# Patient Record
Sex: FEMALE | Race: WHITE | NOT HISPANIC OR LATINO | URBAN - METROPOLITAN AREA
[De-identification: names, ages, dates, MRNs, and addresses within clinical notes are randomized per-mention and may not be internally consistent; named-entity substitution may affect disease eponyms.]

---

## 2017-07-22 ENCOUNTER — INPATIENT (INPATIENT)
Facility: HOSPITAL | Age: 82
LOS: 2 days | Discharge: OTHER INSTITUTION | DRG: 159 | End: 2017-07-25
Attending: HOSPITALIST | Admitting: INTERNAL MEDICINE
Payer: MEDICARE

## 2017-07-22 VITALS
SYSTOLIC BLOOD PRESSURE: 144 MMHG | HEART RATE: 70 BPM | RESPIRATION RATE: 20 BRPM | TEMPERATURE: 98 F | DIASTOLIC BLOOD PRESSURE: 77 MMHG

## 2017-07-22 PROCEDURE — 71010: CPT | Mod: 26

## 2017-07-22 PROCEDURE — 99285 EMERGENCY DEPT VISIT HI MDM: CPT | Mod: 25,GC

## 2017-07-22 PROCEDURE — 73620 X-RAY EXAM OF FOOT: CPT | Mod: 26,LT

## 2017-07-22 PROCEDURE — 72170 X-RAY EXAM OF PELVIS: CPT | Mod: 26

## 2017-07-22 PROCEDURE — 70450 CT HEAD/BRAIN W/O DYE: CPT | Mod: 26

## 2017-07-22 PROCEDURE — 73590 X-RAY EXAM OF LOWER LEG: CPT | Mod: 26,RT

## 2017-07-22 PROCEDURE — 73610 X-RAY EXAM OF ANKLE: CPT | Mod: 26,LT

## 2017-07-22 PROCEDURE — 73562 X-RAY EXAM OF KNEE 3: CPT | Mod: 26,RT

## 2017-07-22 PROCEDURE — 70486 CT MAXILLOFACIAL W/O DYE: CPT | Mod: 26

## 2017-07-22 PROCEDURE — 93010 ELECTROCARDIOGRAM REPORT: CPT

## 2017-07-22 RX ORDER — ACETAMINOPHEN 500 MG
650 TABLET ORAL ONCE
Qty: 0 | Refills: 0 | Status: COMPLETED | OUTPATIENT
Start: 2017-07-22 | End: 2017-07-22

## 2017-07-22 RX ADMIN — Medication 650 MILLIGRAM(S): at 20:11

## 2017-07-22 NOTE — ED PROVIDER NOTE - ATTENDING CONTRIBUTION TO CARE
Patient with fall and difficulty walking, decreased activities of daily living secondary to legal blindness. Patient fell onto face with mild to moderate pain and dental laxity per family. no shortness of breath no chest pain, no numbness or weakness. Patient has pain to left face/foot/ankle and right knee. No loss of consciousness.   will get iv, labs, ct head/neck/maxillofacial bones, dental consult, Will follow up on labs, analgesia, reassess and disposition as clinically indicated.

## 2017-07-22 NOTE — ED PROVIDER NOTE - MUSCULOSKELETAL, MLM
Strength appropriate for age. Full active range of motion of all 4 extremities. Pain in R knee when flexing w/ point tenderness inferior aspect of patella w/ small ecchymose

## 2017-07-22 NOTE — ED PROVIDER NOTE - ENMT, MLM
Airway patent, Nasal mucosa clear. Mouth with normal mucosa. Small abrasion insided lower lip. Loose but stable R upper frontal incisor. Chipped L upper frontal incisior. Small abrasion Just inferior to lip, midline, not actively bleeding and non communicating with inside of lip

## 2017-07-22 NOTE — ED ADULT NURSE NOTE - OBJECTIVE STATEMENT
89 y/o female hx HTN on asa & plavix, bibems s/p fall. as per pt she was walking and fell on her face hit mouth, dry blood on chin, 89 y/o female hx HTN on asa & plavix, stroke in 2015, chronic bilateral hip pain, visually impaired,  bibems s/p fall. as per pt she was walking and fell on her face hit mouth, dry blood on chin, no headache, PERRL, A&Ox3. No CP or SOB, no nausea/vomiting, no fever/chills. rash eruption on right arm on prednisone. Safety maintained & continue monitor.

## 2017-07-22 NOTE — ED PROVIDER NOTE - PROGRESS NOTE DETAILS
Dr. Isacc Parker: [phone] - [facility] saw patient - splinted from R canine to L canine - moved displaced tooth back into place - needs to follow up with Dental Clinic [phone] in [place] in 2 weeks - will place this information in discharge instructions if patient patient unable to walk - will admit patient unable to walk - reporting pain in R knee - unable to walk to bathroom with tech and nurse - not a safe discharge w/ concurrent legal blindness- will admit

## 2017-07-22 NOTE — ED PROVIDER NOTE - EYES, MLM
Clear bilaterally, pupils equal, round and reactive to light. EOMI. No scleral icterus. Dec visual acuity both eyes Clear bilaterally, pupils equal, round and reactive to light. EOMI. No scleral icterus. Decreased visual acuity both eyes (patient sees movement/light at 1-2 feet)

## 2017-07-22 NOTE — ED PROVIDER NOTE - CARE PLAN
Instructions for follow-up, activity and diet:	1) Please return to the ED should you have any new or worsening symptoms, worsening pain, develop   2) Please follow up with Dental Clinic [phone] in [place] in 2 weeks - please call to make an appointment Instructions for follow-up, activity and diet:	1) Please return to the ED should you have any new or worsening symptoms, worsening pain, develop dizziness, chest pain, or shortness of breath  2) Please follow up with Dental Clinic [phone] in [place] in 2 weeks - please call to make an appointment - please continue with a soft diet until cleared by dental Instructions for follow-up, activity and diet:	1) Please return to the ED should you have any new or worsening symptoms, worsening pain, develop dizziness, chest pain, or shortness of breath  2) Please follow up with Dental Clinic [phone] in [place] in 2 weeks - please call to make an appointment - please continue with a soft diet until cleared by dental Principal Discharge DX:	Fall  Instructions for follow-up, activity and diet:	1) Please return to the ED should you have any new or worsening symptoms, worsening pain, develop dizziness, chest pain, or shortness of breath  2) Please follow up with Dental Clinic [phone] in [place] in 2 weeks - please call to make an appointment - please continue with a soft diet until cleared by dental Principal Discharge DX:	Fall  Instructions for follow-up, activity and diet:	1) Please return to the ED should you have any new or worsening symptoms, worsening pain, develop dizziness, chest pain, or shortness of breath  2) Please follow up with Dental Clinic [phone] in [place] in 2 weeks - please call to make an appointment - please continue with a soft diet until cleared by dental Principal Discharge DX:	Fall  Instructions for follow-up, activity and diet:	1) Please return to the ED should you have any new or worsening symptoms, worsening pain, develop dizziness, chest pain, or shortness of breath  2) Please follow up with Dental Clinic [phone] [facility] in 2 weeks - please call to make an appointment - please continue with a soft diet until cleared by dental  Secondary Diagnosis:	Closed fracture of tooth, initial encounter

## 2017-07-22 NOTE — CONSULT NOTE ADULT - SUBJECTIVE AND OBJECTIVE BOX
Patient is a 89y old  Female who presents with a chief complaint of "fell and chipped my front tooth"    HPI:  89 y/o female hx of CVA on ASA plavix, w/ bilateral vision loss, HTN, HLD, not on AC presents from assisted living [place] with a fall. Per patient, pants were wet, so she walking to her room and she tripped and hit face on the floor. No LOC. Normally walks with a walker but was not using it today. Reports L sided face pain, R knee pain, L foot and ankle pain, and chipped front tooth. Was unable to get off floor by herself - aid had to help - on floor for 5 minutes. Denies any Chest pain, SOB, N/V/D, confusion. A+O x 3. Been following with Derm for R arm rash - will find out biopsy results on Monday.     PAST MEDICAL & SURGICAL HISTORY:  Stroke  HLD (hyperlipidemia)  HTN (hypertension)  Hypothyroid  Diabetes (insulin dependent)    ( -  ) heart valve replacement  (  - ) joint replacement  (  - ) pregnancy    Home Medications:   * Patient Currently Takes Medications as of 14-Jul-2015 09:51 documented in Order   · 	lactulose 10 g/15 mL oral syrup: 45 milliliter(s) orally 2 times a day, As needed, constipation  · 	amLODIPine 5 mg oral tablet: 1 tab(s) orally once a day  · 	albuterol-ipratropium 2.5 mg-0.5 mg/3 mL inhalation solution: 3 milliliter(s) inhaled every 6 hours  · 	levofloxacin 250 mg oral tablet: 1 tab(s) orally every 24 hours  · 	insulin lispro 100 units/mL subcutaneous solution:  subcutaneous once a day (at bedtime), 2 Unit(s) if Glucose 251 - 300  	4 Unit(s) if Glucose 301 - 350  	6 Unit(s) if Glucose 351 - 400  	8 Unit(s) if Glucose Greater Than 400  · 	insulin lispro 100 units/mL subcutaneous solution:  subcutaneous 3 times a day (before meals), 1 Unit(s) if Glucose 151 - 200  	2 Unit(s) if Glucose 201 - 250  	3 Unit(s) if Glucose 251 - 300  	4 Unit(s) if Glucose 301 - 350  	5 Unit(s) if Glucose 351 - 400  	6 Unit(s) if Glucose Greater Than 400  · 	acetaminophen 325 mg oral tablet: 2 tab(s) orally every 6 hours, As needed, For Temp over 37.9 C (100.2 F)  · 	Multiple Vitamins oral tablet: 1 tab(s) orally once a day  · 	ascorbic acid 500 mg oral tablet: 1 tab(s) orally 2 times a day  · 	folic acid 1 mg oral tablet: 1 tab(s) orally once a day  · 	atorvastatin 40 mg oral tablet: 1 tab(s) orally once a day (at bedtime)  · 	clopidogrel 75 mg oral tablet: 1 tab(s) orally once a day  carvedilol 25 mg oral tablet: 1 tab(s) orally every 12 hours  · 	famotidine 20 mg oral tablet: 1 tab(s) orally once a day  · 	aspirin 81 mg oral delayed release tablet: 1 tab(s) orally once a day  · 	clonidine 0.2 mg oral tablet:  orally once a day (at bedtime)  · 	Coumadin 2.5 mg oral tablet: 1 tab(s) orally once a day (at bedtime)  · 	Seroquel 25 mg oral tablet: 1 tab(s) orally once a day  · 	Cozaar 50 mg oral tablet: 2 tab(s) orally once a day  · 	Lutein 6 mg oral capsule: 1 cap(s) orally once a day  ·	Synthroid 25 mcg (0.025 mg) oral tablet: 2 tab(s) orally once a day    Allergies: No Known Allergies, no intolerances    Vital Signs Last 24 Hrs  T(C): 36.6 (22 Jul 2017 19:13), Max: 36.6 (22 Jul 2017 19:13)  T(F): 97.9 (22 Jul 2017 19:13), Max: 97.9 (22 Jul 2017 19:13)  HR: 70 (22 Jul 2017 19:13) (70 - 70)  BP: 144/77 (22 Jul 2017 19:13) (144/77 - 144/77)  BP(mean): --  RR: 20 (22 Jul 2017 19:13) (20 - 20)  SpO2: --    EOE:  TMJ (  - ) clicks                    (  +  ) pops bilaterally - assymptomatic                    (  -  ) crepitus             Mandible FROM             Facial bones and MOM grossly intact             ( -  ) trismus             ( -  ) LAD             ( + ) mild swelling - on left of face where patient reports falling - upon palpation patient denied pain, no fluctuance, no crepitus              (  + ) mild asymmetry - due to mild swelling on left side of face             ( +  ) palpation - slight pain with palpation of lower lip - 5mm inferior to vermillion border of lower lip noted ecchymosis of 1.3 cm spanning mediolaterally with superficial abrasion measuring 2x2mm, hemostasis achieved with moderate finger pressure.             (  - ) SOB             (  - ) dysphagia             ( -  ) LOC    IOE:  permanent dentition: grossly intact                 hard/soft palate:  ( -  ) palatal torus           tongue/FOM WNL           labial/buccal mucosa : lower lip ecchymosis - 2cm extending from the midline to distal of tooth #27 and spanning 1cm superior-inferiorly.           ( -  ) percussion           ( +  ) palpation - with apical pressure applied to tooth #8 - hemorrhaging noted from gingival sulcus on buccal of tooth #8            ( - ) swelling            ( -  ) abscess           ( -  ) sinus tract    Dentition present: 2mm anterior open bite, enamel-dentin fracture without pulpal exposure (Plasencia Class II) of tooth #9 mesial incisal edge  Mobility: mobility class 2 on tooth #8, mobility of Class 1 on tooth #9    *DENTAL RADIOGRAPHS: two periapical radiographs taken of tooth #8 & 9     RADIOLOGY & ADDITIONAL STUDIES: widened periodontal ligament noted at apex of tooth #8 indicating coronal displacement. #9 noted to be WNL    *ASSESSMENT: Clinical examination revealed asymptomatic popping of TMJ bilaterally, mild swelling on left of face where patient reports falling, mild extraoral asymmetry due to mild swelling on left side of face. Upon palpation patient denied pain, no fluctuance, no crepitus. Upon palpation of lower lip, patient verbalized slight pain (2/10 on pain scale). 5mm inferior to vermillion border of lower lip noted ecchymosis of 1.3 cm spanning mediolaterally with superficial abrasion measuring 2x2mm, hemostasis achieved with moderate finger pressure x 2mins. Intraorally 2mm anterior open bite noted with posterior teeth in proper occlusion, noted lower lip ecchymosis of 2cm extending from the midline to distal of tooth #27 and spanning 1cm superior-inferiorly. Patient verbalized slight pain (2/10 on pain scale) with apical pressure applied to tooth #8 - hemorrhaging noted from gingival sulcus on buccal of tooth #8. Radiographic and clinical evaluation revealed #8 extruded and #9 concussion.    PROCEDURE:   Verbal consent given.  Anesthesia: 1 carpule 3% Carbocaine delivered via buccal infiltration of tooth #8 & #9.  Treatment: digital repositioning of tooth #8 apically and wire and composite splint placed from tooth # 6-11. Teeth etched, bonded, and flowable composite placed and light cured to secure wire. Repeat periapical radiographs taken. Radiographic evaluation revealed reduction of widened PDL and replacement of tooth #8 in socket.    RECOMMENDATIONS:  1) Pain control as needed.  2) Soft diet and avoid anterior teeth while chewing (avoid biting into food with front teeth)  3) Dental F/U in 2 weeks with outpatient dentist to evaluate splint and stability of tooth #8 and #9   4) Dental F/U with outpatient dentist for comprehensive dental care.   5) If any difficulty swallowing/breathing, fever occur, return to ER.     Magali Terry DDS, Roxie Babcock DDS, pager #[phone]  Consulted with Chief Resident Vitor Toro DDS

## 2017-07-22 NOTE — ED PROVIDER NOTE - OBJECTIVE STATEMENT
89 y/o female with CAD plavix, HTN, HLD, unsure if on AC presents from assisted living with a fall. Per patient, pants were wet, so she walking to her room and she tripped and hit face on the floor. No LOC. Normally walks with a walker but was not using it today. Reports L sided face pain, R knee pain, L foot and ankle pain, and chipped front tooth. Was unable to get off floor by herself - aid had to help. Denies any Chest pain, SOB, N/V/D, confusion. A+O x 3.  PMD: Dr. Teo Olguin 89 y/o female hx of CVA on ASA plavix, w/ bilateral vision loss, HTN, HLD, not on AC presents from assisted living [facility] with a fall. Per patient, pants were wet, so she walking to her room and she tripped and hit face on the floor. No LOC. Normally walks with a walker but was not using it today. Reports L sided face pain, R knee pain, L foot and ankle pain, and chipped front tooth. Was unable to get off floor by herself - aid had to help - on floor for 5 minutes. Denies any Chest pain, SOB, N/V/D, confusion. A+O x 3.  PMD: [facility] (Marko Babcock) [phone] 89 y/o female hx of CVA on ASA plavix, w/ bilateral vision loss, HTN, HLD, not on AC presents from assisted living [facility] with a fall. Per patient, pants were wet, so she walking to her room and she tripped and hit face on the floor. No LOC. Normally walks with a walker but was not using it today. Reports L sided face pain, R knee pain, L foot and ankle pain, and chipped front tooth. Was unable to get off floor by herself - aid had to help - on floor for 5 minutes. Denies any Chest pain, SOB, N/V/D, confusion. A+O x 3. Been following with Derm for R arm rash - will find out biopsy results on Monday.   PMD: [facility] (Marko Babcock) [phone]

## 2017-07-22 NOTE — ED PROVIDER NOTE - PLAN OF CARE
1) Please return to the ED should you have any new or worsening symptoms, worsening pain, develop   2) Please follow up with Dental Clinic [phone] in [place] in 2 weeks - please call to make an appointment 1) Please return to the ED should you have any new or worsening symptoms, worsening pain, develop dizziness, chest pain, or shortness of breath  2) Please follow up with Dental Clinic [phone] in [place] in 2 weeks - please call to make an appointment - please continue with a soft diet until cleared by dental

## 2017-07-22 NOTE — ED PROVIDER NOTE - SKIN, MLM
Erythematous rash w/ scattered bullae on R arm, some blisters sloughed off - Erythematous rash w/ scattered bullae on R arm, some blisters sloughed off

## 2017-07-23 DIAGNOSIS — R21 RASH AND OTHER NONSPECIFIC SKIN ERUPTION: ICD-10-CM
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
## 2017-07-23 DIAGNOSIS — I10 ESSENTIAL (PRIMARY) HYPERTENSION: ICD-10-CM

## 2017-07-23 DIAGNOSIS — I63.9 CEREBRAL INFARCTION, UNSPECIFIED: ICD-10-CM

## 2017-07-23 DIAGNOSIS — R73.9 HYPERGLYCEMIA, UNSPECIFIED: ICD-10-CM

## 2017-07-23 DIAGNOSIS — E03.9 HYPOTHYROIDISM, UNSPECIFIED: ICD-10-CM

## 2017-07-23 DIAGNOSIS — Z96.642 PRESENCE OF LEFT ARTIFICIAL HIP JOINT: Chronic | ICD-10-CM

## 2017-07-23 DIAGNOSIS — F41.9 ANXIETY DISORDER, UNSPECIFIED: ICD-10-CM

## 2017-07-23 LAB
ALBUMIN SERPL ELPH-MCNC: 3.9 G/DL — SIGNIFICANT CHANGE UP (ref 3.3–5)
ALP SERPL-CCNC: 80 U/L — SIGNIFICANT CHANGE UP (ref 40–120)
ALT FLD-CCNC: 42 U/L RC — SIGNIFICANT CHANGE UP (ref 10–45)
ANION GAP SERPL CALC-SCNC: 14 MMOL/L — SIGNIFICANT CHANGE UP (ref 5–17)
APPEARANCE UR: CLEAR — SIGNIFICANT CHANGE UP
APTT BLD: 20 SEC — LOW (ref 27.5–37.4)
AST SERPL-CCNC: 18 U/L — SIGNIFICANT CHANGE UP (ref 10–40)
BASOPHILS # BLD AUTO: 0.1 K/UL — SIGNIFICANT CHANGE UP (ref 0–0.2)
BASOPHILS NFR BLD AUTO: 0.8 % — SIGNIFICANT CHANGE UP (ref 0–2)
BILIRUB SERPL-MCNC: 0.7 MG/DL — SIGNIFICANT CHANGE UP (ref 0.2–1.2)
BILIRUB UR-MCNC: NEGATIVE — SIGNIFICANT CHANGE UP
BUN SERPL-MCNC: 40 MG/DL — HIGH (ref 7–23)
CALCIUM SERPL-MCNC: 9.2 MG/DL — SIGNIFICANT CHANGE UP (ref 8.4–10.5)
CHLORIDE SERPL-SCNC: 97 MMOL/L — SIGNIFICANT CHANGE UP (ref 96–108)
CO2 SERPL-SCNC: 25 MMOL/L — SIGNIFICANT CHANGE UP (ref 22–31)
COLOR SPEC: SIGNIFICANT CHANGE UP
CREAT ?TM UR-MCNC: 38 MG/DL — SIGNIFICANT CHANGE UP
CREAT SERPL-MCNC: 1.02 MG/DL — SIGNIFICANT CHANGE UP (ref 0.5–1.3)
DIFF PNL FLD: NEGATIVE — SIGNIFICANT CHANGE UP
EOSINOPHIL # BLD AUTO: 0 K/UL — SIGNIFICANT CHANGE UP (ref 0–0.5)
EOSINOPHIL NFR BLD AUTO: 0 % — SIGNIFICANT CHANGE UP (ref 0–6)
GLUCOSE SERPL-MCNC: 407 MG/DL — HIGH (ref 70–99)
GLUCOSE UR QL: >1000
HCT VFR BLD CALC: 44.8 % — SIGNIFICANT CHANGE UP (ref 34.5–45)
HGB BLD-MCNC: 14.8 G/DL — SIGNIFICANT CHANGE UP (ref 11.5–15.5)
INR BLD: 0.98 RATIO — SIGNIFICANT CHANGE UP (ref 0.88–1.16)
KETONES UR-MCNC: NEGATIVE — SIGNIFICANT CHANGE UP
LEUKOCYTE ESTERASE UR-ACNC: NEGATIVE — SIGNIFICANT CHANGE UP
LYMPHOCYTES # BLD AUTO: 1.2 K/UL — SIGNIFICANT CHANGE UP (ref 1–3.3)
LYMPHOCYTES # BLD AUTO: 10.5 % — LOW (ref 13–44)
MCHC RBC-ENTMCNC: 31.3 PG — SIGNIFICANT CHANGE UP (ref 27–34)
MCHC RBC-ENTMCNC: 33.1 GM/DL — SIGNIFICANT CHANGE UP (ref 32–36)
MCV RBC AUTO: 94.6 FL — SIGNIFICANT CHANGE UP (ref 80–100)
MICROALBUMIN UR-MCNC: 1.7 MG/DL — SIGNIFICANT CHANGE UP
MICROALBUMIN/CREAT UR-RTO: 45 MG/G — HIGH (ref 0–30)
MONOCYTES # BLD AUTO: 0.5 K/UL — SIGNIFICANT CHANGE UP (ref 0–0.9)
MONOCYTES NFR BLD AUTO: 3.9 % — SIGNIFICANT CHANGE UP (ref 2–14)
NEUTROPHILS # BLD AUTO: 9.8 K/UL — HIGH (ref 1.8–7.4)
NEUTROPHILS NFR BLD AUTO: 84.7 % — HIGH (ref 43–77)
NITRITE UR-MCNC: NEGATIVE — SIGNIFICANT CHANGE UP
PH UR: 5.5 — SIGNIFICANT CHANGE UP (ref 5–8)
PLATELET # BLD AUTO: 176 K/UL — SIGNIFICANT CHANGE UP (ref 150–400)
POTASSIUM SERPL-MCNC: 5.1 MMOL/L — SIGNIFICANT CHANGE UP (ref 3.5–5.3)
POTASSIUM SERPL-SCNC: 5.1 MMOL/L — SIGNIFICANT CHANGE UP (ref 3.5–5.3)
PROT SERPL-MCNC: 6.8 G/DL — SIGNIFICANT CHANGE UP (ref 6–8.3)
PROT UR-MCNC: SIGNIFICANT CHANGE UP
PROTHROM AB SERPL-ACNC: 10.7 SEC — SIGNIFICANT CHANGE UP (ref 9.8–12.7)
RBC # BLD: 4.73 M/UL — SIGNIFICANT CHANGE UP (ref 3.8–5.2)
RBC # FLD: 12.7 % — SIGNIFICANT CHANGE UP (ref 10.3–14.5)
SODIUM SERPL-SCNC: 136 MMOL/L — SIGNIFICANT CHANGE UP (ref 135–145)
SP GR SPEC: 1.03 — HIGH (ref 1.01–1.02)
UROBILINOGEN FLD QL: NEGATIVE — SIGNIFICANT CHANGE UP
WBC # BLD: 11.5 K/UL — HIGH (ref 3.8–10.5)
WBC # FLD AUTO: 11.5 K/UL — HIGH (ref 3.8–10.5)

## 2017-07-23 PROCEDURE — 99223 1ST HOSP IP/OBS HIGH 75: CPT | Mod: GC

## 2017-07-23 PROCEDURE — 99223 1ST HOSP IP/OBS HIGH 75: CPT

## 2017-07-23 RX ORDER — CHOLECALCIFEROL (VITAMIN D3) 125 MCG
1000 CAPSULE ORAL DAILY
Qty: 0 | Refills: 0 | Status: DISCONTINUED | OUTPATIENT
Start: 2017-07-23 | End: 2017-07-25

## 2017-07-23 RX ORDER — CARVEDILOL PHOSPHATE 80 MG/1
25 CAPSULE, EXTENDED RELEASE ORAL EVERY 12 HOURS
Qty: 0 | Refills: 0 | Status: DISCONTINUED | OUTPATIENT
Start: 2017-07-23 | End: 2017-07-25

## 2017-07-23 RX ORDER — PANTOPRAZOLE SODIUM 20 MG/1
40 TABLET, DELAYED RELEASE ORAL
Qty: 0 | Refills: 0 | Status: DISCONTINUED | OUTPATIENT
Start: 2017-07-23 | End: 2017-07-25

## 2017-07-23 RX ORDER — DOCUSATE SODIUM 100 MG
100 CAPSULE ORAL THREE TIMES A DAY
Qty: 0 | Refills: 0 | Status: DISCONTINUED | OUTPATIENT
Start: 2017-07-23 | End: 2017-07-25

## 2017-07-23 RX ORDER — ALPRAZOLAM 0.25 MG
0 TABLET ORAL
Qty: 0 | Refills: 0 | COMMUNITY

## 2017-07-23 RX ORDER — ASCORBIC ACID 60 MG
500 TABLET,CHEWABLE ORAL DAILY
Qty: 0 | Refills: 0 | Status: DISCONTINUED | OUTPATIENT
Start: 2017-07-23 | End: 2017-07-25

## 2017-07-23 RX ORDER — INSULIN LISPRO 100/ML
VIAL (ML) SUBCUTANEOUS
Qty: 0 | Refills: 0 | Status: DISCONTINUED | OUTPATIENT
Start: 2017-07-23 | End: 2017-07-25

## 2017-07-23 RX ORDER — QUETIAPINE FUMARATE 200 MG/1
25 TABLET, FILM COATED ORAL AT BEDTIME
Qty: 0 | Refills: 0 | Status: DISCONTINUED | OUTPATIENT
Start: 2017-07-23 | End: 2017-07-25

## 2017-07-23 RX ORDER — INSULIN LISPRO 100/ML
VIAL (ML) SUBCUTANEOUS AT BEDTIME
Qty: 0 | Refills: 0 | Status: DISCONTINUED | OUTPATIENT
Start: 2017-07-23 | End: 2017-07-25

## 2017-07-23 RX ORDER — FOLIC ACID 0.8 MG
1 TABLET ORAL DAILY
Qty: 0 | Refills: 0 | Status: DISCONTINUED | OUTPATIENT
Start: 2017-07-23 | End: 2017-07-25

## 2017-07-23 RX ORDER — SENNA PLUS 8.6 MG/1
0 TABLET ORAL
Qty: 0 | Refills: 0 | COMMUNITY

## 2017-07-23 RX ORDER — ASPIRIN/CALCIUM CARB/MAGNESIUM 324 MG
81 TABLET ORAL DAILY
Qty: 0 | Refills: 0 | Status: DISCONTINUED | OUTPATIENT
Start: 2017-07-23 | End: 2017-07-25

## 2017-07-23 RX ORDER — VALACYCLOVIR 500 MG/1
500 TABLET, FILM COATED ORAL EVERY 12 HOURS
Qty: 0 | Refills: 0 | Status: DISCONTINUED | OUTPATIENT
Start: 2017-07-23 | End: 2017-07-24

## 2017-07-23 RX ORDER — QUETIAPINE FUMARATE 200 MG/1
0 TABLET, FILM COATED ORAL
Qty: 0 | Refills: 0 | COMMUNITY

## 2017-07-23 RX ORDER — LEVOTHYROXINE SODIUM 125 MCG
50 TABLET ORAL DAILY
Qty: 0 | Refills: 0 | Status: DISCONTINUED | OUTPATIENT
Start: 2017-07-23 | End: 2017-07-25

## 2017-07-23 RX ORDER — DEXTROSE 50 % IN WATER 50 %
25 SYRINGE (ML) INTRAVENOUS ONCE
Qty: 0 | Refills: 0 | Status: DISCONTINUED | OUTPATIENT
Start: 2017-07-23 | End: 2017-07-25

## 2017-07-23 RX ORDER — ACETAMINOPHEN 500 MG
650 TABLET ORAL EVERY 6 HOURS
Qty: 0 | Refills: 0 | Status: DISCONTINUED | OUTPATIENT
Start: 2017-07-23 | End: 2017-07-25

## 2017-07-23 RX ORDER — DOCUSATE SODIUM 100 MG
0 CAPSULE ORAL
Qty: 0 | Refills: 0 | COMMUNITY

## 2017-07-23 RX ORDER — AMLODIPINE BESYLATE 2.5 MG/1
5 TABLET ORAL DAILY
Qty: 0 | Refills: 0 | Status: DISCONTINUED | OUTPATIENT
Start: 2017-07-23 | End: 2017-07-24

## 2017-07-23 RX ORDER — HEPARIN SODIUM 5000 [USP'U]/ML
5000 INJECTION INTRAVENOUS; SUBCUTANEOUS EVERY 8 HOURS
Qty: 0 | Refills: 0 | Status: DISCONTINUED | OUTPATIENT
Start: 2017-07-23 | End: 2017-07-25

## 2017-07-23 RX ORDER — ALPRAZOLAM 0.25 MG
1 TABLET ORAL
Qty: 0 | Refills: 0 | COMMUNITY

## 2017-07-23 RX ORDER — SODIUM CHLORIDE 9 MG/ML
1000 INJECTION, SOLUTION INTRAVENOUS
Qty: 0 | Refills: 0 | Status: DISCONTINUED | OUTPATIENT
Start: 2017-07-23 | End: 2017-07-25

## 2017-07-23 RX ORDER — ALPRAZOLAM 0.25 MG
0.25 TABLET ORAL AT BEDTIME
Qty: 0 | Refills: 0 | Status: DISCONTINUED | OUTPATIENT
Start: 2017-07-23 | End: 2017-07-25

## 2017-07-23 RX ORDER — DEXTROSE 50 % IN WATER 50 %
1 SYRINGE (ML) INTRAVENOUS ONCE
Qty: 0 | Refills: 0 | Status: DISCONTINUED | OUTPATIENT
Start: 2017-07-23 | End: 2017-07-25

## 2017-07-23 RX ORDER — GLUCAGON INJECTION, SOLUTION 0.5 MG/.1ML
1 INJECTION, SOLUTION SUBCUTANEOUS ONCE
Qty: 0 | Refills: 0 | Status: DISCONTINUED | OUTPATIENT
Start: 2017-07-23 | End: 2017-07-25

## 2017-07-23 RX ORDER — DEXTROSE 50 % IN WATER 50 %
12.5 SYRINGE (ML) INTRAVENOUS ONCE
Qty: 0 | Refills: 0 | Status: DISCONTINUED | OUTPATIENT
Start: 2017-07-23 | End: 2017-07-25

## 2017-07-23 RX ORDER — ATORVASTATIN CALCIUM 80 MG/1
40 TABLET, FILM COATED ORAL AT BEDTIME
Qty: 0 | Refills: 0 | Status: DISCONTINUED | OUTPATIENT
Start: 2017-07-23 | End: 2017-07-25

## 2017-07-23 RX ORDER — SENNA PLUS 8.6 MG/1
2 TABLET ORAL AT BEDTIME
Qty: 0 | Refills: 0 | Status: DISCONTINUED | OUTPATIENT
Start: 2017-07-23 | End: 2017-07-25

## 2017-07-23 RX ORDER — CLOPIDOGREL BISULFATE 75 MG/1
75 TABLET, FILM COATED ORAL DAILY
Qty: 0 | Refills: 0 | Status: DISCONTINUED | OUTPATIENT
Start: 2017-07-23 | End: 2017-07-25

## 2017-07-23 RX ADMIN — Medication 1 MILLIGRAM(S): at 08:49

## 2017-07-23 RX ADMIN — ATORVASTATIN CALCIUM 40 MILLIGRAM(S): 80 TABLET, FILM COATED ORAL at 22:53

## 2017-07-23 RX ADMIN — Medication 10 MILLIGRAM(S): at 14:45

## 2017-07-23 RX ADMIN — Medication 50 MICROGRAM(S): at 05:20

## 2017-07-23 RX ADMIN — Medication 650 MILLIGRAM(S): at 02:47

## 2017-07-23 RX ADMIN — Medication 2: at 22:53

## 2017-07-23 RX ADMIN — Medication 2: at 19:00

## 2017-07-23 RX ADMIN — Medication 0.2 MILLIGRAM(S): at 22:52

## 2017-07-23 RX ADMIN — PANTOPRAZOLE SODIUM 40 MILLIGRAM(S): 20 TABLET, DELAYED RELEASE ORAL at 08:48

## 2017-07-23 RX ADMIN — Medication 0.2 MILLIGRAM(S): at 03:40

## 2017-07-23 RX ADMIN — Medication 1 TABLET(S): at 08:48

## 2017-07-23 RX ADMIN — AMLODIPINE BESYLATE 5 MILLIGRAM(S): 2.5 TABLET ORAL at 08:49

## 2017-07-23 RX ADMIN — SENNA PLUS 2 TABLET(S): 8.6 TABLET ORAL at 22:52

## 2017-07-23 RX ADMIN — CARVEDILOL PHOSPHATE 25 MILLIGRAM(S): 80 CAPSULE, EXTENDED RELEASE ORAL at 08:48

## 2017-07-23 RX ADMIN — HEPARIN SODIUM 5000 UNIT(S): 5000 INJECTION INTRAVENOUS; SUBCUTANEOUS at 22:53

## 2017-07-23 RX ADMIN — Medication 100 MILLIGRAM(S): at 17:27

## 2017-07-23 RX ADMIN — Medication: at 04:36

## 2017-07-23 RX ADMIN — CLOPIDOGREL BISULFATE 75 MILLIGRAM(S): 75 TABLET, FILM COATED ORAL at 14:19

## 2017-07-23 RX ADMIN — CARVEDILOL PHOSPHATE 25 MILLIGRAM(S): 80 CAPSULE, EXTENDED RELEASE ORAL at 17:28

## 2017-07-23 RX ADMIN — HEPARIN SODIUM 5000 UNIT(S): 5000 INJECTION INTRAVENOUS; SUBCUTANEOUS at 17:28

## 2017-07-23 RX ADMIN — Medication 100 MILLIGRAM(S): at 22:52

## 2017-07-23 RX ADMIN — VALACYCLOVIR 500 MILLIGRAM(S): 500 TABLET, FILM COATED ORAL at 22:53

## 2017-07-23 RX ADMIN — Medication 3: at 14:19

## 2017-07-23 RX ADMIN — HEPARIN SODIUM 5000 UNIT(S): 5000 INJECTION INTRAVENOUS; SUBCUTANEOUS at 08:50

## 2017-07-23 RX ADMIN — Medication 100 MILLIGRAM(S): at 08:49

## 2017-07-23 RX ADMIN — Medication 500 MILLIGRAM(S): at 08:48

## 2017-07-23 RX ADMIN — Medication 1000 UNIT(S): at 08:48

## 2017-07-23 RX ADMIN — QUETIAPINE FUMARATE 25 MILLIGRAM(S): 200 TABLET, FILM COATED ORAL at 22:52

## 2017-07-23 RX ADMIN — Medication 81 MILLIGRAM(S): at 14:20

## 2017-07-23 NOTE — H&P ADULT - PROBLEM SELECTOR PLAN 1
See above.  Presumed mechanical fall with multiple sensory deficits>>PT.  Visually impaired.  Fall precautions.

## 2017-07-23 NOTE — H&P ADULT - NSHPLABSRESULTS_GEN_ALL_CORE
WBC 11.5  Hb 14.8.  Platelets of 176K.  INR 0.9.  Random glucose of 407.  Cr 1.0.  Alb 3.9.  HCO3 25.  EKG tracing reviewed with NSR at 61.  Chest radiograph reviewed with no infiltrate or effusion.  Pelvis radiograph negative for fx, LEFT ORIF.  LEFT ankle, foot negative fx.  LEFT tib/fib neg for fx.  RIGHT knee and RIGHT tib/fib neg for fx.  Head CTT and maxillofacial non-dx.

## 2017-07-23 NOTE — CONSULT NOTE ADULT - SUBJECTIVE AND OBJECTIVE BOX
Patient is a 89y old  Female who presents with a chief complaint of S/P presumed mechanical fall at [facility]. (2017 02:42)      HPI: Pt is a 89 yo female HTN, HLD, s/p CVA x2 recent rash rx'd with prednisone presents s/p mechanical fall.  Pt denied lightheadedness, LOC, cp, sob or palpitations.  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 Found to have high grade intracranial vertebral stenosis.   2nd infarct with bilateral decrease in vision.  PAST MEDICAL & SURGICAL HISTORY:  Stroke  HLD (hyperlipidemia)  HTN (hypertension)  Hypothyroid  History of hip replacement, total, left      Allergies    No Known Allergies      Home Medications:   * Patient Currently Takes Medications as of 2017 02:46 documented in [facility]  · 	amLODIPine 5 mg oral tablet: 1 tab(s) orally once a day, Last Dose Taken:    · 	Multiple Vitamins oral tablet: 1 tab(s) orally once a day  · 	ascorbic acid 500 mg oral tablet: 1 tab(s) orally 2 times a day, Last Dose Taken:    · 	folic acid 1 mg oral tablet: 1 tab(s) orally once a day  · 	atorvastatin 40 mg oral tablet: 1 tab(s) orally once a day (at bedtime), Last Dose Taken:    · 	clopidogrel 75 mg oral tablet: 1 tab(s) orally once a day  · 	carvedilol 25 mg oral tablet: 1 tab(s) orally every 12 hours, Last Dose Taken:    · 	aspirin 81 mg oral delayed release tablet: 1 tab(s) orally once a day, Last Dose Taken:    · 	clonidine 0.2 mg oral tablet:  orally once a day (at bedtime)  · 	SEROquel 25 mg oral tablet:  orally once a day (at bedtime), Last Dose Taken:    · 	Synthroid 50 mcg (0.05 mg) oral tablet: 1 tab(s) orally once a day, Last Dose Taken:    · 	Protonix 40 mg oral delayed release tablet: 1 tab(s) orally once a day, Last Dose Taken:    · 	Colace 100 mg oral capsule:  orally 3 times a day, Last Dose Taken:    · 	Senna:  orally , Last Dose Taken:    · 	Xanax 0.25 mg oral tablet:  orally once a day (at bedtime), As Needed, Last Dose Taken:    · 	predniSONE 20 mg oral tablet: 1 tab(s) orally once a day, Last Dose Taken: 2017        MEDICATIONS  (STANDING):  aspirin enteric coated 81 milliGRAM(s) Oral daily  cloNIDine 0.2 milliGRAM(s) Oral at bedtime  atorvastatin 40 milliGRAM(s) Oral at bedtime  clopidogrel Tablet 75 milliGRAM(s) Oral daily  QUEtiapine 25 milliGRAM(s) Oral at bedtime  carvedilol 25 milliGRAM(s) Oral every 12 hours  amLODIPine   Tablet 5 milliGRAM(s) Oral daily  docusate sodium 100 milliGRAM(s) Oral three times a day  senna 2 Tablet(s) Oral at bedtime  pantoprazole    Tablet 40 milliGRAM(s) Oral before breakfast  levothyroxine 50 MICROGram(s) Oral daily  multivitamin 1 Tablet(s) Oral daily  ascorbic acid 500 milliGRAM(s) Oral daily  folic acid 1 milliGRAM(s) Oral daily  heparin  Injectable 5000 Unit(s) SubCutaneous every 8 hours  cholecalciferol 1000 Unit(s) Oral daily  insulin lispro (HumaLOG) corrective regimen sliding scale   SubCutaneous three times a day before meals  insulin lispro (HumaLOG) corrective regimen sliding scale   SubCutaneous at bedtime  dextrose 5%. 1000 milliLiter(s) (50 mL/Hr) IV Continuous <Continuous>  dextrose 50% Injectable 12.5 Gram(s) IV Push once  dextrose 50% Injectable 25 Gram(s) IV Push once  dextrose 50% Injectable 25 Gram(s) IV Push once    MEDICATIONS  (PRN):  acetaminophen   Tablet. 650 milliGRAM(s) Oral every 6 hours PRN Mild Pain (1 - 3)  ALPRAZolam 0.25 milliGRAM(s) Oral at bedtime PRN anxiety  dextrose Gel 1 Dose(s) Oral once PRN Blood Glucose LESS THAN 70 milliGRAM(s)/deciliter  glucagon  Injectable 1 milliGRAM(s) IntraMuscular once PRN Glucose LESS THAN 70 milligrams/deciliter      SH neg cig neg etoh    FAMILY HISTORY:  No pertinent family history in first degree relatives    ROS as above all other systems negative        Vital Signs Last 24 Hrs  T(C): 36.3 (2017 07:35), Max: 36.6 (2017 19:13)  T(F): 97.4 (2017 07:35), Max: 97.9 (2017 19:13)  HR: 62 (2017 08:43) (61 - 79)  BP: 146/86 (2017 08:43) (144/77 - 196/78)  BP(mean): --  RR: 16 (2017 07:35) (16 - 20)  SpO2: 95% (2017 07:35) (94% - 96%)  Daily     Daily   I&O's Detail      Physical Exam  Patient in NAD no c/o cp or sob  Neck supple without JVD  carotid without bruit  Lungs clear  Cor s1s2 2/6 donny rusb  Abd soft nontender  Ext without edema  multiple erythematous lesions RUE  Pulses +2 DP      LABS  PT/INR - ( 2017 02:43 )   PT: 10.7 sec;   INR: 0.98 ratio         PTT - ( 2017 02:43 )  PTT:20.0 sec  Urinalysis Basic - ( 2017 04:12 )    Color: x / Appearance: Clear / S.026 / pH: x  Gluc: x / Ketone: Negative  / Bili: Negative / Urobili: Negative   Blood: x / Protein: Trace / Nitrite: Negative   Leuk Esterase: Negative / RBC: x / WBC x   Sq Epi: x / Non Sq Epi: x / Bacteria: x          CBC Full  -  ( 2017 02:43 )  WBC Count : 11.5 K/uL  Hemoglobin : 14.8 g/dL  Hematocrit : 44.8 %  Platelet Count - Automated : 176 K/uL  Mean Cell Volume : 94.6 fl  Mean Cell Hemoglobin : 31.3 pg  Mean Cell Hemoglobin Concentration : 33.1 gm/dL  Auto Neutrophil # : 9.8 K/uL  Auto Lymphocyte # : 1.2 K/uL  Auto Monocyte # : 0.5 K/uL  Auto Eosinophil # : 0.0 K/uL  Auto Basophil # : 0.1 K/uL  Auto Neutrophil % : 84.7 %  Auto Lymphocyte % : 10.5 %  Auto Monocyte % : 3.9 %  Auto Eosinophil % : 0.0 %  Auto Basophil % : 0.8 %        136  |  97  |  40<H>  ----------------------------<  407<H>  5.1   |  25  |  1.02    Ca    9.2      2017 02:43    TPro  6.8  /  Alb  3.9  /  TBili  0.7  /  DBili  x   /  AST  18  /  ALT  42  /  AlkPhos  80  07-23    ECG:  < from: 12 Lead ECG (17 @ 22:50) >    Ventricular Rate 61 BPM    Atrial Rate 61 BPM    P-R Interval 194 ms    QRS Duration 76 ms     ms    QTc 430 ms    P Axis 53 degrees    R Axis 14 degrees    T Axis 40 degrees    Diagnosis Line NORMAL SINUS RHYTHM  POSSIBLE LEFT ATRIAL ENLARGEMENT  BORDERLINE ECG        RADIOLOGY & ADDITIONAL STUDIES:    < from: CT Head No Cont (17 @ 23:57) >  INTERPRETATION:  .    CLINICAL INFORMATION: Status post fall. Broken teeth. Trauma.    TECHNIQUE: Axial CT images were obtained through the brain, paranasal   sinuses, and orbits. Coronal and sagittal reconstruction images were also   obtained.    COMPARISON: Comparison is made to a prior noncontrast head CT examination   dated 2015. Comparison is also made to aprior brain MRI examination   dated 2014. No prior maxillofacial CT examinations are available for   comparison.    FINDINGS:     NONCONTRAST HEAD CT: There is no acute intracranial hemorrhage, mass   effect, midline shift, herniation, extra-axial fluid collection, or   hydrocephalus.    There is diffuse cerebral volume loss with prominence of the sulci,   fissures, and cisternal spaces which is normal for the patient's age.   There is mild periventricular white matter hypoattenuation statistically   compatible with microvascular changes given calcific atherosclerotic   disease of the intracranial arteries. There is redemonstration of   encephalomalacia and gliosis within the bilateral parieto-occipital   lobes, right greater than left, and right temporal lobe. There is   redemonstration of chronic infarcts within the bilateral cerebellar   hemispheres.    The calvarium appears intact.    NONCONTRAST MAXILLOFACIAL CT: No acute fractures or dislocations are   notable. The bilateral orbital floors and orbital rims are intact.    There is evidence of bilateral cataract removal. Bilateral senile scleral   plaques are noted. Otherwise, the bilateral globes, extraocular muscles,   optic nerves, and orbital fat appear unremarkable.    The nasal bones and nasal septum are intact.    The paranasal sinuses and mastoid air cells are clear.    Evaluation of the oral cavity is limited study due to streak and beam   hardening artifact generated by dental hardware and dental amalgam. There   is nonspecific periapical lucency surrounding the roots of the right   second maxillary molar.    The mandible and maxilla appear intact.    Sebaceous cysts are notable along the chin.    IMPRESSION:.    NONCONTRAST HEAD CT: No acute intracranial hemorrhage, mass effect, or   midline shift.    Similar-appearing microvascular disease and chronic infarcts, as detailed.    NONCONTRAST MAXILLOFACIAL CT: No acute facial fractures.      Assessment and Plan  89 yo female HTN, HLD, s/p CVA x2 recent rash rx'd with prednisone presents s/p mechanical fall.  Pt denied lightheadedness, LOC, cp, sob or palpitations.  CVA  bilateral occipital and R inferior cerebellar infarct. Found to have high grade intracranial vertebral stenosis.   2nd infarct with bilateral decrease in vision.  Cardiac status is stable  Neuro consult Dr. Tomlinson-will discuss with him risk benefit of DAPT in this patient s/p two mechanical falls the previous one resulting in a fractured him  Taper prednisone off.  Agree with current rx

## 2017-07-23 NOTE — H&P ADULT - NSHPPHYSICALEXAM_GEN_ALL_CORE
Physical exam with an elderly, chronically ill appearing, with diffuse sarcopenia Physical exam with an elderly, chronically ill appearing, with diffuse sarcopenia.  BP  144-196/78-82  HR  79  Afebrile.  95% on RA.  HEENT, PERRL, B/L visual impairment.  Oropharynx with repair of upper set tooth #8 and #9 with composite splint placed by dentist.  Neck supple, chest clear, cor s1 s2, abdomen soft, normal bowel sounds, non-tender, no rebound.  Ext with diffuse sarcopenia, interossei wasting.  Full range of motion of B/L hips, knees and ankles..  Skin with RIGHT upper extremity with (?) C6 dermatome distribution of scattered stages of healing of crusted and raw blisters.  No pus, no warmth.  Neurologic exam AXOx3.  speech fluent.  cognition intact.  Poor visual capacity.  UE/LE 5/5.

## 2017-07-23 NOTE — H&P ADULT - ATTENDING COMMENTS
NIGHT HOSPITALIST:  Patient/ nephew (Dr. MEENAKSHI Parker) aware of course and agree with plan/care as above.  Care reviewed with covering NP.  Care assumed by the DAY HOSPITALIST.

## 2017-07-23 NOTE — INPATIENT CERTIFICATION FOR MEDICARE PATIENTS - RISKS OF ADVERSE EVENTS
Concern for delay in diagnosis and treatment/Concern for neurologic deterioration/Concern for worsening infectious process

## 2017-07-23 NOTE — CONSULT NOTE ADULT - ATTENDING COMMENTS
This woman appears to have 'predominantly' localized cutaneous zoster involving the R arm.  However, there are a few vesicles on the abdomen and contralateral hand.  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  While we have not confirmed this is zoster, it appears quite characteristic and most of the lesions on the R arm have crusted.    In view of the extra-dermatomal vesicles, and the recent prednisone, I'm inclined to recommend antiviral Rx.  However, I don't think that IV acyclovir is necessary.    Suggest: valtrex 500 mg po twice daily for 7 days.  [facility] precs and contact precs   Hopefully dc home soon    ID to see the patient PRN.

## 2017-07-23 NOTE — H&P ADULT - PROBLEM SELECTOR PLAN 2
Skin exanthem as above appears limited to a dermatome>>will have the DAY PROVIDER review if Prednisone is still indicated>>to follow up pending skin biopsy as above.  Consider formal ID evaluation.

## 2017-07-23 NOTE — CHART NOTE - NSCHARTNOTEFT_GEN_A_CORE
Patient seen and examined.  For full H and P, please refer to note in [place] from same date.  Meds, labs and vitals all reviewed.  Patient is an elderly female who presents with mechanical fall from assisted living.   Awaiting ID eval for evaluation of possible zoster.   Patient is on Prednisone taper for possible pemphigoid.   Cardiology consult appreciated.   Neuro pending, to evaluate safety of DAPT given falls.

## 2017-07-23 NOTE — H&P ADULT - PROBLEM SELECTOR PLAN 6
See [place] I Stop.  Will cautiously continue with low dose night time Xanax (has been on Rx for 2 years) and on night Seroquel.

## 2017-07-23 NOTE — H&P ADULT - ASSESSMENT
NIGHT HOSPITALIST:  Presentation S/P presumed mechanical fall in the setting of patient with multiple sensory deficits, in the setting of known cerebrovascular disease but clinical and radiographic evidence of no acute neurologic event.  Patient S/P skin biopsy with patient on Prednisone 20 mg daily for Day 3 of 10>>unclear if this is pemphigoid but suspicious for zoster--appears limited to a single dermatome distribution>>will temporarily HOLD Prednisone until review by the DAY PROVIDER with the dermatologist.  May consider formal ID evaluation.  Patient likely has type 2 diabetes mellitus with historical elevation of random glucose since 2013>>will provide S/S and night time Lantus when weight in kg is available.  Will assume aspiration risk and provided soft diet per dentistry as tolerated.

## 2017-07-23 NOTE — H&P ADULT - PROBLEM SELECTOR PLAN 7
Likely has type 2 diabetes mellitus versus steroid induced>>S/S with night time Lantus when weight in kg is available.

## 2017-07-23 NOTE — CONSULT NOTE ADULT - SUBJECTIVE AND OBJECTIVE BOX
HPI:    89 year old female with PMH CVA in  with B/L occipital and RIGHT inferior- cerebellar hemisphere infarcts, Hypothyroidism who presented on 17 with presumed mechanical fall at [facility]. Patient had a rash develop on her right arm over the past week. Which was reportedly biopsied as outpatient. Differential was pemphigoid vs. shingles. No pain or pruritis associated with the rash. No chills or fevers. Patient was started on a 10 day course of Prednisone starting at 20 mg PO QD. With today being Day 5. No SOB, Cough, Sore throat or rhinorrhea. No N/V/D.       PAST MEDICAL & SURGICAL HISTORY:  Stroke  HLD (hyperlipidemia)  HTN (hypertension)  Hypothyroid  History of hip replacement, total, left      Allergies  No Known Allergies        ANTIMICROBIALS:      OTHER MEDS: MEDICATIONS  (STANDING):  acetaminophen   Tablet. 650 every 6 hours PRN  aspirin enteric coated 81 daily  cloNIDine 0.2 at bedtime  atorvastatin 40 at bedtime  clopidogrel Tablet 75 daily  QUEtiapine 25 at bedtime  carvedilol 25 every 12 hours  amLODIPine   Tablet 5 daily  docusate sodium 100 three times a day  senna 2 at bedtime  pantoprazole    Tablet 40 before breakfast  levothyroxine 50 daily  ALPRAZolam 0.25 at bedtime PRN  heparin  Injectable 5000 every 8 hours  insulin lispro (HumaLOG) corrective regimen sliding scale  three times a day before meals  insulin lispro (HumaLOG) corrective regimen sliding scale  at bedtime  dextrose Gel 1 once PRN  dextrose 50% Injectable 12.5 once  dextrose 50% Injectable 25 once  dextrose 50% Injectable 25 once  glucagon  Injectable 1 once PRN      SOCIAL HISTORY:  [ ] etoh [ ] tobacco [ ] former smoker [ ] IVDU    FAMILY HISTORY:  No pertinent family history in first degree relatives      REVIEW OF SYSTEMS  [  ] ROS unobtainable because:    [  ] All other systems negative except as noted below:	    Constitutional:  [ ] fever [ ] weight loss  Skin:  [ ] rash [ ] phlebitis	  Eyes: [ ] icterus [ ] inflammation	  ENMT: [ ] discharge [ ] thrush [ ] ulcers [ ] exudates  Respiratory: [ ] dyspnea [ ] hemoptysis [ ] cough [ ] sputum	  Cardiovascular:  [ ] chest pain [ ] palpitations [ ] edema	  Gastrointestinal:  [ ] nausea [ ] vomiting [ ] diarrhea [ ] constipation [ ] pain	  Genitourinary:  [ ] dysuria [ ] frequency [ ] hematuria [ ] discharge [ ] flank pain  Musculoskeletal:  [ ] myalgias [ ] arthralgias [ ] arthritis	  Neurological:  [ ] headache [ ] seizures	  Psychiatric:  [ ] anxiety [ ] depression	  Hematology/Lymphatics:  [ ] lymphadenopathy  Endocrine:  [ ] adrenal [ ] thyroid  Allergic/Immunologic:	 [ ] transplant [ ] seasonal    Vital Signs Last 24 Hrs  T(F): 97.4 (17 @ 07:35), Max: 97.9 (17 @ 19:13)    Vital Signs Last 24 Hrs  HR: 62 (17 @ 08:43) (61 - 79)  BP: 146/86 (17 @ 08:43) (144/77 - 196/78)  RR: 16 (17 @ 07:35)  SpO2: 95% (17 @ 07:35) (94% - 96%)  Wt(kg): --    PHYSICAL EXAM:  General: non-toxic  HEAD/EYES: anicteric, PERRL  ENT:  supple  Cardiovascular:   S1, S2  Respiratory:  clear bilaterally  GI:  soft, non-tender, normal bowel sounds  :  no CVA tenderness   Musculoskeletal:  no synovitis  Neurologic:  grossly non-focal  Skin:  no rash  Lymph: no lymphadenopathy  Psychiatric:  appropriate affect  Vascular:  no phlebitis                                14.8   11.5  )-----------( 176      ( 2017 02:43 )             44.8           136  |  97  |  40<H>  ----------------------------<  407<H>  5.1   |  25  |  1.02    Ca    9.2      2017 02:43    TPro  6.8  /  Alb  3.9  /  TBili  0.7  /  DBili  x   /  AST  18  /  ALT  42  /  AlkPhos  80        Urinalysis Basic - ( 2017 04:12 )    Color: x / Appearance: Clear / S.026 / pH: x  Gluc: x / Ketone: Negative  / Bili: Negative / Urobili: Negative   Blood: x / Protein: Trace / Nitrite: Negative   Leuk Esterase: Negative / RBC: x / WBC x   Sq Epi: x / Non Sq Epi: x / Bacteria: x      RADIOLOGY:    EXAM:  CT BRAIN                        EXAM:  CT MAXILLOFACIAL                        PROCEDURE DATE:  2017    NONCONTRAST HEAD CT: No acute intracranial hemorrhage, mass effect, or   midline shift.    Similar-appearing microvascular disease and chronic infarcts, as detailed.    EXAM:  CHEST SINGLE AP OR PA                        PROCEDURE DATE:  2017    No acute bony abnormality or fracture. Clear lungs. HPI:    89 year old female with PMH CVA in  with B/L occipital and RIGHT inferior- cerebellar hemisphere infarcts, Hypothyroidism who presented on 17 with presumed mechanical fall at [facility]. Patient had a rash develop on her right arm over the past week. Per the patient's report it was biopsied during the mid-week. Suspicion as outpatient was that this was bullous pemphigoid vs. zoster. She developed the rash starting on her RUE, 1st, 2nd and 3rd digits extending around to her back. She also developed several lesions over her knuckles on LUE involving digits 1, 2 and 3. She also reported scattered lesions on her lower back. She states that they were not pruritic or painful but she did note a pulling sensation in the finger of the RUE at onset. She was prescribed a 10 day course of 20 mg Prednisone with today being day 5. Biopsy results were supposed to be ready today but patient was hospitalized She denies any chills or fevers. She denies any abdominal pain, N/V/D. She denies any urinary symptoms. No rhinorrhea, cough or sore throat.     PAST MEDICAL & SURGICAL HISTORY:  Stroke  HLD (hyperlipidemia)  HTN (hypertension)  Hypothyroid  History of hip replacement, total, left    Allergies  No Known Allergies    ANTIMICROBIALS:      OTHER MEDS: MEDICATIONS  (STANDING):  acetaminophen   Tablet. 650 every 6 hours PRN  aspirin enteric coated 81 daily  cloNIDine 0.2 at bedtime  atorvastatin 40 at bedtime  clopidogrel Tablet 75 daily  QUEtiapine 25 at bedtime  carvedilol 25 every 12 hours  amLODIPine   Tablet 5 daily  docusate sodium 100 three times a day  senna 2 at bedtime  pantoprazole    Tablet 40 before breakfast  levothyroxine 50 daily  ALPRAZolam 0.25 at bedtime PRN  heparin  Injectable 5000 every 8 hours  insulin lispro (HumaLOG) corrective regimen sliding scale  three times a day before meals  insulin lispro (HumaLOG) corrective regimen sliding scale  at bedtime  dextrose Gel 1 once PRN  dextrose 50% Injectable 12.5 once  dextrose 50% Injectable 25 once  dextrose 50% Injectable 25 once  glucagon  Injectable 1 once PRN    SOCIAL HISTORY: No smoking or EtOH use. No recreational drug use. Resident of [facility]    FAMILY HISTORY:  No pertinent family history in first degree relatives      REVIEW OF SYSTEMS  [  ] ROS unobtainable because:    [ x ] All other systems negative except as noted below:	    Constitutional:  [ ] fever [ ] weight loss  Skin:  [ x] rash [ ] phlebitis	  Eyes: [ ] icterus [ ] inflammation	  ENMT: [ ] discharge [ ] thrush [ ] ulcers [ ] exudates  Respiratory: [ ] dyspnea [ ] hemoptysis [ ] cough [ ] sputum	  Cardiovascular:  [ ] chest pain [ ] palpitations [ ] edema	  Gastrointestinal:  [ ] nausea [ ] vomiting [ ] diarrhea [ ] constipation [ ] pain	  Genitourinary:  [ ] dysuria [ ] frequency [ ] hematuria [ ] discharge [ ] flank pain  Musculoskeletal:  [ ] myalgias [ ] arthralgias [ ] arthritis	  Neurological:  [ ] headache [ ] seizures	  Psychiatric:  [ ] anxiety [ ] depression	  Hematology/Lymphatics:  [ ] lymphadenopathy  Endocrine:  [ ] adrenal [ ] thyroid  Allergic/Immunologic:	 [ ] transplant [ ] seasonal    Vital Signs Last 24 Hrs  T(F): 97.4 (17 @ 07:35), Max: 97.9 (17 @ 19:13)    Vital Signs Last 24 Hrs  HR: 62 (17 @ 08:43) (61 - 79)  BP: 146/86 (17 @ 08:43) (144/77 - 196/78)  RR: 16 (17 @ 07:35)  SpO2: 95% (17 @ 07:35) (94% - 96%)  Wt(kg): --    PHYSICAL EXAMINATION:  General: AAO3X, NAD  HEENT: PERRL, EOMI, No subconjunctival hemorrhages  Neck: Supple  Cardiac: RRR, No M/R/G  Resp: CTAB, No Wh/Rh/Ra  Abdomen: Partially crusted skin lesion to the right of her umbilicus. Scattered crusted lesions at LLQ and LUQ. NBS, NT/ND, No HSM, No rigidty or guarding  MSK: No LE edema. No stigmata of IE. No evidence of phlebitis. No evidence of synovitis.  Back: No CVA Tenderness  Skin: Mixture of crusted, partially crusted and uncrusted skin lesions extending from the 1st, 2nd, 3rd digits on RUE along Dermatome C6/C7 to the upper back. Crusted skin lesions over her knuckles on LUE over 2nd and 3rd digits (Dermatome C6/C7). Crusted lesions over her left upper back along dermatome ~T2-T3. Isolated uncrusted lesion at the lower thoracic midback. Scattered abdominal crushed and uncrusted lesions on abdomen as mentioned above.   Neuro: AAO3X. CN 2-12 Grossly intact. Moves all four extremities spontaneously.  Psych: Calm, Pleasant, Cooperative                       14.8   11.5  )-----------( 176      ( 2017 02:43 )             44.8           136  |  97  |  40<H>  ----------------------------<  407<H>  5.1   |  25  |  1.02    Ca    9.2      2017 02:43    TPro  6.8  /  Alb  3.9  /  TBili  0.7  /  DBili  x   /  AST  18  /  ALT  42  /  AlkPhos  80        Urinalysis Basic - ( 2017 04:12 )    Color: x / Appearance: Clear / S.026 / pH: x  Gluc: x / Ketone: Negative  / Bili: Negative / Urobili: Negative   Blood: x / Protein: Trace / Nitrite: Negative   Leuk Esterase: Negative / RBC: x / WBC x   Sq Epi: x / Non Sq Epi: x / Bacteria: x      RADIOLOGY:    EXAM:  CT BRAIN                        EXAM:  CT MAXILLOFACIAL                        PROCEDURE DATE:  2017    NONCONTRAST HEAD CT: No acute intracranial hemorrhage, mass effect, or   midline shift.    Similar-appearing microvascular disease and chronic infarcts, as detailed.    EXAM:  CHEST SINGLE AP OR PA                        PROCEDURE DATE:  2017    No acute bony abnormality or fracture. Clear lungs.

## 2017-07-23 NOTE — H&P ADULT - HISTORY OF PRESENT ILLNESS
NIGHT HOSPITALIST:  Patient UNKNOWN to me previously, assigned to me at this point via the ER to admit this 89 y/o F--followed by Dr. TYLER Parker (patient's nephew) and history obtained from patient's other nephew, Dr. 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  No HA, no LOC>  Patient apparently with skin biopsy of the RIGHT arm with patient with a week history of RIGHT arm skin eruption with varying blisters--by Dr. Adam Bodian  [phone]-- with R/O pemphigoid versus shingles.  Patient denies pain or pruritus of the rash.  No fever, no chills, no rigors.  Apparently patient has been on Prednisone Day 4 of planned 10 of twenty mg daily but awaiting pathology of above.  No chest pain/pressure.  No dyspnea.  No abdominal pain, no red blood per rectum or melena.  NO back pain, no tearing back pain.  NO hip pain.  Remaining review of systems not contributory.

## 2017-07-24 DIAGNOSIS — B02.9 ZOSTER WITHOUT COMPLICATIONS: ICD-10-CM

## 2017-07-24 LAB
ANION GAP SERPL CALC-SCNC: 14 MMOL/L — SIGNIFICANT CHANGE UP (ref 5–17)
BASOPHILS # BLD AUTO: 0 K/UL — SIGNIFICANT CHANGE UP (ref 0–0.2)
BASOPHILS NFR BLD AUTO: 0.2 % — SIGNIFICANT CHANGE UP (ref 0–2)
BUN SERPL-MCNC: 33 MG/DL — HIGH (ref 7–23)
CALCIUM SERPL-MCNC: 9.2 MG/DL — SIGNIFICANT CHANGE UP (ref 8.4–10.5)
CHLORIDE SERPL-SCNC: 101 MMOL/L — SIGNIFICANT CHANGE UP (ref 96–108)
CO2 SERPL-SCNC: 25 MMOL/L — SIGNIFICANT CHANGE UP (ref 22–31)
CREAT SERPL-MCNC: 0.89 MG/DL — SIGNIFICANT CHANGE UP (ref 0.5–1.3)
EOSINOPHIL # BLD AUTO: 0 K/UL — SIGNIFICANT CHANGE UP (ref 0–0.5)
EOSINOPHIL NFR BLD AUTO: 0.5 % — SIGNIFICANT CHANGE UP (ref 0–6)
GLUCOSE SERPL-MCNC: 257 MG/DL — HIGH (ref 70–99)
HBA1C BLD-MCNC: 9.6 % — HIGH (ref 4–5.6)
HCT VFR BLD CALC: 43.3 % — SIGNIFICANT CHANGE UP (ref 34.5–45)
HGB BLD-MCNC: 14.3 G/DL — SIGNIFICANT CHANGE UP (ref 11.5–15.5)
LYMPHOCYTES # BLD AUTO: 2 K/UL — SIGNIFICANT CHANGE UP (ref 1–3.3)
LYMPHOCYTES # BLD AUTO: 22 % — SIGNIFICANT CHANGE UP (ref 13–44)
MCHC RBC-ENTMCNC: 31 PG — SIGNIFICANT CHANGE UP (ref 27–34)
MCHC RBC-ENTMCNC: 33 GM/DL — SIGNIFICANT CHANGE UP (ref 32–36)
MCV RBC AUTO: 94.1 FL — SIGNIFICANT CHANGE UP (ref 80–100)
MONOCYTES # BLD AUTO: 0.8 K/UL — SIGNIFICANT CHANGE UP (ref 0–0.9)
MONOCYTES NFR BLD AUTO: 9.1 % — SIGNIFICANT CHANGE UP (ref 2–14)
NEUTROPHILS # BLD AUTO: 6.2 K/UL — SIGNIFICANT CHANGE UP (ref 1.8–7.4)
NEUTROPHILS NFR BLD AUTO: 68.2 % — SIGNIFICANT CHANGE UP (ref 43–77)
PLATELET # BLD AUTO: 174 K/UL — SIGNIFICANT CHANGE UP (ref 150–400)
POTASSIUM SERPL-MCNC: 4.2 MMOL/L — SIGNIFICANT CHANGE UP (ref 3.5–5.3)
POTASSIUM SERPL-SCNC: 4.2 MMOL/L — SIGNIFICANT CHANGE UP (ref 3.5–5.3)
RBC # BLD: 4.6 M/UL — SIGNIFICANT CHANGE UP (ref 3.8–5.2)
RBC # FLD: 12.7 % — SIGNIFICANT CHANGE UP (ref 10.3–14.5)
SODIUM SERPL-SCNC: 140 MMOL/L — SIGNIFICANT CHANGE UP (ref 135–145)
TSH SERPL-MCNC: 1.88 UIU/ML — SIGNIFICANT CHANGE UP (ref 0.27–4.2)
WBC # BLD: 9.1 K/UL — SIGNIFICANT CHANGE UP (ref 3.8–10.5)
WBC # FLD AUTO: 9.1 K/UL — SIGNIFICANT CHANGE UP (ref 3.8–10.5)

## 2017-07-24 PROCEDURE — 99233 SBSQ HOSP IP/OBS HIGH 50: CPT

## 2017-07-24 RX ORDER — AMLODIPINE BESYLATE 2.5 MG/1
10 TABLET ORAL DAILY
Qty: 0 | Refills: 0 | Status: DISCONTINUED | OUTPATIENT
Start: 2017-07-24 | End: 2017-07-25

## 2017-07-24 RX ADMIN — Medication 2: at 08:28

## 2017-07-24 RX ADMIN — Medication 100 MILLIGRAM(S): at 05:26

## 2017-07-24 RX ADMIN — AMLODIPINE BESYLATE 5 MILLIGRAM(S): 2.5 TABLET ORAL at 05:26

## 2017-07-24 RX ADMIN — Medication 0.2 MILLIGRAM(S): at 22:47

## 2017-07-24 RX ADMIN — Medication 100 MILLIGRAM(S): at 13:02

## 2017-07-24 RX ADMIN — QUETIAPINE FUMARATE 25 MILLIGRAM(S): 200 TABLET, FILM COATED ORAL at 22:47

## 2017-07-24 RX ADMIN — Medication 100 MILLIGRAM(S): at 22:47

## 2017-07-24 RX ADMIN — Medication 81 MILLIGRAM(S): at 13:02

## 2017-07-24 RX ADMIN — HEPARIN SODIUM 5000 UNIT(S): 5000 INJECTION INTRAVENOUS; SUBCUTANEOUS at 13:01

## 2017-07-24 RX ADMIN — Medication 2: at 17:05

## 2017-07-24 RX ADMIN — Medication 1 TABLET(S): at 13:02

## 2017-07-24 RX ADMIN — CARVEDILOL PHOSPHATE 25 MILLIGRAM(S): 80 CAPSULE, EXTENDED RELEASE ORAL at 05:26

## 2017-07-24 RX ADMIN — Medication 1 MILLIGRAM(S): at 13:02

## 2017-07-24 RX ADMIN — VALACYCLOVIR 500 MILLIGRAM(S): 500 TABLET, FILM COATED ORAL at 13:02

## 2017-07-24 RX ADMIN — CARVEDILOL PHOSPHATE 25 MILLIGRAM(S): 80 CAPSULE, EXTENDED RELEASE ORAL at 17:05

## 2017-07-24 RX ADMIN — SENNA PLUS 2 TABLET(S): 8.6 TABLET ORAL at 22:47

## 2017-07-24 RX ADMIN — AMLODIPINE BESYLATE 10 MILLIGRAM(S): 2.5 TABLET ORAL at 17:04

## 2017-07-24 RX ADMIN — Medication 50 MICROGRAM(S): at 05:26

## 2017-07-24 RX ADMIN — Medication 1000 UNIT(S): at 13:02

## 2017-07-24 RX ADMIN — Medication 1: at 13:01

## 2017-07-24 RX ADMIN — HEPARIN SODIUM 5000 UNIT(S): 5000 INJECTION INTRAVENOUS; SUBCUTANEOUS at 22:47

## 2017-07-24 RX ADMIN — CLOPIDOGREL BISULFATE 75 MILLIGRAM(S): 75 TABLET, FILM COATED ORAL at 13:02

## 2017-07-24 RX ADMIN — HEPARIN SODIUM 5000 UNIT(S): 5000 INJECTION INTRAVENOUS; SUBCUTANEOUS at 05:26

## 2017-07-24 RX ADMIN — PANTOPRAZOLE SODIUM 40 MILLIGRAM(S): 20 TABLET, DELAYED RELEASE ORAL at 05:26

## 2017-07-24 RX ADMIN — ATORVASTATIN CALCIUM 40 MILLIGRAM(S): 80 TABLET, FILM COATED ORAL at 22:47

## 2017-07-24 RX ADMIN — Medication 500 MILLIGRAM(S): at 13:02

## 2017-07-24 NOTE — PROGRESS NOTE ADULT - PROBLEM SELECTOR PLAN 1
See above.  Presumed mechanical fall with multiple sensory deficits>>PT.  Visually impaired.  Fall precautions. -s/p mechanical fall  -has had 2 prior falls before  -PT eval

## 2017-07-24 NOTE — CONSULT NOTE ADULT - NEGATIVE GASTROINTESTINAL SYMPTOMS
no melena/no hematochezia/no abdominal pain/no vomiting/no steatorrhea/positive constipation; colonscopy in distant past without advanced lesions/no diarrhea/no nausea/no jaundice

## 2017-07-24 NOTE — PHYSICAL THERAPY INITIAL EVALUATION ADULT - GAIT DEVIATIONS NOTED, PT EVAL
decreased velocity of limb motion/decreased massimo/decreased step length/decreased weight-shifting ability

## 2017-07-24 NOTE — PROGRESS NOTE ADULT - PROBLEM SELECTOR PLAN 2
Skin exanthem as above appears limited to a dermatome>>will have the DAY PROVIDER review if Prednisone is still indicated>>to follow up pending skin biopsy as above.  Consider formal ID evaluation. -cutaneous zoster (likely), however treating as disseminated as abdomen and upper back also involved   -appreciated ID recommendations  -continue with valtrex for 7 days total  -d/c prednisone

## 2017-07-24 NOTE — PROGRESS NOTE ADULT - SUBJECTIVE AND OBJECTIVE BOX
Patient is a 89y old  Female who presents with a chief complaint of S/P presumed mechanical fall at [facility]. (2017 02:42)      SUBJECTIVE / OVERNIGHT EVENTS:    MEDICATIONS  (STANDING):  aspirin enteric coated 81 milliGRAM(s) Oral daily  cloNIDine 0.2 milliGRAM(s) Oral at bedtime  atorvastatin 40 milliGRAM(s) Oral at bedtime  clopidogrel Tablet 75 milliGRAM(s) Oral daily  QUEtiapine 25 milliGRAM(s) Oral at bedtime  carvedilol 25 milliGRAM(s) Oral every 12 hours  amLODIPine   Tablet 5 milliGRAM(s) Oral daily  docusate sodium 100 milliGRAM(s) Oral three times a day  senna 2 Tablet(s) Oral at bedtime  pantoprazole    Tablet 40 milliGRAM(s) Oral before breakfast  levothyroxine 50 MICROGram(s) Oral daily  multivitamin 1 Tablet(s) Oral daily  ascorbic acid 500 milliGRAM(s) Oral daily  folic acid 1 milliGRAM(s) Oral daily  heparin  Injectable 5000 Unit(s) SubCutaneous every 8 hours  cholecalciferol 1000 Unit(s) Oral daily  insulin lispro (HumaLOG) corrective regimen sliding scale   SubCutaneous three times a day before meals  insulin lispro (HumaLOG) corrective regimen sliding scale   SubCutaneous at bedtime  dextrose 5%. 1000 milliLiter(s) (50 mL/Hr) IV Continuous <Continuous>  dextrose 50% Injectable 12.5 Gram(s) IV Push once  dextrose 50% Injectable 25 Gram(s) IV Push once  dextrose 50% Injectable 25 Gram(s) IV Push once  predniSONE   Tablet 5 milliGRAM(s) Oral daily  valACYclovir 500 milliGRAM(s) Oral every 12 hours    MEDICATIONS  (PRN):  acetaminophen   Tablet. 650 milliGRAM(s) Oral every 6 hours PRN Mild Pain (1 - 3)  ALPRAZolam 0.25 milliGRAM(s) Oral at bedtime PRN anxiety  dextrose Gel 1 Dose(s) Oral once PRN Blood Glucose LESS THAN 70 milliGRAM(s)/deciliter  glucagon  Injectable 1 milliGRAM(s) IntraMuscular once PRN Glucose LESS THAN 70 milligrams/deciliter        CAPILLARY BLOOD GLUCOSE  225 (2017 07:50)  320 (2017 21:23)  233 (2017 17:36)  265 (2017 13:06)        I&O's Summary    2017 07:01  -  2017 07:00  --------------------------------------------------------  IN: 240 mL / OUT: 300 mL / NET: -60 mL        PHYSICAL EXAM:  GENERAL: NAD, well-developed  HEAD:  Atraumatic, Normocephalic  EYES: EOMI, PERRLA, conjunctiva and sclera clear  NECK: Supple, No JVD  CHEST/LUNG: Clear to auscultation bilaterally; No wheeze  HEART: Regular rate and rhythm; No murmurs, rubs, or gallops  ABDOMEN: Soft, Nontender, Nondistended; Bowel sounds present  EXTREMITIES:  2+ Peripheral Pulses, No clubbing, cyanosis, or edema  PSYCH: AAOx3  NEUROLOGY: non-focal  SKIN: No rashes or lesions    LABS:                        14.3   9.1   )-----------( 174      ( 2017 06:33 )             43.3     07-24    140  |  101  |  33<H>  ----------------------------<  257<H>  4.2   |  25  |  0.89    Ca    9.2      2017 06:33    TPro  6.8  /  Alb  3.9  /  TBili  0.7  /  DBili  x   /  AST  18  /  ALT  42  /  AlkPhos  80  07-23    PT/INR - ( 2017 02:43 )   PT: 10.7 sec;   INR: 0.98 ratio         PTT - ( 2017 02:43 )  PTT:20.0 sec      Urinalysis Basic - ( 2017 04:12 )    Color: x / Appearance: Clear / S.026 / pH: x  Gluc: x / Ketone: Negative  / Bili: Negative / Urobili: Negative   Blood: x / Protein: Trace / Nitrite: Negative   Leuk Esterase: Negative / RBC: x / WBC x   Sq Epi: x / Non Sq Epi: x / Bacteria: x        RADIOLOGY & ADDITIONAL TESTS:    Imaging Personally Reviewed:    Consultant(s) Notes Reviewed:      Care Discussed with Consultants/Other Providers: Patient is a 89y old  Female who presents with a chief complaint of S/P presumed mechanical fall at [facility]. (2017 02:42)      SUBJECTIVE / OVERNIGHT EVENTS: no acute events overnight. Patient states that her right arm rash is improving. Denies any other pain or discomfort. Looking forward to discharge from hospital.     MEDICATIONS  (STANDING):  aspirin enteric coated 81 milliGRAM(s) Oral daily  cloNIDine 0.2 milliGRAM(s) Oral at bedtime  atorvastatin 40 milliGRAM(s) Oral at bedtime  clopidogrel Tablet 75 milliGRAM(s) Oral daily  QUEtiapine 25 milliGRAM(s) Oral at bedtime  carvedilol 25 milliGRAM(s) Oral every 12 hours  amLODIPine   Tablet 5 milliGRAM(s) Oral daily  docusate sodium 100 milliGRAM(s) Oral three times a day  senna 2 Tablet(s) Oral at bedtime  pantoprazole    Tablet 40 milliGRAM(s) Oral before breakfast  levothyroxine 50 MICROGram(s) Oral daily  multivitamin 1 Tablet(s) Oral daily  ascorbic acid 500 milliGRAM(s) Oral daily  folic acid 1 milliGRAM(s) Oral daily  heparin  Injectable 5000 Unit(s) SubCutaneous every 8 hours  cholecalciferol 1000 Unit(s) Oral daily  insulin lispro (HumaLOG) corrective regimen sliding scale   SubCutaneous three times a day before meals  insulin lispro (HumaLOG) corrective regimen sliding scale   SubCutaneous at bedtime  dextrose 5%. 1000 milliLiter(s) (50 mL/Hr) IV Continuous <Continuous>  dextrose 50% Injectable 12.5 Gram(s) IV Push once  dextrose 50% Injectable 25 Gram(s) IV Push once  dextrose 50% Injectable 25 Gram(s) IV Push once  predniSONE   Tablet 5 milliGRAM(s) Oral daily  valACYclovir 500 milliGRAM(s) Oral every 12 hours    MEDICATIONS  (PRN):  acetaminophen   Tablet. 650 milliGRAM(s) Oral every 6 hours PRN Mild Pain (1 - 3)  ALPRAZolam 0.25 milliGRAM(s) Oral at bedtime PRN anxiety  dextrose Gel 1 Dose(s) Oral once PRN Blood Glucose LESS THAN 70 milliGRAM(s)/deciliter  glucagon  Injectable 1 milliGRAM(s) IntraMuscular once PRN Glucose LESS THAN 70 milligrams/deciliter        CAPILLARY BLOOD GLUCOSE  225 (2017 07:50)  320 (2017 21:23)  233 (2017 17:36)  265 (2017 13:06)        I&O's Summary    2017 07:01  -  2017 07:00  --------------------------------------------------------  IN: 240 mL / OUT: 300 mL / NET: -60 mL        PHYSICAL EXAM:  GENERAL: NAD, elderly appearing female   HEAD:  Atraumatic, Normocephalic  EYES: EOMI, PERRLA, conjunctiva and sclera clear  NECK: Supple, No JVD  CHEST/LUNG: Clear to auscultation bilaterally; No wheeze  HEART: Regular rate and rhythm;   ABDOMEN: Soft, Nontender, Nondistended; Bowel sounds present  EXTREMITIES:  no cyanosis, warm to touch b/l   PSYCH: AAOx3  NEUROLOGY: non-focal  SKIN: multiple skin lesions (crusted and uncrusted) over the right upper extremity extending to the cupper back. Some lesions on LUE as well. Minimal lesions noted on abdomen.     LABS:                        14.3   9.1   )-----------( 174      ( 2017 06:33 )             43.3     07-24    140  |  101  |  33<H>  ----------------------------<  257<H>  4.2   |  25  |  0.89    Ca    9.2      2017 06:33    TPro  6.8  /  Alb  3.9  /  TBili  0.7  /  DBili  x   /  AST  18  /  ALT  42  /  AlkPhos  80  07-23    PT/INR - ( 2017 02:43 )   PT: 10.7 sec;   INR: 0.98 ratio         PTT - ( 2017 02:43 )  PTT:20.0 sec      Urinalysis Basic - ( 2017 04:12 )    Color: x / Appearance: Clear / S.026 / pH: x  Gluc: x / Ketone: Negative  / Bili: Negative / Urobili: Negative   Blood: x / Protein: Trace / Nitrite: Negative   Leuk Esterase: Negative / RBC: x / WBC x   Sq Epi: x / Non Sq Epi: x / Bacteria: x

## 2017-07-24 NOTE — PHYSICAL THERAPY INITIAL EVALUATION ADULT - PERTINENT HX OF CURRENT PROBLEM, REHAB EVAL
Pt is a 89 y/o female admitted to 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

## 2017-07-24 NOTE — DIETITIAN INITIAL EVALUATION ADULT. - NS AS NUTRI INTERV MEALS SNACK
continue Soft, Consistent Carbohydrate, DASH diet/Carbohydrate - modified diet/Texture-modified diet

## 2017-07-24 NOTE — PROGRESS NOTE ADULT - PROBLEM SELECTOR PLAN 6
See [place] I Stop.  Will cautiously continue with low dose night time Xanax (has been on Rx for 2 years) and on night Seroquel. -continue night time xanax and seroquel

## 2017-07-24 NOTE — DIETITIAN INITIAL EVALUATION ADULT. - ORAL INTAKE PTA
Unable to assess po intake PTA. Pt admitted from [facility] .Per chart, pt takes MVI, vit C and folic acid; reports NKFA. Pt noted on coumadin PTA. Pt reports good po intake PTA. Pt admitted from [facility], reports she follows a Regular diet with no restrictions and no chewing or swallowing difficulty. Per chart, pt takes MVI, vit C and folic acid; reports NKFA. Pt noted on coumadin PTA.

## 2017-07-24 NOTE — PROGRESS NOTE ADULT - PROBLEM SELECTOR PLAN 4
Will continue with patient's clonidine, Norvasc, Coreg. -BP elevted  -On norvasc, coreg, and clonidine  -will uptitrate amlodipine

## 2017-07-24 NOTE — CONSULT NOTE ADULT - GASTROINTESTINAL DETAILS
bowel sounds normal/no distention/no masses palpable/no rigidity/no organomegaly/no rebound tenderness/soft/no guarding/nontender

## 2017-07-24 NOTE — DIETITIAN INITIAL EVALUATION ADULT. - ENERGY NEEDS
ht: 5 feet 0 inches, wt: 137 pounds, BMI: 26.7 Kg/m2, IBW: 100pounds (+/- 10%), 137% IBW. Edema: 1+ L/R ankle; Skin: no pressure injuries.

## 2017-07-24 NOTE — DIETITIAN INITIAL EVALUATION ADULT. - FACTORS AFF FOOD INTAKE
persistent constipation/Meal rounds and pt interview pending. Pt noted with chipped front tooth S/P fall, now S/P 7/22 dental work in ER, ordered for Soft diet to avoid using front teeth. Constipation noted in chart; GI following, bowel regimen recommended./difficulty chewing difficulty chewing/persistent constipation/Pt consumed 50-75% of lunch tray, reports no chewing difficulty despite dental work. Pt noted with chipped front tooth S/P fall, now S/P 7/22 dental work in ER, ordered for Soft diet to avoid using front teeth. Constipation noted in chart; GI following. Bowel regimen resulted in BM today per pt.

## 2017-07-24 NOTE — DIETITIAN INITIAL EVALUATION ADULT. - OTHER INFO
Nutrition Consult for Nutrition Support Team received and appreciated. Pt with h/o ASA plavix, with bilateral vision loss, HTN, HLD, not on AC presents from assisted living [place] with a fall; chipped front tooth noted with dental repair 7/22. Pt  on airborne/contact isolation for Shingles.

## 2017-07-24 NOTE — PROGRESS NOTE ADULT - ASSESSMENT
89 year old female with PMH CVA in 2013 with B/L occipital and RIGHT inferior- cerebellar hemisphere infarcts, Hypothyroidism, s/p mechanical fall at assisted living, found to have cutaneous zoster:

## 2017-07-24 NOTE — CHART NOTE - NSCHARTNOTEFT_GEN_A_CORE
Notified by Dr. Parker that pathology from skin biopsy was consistent with a hypersensitivity reaction, likely a drug reaction, with no evidence of zoster. Spoke with Dr. Santillan of ID and we both agree with stopping the valtrex and d/cing the isolation precautions. Dr. Tomlinson to see patient tomorrow morning. Patient evaluated by PT - can return to assisted living facility.     Attempted to reach Dr. Parker with these updates, unable to reach him. Will try again tomorrow.

## 2017-07-24 NOTE — PHYSICAL THERAPY INITIAL EVALUATION ADULT - PRECAUTIONS/LIMITATIONS, REHAB EVAL
Patient apparently with skin biopsy of the RIGHT arm with patient with a week history of RIGHT arm skin eruption with varying blisters. - CT head, - CT Maxillofacial. XR knee: There is no fracture or dislocation of the right knee. The joint spaces are preserved. There are vascular calcifications.

## 2017-07-24 NOTE — PROGRESS NOTE ADULT - SUBJECTIVE AND OBJECTIVE BOX
Subjective: Pt. feels better today. Rash on R arm is drying up.  No CP or SOB.    MEDICATIONS  (STANDING):  aspirin enteric coated 81 milliGRAM(s) Oral daily  cloNIDine 0.2 milliGRAM(s) Oral at bedtime  atorvastatin 40 milliGRAM(s) Oral at bedtime  clopidogrel Tablet 75 milliGRAM(s) Oral daily  QUEtiapine 25 milliGRAM(s) Oral at bedtime  carvedilol 25 milliGRAM(s) Oral every 12 hours  amLODIPine   Tablet 5 milliGRAM(s) Oral daily  docusate sodium 100 milliGRAM(s) Oral three times a day  senna 2 Tablet(s) Oral at bedtime  pantoprazole    Tablet 40 milliGRAM(s) Oral before breakfast  levothyroxine 50 MICROGram(s) Oral daily  multivitamin 1 Tablet(s) Oral daily  ascorbic acid 500 milliGRAM(s) Oral daily  folic acid 1 milliGRAM(s) Oral daily  heparin  Injectable 5000 Unit(s) SubCutaneous every 8 hours  cholecalciferol 1000 Unit(s) Oral daily  insulin lispro (HumaLOG) corrective regimen sliding scale   SubCutaneous three times a day before meals  insulin lispro (HumaLOG) corrective regimen sliding scale   SubCutaneous at bedtime  dextrose 5%. 1000 milliLiter(s) (50 mL/Hr) IV Continuous <Continuous>  dextrose 50% Injectable 12.5 Gram(s) IV Push once  dextrose 50% Injectable 25 Gram(s) IV Push once  dextrose 50% Injectable 25 Gram(s) IV Push once  predniSONE   Tablet 5 milliGRAM(s) Oral daily  valACYclovir 500 milliGRAM(s) Oral every 12 hours    MEDICATIONS  (PRN):  acetaminophen   Tablet. 650 milliGRAM(s) Oral every 6 hours PRN Mild Pain (1 - 3)  ALPRAZolam 0.25 milliGRAM(s) Oral at bedtime PRN anxiety  dextrose Gel 1 Dose(s) Oral once PRN Blood Glucose LESS THAN 70 milliGRAM(s)/deciliter  glucagon  Injectable 1 milliGRAM(s) IntraMuscular once PRN Glucose LESS THAN 70 milligrams/deciliter      Vital Signs Last 24 Hrs  T(C): 36.7 (2017 05:25), Max: 36.8 (2017 17:13)  T(F): 98 (2017 05:25), Max: 98.2 (2017 17:13)  HR: 71 (2017 05:25) (64 - 71)  BP: 142/87 (2017 05:25) (142/87 - 166/77)  BP(mean): --  RR: 17 (2017 05:25) (16 - 18)  SpO2: 95% (2017 05:25) (94% - 97%)    PHYSICAL EXAM:    Constitutional: WD, WN in NAD    ENMT: Ecchymosis below lower lip    Neck: no JVD noted    Respiratory: clear lungs    Cardiovascular: RRR, no murmurs noted    Gastrointestinal: BS present, NT, ND, no masses noted    Extremities: no edema noted    Neurological: pt is blind    Skin: vesicular rash on the R arm with dried lesions    TELEMETRY: None    ECG:      LABS:                        14.3   9.1   )-----------( 174      ( 2017 06:33 )             43.3     24    140  |  101  |  33<H>  ----------------------------<  257<H>  4.2   |  25  |  0.89    Ca    9.2      2017 06:33    TPro  6.8  /  Alb  3.9  /  TBili  0.7  /  DBili  x   /  AST  18  /  ALT  42  /  AlkPhos  80          PT/INR - ( 2017 02:43 )   PT: 10.7 sec;   INR: 0.98 ratio         PTT - ( 2017 02:43 )  PTT:20.0 sec  Urinalysis Basic - ( 2017 04:12 )    Color: x / Appearance: Clear / S.026 / pH: x  Gluc: x / Ketone: Negative  / Bili: Negative / Urobili: Negative   Blood: x / Protein: Trace / Nitrite: Negative   Leuk Esterase: Negative / RBC: x / WBC x   Sq Epi: x / Non Sq Epi: x / Bacteria: x      I&O's Summary    2017 07:01  -  2017 07:00  --------------------------------------------------------  IN: 240 mL / OUT: 300 mL / NET: -60 mL      BNP  RADIOLOGY & ADDITIONAL STUDIES:    IMPRESSION:  Mechanical fall at home  Prior cerebellar CVA  HTN  R arm rash- ? herpes zoster vs. bullous pemphigoid    RECOMMENDATIONS:  Continue current meds- taper off Prednisone  Neurology f/u regarding DAPT- if needed  PT as tolerated

## 2017-07-24 NOTE — PROGRESS NOTE ADULT - PROBLEM SELECTOR PLAN 7
Likely has type 2 diabetes mellitus versus steroid induced>>S/S with night time Lantus when weight in kg is available. -likely from steroids, should improve now that steroids are d/rea  -continue SS insulin

## 2017-07-24 NOTE — PHYSICAL THERAPY INITIAL EVALUATION ADULT - ADDITIONAL COMMENTS
Pt lives is a resident at an assisted living facility. Pt required assist with all ADLs, and amb with a RW and assist.

## 2017-07-24 NOTE — PROGRESS NOTE ADULT - PROBLEM SELECTOR PLAN 3
History of cerebrovascular disease but presently clinically stable.  On ASA , Plavix. -h/o R inferior cerebellar hemisphere infarcts  -limited vision  -on ASA and plavix  -neuro consulted regarding need for DAPT in the setting of multiple falls, will follow up

## 2017-07-24 NOTE — CONSULT NOTE ADULT - SUBJECTIVE AND OBJECTIVE BOX
Patient is a 89y old  Female who presents with a chief complaint of S/P presumed mechanical fall at [facility]. notes constipation for at least  3 three days.      HPI:  patient is a 89 y.o. white female with a history of  HTN, hypothyroidism, hyperlipidemia, ascvd  with a history of CVA in  with B/L occipital and RIGHT inferior- cerebellar hemisphere infarcts, with short segment high grade stenosis of the proximal RIGHT intracranial vertebral artery, with an admission in May, 2014 for a CVA, with severe bilateral visual impairment, with the patient with a presumed mechanical fall at the assisted living . No HA,  LOC;  Patient apparently with skin biopsy of the RIGHT arm with patient with a week history of RIGHT arm skin eruption with varying blisters--by Dr. Adam Bodian  [phone]-- with R/O pemphigoid versus shingles.  Patient denies pain or pruritus of the rash.  No fever, no chills, no rigors.  Apparently patient has been on Prednisone Day 4 of planned 10 of twenty mg daily but awaiting pathology of above.  No chest pain/pressure.  No dyspnea.  No abdominal pain, no red blood per rectum or melena. no nauseaa or vomiting. no bowel movement for ? 3 to 5 days.      PAST MEDICAL  HISTORY:  ascvd  HLD (hyperlipidemia)  HTN (hypertension)  Hypothyroid  past surgical hx:  History of hip replacement, total, left  s/p cataract repair      Allergies  No Known Allergies      MEDICATIONS  (STANDING):  aspirin enteric coated 81 milliGRAM(s) Oral daily  cloNIDine 0.2 milliGRAM(s) Oral at bedtime  atorvastatin 40 milliGRAM(s) Oral at bedtime  clopidogrel Tablet 75 milliGRAM(s) Oral daily  QUEtiapine 25 milliGRAM(s) Oral at bedtime  carvedilol 25 milliGRAM(s) Oral every 12 hours  amLODIPine   Tablet 5 milliGRAM(s) Oral daily  docusate sodium 100 milliGRAM(s) Oral three times a day  senna 2 Tablet(s) Oral at bedtime    family hx:  negative for crc  pantoprazole    Tablet 40 milliGRAM(s) Oral before breakfast  levothyroxine 50 MICROGram(s) Oral daily  multivitamin 1 Tablet(s) Oral daily  ascorbic acid 500 milliGRAM(s) Oral daily  folic acid 1 milliGRAM(s) Oral daily  heparin  Injectable 5000 Unit(s) SubCutaneous every 8 hours  cholecalciferol 1000 Unit(s) Oral daily  insulin lispro (HumaLOG) corrective regimen sliding scale   SubCutaneous three times a day before meals  insulin lispro (HumaLOG) corrective regimen sliding scale   SubCutaneous at bedtime  dextrose 5%. 1000 milliLiter(s) (50 mL/Hr) IV Continuous <Continuous>  dextrose 50% Injectable 12.5 Gram(s) IV Push once  dextrose 50% Injectable 25 Gram(s) IV Push once  dextrose 50% Injectable 25 Gram(s) IV Push once  predniSONE   Tablet 5 milliGRAM(s) Oral daily  valACYclovir 500 milliGRAM(s) Oral every 12 hours    MEDICATIONS  (PRN):  acetaminophen   Tablet. 650 milliGRAM(s) Oral every 6 hours PRN Mild Pain (1 - 3)  ALPRAZolam 0.25 milliGRAM(s) Oral at bedtime PRN anxiety  dextrose Gel 1 Dose(s) Oral once PRN Blood Glucose LESS THAN 70 milliGRAM(s)/deciliter  glucagon  Injectable 1 milliGRAM(s) IntraMuscular once PRN Glucose LESS THAN 70 milligrams/deciliter      social history    FAMILY HISTORY:  No pertinent family history in first degree relatives              Vital Signs Last 24 Hrs  T(C): 36.7 (2017 05:25), Max: 36.8 (2017 17:13)  T(F): 98 (2017 05:25), Max: 98.2 (2017 17:13)  HR: 71 (2017 05:25) (62 - 71)  BP: 142/87 (2017 05:25) (142/87 - 185/79)  BP(mean): --  RR: 17 (2017 05:25) (16 - 18)  SpO2: 95% (2017 05:25) (94% - 97%)        LABS:                        14.8   11.5  )-----------( 176      ( 2017 02:43 )             44.8     07-23    136  |  97  |  40<H>  ----------------------------<  407<H>  5.1   |  25  |  1.02    Ca    9.2      2017 02:43    TPro  6.8  /  Alb  3.9  /  TBili  0.7  /  DBili  x   /  AST  18  /  ALT  42  /  AlkPhos  80  -    PT/INR - ( 2017 02:43 )   PT: 10.7 sec;   INR: 0.98 ratio         PTT - ( 2017 02:43 )  PTT:20.0 sec  Urinalysis Basic - ( 2017 04:12 )    Color: x / Appearance: Clear / S.026 / pH: x  Gluc: x / Ketone: Negative  / Bili: Negative / Urobili: Negative   Blood: x / Protein: Trace / Nitrite: Negative   Leuk Esterase: Negative / RBC: x / WBC x   Sq Epi: x / Non Sq Epi: x / Bacteria: x      I&O's Summary    2017 07:01  -  2017 06:59  --------------------------------------------------------  IN: 240 mL / OUT: 300 mL / NET: -60 mL      RADIOLOGY & ADDITIONAL STUDIES:        lidya

## 2017-07-25 ENCOUNTER — TRANSCRIPTION ENCOUNTER (OUTPATIENT)
Age: 82
End: 2017-07-25

## 2017-07-25 VITALS
RESPIRATION RATE: 18 BRPM | OXYGEN SATURATION: 96 % | DIASTOLIC BLOOD PRESSURE: 75 MMHG | SYSTOLIC BLOOD PRESSURE: 134 MMHG | HEART RATE: 75 BPM | TEMPERATURE: 97 F

## 2017-07-25 PROCEDURE — 71045 X-RAY EXAM CHEST 1 VIEW: CPT

## 2017-07-25 PROCEDURE — 97162 PT EVAL MOD COMPLEX 30 MIN: CPT

## 2017-07-25 PROCEDURE — 83036 HEMOGLOBIN GLYCOSYLATED A1C: CPT

## 2017-07-25 PROCEDURE — 70450 CT HEAD/BRAIN W/O DYE: CPT

## 2017-07-25 PROCEDURE — 81003 URINALYSIS AUTO W/O SCOPE: CPT

## 2017-07-25 PROCEDURE — 82043 UR ALBUMIN QUANTITATIVE: CPT

## 2017-07-25 PROCEDURE — 99239 HOSP IP/OBS DSCHRG MGMT >30: CPT

## 2017-07-25 PROCEDURE — 73620 X-RAY EXAM OF FOOT: CPT

## 2017-07-25 PROCEDURE — 73610 X-RAY EXAM OF ANKLE: CPT

## 2017-07-25 PROCEDURE — 80053 COMPREHEN METABOLIC PANEL: CPT

## 2017-07-25 PROCEDURE — 99285 EMERGENCY DEPT VISIT HI MDM: CPT | Mod: 25

## 2017-07-25 PROCEDURE — 85730 THROMBOPLASTIN TIME PARTIAL: CPT

## 2017-07-25 PROCEDURE — 73562 X-RAY EXAM OF KNEE 3: CPT

## 2017-07-25 PROCEDURE — 85610 PROTHROMBIN TIME: CPT

## 2017-07-25 PROCEDURE — 70486 CT MAXILLOFACIAL W/O DYE: CPT

## 2017-07-25 PROCEDURE — 73590 X-RAY EXAM OF LOWER LEG: CPT

## 2017-07-25 PROCEDURE — 72170 X-RAY EXAM OF PELVIS: CPT

## 2017-07-25 PROCEDURE — 80048 BASIC METABOLIC PNL TOTAL CA: CPT

## 2017-07-25 PROCEDURE — 84443 ASSAY THYROID STIM HORMONE: CPT

## 2017-07-25 PROCEDURE — 93005 ELECTROCARDIOGRAM TRACING: CPT

## 2017-07-25 PROCEDURE — 85027 COMPLETE CBC AUTOMATED: CPT

## 2017-07-25 RX ORDER — ACETAMINOPHEN 500 MG
2 TABLET ORAL
Qty: 0 | Refills: 0 | COMMUNITY

## 2017-07-25 RX ORDER — ASPIRIN/CALCIUM CARB/MAGNESIUM 324 MG
1 TABLET ORAL
Qty: 0 | Refills: 0 | COMMUNITY

## 2017-07-25 RX ORDER — AMLODIPINE BESYLATE 2.5 MG/1
1 TABLET ORAL
Qty: 30 | Refills: 0 | OUTPATIENT
Start: 2017-07-25 | End: 2017-08-24

## 2017-07-25 RX ORDER — HALOBETASOL PROPIONATE 0.5 MG/G
1 OINTMENT TOPICAL
Qty: 0 | Refills: 0 | COMMUNITY

## 2017-07-25 RX ORDER — QUETIAPINE FUMARATE 200 MG/1
1 TABLET, FILM COATED ORAL
Qty: 0 | Refills: 0 | COMMUNITY

## 2017-07-25 RX ORDER — SENNA PLUS 8.6 MG/1
2 TABLET ORAL
Qty: 0 | Refills: 0 | COMMUNITY

## 2017-07-25 RX ORDER — LEVOTHYROXINE SODIUM 125 MCG
1 TABLET ORAL
Qty: 0 | Refills: 0 | COMMUNITY

## 2017-07-25 RX ORDER — PANTOPRAZOLE SODIUM 20 MG/1
1 TABLET, DELAYED RELEASE ORAL
Qty: 0 | Refills: 0 | COMMUNITY

## 2017-07-25 RX ORDER — ALPRAZOLAM 0.25 MG
1 TABLET ORAL
Qty: 0 | Refills: 0 | COMMUNITY

## 2017-07-25 RX ORDER — DOCUSATE SODIUM 100 MG
3 CAPSULE ORAL
Qty: 0 | Refills: 0 | COMMUNITY

## 2017-07-25 RX ADMIN — Medication 500 MILLIGRAM(S): at 12:16

## 2017-07-25 RX ADMIN — CARVEDILOL PHOSPHATE 25 MILLIGRAM(S): 80 CAPSULE, EXTENDED RELEASE ORAL at 05:18

## 2017-07-25 RX ADMIN — Medication 100 MILLIGRAM(S): at 12:16

## 2017-07-25 RX ADMIN — PANTOPRAZOLE SODIUM 40 MILLIGRAM(S): 20 TABLET, DELAYED RELEASE ORAL at 05:18

## 2017-07-25 RX ADMIN — Medication 1 TABLET(S): at 12:16

## 2017-07-25 RX ADMIN — AMLODIPINE BESYLATE 10 MILLIGRAM(S): 2.5 TABLET ORAL at 05:18

## 2017-07-25 RX ADMIN — HEPARIN SODIUM 5000 UNIT(S): 5000 INJECTION INTRAVENOUS; SUBCUTANEOUS at 12:16

## 2017-07-25 RX ADMIN — HEPARIN SODIUM 5000 UNIT(S): 5000 INJECTION INTRAVENOUS; SUBCUTANEOUS at 05:19

## 2017-07-25 RX ADMIN — Medication 50 MICROGRAM(S): at 05:19

## 2017-07-25 RX ADMIN — CLOPIDOGREL BISULFATE 75 MILLIGRAM(S): 75 TABLET, FILM COATED ORAL at 12:16

## 2017-07-25 RX ADMIN — Medication 81 MILLIGRAM(S): at 12:16

## 2017-07-25 RX ADMIN — Medication 2: at 08:18

## 2017-07-25 RX ADMIN — Medication 1 MILLIGRAM(S): at 12:16

## 2017-07-25 RX ADMIN — Medication 100 MILLIGRAM(S): at 05:18

## 2017-07-25 RX ADMIN — Medication 1000 UNIT(S): at 12:16

## 2017-07-25 NOTE — PROGRESS NOTE ADULT - GASTROINTESTINAL DETAILS
no rebound tenderness/bowel sounds normal/soft/no masses palpable/nontender/no organomegaly/no guarding/no rigidity/no distention

## 2017-07-25 NOTE — CONSULT NOTE ADULT - ASSESSMENT
The patient is a 89y Female with a history of HT, HLD multiple montalvo w multiple intracranial stenosis- refractory to single anti-plt tx stabilized on multiple - admitted w recent zoster then s/p trip/fall     -continue to recommend dual tx-asa/plavix  -consider MRI/A-- may be done as outpt  - consider high dose statin
2 year old female with PMH CVA in 2013 with B/L occipital and RIGHT inferior- cerebellar hemisphere infarcts, Hypothyroidism who presented on 7/23/17 with presumed mechanical fall at [facility]. Developed predominantly (suspected) localized cutaneous zoster involving RUE. However, there are scattered vesicles on the abdomen, contralateral hand and left upper back. 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  --Please Start Valacyclovir 500 mg PO BID x 7 days  --Recommend Airborne and Contact Precautions  --Recommend Discontinuing Prednisone  --F/U outpatient dermatology skin biopsy
constipation  suggest trial of miralax  hold colace and senna  check stool guaiac  continue gi prophylaxis

## 2017-07-25 NOTE — DISCHARGE NOTE ADULT - MEDICATION SUMMARY - MEDICATIONS TO STOP TAKING
I will STOP taking the medications listed below when I get home from the hospital:    predniSONE 20 mg oral tablet  -- 1 tab(s) by mouth once a day

## 2017-07-25 NOTE — PROGRESS NOTE ADULT - PROBLEM SELECTOR PLAN 2
-derm path results shows no evidence of zoster  -as per discussion with ID, valtrex was discontinued   -rash appears to be healing well  -follow up with PCP as an outpatient

## 2017-07-25 NOTE — PROGRESS NOTE ADULT - PROBLEM SELECTOR PLAN 3
-h/o R inferior cerebellar hemisphere infarcts  -limited vision  -on ASA and plavix  -patient seen by neuro, continue ASA and plavix  -MRI/MRA as an outpatient

## 2017-07-25 NOTE — DISCHARGE NOTE ADULT - HOSPITAL COURSE
MD 89 y/o F--followed by Dr. TYLER Parker (patient's nephew) and history obtained from patient's other nephew, Dr. 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  No HA, no LOC>  Patient apparently with skin biopsy of the RIGHT arm with patient with a week history of RIGHT arm skin eruption with varying blisters--by Dr. Adam Bodian  [phone]-- with R/O pemphigoid versus shingles.  Patient denies pain or pruritus of the rash.  No fever, no chills, no rigors.  Apparently patient has been on Prednisone Day 4 of planned 10 of twenty mg daily but awaiting pathology of above.  No chest pain/pressure.  No dyspnea.  No abdominal pain, no red blood per rectum or melena.  NO back pain, no tearing back pain.  NO hip pain.      Patient was treated with valtrex for zoster, along with prednisone. However derm path results from biopsy done at her outpatient dermatologist's office returned as no evidence of zoster, therefore the valtrex and prednisone were discontinued. Was likely a drug reaction. Her rash continued to improve. She was continued on DAPT (despite history of falls), per neurology recommendations. She was discharged to assisted living.

## 2017-07-25 NOTE — CONSULT NOTE ADULT - SUBJECTIVE AND OBJECTIVE BOX
Neurology Consult Note    Patient is a 89y old  Female who presents with a chief complaint of S/P presumed mechanical fall at [facility]. (23 Jul 2017 02:42)      The patient is a 89y Female with a history of HT, HLD multiple montalvo w multiple intracranial stenosis- refractory to single anti-plt tx stabilized on multiple - admitted w recent zoster then s/p trip/fall asked to re adress continued need for dual tx    MEDICATIONS  (STANDING):  aspirin enteric coated 81 milliGRAM(s) Oral daily  cloNIDine 0.2 milliGRAM(s) Oral at bedtime  atorvastatin 40 milliGRAM(s) Oral at bedtime  clopidogrel Tablet 75 milliGRAM(s) Oral daily  QUEtiapine 25 milliGRAM(s) Oral at bedtime  carvedilol 25 milliGRAM(s) Oral   every 12 hours  docusate sodium 100 milliGRAM(s) Oral three times a day  senna 2 Tablet(s) Oral at bedtime  pantoprazole    Tablet 40 milliGRAM(s) Oral before breakfast  levothyroxine 50 MICROGram(s) Oral daily  multivitamin 1 Tablet(s) Oral daily  ascorbic acid 500 milliGRAM(s) Oral daily  folic acid 1 milliGRAM(s) Oral daily  heparin  Injectable 5000 Unit(s) SubCutaneous every 8 hours  cholecalciferol 1000 Unit(s) Oral daily  insulin lispro (HumaLOG) corrective regimen sliding scale   SubCutaneous three times a day before meals  insulin lispro (HumaLOG) corrective regimen sliding scale   SubCutaneous at bedtime  dextrose 5%. 1000 milliLiter(s) (50 mL/Hr) IV Continuous <Continuous>  dextrose 50% Injectable 12.5 Gram(s) IV Push once  dextrose 50% Injectable 25 Gram(s) IV Push once  dextrose 50% Injectable 25 Gram(s) IV Push once  amLODIPine   Tablet 10 milliGRAM(s) Oral daily    MEDICATIONS  (PRN):  acetaminophen   Tablet. 650 milliGRAM(s) Oral every 6 hours PRN Mild Pain (1 - 3)  ALPRAZolam 0.25 milliGRAM(s) Oral at bedtime PRN anxiety  dextrose Gel 1 Dose(s) Oral once PRN Blood Glucose LESS THAN 70 milliGRAM(s)/deciliter  glucagon  Injectable 1 milliGRAM(s) IntraMuscular once PRN Glucose LESS THAN 70 milligrams/deciliter      PAST MEDICAL & SURGICAL HISTORY:  Stroke  HLD (hyperlipidemia)  HTN (hypertension)  Hypothyroid  History of hip replacement, total, left      FAMILY HISTORY:  No pertinent family history in first degree relatives      Allergies    No Known Allergies    Intolerances        SOCIAL HISTORY:  type ~  then ?H&P    REVIEW OF SYSTEMS  General:(-),Skin/Breast:(-),Ophthalmologic:(-),ENMT:(-),Respiratory and Thorax:(-),Cardiovascular:(-),Gastrointestinal:(-),Genitourinary:(-),Musculoskeletal:(-),Psychiatric:(-),Hematology/Lymphatics:(-),Endocrine:(-),Allergic/Immunologic:(-)    Vital Signs Last 24 Hrs  T(C): 36.6 (25 Jul 2017 05:05), Max: 36.7 (24 Jul 2017 13:00)  T(F): 97.8 (25 Jul 2017 05:05), Max: 98 (24 Jul 2017 13:00)  HR: 75 (25 Jul 2017 05:05) (67 - 86)  BP: 127/78 (25 Jul 2017 05:05) (127/78 - 152/74)  BP(mean): --  RR: 18 (25 Jul 2017 05:05) (16 - 18)  SpO2: 95% (25 Jul 2017 05:05) (94% - 96%)    Physical exam  GENERAL-WDWN  CARDIOVASCULAR- Cor- RRR s MGR, carotid- 2+, No bruits  EYES- non-icteric disks obscured  MENTAL STATUS- Alert, attends, fluent, non-dysarthric, follows commands, oriented, good memory and affect.  CRANIAL NERVES-II  Visual Fields inconsistent but finger counting appears accurate?. III-IV-VI EOMI & Pupils - Bilateral - Normal Bilaterally- L palp fissure <R. V Trigeminal - Bilateral - Normal bilateral facial sensation and jaw movement. VII Facial - Normal bilateral facial movement. VIII Acoustic - Bilateral - Hearing normal to voice bilaterally. IX Glossopharyngeal / X Vagus - Normal palate elevates symmetrically. XI Accessory - Normal Bilaterally. XII Hypoglossal - Tongue protrudes midline and moves symmetrically.  MOTOR-Bulk and Contour - Normal. Tone - Normal tone, no abnormal movements. Strength - 5/5 normal muscle strength - Strength full throughout.  SENSORY-Normal - LT, DSS,   REFLEXES- DTR's 1+ throughout.  COORDINATION-Normal FFM, DARCIE, FNF - bilaterally.  GAIT-NA    CBC Full  -  ( 24 Jul 2017 06:33 )  WBC Count : 9.1 K/uL  Hemoglobin : 14.3 g/dL  Hematocrit : 43.3 %  Platelet Count - Automated : 174 K/uL  Mean Cell Volume : 94.1 fl  Mean Cell Hemoglobin : 31.0 pg  Mean Cell Hemoglobin Concentration : 33.0 gm/dL  Auto Neutrophil # : 6.2 K/uL  Auto Lymphocyte # : 2.0 K/uL  Auto Monocyte # : 0.8 K/uL  Auto Eosinophil # : 0.0 K/uL  Auto Basophil # : 0.0 K/uL  Auto Neutrophil % : 68.2 %  Auto Lymphocyte % : 22.0 %  Auto Monocyte % : 9.1 %  Auto Eosinophil % : 0.5 %  Auto Basophil % : 0.2 %      07-24    140  |  101  |  33<H>  ----------------------------<  257<H>  4.2   |  25  |  0.89    Ca    9.2      24 Jul 2017 06:33                < from: CT Head No Cont (07.22.17 @ 23:57) >    EXAM:  CT BRAIN                          EXAM:  CT MAXILLOFACIAL                            PROCEDURE DATE:  07/22/2017            INTERPRETATION:  .    CLINICAL INFORMATION: Status post fall. Broken teeth. Trauma.    TECHNIQUE: Axial CT images were obtained through the brain, paranasal   sinuses, and orbits. Coronal and sagittal reconstruction images were also   obtained.    COMPARISON: Comparison is made to a prior noncontrast head CT examination   dated 7/5/2015. Comparison is also made to aprior brain MRI examination   dated 4/30/2014. No prior maxillofacial CT examinations are available for   comparison.    FINDINGS:     NONCONTRAST HEAD CT: There is no acute intracranial hemorrhage, mass   effect, midline shift, herniation, extra-axial fluid collection, or   hydrocephalus.    There is diffuse cerebral volume loss with prominence of the sulci,   fissures, and cisternal spaces which is normal for the patient's age.   There is mild periventricular white matter hypoattenuation statistically   compatible with microvascular changes given calcific atherosclerotic   disease of the intracranial arteries. There is redemonstration of   encephalomalacia and gliosis within the bilateral parieto-occipital   lobes, right greater than left, and right temporal lobe. There is   redemonstration of chronic infarcts within the bilateral cerebellar   hemispheres.    The calvarium appears intact.    NONCONTRAST MAXILLOFACIAL CT: No acute fractures or dislocations are   notable. The bilateral orbital floors and orbital rims are intact.    There is evidence of bilateral cataract removal. Bilateral senile scleral   plaques are noted. Otherwise, the bilateral globes, extraocular muscles,   optic nerves, and orbital fat appear unremarkable.    The nasal bones and nasal septum are intact.    The paranasal sinuses and mastoid air cells are clear.    Evaluation of the oral cavity is limited study due to streak and beam   hardening artifact generated by dental hardware and dental amalgam. There   is nonspecific periapical lucency surrounding the roots of the right   second maxillary molar.    The mandible and maxilla appear intact.    Sebaceous cysts are notable along the chin.    IMPRESSION:.    NONCONTRAST HEAD CT: No acute intracranial hemorrhage, mass effect, or   midline shift.    Similar-appearing microvascular disease and chronic infarcts, as detailed.    NONCONTRAST MAXILLOFACIAL CT: No acute facial fractures.                    JONNA DARDEN M.D., ATTENDING RADIOLOGIST  This documenthas been electronically signed. Jul 23 2017  1:02AM                < end of copied text >

## 2017-07-25 NOTE — PROGRESS NOTE ADULT - ASSESSMENT
consider  miralax prn constipation  hold colace and senna  ? role of norvasc in increasing constipation  gi prophylaxis

## 2017-07-25 NOTE — DISCHARGE NOTE ADULT - PLAN OF CARE
Resolution of symptom Monitor for fall  Fall precaution  call bell within reach  chair and bed alarm to prevent fall Low salt diet  Activity as tolerated.  Take all medication as prescribed.  Follow up with your medical doctor for routine blood pressure monitoring at your next visit.  Notify your doctor if you have any of the following symptoms:   Dizziness, Lightheadedness, Blurry vision, Headache, Chest pain, Shortness of breath you do not make enough thyroid hormone  signs & symptoms of low levels of thyroid hormone - tired, getting cold easily, coarse or thin hair, constipation, shortness of breath, swelling, irregular periods  your doctor will do thyroid hormone blood tests at least once a year to monitor if medication dose is adequate  take your thyroid medicine as directed by your doctor & on empty stomach Continue to apply cream

## 2017-07-25 NOTE — PROGRESS NOTE ADULT - SUBJECTIVE AND OBJECTIVE BOX
HPI:   89 yo female HTN, HLD, s/p CVA x2 recent rash rx'd with prednisone presents s/p mechanical fall.  Pt denied lightheadedness, LOC, cp, sob or palpitations.  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 Found to have high grade intracranial vertebral stenosis.  5/14 2nd infarct with bilateral decrease in vision.  PAST MEDICAL & SURGICAL HISTORY:  Stroke  HLD (hyperlipidemia)  HTN (hypertension)  Hypothyroid  History of hip replacement, total, left      Allergies    No Known Allergies          MEDICATIONS  (STANDING):  aspirin enteric coated 81 milliGRAM(s) Oral daily  cloNIDine 0.2 milliGRAM(s) Oral at bedtime  atorvastatin 40 milliGRAM(s) Oral at bedtime  clopidogrel Tablet 75 milliGRAM(s) Oral daily  QUEtiapine 25 milliGRAM(s) Oral at bedtime  carvedilol 25 milliGRAM(s) Oral every 12 hours  docusate sodium 100 milliGRAM(s) Oral three times a day  senna 2 Tablet(s) Oral at bedtime  pantoprazole    Tablet 40 milliGRAM(s) Oral before breakfast  levothyroxine 50 MICROGram(s) Oral daily  multivitamin 1 Tablet(s) Oral daily  ascorbic acid 500 milliGRAM(s) Oral daily  folic acid 1 milliGRAM(s) Oral daily  heparin  Injectable 5000 Unit(s) SubCutaneous every 8 hours  cholecalciferol 1000 Unit(s) Oral daily  insulin lispro (HumaLOG) corrective regimen sliding scale   SubCutaneous three times a day before meals  insulin lispro (HumaLOG) corrective regimen sliding scale   SubCutaneous at bedtime  dextrose 5%. 1000 milliLiter(s) (50 mL/Hr) IV Continuous <Continuous>  dextrose 50% Injectable 12.5 Gram(s) IV Push once  dextrose 50% Injectable 25 Gram(s) IV Push once  dextrose 50% Injectable 25 Gram(s) IV Push once  amLODIPine   Tablet 10 milliGRAM(s) Oral daily    MEDICATIONS  (PRN):  acetaminophen   Tablet. 650 milliGRAM(s) Oral every 6 hours PRN Mild Pain (1 - 3)  ALPRAZolam 0.25 milliGRAM(s) Oral at bedtime PRN anxiety  dextrose Gel 1 Dose(s) Oral once PRN Blood Glucose LESS THAN 70 milliGRAM(s)/deciliter  glucagon  Injectable 1 milliGRAM(s) IntraMuscular once PRN Glucose LESS THAN 70 milligrams/deciliter            Vital Signs Last 24 Hrs  T(C): 36.6 (25 Jul 2017 05:05), Max: 36.7 (24 Jul 2017 13:00)  T(F): 97.8 (25 Jul 2017 05:05), Max: 98 (24 Jul 2017 13:00)  HR: 75 (25 Jul 2017 05:05) (67 - 86)  BP: 127/78 (25 Jul 2017 05:05) (127/78 - 152/74)  BP(mean): --  RR: 18 (25 Jul 2017 05:05) (16 - 18)  SpO2: 95% (25 Jul 2017 05:05) (94% - 96%)  Daily     Daily   I&O's Detail    24 Jul 2017 07:01  -  25 Jul 2017 07:00  --------------------------------------------------------  IN:    Oral Fluid: 720 mL  Total IN: 720 mL    OUT:    Voided: 650 mL  Total OUT: 650 mL    Total NET: 70 mL          Physical Exam  Pt in NAD  Neck without JVD  Lungs clear  Cor s1s2 2/6 donny rusb  Abd soft  Ext without edema  RUE multiple healing erythematous lesions        CBC Full  -  ( 24 Jul 2017 06:33 )  WBC Count : 9.1 K/uL  Hemoglobin : 14.3 g/dL  Hematocrit : 43.3 %  Platelet Count - Automated : 174 K/uL  Mean Cell Volume : 94.1 fl  Mean Cell Hemoglobin : 31.0 pg  Mean Cell Hemoglobin Concentration : 33.0 gm/dL  Auto Neutrophil # : 6.2 K/uL  Auto Lymphocyte # : 2.0 K/uL  Auto Monocyte # : 0.8 K/uL  Auto Eosinophil # : 0.0 K/uL  Auto Basophil # : 0.0 K/uL  Auto Neutrophil % : 68.2 %  Auto Lymphocyte % : 22.0 %  Auto Monocyte % : 9.1 %  Auto Eosinophil % : 0.5 %  Auto Basophil % : 0.2 %    07-24    140  |  101  |  33<H>  ----------------------------<  257<H>  4.2   |  25  |  0.89    Ca    9.2      24 Jul 2017 06:33          Assessment and Plan:   89 yo female HTN, HLD, s/p CVA x2 recent rash rx'd with prednisone presents s/p mechanical fall.  Pt denied lightheadedness, LOC, cp, sob or palpitations.  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 Found to have high grade intracranial vertebral stenosis.  5/14 2nd infarct with bilateral decrease in vision.  As patient reinfarcted when plavix was dc'd will remain on DAPT

## 2017-07-25 NOTE — DISCHARGE NOTE ADULT - MEDICATION SUMMARY - MEDICATIONS TO TAKE
I will START or STAY ON the medications listed below when I get home from the hospital:    Triamcinolone Acetonide ointment  -- Apply on skin to right arm 2 times a day  -- Indication: For Rash    Tylenol 325 mg oral tablet  -- 2 tab(s) by mouth every 6 hours, As Needed for pain  -- Indication: For pain    aspirin 81 mg oral tablet  -- 1 tab(s) by mouth once a day  -- Indication: For Preventive measure    clonidine 0.2 mg oral tablet  -- 1 tab(s) by mouth once a day  -- Indication: For Essential hypertension    atorvastatin 40 mg oral tablet  -- 1 tab(s) by mouth once a day (at bedtime)  -- Indication: For Hyperlipidemia    clopidogrel 75 mg oral tablet  -- 1 tab(s) by mouth once a day  -- Indication: For preventive measure    SEROquel 25 mg oral tablet  -- 1 tab(s) by mouth once a day (at bedtime)  -- Indication: For depression    Xanax 0.25 mg oral tablet  -- 1 tab(s) by mouth once a day, As Needed MDD:1  -- Indication: For Anxiety disorder    carvedilol 25 mg oral tablet  -- 1 tab(s) by mouth every 12 hours  -- Indication: For Essential hypertension    amLODIPine 10 mg oral tablet  -- 1 tab(s) by mouth once a day MDD:1  -- Indication: For Essential hypertension    senna 8.6 mg oral tablet  -- 2 tab(s) by mouth once a day (at bedtime)  -- Indication: For constipation    Colace 100 mg oral capsule  -- 3 cap(s) by mouth once a day (at bedtime)  -- Indication: For constipation    Protonix 40 mg oral delayed release tablet  -- 1 tab(s) by mouth once a day  -- Indication: For GI preventive measure    Synthroid 50 mcg (0.05 mg) oral tablet  -- 1 tab(s) by mouth once a day  -- Indication: For Hypothyroid    Multiple Vitamins oral tablet  -- 1 tab(s) by mouth once a day  -- Indication: For Supplement    folic acid 1 mg oral tablet  -- 1 tab(s) by mouth once a day  -- Indication: For Supplement    ascorbic acid 500 mg oral tablet  -- 1 tab(s) by mouth 2 times a day  -- Indication: For Supplement    cholecalciferol oral tablet  -- 1000 unit(s) by mouth once a day  -- Indication: For Supplement

## 2017-07-25 NOTE — PROGRESS NOTE ADULT - SUBJECTIVE AND OBJECTIVE BOX
Patient is a 89y old  Female who presents with a chief complaint of S/P presumed mechanical fall at [facility]. (23 Jul 2017 02:42)      SUBJECTIVE / OVERNIGHT EVENTS: no acute events overnight     MEDICATIONS  (STANDING):  aspirin enteric coated 81 milliGRAM(s) Oral daily  cloNIDine 0.2 milliGRAM(s) Oral at bedtime  atorvastatin 40 milliGRAM(s) Oral at bedtime  clopidogrel Tablet 75 milliGRAM(s) Oral daily  QUEtiapine 25 milliGRAM(s) Oral at bedtime  carvedilol 25 milliGRAM(s) Oral every 12 hours  docusate sodium 100 milliGRAM(s) Oral three times a day  senna 2 Tablet(s) Oral at bedtime  pantoprazole    Tablet 40 milliGRAM(s) Oral before breakfast  levothyroxine 50 MICROGram(s) Oral daily  multivitamin 1 Tablet(s) Oral daily  ascorbic acid 500 milliGRAM(s) Oral daily  folic acid 1 milliGRAM(s) Oral daily  heparin  Injectable 5000 Unit(s) SubCutaneous every 8 hours  cholecalciferol 1000 Unit(s) Oral daily  insulin lispro (HumaLOG) corrective regimen sliding scale   SubCutaneous three times a day before meals  insulin lispro (HumaLOG) corrective regimen sliding scale   SubCutaneous at bedtime  dextrose 5%. 1000 milliLiter(s) (50 mL/Hr) IV Continuous <Continuous>  dextrose 50% Injectable 12.5 Gram(s) IV Push once  dextrose 50% Injectable 25 Gram(s) IV Push once  dextrose 50% Injectable 25 Gram(s) IV Push once  amLODIPine   Tablet 10 milliGRAM(s) Oral daily    MEDICATIONS  (PRN):  acetaminophen   Tablet. 650 milliGRAM(s) Oral every 6 hours PRN Mild Pain (1 - 3)  ALPRAZolam 0.25 milliGRAM(s) Oral at bedtime PRN anxiety  dextrose Gel 1 Dose(s) Oral once PRN Blood Glucose LESS THAN 70 milliGRAM(s)/deciliter  glucagon  Injectable 1 milliGRAM(s) IntraMuscular once PRN Glucose LESS THAN 70 milligrams/deciliter      Vital Signs Last 24 Hrs  T(C): 36.4 (25 Jul 2017 10:10), Max: 36.7 (24 Jul 2017 13:00)  T(F): 97.6 (25 Jul 2017 10:10), Max: 98 (24 Jul 2017 13:00)  HR: 72 (25 Jul 2017 10:10) (67 - 86)  BP: 135/75 (25 Jul 2017 10:10) (127/78 - 152/74)  BP(mean): --  RR: 18 (25 Jul 2017 10:10) (16 - 18)  SpO2: 95% (25 Jul 2017 10:10) (94% - 96%)  CAPILLARY BLOOD GLUCOSE  220 (25 Jul 2017 08:03)  214 (24 Jul 2017 22:00)  210 (24 Jul 2017 16:30)        I&O's Summary    24 Jul 2017 07:01  -  25 Jul 2017 07:00  --------------------------------------------------------  IN: 720 mL / OUT: 650 mL / NET: 70 mL        PHYSICAL EXAM:  GENERAL: NAD, elderly appearing female   HEAD:  Atraumatic, Normocephalic  EYES: EOMI, PERRLA, conjunctiva and sclera clear  NECK: Supple, No JVD  CHEST/LUNG: Clear to auscultation bilaterally; No wheeze  HEART: Regular rate and rhythm;   ABDOMEN: Soft, Nontender, Nondistended; Bowel sounds present  EXTREMITIES:  no cyanosis, warm to touch b/l   PSYCH: AAOx3  NEUROLOGY: non-focal  SKIN: multiple skin lesions (crusted and uncrusted) over the right upper extremity extending to the upper back. Some lesions on LUE as well. Minimal lesions noted on abdomen. All appear to be healing.       LABS:                        14.3   9.1   )-----------( 174      ( 24 Jul 2017 06:33 )             43.3     07-24    140  |  101  |  33<H>  ----------------------------<  257<H>  4.2   |  25  |  0.89    Ca    9.2      24 Jul 2017 06:33        Care Discussed with Consultants/Other Providers: Dr. Parker regarding discharge

## 2017-07-25 NOTE — PROGRESS NOTE ADULT - SUBJECTIVE AND OBJECTIVE BOX
INTERVAL HPI/OVERNIGHT EVENTS:  notes positive bowel movement  no nausea, vomiting or abdominal pain   tolerating po    Vital Signs Last 24 Hrs  T(C): 36.6 (25 Jul 2017 05:05), Max: 36.7 (24 Jul 2017 13:00)  T(F): 97.8 (25 Jul 2017 05:05), Max: 98 (24 Jul 2017 13:00)  HR: 75 (25 Jul 2017 05:05) (67 - 86)  BP: 127/78 (25 Jul 2017 05:05) (127/78 - 152/74)  BP(mean): --  RR: 18 (25 Jul 2017 05:05) (16 - 18)  SpO2: 95% (25 Jul 2017 05:05) (94% - 96%)        LABS:                        14.3   9.1   )-----------( 174      ( 24 Jul 2017 06:33 )             43.3     07-24    140  |  101  |  33<H>  ----------------------------<  257<H>  4.2   |  25  |  0.89    Ca    9.2      24 Jul 2017 06:33          I&O's Summary    24 Jul 2017 07:01  -  25 Jul 2017 07:00  --------------------------------------------------------  IN: 720 mL / OUT: 650 mL / NET: 70 mL        MEDICATIONS  (STANDING):  aspirin enteric coated 81 milliGRAM(s) Oral daily  cloNIDine 0.2 milliGRAM(s) Oral at bedtime  atorvastatin 40 milliGRAM(s) Oral at bedtime  clopidogrel Tablet 75 milliGRAM(s) Oral daily  QUEtiapine 25 milliGRAM(s) Oral at bedtime  carvedilol 25 milliGRAM(s) Oral every 12 hours  docusate sodium 100 milliGRAM(s) Oral three times a day  senna 2 Tablet(s) Oral at bedtime  pantoprazole    Tablet 40 milliGRAM(s) Oral before breakfast  levothyroxine 50 MICROGram(s) Oral daily  multivitamin 1 Tablet(s) Oral daily  ascorbic acid 500 milliGRAM(s) Oral daily  folic acid 1 milliGRAM(s) Oral daily  heparin  Injectable 5000 Unit(s) SubCutaneous every 8 hours  cholecalciferol 1000 Unit(s) Oral daily  insulin lispro (HumaLOG) corrective regimen sliding scale   SubCutaneous three times a day before meals  insulin lispro (HumaLOG) corrective regimen sliding scale   SubCutaneous at bedtime  dextrose 5%. 1000 milliLiter(s) (50 mL/Hr) IV Continuous <Continuous>  dextrose 50% Injectable 12.5 Gram(s) IV Push once  dextrose 50% Injectable 25 Gram(s) IV Push once  dextrose 50% Injectable 25 Gram(s) IV Push once  amLODIPine   Tablet 10 milliGRAM(s) Oral daily    MEDICATIONS  (PRN):  acetaminophen   Tablet. 650 milliGRAM(s) Oral every 6 hours PRN Mild Pain (1 - 3)  ALPRAZolam 0.25 milliGRAM(s) Oral at bedtime PRN anxiety  dextrose Gel 1 Dose(s) Oral once PRN Blood Glucose LESS THAN 70 milliGRAM(s)/deciliter  glucagon  Injectable 1 milliGRAM(s) IntraMuscular once PRN Glucose LESS THAN 70 milligrams/deciliter        RADIOLOGY & ADDITIONAL TESTS:                [facility]

## 2017-07-25 NOTE — DISCHARGE NOTE ADULT - PATIENT PORTAL LINK FT
“You can access the [employer] Patient Portal, offered by [facility], by registering with the following website: [email or web address]”

## 2017-07-25 NOTE — DISCHARGE NOTE ADULT - CARE PLAN
Principal Discharge DX:	Fall  Goal:	Resolution of symptom  Instructions for follow-up, activity and diet:	Monitor for fall  Fall precaution  call bell within reach  chair and bed alarm to prevent fall  Secondary Diagnosis:	Essential hypertension  Instructions for follow-up, activity and diet:	Low salt diet  Activity as tolerated.  Take all medication as prescribed.  Follow up with your medical doctor for routine blood pressure monitoring at your next visit.  Notify your doctor if you have any of the following symptoms:   Dizziness, Lightheadedness, Blurry vision, Headache, Chest pain, Shortness of breath  Secondary Diagnosis:	Hypothyroid  Instructions for follow-up, activity and diet:	you do not make enough thyroid hormone  signs & symptoms of low levels of thyroid hormone - tired, getting cold easily, coarse or thin hair, constipation, shortness of breath, swelling, irregular periods  your doctor will do thyroid hormone blood tests at least once a year to monitor if medication dose is adequate  take your thyroid medicine as directed by your doctor & on empty stomach  Secondary Diagnosis:	Rash  Instructions for follow-up, activity and diet:	Continue to apply cream

## 2017-11-22 ENCOUNTER — INPATIENT (INPATIENT)
Facility: HOSPITAL | Age: 82
LOS: 14 days | Discharge: HOME HEALTH | DRG: 480 | End: 2017-12-07
Attending: INTERNAL MEDICINE | Admitting: ORTHOPAEDIC SURGERY
Payer: MEDICARE

## 2017-11-22 VITALS
HEART RATE: 82 BPM | DIASTOLIC BLOOD PRESSURE: 91 MMHG | RESPIRATION RATE: 22 BRPM | OXYGEN SATURATION: 95 % | SYSTOLIC BLOOD PRESSURE: 171 MMHG

## 2017-11-22 DIAGNOSIS — I10 ESSENTIAL (PRIMARY) HYPERTENSION: ICD-10-CM

## 2017-11-22 DIAGNOSIS — I63.9 CEREBRAL INFARCTION, UNSPECIFIED: ICD-10-CM
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
## 2017-11-22 DIAGNOSIS — Z96.642 PRESENCE OF LEFT ARTIFICIAL HIP JOINT: Chronic | ICD-10-CM

## 2017-11-22 DIAGNOSIS — E78.5 HYPERLIPIDEMIA, UNSPECIFIED: ICD-10-CM

## 2017-11-22 DIAGNOSIS — S72.91XA UNSPECIFIED FRACTURE OF RIGHT FEMUR, INITIAL ENCOUNTER FOR CLOSED FRACTURE: ICD-10-CM

## 2017-11-22 LAB
ALBUMIN SERPL ELPH-MCNC: 3.8 G/DL — SIGNIFICANT CHANGE UP (ref 3.3–5)
ALP SERPL-CCNC: 82 U/L — SIGNIFICANT CHANGE UP (ref 40–120)
ALT FLD-CCNC: 19 U/L RC — SIGNIFICANT CHANGE UP (ref 10–45)
ANION GAP SERPL CALC-SCNC: 15 MMOL/L — SIGNIFICANT CHANGE UP (ref 5–17)
APPEARANCE UR: CLEAR — SIGNIFICANT CHANGE UP
APTT BLD: 25.9 SEC — LOW (ref 27.5–37.4)
AST SERPL-CCNC: 13 U/L — SIGNIFICANT CHANGE UP (ref 10–40)
BASOPHILS # BLD AUTO: 0.1 K/UL — SIGNIFICANT CHANGE UP (ref 0–0.2)
BASOPHILS NFR BLD AUTO: 0.4 % — SIGNIFICANT CHANGE UP (ref 0–2)
BILIRUB SERPL-MCNC: 0.2 MG/DL — SIGNIFICANT CHANGE UP (ref 0.2–1.2)
BILIRUB UR-MCNC: NEGATIVE — SIGNIFICANT CHANGE UP
BLD GP AB SCN SERPL QL: NEGATIVE — SIGNIFICANT CHANGE UP
BUN SERPL-MCNC: 28 MG/DL — HIGH (ref 7–23)
CALCIUM SERPL-MCNC: 9.2 MG/DL — SIGNIFICANT CHANGE UP (ref 8.4–10.5)
CHLORIDE SERPL-SCNC: 103 MMOL/L — SIGNIFICANT CHANGE UP (ref 96–108)
CO2 SERPL-SCNC: 24 MMOL/L — SIGNIFICANT CHANGE UP (ref 22–31)
COLOR SPEC: SIGNIFICANT CHANGE UP
CREAT SERPL-MCNC: 0.82 MG/DL — SIGNIFICANT CHANGE UP (ref 0.5–1.3)
DIFF PNL FLD: NEGATIVE — SIGNIFICANT CHANGE UP
EOSINOPHIL # BLD AUTO: 0.2 K/UL — SIGNIFICANT CHANGE UP (ref 0–0.5)
EOSINOPHIL NFR BLD AUTO: 1.1 % — SIGNIFICANT CHANGE UP (ref 0–6)
GLUCOSE SERPL-MCNC: 219 MG/DL — HIGH (ref 70–99)
GLUCOSE UR QL: 50
HCT VFR BLD CALC: 37 % — SIGNIFICANT CHANGE UP (ref 34.5–45)
HGB BLD-MCNC: 12.2 G/DL — SIGNIFICANT CHANGE UP (ref 11.5–15.5)
INR BLD: 0.96 RATIO — SIGNIFICANT CHANGE UP (ref 0.88–1.16)
KETONES UR-MCNC: NEGATIVE — SIGNIFICANT CHANGE UP
LEUKOCYTE ESTERASE UR-ACNC: NEGATIVE — SIGNIFICANT CHANGE UP
LYMPHOCYTES # BLD AUTO: 1.4 K/UL — SIGNIFICANT CHANGE UP (ref 1–3.3)
LYMPHOCYTES # BLD AUTO: 8.3 % — LOW (ref 13–44)
MCHC RBC-ENTMCNC: 32.1 PG — SIGNIFICANT CHANGE UP (ref 27–34)
MCHC RBC-ENTMCNC: 33 GM/DL — SIGNIFICANT CHANGE UP (ref 32–36)
MCV RBC AUTO: 97.2 FL — SIGNIFICANT CHANGE UP (ref 80–100)
MONOCYTES # BLD AUTO: 1 K/UL — HIGH (ref 0–0.9)
MONOCYTES NFR BLD AUTO: 5.9 % — SIGNIFICANT CHANGE UP (ref 2–14)
NEUTROPHILS # BLD AUTO: 13.8 K/UL — HIGH (ref 1.8–7.4)
NEUTROPHILS NFR BLD AUTO: 84.4 % — HIGH (ref 43–77)
NITRITE UR-MCNC: NEGATIVE — SIGNIFICANT CHANGE UP
PH UR: 5.5 — SIGNIFICANT CHANGE UP (ref 5–8)
PLATELET # BLD AUTO: 191 K/UL — SIGNIFICANT CHANGE UP (ref 150–400)
POTASSIUM SERPL-MCNC: 4.3 MMOL/L — SIGNIFICANT CHANGE UP (ref 3.5–5.3)
POTASSIUM SERPL-SCNC: 4.3 MMOL/L — SIGNIFICANT CHANGE UP (ref 3.5–5.3)
PROT SERPL-MCNC: 6.3 G/DL — SIGNIFICANT CHANGE UP (ref 6–8.3)
PROT UR-MCNC: NEGATIVE — SIGNIFICANT CHANGE UP
PROTHROM AB SERPL-ACNC: 10.4 SEC — SIGNIFICANT CHANGE UP (ref 9.8–12.7)
RBC # BLD: 3.81 M/UL — SIGNIFICANT CHANGE UP (ref 3.8–5.2)
RBC # FLD: 12.1 % — SIGNIFICANT CHANGE UP (ref 10.3–14.5)
RBC CASTS # UR COMP ASSIST: SIGNIFICANT CHANGE UP /HPF (ref 0–2)
RH IG SCN BLD-IMP: POSITIVE — SIGNIFICANT CHANGE UP
SODIUM SERPL-SCNC: 142 MMOL/L — SIGNIFICANT CHANGE UP (ref 135–145)
SP GR SPEC: 1.02 — SIGNIFICANT CHANGE UP (ref 1.01–1.02)
UROBILINOGEN FLD QL: NEGATIVE — SIGNIFICANT CHANGE UP
WBC # BLD: 16.4 K/UL — HIGH (ref 3.8–10.5)
WBC # FLD AUTO: 16.4 K/UL — HIGH (ref 3.8–10.5)
WBC UR QL: SIGNIFICANT CHANGE UP /HPF (ref 0–5)

## 2017-11-22 PROCEDURE — 70450 CT HEAD/BRAIN W/O DYE: CPT | Mod: 26

## 2017-11-22 PROCEDURE — 72125 CT NECK SPINE W/O DYE: CPT | Mod: 26

## 2017-11-22 PROCEDURE — 71010: CPT | Mod: 26

## 2017-11-22 PROCEDURE — 73552 X-RAY EXAM OF FEMUR 2/>: CPT | Mod: 26,RT

## 2017-11-22 PROCEDURE — 73502 X-RAY EXAM HIP UNI 2-3 VIEWS: CPT | Mod: 26,RT

## 2017-11-22 PROCEDURE — 99285 EMERGENCY DEPT VISIT HI MDM: CPT

## 2017-11-22 RX ORDER — HYDROMORPHONE HYDROCHLORIDE 2 MG/ML
1 INJECTION INTRAMUSCULAR; INTRAVENOUS; SUBCUTANEOUS EVERY 4 HOURS
Qty: 0 | Refills: 0 | Status: DISCONTINUED | OUTPATIENT
Start: 2017-11-22 | End: 2017-11-23

## 2017-11-22 RX ORDER — ONDANSETRON 8 MG/1
4 TABLET, FILM COATED ORAL EVERY 6 HOURS
Qty: 0 | Refills: 0 | Status: DISCONTINUED | OUTPATIENT
Start: 2017-11-22 | End: 2017-11-23

## 2017-11-22 RX ORDER — ACETAMINOPHEN 500 MG
650 TABLET ORAL EVERY 6 HOURS
Qty: 0 | Refills: 0 | Status: DISCONTINUED | OUTPATIENT
Start: 2017-11-22 | End: 2017-11-23

## 2017-11-22 RX ORDER — DIPHENHYDRAMINE HCL 50 MG
50 CAPSULE ORAL EVERY 4 HOURS
Qty: 0 | Refills: 0 | Status: DISCONTINUED | OUTPATIENT
Start: 2017-11-22 | End: 2017-11-23

## 2017-11-22 RX ORDER — OXYCODONE HYDROCHLORIDE 5 MG/1
5 TABLET ORAL EVERY 4 HOURS
Qty: 0 | Refills: 0 | Status: DISCONTINUED | OUTPATIENT
Start: 2017-11-22 | End: 2017-11-23

## 2017-11-22 RX ORDER — OXYCODONE HYDROCHLORIDE 5 MG/1
10 TABLET ORAL EVERY 4 HOURS
Qty: 0 | Refills: 0 | Status: DISCONTINUED | OUTPATIENT
Start: 2017-11-22 | End: 2017-11-23

## 2017-11-22 RX ORDER — CALCIUM CARBONATE 500(1250)
3 TABLET ORAL EVERY 6 HOURS
Qty: 0 | Refills: 0 | Status: DISCONTINUED | OUTPATIENT
Start: 2017-11-22 | End: 2017-11-23

## 2017-11-22 RX ORDER — SENNA PLUS 8.6 MG/1
2 TABLET ORAL AT BEDTIME
Qty: 0 | Refills: 0 | Status: DISCONTINUED | OUTPATIENT
Start: 2017-11-22 | End: 2017-11-23

## 2017-11-22 RX ORDER — BENZOCAINE AND MENTHOL 5; 1 G/100ML; G/100ML
1 LIQUID ORAL
Qty: 0 | Refills: 0 | Status: DISCONTINUED | OUTPATIENT
Start: 2017-11-22 | End: 2017-11-23

## 2017-11-22 RX ORDER — ZALEPLON 10 MG
5 CAPSULE ORAL AT BEDTIME
Qty: 0 | Refills: 0 | Status: DISCONTINUED | OUTPATIENT
Start: 2017-11-22 | End: 2017-11-23

## 2017-11-22 RX ORDER — ACETAMINOPHEN 500 MG
1000 TABLET ORAL ONCE
Qty: 0 | Refills: 0 | Status: COMPLETED | OUTPATIENT
Start: 2017-11-22 | End: 2017-11-22

## 2017-11-22 RX ORDER — HEPARIN SODIUM 5000 [USP'U]/ML
5000 INJECTION INTRAVENOUS; SUBCUTANEOUS EVERY 8 HOURS
Qty: 0 | Refills: 0 | Status: COMPLETED | OUTPATIENT
Start: 2017-11-22 | End: 2017-11-22

## 2017-11-22 RX ADMIN — OXYCODONE HYDROCHLORIDE 10 MILLIGRAM(S): 5 TABLET ORAL at 23:24

## 2017-11-22 RX ADMIN — HEPARIN SODIUM 5000 UNIT(S): 5000 INJECTION INTRAVENOUS; SUBCUTANEOUS at 22:19

## 2017-11-22 RX ADMIN — Medication 400 MILLIGRAM(S): at 18:42

## 2017-11-22 NOTE — ED PROVIDER NOTE - OBJECTIVE STATEMENT
89 yo F BIBEMS from nursing home s/p ground level fall. Per EMS: Pt is 89 yo F c PMH of HTN, HLD, and hypothyroidism resident of nursing home with baseline legal blindness and walks with walker. Pt was on toilet and fell onto the floor (unwitnessed), hitting head, no LOC. Pt is on ASA and plavix, last dose unknown. Pt mentating well with EMS.    Per pt: Pt fell today with no LOC. Pt has hx of CVA x 2 with only residual deficit being L eye blindness. Pt c/o R hip pain. Pt denies LOC, HA, SOB, CP, n/v, abdominal pain, or back pain.

## 2017-11-22 NOTE — ED PROVIDER NOTE - ATTENDING CONTRIBUTION TO CARE
I performed a history and physical exam of the patient and discussed their management with the resident. I reviewed the resident's note and agree with the documented findings and plan of care.     Patient is a 89 yo F brought in s/p fall from toilet at NH. Patient reports being the in the bathroom and then falling. She c/o right hip pain. Denies loss of consciousness, head pain, neck pain, chest pain, abdominal pain. Right leg is shortened and externally rotated. Patient requesting non-narcotic medicine for pain.   VS noted  Gen. no acute distress, Non toxic   HEENT: EOMI, mmm, atraumatic  Lungs: CTAB/L no C/ W /R   CVS: RRR   Abd; Soft non tender, non distended   Spine: No C-T-L spine tenderness  Ext: right hip ttp in inguinal region, shortened and externally rotated, +2 DP pulse  Skin: no rash  Neuro AAOx3 non focal clear speech  a/p: fall, right hip deformity - concern for right hip fx. Will get CT Head/ C-spine given age, xray pelvis, hip, preop labs. ADMIT.

## 2017-11-22 NOTE — CONSULT NOTE ADULT - PROBLEM SELECTOR RECOMMENDATION 3
Start Patient on Norvasc  and Add B complete Start Patient on Norvasc 5 mg po qd   and Add Complete B vitamin.

## 2017-11-22 NOTE — ED ADULT NURSE NOTE - OBJECTIVE STATEMENT
89 yo female with PMH HTN, HLD, hypothyroid, CVA with residual visual loss on aspirin and plavix brought by EMS from nursing home s/p unwitnessed fall from toilet. Patient cannot remember details of fall as "it happened so fast." Patient did hit her head. Patient denies LOC. Patient now reporting severe right hip pain. Pulse/motor/sensory intact to right foot. Right hip tender to palpation. Champion catheter inserted using sterile technique as per MD order, draining by gravity, secured with StatLock. Second RN present to confirm sterility.

## 2017-11-22 NOTE — CONSULT NOTE ADULT - SUBJECTIVE AND OBJECTIVE BOX
Patient is a 89y old  Female who presents with a chief complaint of     HPI:  Patient ws in her bathroom trying to clean the toilet when she lost her balance and sustained a right hip fracture.  Patient denies syncope or seizure.  She is legally blind and she also c/o right arm weakness and paraesthesias from shingles . Patient still with cervical collar until the neck  is cleared to remove the collar .      MEDICATIONS  (STANDING):    MEDICATIONS  (PRN):  acetaminophen   Tablet 650 milliGRAM(s) Oral every 6 hours PRN For Temp greater than 38 C (100.4 F) or headache  benzocaine 15 mG/menthol 3.6 mG Lozenge 1 Lozenge Oral every 2 hours PRN Sore Throat  calcium carbonate 500 mG (Tums) Chewable 3 Tablet(s) Chew every 6 hours PRN Dyspepsia  diphenhydrAMINE   Capsule 50 milliGRAM(s) Oral every 4 hours PRN Rash and/or Itching  HYDROmorphone  Injectable 1 milliGRAM(s) IV Push every 4 hours PRN Severe Pain (7 - 10)  ondansetron Injectable 4 milliGRAM(s) IV Push every 6 hours PRN Nausea  oxyCODONE    IR 5 milliGRAM(s) Oral every 4 hours PRN Mild Pain (1 - 3)  oxyCODONE    IR 10 milliGRAM(s) Oral every 4 hours PRN Moderate Pain (4 - 6)  senna 2 Tablet(s) Oral at bedtime PRN Constipation  zaleplon 5 milliGRAM(s) Oral at bedtime PRN Insomnia      Allergies    No Known Allergies    Intolerances      PAST MEDICAL HISTORY:  Stroke  HLD (hyperlipidemia)  HTN (hypertension)  Hypothyroid    PAST SURGICAL HISTORY:  History of hip replacement, total, left      Diet:  (   ) Regular   ( x  ) Low Sodium   (   ) Low Cholesterol   (   ) Diabetic   (   ) Other    FAMILY HISTORY:  arthritis  CVA      SOCIAL HISTORY:  Marital Status:  (   )    (   ) Single    (   )    ( x  )   Occupation:   Lives with: ( X  ) alone  (   ) children   (   ) spouse   (   ) parents  (  X ) other  Substance Use: ( X ) never used  (  ) other:  Tobacco Usage:  ( X  ) never smoked   (   ) former smoker   (   ) current smoker  (   ) pack years  (   ) last cigarette date  Alcohol Usage:  RARE DRINKG  Advanced Directives: (   ) None    (   ) DNR    (   ) DNI    (   ) Health Care Proxy:     REVIEW OF SYSTEM:  CONSTITUTIONAL: No fever, No change in weight, No fatigue  HEAD: No headache, No dizziness, No recent trauma  EYES: BLIND  ENT:  No difficulty hearing, No tinnitus, No vertigo, No sinus pain, No throat pain  NECK: No pain, No stiffness  RESPIRATORY: No cough, No wheezing, No chills, No hemoptysis, No shortness of breath at rest or exertional shortness of breath  CARDIOVASCULAR: No chest pain, No palpitations, No dizziness, No CHF, No arrhythmia, No cardiomegaly, No leg swelling  GASTROINTESTINAL: No abdominal, No epigastric pain. No nausea, No vomiting, No hematemesis, No diarrhea, No constipation. No melena, No hematochezia. No GERD  GENITOURINARY: No dysuria, No frequency, No hematuria, No incontinence, No nocturia, No hesitancy,  SKIN: No itching, No burning, No rashes, No lesions   LYMPH NODES: No history of enlarged glands  ENDOCRINE: No heat or cold intolerance, No hair loss. No osteoporosis, No thyroid disease  MUSCULOSKELETAL:     (+)  RIGHT HIP PAIN  PSYCHIATRIC: No depression, No anxiety, No mood swings, No difficulty sleeping  HEME/LYMPH: No easy bruising, No anticoagulants, No bleeding disorder, No bleeding gums  ALLERGY AND IMMUNOLOGIC: No hives, No eczema  NEUROLOGICAL: No memory loss, No loss of strength, No numbness, No tremors    VITALS:  Vital Signs Last 24 Hrs  T(C): 36.3 (2017 22:16), Max: 36.3 (2017 18:13)  T(F): 97.3 (2017 22:16), Max: 97.3 (2017 18:13)  HR: 75 (2017 22:16) (62 - 82)  BP: 180/74 (2017 22:16) (171/91 - 207/87)  BP(mean): --  RR: 18 (2017 22:16) (18 - 22)  SpO2: 95% (2017 22:16) (95% - 96%)  I&O's Summary      PHYSICAL EXAM:  GENERAL: NAD, well nourished and conversant  HEAD:  Atraumatic  EYES:  PATIENT I S BLIND  ENT: No tonsillar erythema, exudates, or enlargement, moist mucous membranes, good dentition, no lesions  NECK: Supple, No JVD, normal thyroid, carotids with normal upstrokes and no bruits  CHEST/LUNG: Clear to auscultation bilaterally, No rales, rhonchi, wheezing, or rubs  HEART: Regular rate and rhythm, No murmurs, rubs, or gallops  ABDOMEN: Soft, nondistended, no masses, guarding, tenderness or rebound, bowel sounds present  EXTREMITIES:  2+ Peripheral Pulses, No clubbing, cyanosis, or edema.   (+) RIGHT HIP PAIN C/W HIP FRACTURE  LYMPH: No lymphadenopathy noted  SKIN: No rashes or lesions  NERVOUS SYSTEM:  Alert & Oriented X3, normal cognitive function. Motor Strength 5/5 right upper and right lower.  5/5 left upper and left lower extremities, DTRs 2+ intact and symmetric    LABS:        CBC Full  -  ( 2017 18:39 )  WBC Count : 16.4 K/uL  Hemoglobin : 12.2 g/dL  Hematocrit : 37.0 %  Platelet Count - Automated : 191 K/uL  Mean Cell Volume : 97.2 fl  Mean Cell Hemoglobin : 32.1 pg  Mean Cell Hemoglobin Concentration : 33.0 gm/dL  Auto Neutrophil # : 13.8 K/uL  Auto Lymphocyte # : 1.4 K/uL  Auto Monocyte # : 1.0 K/uL  Auto Eosinophil # : 0.2 K/uL  Auto Basophil # : 0.1 K/uL  Auto Neutrophil % : 84.4 %  Auto Lymphocyte % : 8.3 %  Auto Monocyte % : 5.9 %  Auto Eosinophil % : 1.1 %  Auto Basophil % : 0.4 %        142  |  103  |  28<H>  ----------------------------<  219<H>  4.3   |  24  |  0.82    Ca    9.2      2017 18:39    TPro  6.3  /  Alb  3.8  /  TBili  0.2  /  DBili  x   /  AST  13  /  ALT  19  /  AlkPhos  82      LIVER FUNCTIONS - ( 2017 18:39 )  Alb: 3.8 g/dL / Pro: 6.3 g/dL / ALK PHOS: 82 U/L / ALT: 19 U/L RC / AST: 13 U/L / GGT: x           PT/INR - ( 2017 18:39 )   PT: 10.4 sec;   INR: 0.96 ratio         PTT - ( 2017 18:39 )  PTT:25.9 sec  Urinalysis Basic - ( 2017 21:09 )    Color: PL Yellow / Appearance: Clear / S.017 / pH: x  Gluc: x / Ketone: Negative  / Bili: Negative / Urobili: Negative   Blood: x / Protein: Negative / Nitrite: Negative   Leuk Esterase: Negative / RBC: 0-2 /HPF / WBC 0-2 /HPF   Sq Epi: x / Non Sq Epi: x / Bacteria: x      CAPILLARY BLOOD GLUCOSE          RADIOLOGY & ADDITIONAL TESTS:    Consultant(s):    Care Discussed with Consultants/Other Providers [ ] YES  [ ] NO Patient is a 89y old  Female who presents with a chief complaint of     HPI:  Patient ws in her bathroom trying to clean the toilet when she lost her balance and sustained a right hip fracture.  Patient denies syncope or seizure.  She is legally blind and she also c/o right arm weakness and paraesthesias from shingles . Patient still with cervical collar until the neck  is cleared to remove the collar .  Patient with atrophy  of the right hadn a      MEDICATIONS  (STANDING):    MEDICATIONS  (PRN):  acetaminophen   Tablet 650 milliGRAM(s) Oral every 6 hours PRN For Temp greater than 38 C (100.4 F) or headache  benzocaine 15 mG/menthol 3.6 mG Lozenge 1 Lozenge Oral every 2 hours PRN Sore Throat  calcium carbonate 500 mG (Tums) Chewable 3 Tablet(s) Chew every 6 hours PRN Dyspepsia  diphenhydrAMINE   Capsule 50 milliGRAM(s) Oral every 4 hours PRN Rash and/or Itching  HYDROmorphone  Injectable 1 milliGRAM(s) IV Push every 4 hours PRN Severe Pain (7 - 10)  ondansetron Injectable 4 milliGRAM(s) IV Push every 6 hours PRN Nausea  oxyCODONE    IR 5 milliGRAM(s) Oral every 4 hours PRN Mild Pain (1 - 3)  oxyCODONE    IR 10 milliGRAM(s) Oral every 4 hours PRN Moderate Pain (4 - 6)  senna 2 Tablet(s) Oral at bedtime PRN Constipation  zaleplon 5 milliGRAM(s) Oral at bedtime PRN Insomnia      Allergies    No Known Allergies    Intolerances      PAST MEDICAL HISTORY:  Stroke  HLD (hyperlipidemia)  HTN (hypertension)  Hypothyroid    PAST SURGICAL HISTORY:  History of hip replacement, total, left      Diet:  (   ) Regular   ( x  ) Low Sodium   (   ) Low Cholesterol   (   ) Diabetic   (   ) Other    FAMILY HISTORY:  arthritis  CVA      SOCIAL HISTORY:  Marital Status:  (   )    (   ) Single    (   )    ( x  )   Occupation:   Lives with: ( X  ) alone  (   ) children   (   ) spouse   (   ) parents  (  X ) other  Substance Use: ( X ) never used  (  ) other:  Tobacco Usage:  ( X  ) never smoked   (   ) former smoker   (   ) current smoker  (   ) pack years  (   ) last cigarette date  Alcohol Usage:  RARE DRINKG  Advanced Directives: (   ) None    (   ) DNR    (   ) DNI    (   ) Health Care Proxy:     REVIEW OF SYSTEM:  CONSTITUTIONAL: No fever, No change in weight, No fatigue  HEAD: No headache, No dizziness, No recent trauma  EYES: BLIND  ENT:  No difficulty hearing, No tinnitus, No vertigo, No sinus pain, No throat pain  NECK: No pain, No stiffness  RESPIRATORY: No cough, No wheezing, No chills, No hemoptysis, No shortness of breath at rest or exertional shortness of breath  CARDIOVASCULAR: No chest pain, No palpitations, No dizziness, No CHF, No arrhythmia, No cardiomegaly, No leg swelling  GASTROINTESTINAL: No abdominal, No epigastric pain. No nausea, No vomiting, No hematemesis, No diarrhea, No constipation. No melena, No hematochezia. No GERD  GENITOURINARY: No dysuria, No frequency, No hematuria, No incontinence, No nocturia, No hesitancy,  SKIN: No itching, No burning, No rashes, No lesions   LYMPH NODES: No history of enlarged glands  ENDOCRINE: No heat or cold intolerance, No hair loss. No osteoporosis, No thyroid disease  MUSCULOSKELETAL:     (+)  RIGHT HIP PAIN  PSYCHIATRIC: No depression, No anxiety, No mood swings, No difficulty sleeping  HEME/LYMPH: No easy bruising, No anticoagulants, No bleeding disorder, No bleeding gums  ALLERGY AND IMMUNOLOGIC: No hives, No eczema  NEUROLOGICAL: No memory loss, No loss of strength, No numbness, No tremors    VITALS:  Vital Signs Last 24 Hrs  T(C): 36.3 (2017 22:16), Max: 36.3 (2017 18:13)  T(F): 97.3 (2017 22:16), Max: 97.3 (2017 18:13)  HR: 75 (2017 22:16) (62 - 82)  BP: 180/74 (2017 22:16) (171/91 - 207/87)  BP(mean): --  RR: 18 (2017 22:16) (18 - 22)  SpO2: 95% (2017 22:16) (95% - 96%)  I&O's Summary      PHYSICAL EXAM:  GENERAL: NAD, well nourished and conversant  HEAD:  Atraumatic  EYES:  PATIENT I S BLIND  ENT: No tonsillar erythema, exudates, or enlargement, moist mucous membranes, good dentition, no lesions  NECK: Supple, No JVD, normal thyroid, carotids with normal upstrokes and no bruits  CHEST/LUNG: Clear to auscultation bilaterally, No rales, rhonchi, wheezing, or rubs  HEART: Regular rate and rhythm, No murmurs, rubs, or gallops  ABDOMEN: Soft, nondistended, no masses, guarding, tenderness or rebound, bowel sounds present  EXTREMITIES:  2+ Peripheral Pulses, No clubbing, cyanosis, or edema.   (+) RIGHT HIP PAIN C/W HIP FRACTURE  LYMPH: No lymphadenopathy noted  SKIN: No rashes or lesions  NERVOUS SYSTEM:  Alert & Oriented X3, normal cognitive function. Motor Strength 5/5 right upper and right lower.  5/5 left upper and left lower extremities, DTRs 2+ intact and symmetric    LABS:        CBC Full  -  ( 2017 18:39 )  WBC Count : 16.4 K/uL  Hemoglobin : 12.2 g/dL  Hematocrit : 37.0 %  Platelet Count - Automated : 191 K/uL  Mean Cell Volume : 97.2 fl  Mean Cell Hemoglobin : 32.1 pg  Mean Cell Hemoglobin Concentration : 33.0 gm/dL  Auto Neutrophil # : 13.8 K/uL  Auto Lymphocyte # : 1.4 K/uL  Auto Monocyte # : 1.0 K/uL  Auto Eosinophil # : 0.2 K/uL  Auto Basophil # : 0.1 K/uL  Auto Neutrophil % : 84.4 %  Auto Lymphocyte % : 8.3 %  Auto Monocyte % : 5.9 %  Auto Eosinophil % : 1.1 %  Auto Basophil % : 0.4 %        142  |  103  |  28<H>  ----------------------------<  219<H>  4.3   |  24  |  0.82    Ca    9.2      2017 18:39    TPro  6.3  /  Alb  3.8  /  TBili  0.2  /  DBili  x   /  AST  13  /  ALT  19  /  AlkPhos  82      LIVER FUNCTIONS - ( 2017 18:39 )  Alb: 3.8 g/dL / Pro: 6.3 g/dL / ALK PHOS: 82 U/L / ALT: 19 U/L RC / AST: 13 U/L / GGT: x           PT/INR - ( 2017 18:39 )   PT: 10.4 sec;   INR: 0.96 ratio         PTT - ( 2017 18:39 )  PTT:25.9 sec  Urinalysis Basic - ( 2017 21:09 )    Color: PL Yellow / Appearance: Clear / S.017 / pH: x  Gluc: x / Ketone: Negative  / Bili: Negative / Urobili: Negative   Blood: x / Protein: Negative / Nitrite: Negative   Leuk Esterase: Negative / RBC: 0-2 /HPF / WBC 0-2 /HPF   Sq Epi: x / Non Sq Epi: x / Bacteria: x      CAPILLARY BLOOD GLUCOSE          RADIOLOGY & ADDITIONAL TESTS:    Consultant(s):    Care Discussed with Consultants/Other Providers [ ] YES  [ ] NO Patient is a 89y old  Female who presents with a chief complaint of     HPI:  Patient ws in her bathroom trying to clean the toilet when she lost her balance and sustained a right hip fracture.  Patient denies syncope or seizure.  She is legally blind and she also c/o right arm weakness and paraesthesias from shingles . Patient still with cervical collar until the neck  is cleared to remove the collar .  Patient with atrophy  of the right hand and distal arlf      MEDICATIONS  (STANDING):    MEDICATIONS  (PRN):  acetaminophen   Tablet 650 milliGRAM(s) Oral every 6 hours PRN For Temp greater than 38 C (100.4 F) or headache  benzocaine 15 mG/menthol 3.6 mG Lozenge 1 Lozenge Oral every 2 hours PRN Sore Throat  calcium carbonate 500 mG (Tums) Chewable 3 Tablet(s) Chew every 6 hours PRN Dyspepsia  diphenhydrAMINE   Capsule 50 milliGRAM(s) Oral every 4 hours PRN Rash and/or Itching  HYDROmorphone  Injectable 1 milliGRAM(s) IV Push every 4 hours PRN Severe Pain (7 - 10)  ondansetron Injectable 4 milliGRAM(s) IV Push every 6 hours PRN Nausea  oxyCODONE    IR 5 milliGRAM(s) Oral every 4 hours PRN Mild Pain (1 - 3)  oxyCODONE    IR 10 milliGRAM(s) Oral every 4 hours PRN Moderate Pain (4 - 6)  senna 2 Tablet(s) Oral at bedtime PRN Constipation  zaleplon 5 milliGRAM(s) Oral at bedtime PRN Insomnia      Allergies    No Known Allergies    Intolerances      PAST MEDICAL HISTORY:  Stroke  HLD (hyperlipidemia)  HTN (hypertension)  Hypothyroid    PAST SURGICAL HISTORY:  History of hip replacement, total, left      Diet:  (   ) Regular   ( x  ) Low Sodium   (   ) Low Cholesterol   (   ) Diabetic   (   ) Other    FAMILY HISTORY:  arthritis  CVA      SOCIAL HISTORY:  Marital Status:  (   )    (   ) Single    (   )    ( x  )   Occupation:   Lives with: ( X  ) alone  (   ) children   (   ) spouse   (   ) parents  (  X ) other  Substance Use: ( X ) never used  (  ) other:  Tobacco Usage:  ( X  ) never smoked   (   ) former smoker   (   ) current smoker  (   ) pack years  (   ) last cigarette date  Alcohol Usage:  RARE DRINKG  Advanced Directives: (   ) None    (   ) DNR    (   ) DNI    (   ) Health Care Proxy:     REVIEW OF SYSTEM:  CONSTITUTIONAL: No fever, No change in weight, No fatigue  HEAD: No headache, No dizziness, No recent trauma  EYES: BLIND  ENT:  No difficulty hearing, No tinnitus, No vertigo, No sinus pain, No throat pain  NECK: No pain, No stiffness  RESPIRATORY: No cough, No wheezing, No chills, No hemoptysis, No shortness of breath at rest or exertional shortness of breath  CARDIOVASCULAR: No chest pain, No palpitations, No dizziness, No CHF, No arrhythmia, No cardiomegaly, No leg swelling  GASTROINTESTINAL: No abdominal, No epigastric pain. No nausea, No vomiting, No hematemesis, No diarrhea, No constipation. No melena, No hematochezia. No GERD  GENITOURINARY: No dysuria, No frequency, No hematuria, No incontinence, No nocturia, No hesitancy,  SKIN: No itching, No burning, No rashes, No lesions   LYMPH NODES: No history of enlarged glands  ENDOCRINE: No heat or cold intolerance, No hair loss. No osteoporosis, No thyroid disease  MUSCULOSKELETAL:     (+)  RIGHT HIP PAIN  PSYCHIATRIC: No depression, No anxiety, No mood swings, No difficulty sleeping  HEME/LYMPH: No easy bruising, No anticoagulants, No bleeding disorder, No bleeding gums  ALLERGY AND IMMUNOLOGIC: No hives, No eczema  NEUROLOGICAL: No memory loss, No loss of strength, No numbness, No tremors    VITALS:  Vital Signs Last 24 Hrs  T(C): 36.3 (2017 22:16), Max: 36.3 (2017 18:13)  T(F): 97.3 (2017 22:16), Max: 97.3 (2017 18:13)  HR: 75 (2017 22:16) (62 - 82)  BP: 180/74 (2017 22:16) (171/91 - 207/87)  BP(mean): --  RR: 18 (2017 22:16) (18 - 22)  SpO2: 95% (2017 22:16) (95% - 96%)  I&O's Summary      PHYSICAL EXAM:  GENERAL: NAD, well nourished and conversant  HEAD:  Atraumatic  EYES:  PATIENT I S BLIND  ENT: No tonsillar erythema, exudates, or enlargement, moist mucous membranes, good dentition, no lesions  NECK: Supple, No JVD, normal thyroid, carotids with normal upstrokes and no bruits  CHEST/LUNG: Clear to auscultation bilaterally, No rales, rhonchi, wheezing, or rubs  HEART: Regular rate and rhythm, No murmurs, rubs, or gallops  ABDOMEN: Soft, nondistended, no masses, guarding, tenderness or rebound, bowel sounds present  EXTREMITIES:  2+ Peripheral Pulses, No clubbing, cyanosis, or edema.   (+) RIGHT HIP PAIN C/W HIP FRACTURE  LYMPH: No lymphadenopathy noted  SKIN: No rashes or lesions  NERVOUS SYSTEM:  Alert & Oriented X3, normal cognitive function. Motor Strength 5/5 right upper and right lower.  5/5 left upper and left lower extremities, DTRs 2+ intact and symmetric    LABS:        CBC Full  -  ( 2017 18:39 )  WBC Count : 16.4 K/uL  Hemoglobin : 12.2 g/dL  Hematocrit : 37.0 %  Platelet Count - Automated : 191 K/uL  Mean Cell Volume : 97.2 fl  Mean Cell Hemoglobin : 32.1 pg  Mean Cell Hemoglobin Concentration : 33.0 gm/dL  Auto Neutrophil # : 13.8 K/uL  Auto Lymphocyte # : 1.4 K/uL  Auto Monocyte # : 1.0 K/uL  Auto Eosinophil # : 0.2 K/uL  Auto Basophil # : 0.1 K/uL  Auto Neutrophil % : 84.4 %  Auto Lymphocyte % : 8.3 %  Auto Monocyte % : 5.9 %  Auto Eosinophil % : 1.1 %  Auto Basophil % : 0.4 %        142  |  103  |  28<H>  ----------------------------<  219<H>  4.3   |  24  |  0.82    Ca    9.2      2017 18:39    TPro  6.3  /  Alb  3.8  /  TBili  0.2  /  DBili  x   /  AST  13  /  ALT  19  /  AlkPhos  82      LIVER FUNCTIONS - ( 2017 18:39 )  Alb: 3.8 g/dL / Pro: 6.3 g/dL / ALK PHOS: 82 U/L / ALT: 19 U/L RC / AST: 13 U/L / GGT: x           PT/INR - ( 2017 18:39 )   PT: 10.4 sec;   INR: 0.96 ratio         PTT - ( 2017 18:39 )  PTT:25.9 sec  Urinalysis Basic - ( 2017 21:09 )    Color: PL Yellow / Appearance: Clear / S.017 / pH: x  Gluc: x / Ketone: Negative  / Bili: Negative / Urobili: Negative   Blood: x / Protein: Negative / Nitrite: Negative   Leuk Esterase: Negative / RBC: 0-2 /HPF / WBC 0-2 /HPF   Sq Epi: x / Non Sq Epi: x / Bacteria: x      CAPILLARY BLOOD GLUCOSE          RADIOLOGY & ADDITIONAL TESTS:    Consultant(s):    Care Discussed with Consultants/Other Providers [ ] YES  [ ] NO Patient is a 89y old  Female who presents with a chief complaint of     HPI:  Patient ws in her bathroom trying to clean the toilet when she lost her balance and sustained a right hip fracture.  Patient denies syncope or seizure.  She is legally blind and she also c/o right arm weakness and paraesthesias from shingles . Patient still with cervical collar until the neck  is cleared to remove the collar .  Patient with atrophy  of the right hand and distal arlf      MEDICATIONS  (STANDING):    MEDICATIONS  (PRN):  acetaminophen   Tablet 650 milliGRAM(s) Oral every 6 hours PRN For Temp greater than 38 C (100.4 F) or headache  benzocaine 15 mG/menthol 3.6 mG Lozenge 1 Lozenge Oral every 2 hours PRN Sore Throat  calcium carbonate 500 mG (Tums) Chewable 3 Tablet(s) Chew every 6 hours PRN Dyspepsia  diphenhydrAMINE   Capsule 50 milliGRAM(s) Oral every 4 hours PRN Rash and/or Itching  HYDROmorphone  Injectable 1 milliGRAM(s) IV Push every 4 hours PRN Severe Pain (7 - 10)  ondansetron Injectable 4 milliGRAM(s) IV Push every 6 hours PRN Nausea  oxyCODONE    IR 5 milliGRAM(s) Oral every 4 hours PRN Mild Pain (1 - 3)  oxyCODONE    IR 10 milliGRAM(s) Oral every 4 hours PRN Moderate Pain (4 - 6)  senna 2 Tablet(s) Oral at bedtime PRN Constipation  zaleplon 5 milliGRAM(s) Oral at bedtime PRN Insomnia      Allergies    No Known Allergies    Intolerances      PAST MEDICAL HISTORY:  Stroke  HLD (hyperlipidemia)  HTN (hypertension)  Hypothyroid    PAST SURGICAL HISTORY:  History of hip replacement, total, left      Diet:  (   ) Regular   ( x  ) Low Sodium   (   ) Low Cholesterol   (   ) Diabetic   (   ) Other    FAMILY HISTORY:  arthritis  CVA      SOCIAL HISTORY:  Marital Status:  (   )    (   ) Single    (   )    ( x  )   Occupation:   Lives with: ( X  ) alone  (   ) children   (   ) spouse   (   ) parents  (  X ) other  Substance Use: ( X ) never used  (  ) other:  Tobacco Usage:  ( X  ) never smoked   (   ) former smoker   (   ) current smoker  (   ) pack years  (   ) last cigarette date  Alcohol Usage:  RARE DRINKG  Advanced Directives: (   ) None    (   ) DNR    (   ) DNI    (   ) Health Care Proxy:     REVIEW OF SYSTEM:  CONSTITUTIONAL: No fever, No change in weight, No fatigue  HEAD: No headache, No dizziness, No recent trauma  EYES: BLIND  ENT:  No difficulty hearing, No tinnitus, No vertigo, No sinus pain, No throat pain  NECK: No pain, No stiffness  RESPIRATORY: No cough, No wheezing, No chills, No hemoptysis, No shortness of breath at rest or exertional shortness of breath  CARDIOVASCULAR: No chest pain, No palpitations, No dizziness, No CHF, No arrhythmia, No cardiomegaly, No leg swelling  GASTROINTESTINAL: No abdominal, No epigastric pain. No nausea, No vomiting, No hematemesis, No diarrhea, No constipation. No melena, No hematochezia. No GERD  GENITOURINARY: No dysuria, No frequency, No hematuria, No incontinence, No nocturia, No hesitancy,  SKIN: No itching, No burning, No rashes, No lesions   LYMPH NODES: No history of enlarged glands  ENDOCRINE: No heat or cold intolerance, No hair loss. No osteoporosis, No thyroid disease  MUSCULOSKELETAL:     (+)  RIGHT HIP PAIN  PSYCHIATRIC: No depression, No anxiety, No mood swings, No difficulty sleeping  HEME/LYMPH: No easy bruising, No anticoagulants, No bleeding disorder, No bleeding gums  ALLERGY AND IMMUNOLOGIC: No hives, No eczema  NEUROLOGICAL: No memory loss, No loss of strength, No numbness, No tremors    VITALS:  Vital Signs Last 24 Hrs  T(C): 36.3 (2017 22:16), Max: 36.3 (2017 18:13)  T(F): 97.3 (2017 22:16), Max: 97.3 (2017 18:13)  HR: 75 (2017 22:16) (62 - 82)  BP: 180/74 (2017 22:16) (171/91 - 207/87)  BP(mean): --  RR: 18 (2017 22:16) (18 - 22)  SpO2: 95% (2017 22:16) (95% - 96%)  I&O's Summary      PHYSICAL EXAM:  GENERAL: NAD, well nourished and conversant  HEAD:  Atraumatic  EYES:  PATIENT I S BLIND  ENT: No tonsillar erythema, exudates, or enlargement, moist mucous membranes, good dentition, no lesions  NECK: Supple, No JVD, normal thyroid, carotids with normal upstrokes and no bruits  CHEST/LUNG: Clear to auscultation bilaterally, No rales, rhonchi, wheezing, or rubs  HEART: Regular rate and rhythm, No murmurs, rubs, or gallops  ABDOMEN: Soft, nondistended, no masses, guarding, tenderness or rebound, bowel sounds present  EXTREMITIES:  2+ Peripheral Pulses, No clubbing, cyanosis, or edema.   (+) RIGHT HIP PAIN C/W HIP FRACTURE  LYMPH: No lymphadenopathy noted  SKIN: No rashes or lesions  NERVOUS SYSTEM:  Alert & Oriented X3, normal cognitive function. Motor Strength 5/5 right upper and right lower.  5/5 left upper and left lower extremities, DTRs 2+ intact and symmetric    LABS:    CXR normal lung parenchyma, Heart size normal djd spine       EKG nsr no acute  changes                                                                                                                                                                                                                                                                                                             CBC Full  -  ( 2017 18:39 )  WBC Count : 16.4 K/uL  Hemoglobin : 12.2 g/dL  Hematocrit : 37.0 %  Platelet Count - Automated : 191 K/uL  Mean Cell Volume : 97.2 fl  Mean Cell Hemoglobin : 32.1 pg  Mean Cell Hemoglobin Concentration : 33.0 gm/dL  Auto Neutrophil # : 13.8 K/uL  Auto Lymphocyte # : 1.4 K/uL  Auto Monocyte # : 1.0 K/uL  Auto Eosinophil # : 0.2 K/uL  Auto Basophil # : 0.1 K/uL  Auto Neutrophil % : 84.4 %  Auto Lymphocyte % : 8.3 %  Auto Monocyte % : 5.9 %  Auto Eosinophil % : 1.1 %  Auto Basophil % : 0.4 %        142  |  103  |  28<H>  ----------------------------<  219<H>  4.3   |  24  |  0.82    Ca    9.2      2017 18:39    TPro  6.3  /  Alb  3.8  /  TBili  0.2  /  DBili  x   /  AST  13  /  ALT  19  /  AlkPhos  82      LIVER FUNCTIONS - ( 2017 18:39 )  Alb: 3.8 g/dL / Pro: 6.3 g/dL / ALK PHOS: 82 U/L / ALT: 19 U/L RC / AST: 13 U/L / GGT: x           PT/INR - ( 2017 18:39 )   PT: 10.4 sec;   INR: 0.96 ratio         PTT - ( 2017 18:39 )  PTT:25.9 sec  Urinalysis Basic - ( 2017 21:09 )    Color: PL Yellow / Appearance: Clear / S.017 / pH: x  Gluc: x / Ketone: Negative  / Bili: Negative / Urobili: Negative   Blood: x / Protein: Negative / Nitrite: Negative   Leuk Esterase: Negative / RBC: 0-2 /HPF / WBC 0-2 /HPF   Sq Epi: x / Non Sq Epi: x / Bacteria: x      CAPILLARY BLOOD GLUCOSE          RADIOLOGY & ADDITIONAL TESTS:    Consultant(s):    Care Discussed with Consultants/Other Providers [ ] YES  [ ] NO

## 2017-11-22 NOTE — ED PROVIDER NOTE - PROGRESS NOTE DETAILS
Mason Fang MD (resident): d/w Ortho resdient who came to see pt and agrees w/ admission to [facility]. if pt requires nerve block for pain the ortho resident agrees with plan of care

## 2017-11-22 NOTE — ED PROVIDER NOTE - CARE PLAN
Principal Discharge DX:	Fall Principal Discharge DX:	Fall  Secondary Diagnosis:	Femur fracture, right

## 2017-11-22 NOTE — ED PROVIDER NOTE - PHYSICAL EXAMINATION
GENERAL: Well appearing, well nourished, awake, alert, oriented to person, place, time/situation and in mild distress 2/2 pain  ENMT: Airway patent, Nasal mucosa clear. Mouth with normal mucosa. Throat has no vesicles, no oropharyngeal exudates and uvula is midline.  EYES: Clear bilaterally, pupils equal, round and reactive to light.  CARDIAC: Regular rate, regular rhythm.  Heart sounds S1, S2, no S3, S4. No murmurs, rubs or gallops.  RESPIRATORY: Breath sounds clear and equal in bilateral anterior lung fields, no wheezes/ronchi/stridor; pt breathing and speaking comfortably with no increased WOB, no accessory mm. use, no intercostal retractions, no nasal flaring  GI: no ecchymosis, erythema, or lacerations, abdomen soft, non-distended, non-tender, no rebound or guarding. GENERAL: Well appearing, well nourished, awake, alert, oriented to person, place, time/situation and in mild distress 2/2 pain  ENMT: Airway patent, Nasal mucosa clear. Mouth with normal mucosa. Throat has no vesicles, no oropharyngeal exudates   EYES: Clear bilaterally, pupils equal, round and reactive to light.  CARDIAC: Regular rate, regular rhythm.  Heart sounds S1, S2, no S3, S4. No murmurs, rubs or gallops.  RESPIRATORY: Breath sounds clear and equal in bilateral anterior lung fields, no wheezes/ronchi/stridor; pt breathing and speaking comfortably with no increased WOB, no accessory mm. use, no intercostal retractions, no nasal flaring  GI: no ecchymosis, erythema, or lacerations, abdomen soft, non-distended, non-tender, no rebound or guarding.  R hip: shortened and externally rotated, diminished ROM 2/2 pain, no erythema/ecchymosis, lacerations, +TTP on greater trochanter  neuro: pt alert and coherent, opening eyes spontaneously, speech is clear, PERRL, pt moving BUE and LLE with full strength with RLE showing diminished strength 2/2 pain

## 2017-11-22 NOTE — ED PROVIDER NOTE - MEDICAL DECISION MAKING DETAILS
89 yo F BIBEMS from nursing home s/p ground level fall. 89 yo F BIBEMS from nursing home s/p ground level fall presenting with R hip pain. No LOC, no head pain, no midline C/T/L spinous TTP or stepoffs. CTH, Xray R hip, pelvis, and femur, and basic and pre-op labs pending. Will reassess. 89 yo F BIBEMS from nursing home s/p ground level fall presenting with R hip pain. No LOC, no head pain, no midline C/T/L spinous TTP or stepoffs. CTH, Xray R hip, pelvis, and femur, and basic and pre-op labs pending. Pt given 1 g tylenol, will consider morphine if pain remains uncontrolled. Will reassess.

## 2017-11-22 NOTE — CONSULT NOTE ADULT - ATTENDING COMMENTS
Patine is medically stable to proceed to surgery as planned Patient is medically stable to proceed to surgery as planned

## 2017-11-22 NOTE — CONSULT NOTE ADULT - ASSESSMENT
89 YO female sustained a right hip fracture requiring surgical repair in the  OR. 89 YO female sustained a right hip fracture requiring surgical repair in the  OR.  Patient was seen and  examined,  There was no cardiac   or pulmonary    causes 89 YO female sustained a right hip fracture requiring surgical repair in the  OR.  Patient was seen and  examined,  There was no cardiac   or pulmonary  problems tat would preclude surgery in the am. Recommend Norvasc  5 mg po qd for BP control.

## 2017-11-23 ENCOUNTER — TRANSCRIPTION ENCOUNTER (OUTPATIENT)
Age: 82
End: 2017-11-23

## 2017-11-23 LAB
ANION GAP SERPL CALC-SCNC: 17 MMOL/L — SIGNIFICANT CHANGE UP (ref 5–17)
ANION GAP SERPL CALC-SCNC: 17 MMOL/L — SIGNIFICANT CHANGE UP (ref 5–17)
APTT BLD: 25.6 SEC — LOW (ref 27.5–37.4)
BUN SERPL-MCNC: 26 MG/DL — HIGH (ref 7–23)
BUN SERPL-MCNC: 28 MG/DL — HIGH (ref 7–23)
CALCIUM SERPL-MCNC: 8.2 MG/DL — LOW (ref 8.4–10.5)
CALCIUM SERPL-MCNC: 9.1 MG/DL — SIGNIFICANT CHANGE UP (ref 8.4–10.5)
CHLORIDE SERPL-SCNC: 102 MMOL/L — SIGNIFICANT CHANGE UP (ref 96–108)
CHLORIDE SERPL-SCNC: 98 MMOL/L — SIGNIFICANT CHANGE UP (ref 96–108)
CO2 SERPL-SCNC: 23 MMOL/L — SIGNIFICANT CHANGE UP (ref 22–31)
CO2 SERPL-SCNC: 23 MMOL/L — SIGNIFICANT CHANGE UP (ref 22–31)
CREAT SERPL-MCNC: 0.89 MG/DL — SIGNIFICANT CHANGE UP (ref 0.5–1.3)
CREAT SERPL-MCNC: 1 MG/DL — SIGNIFICANT CHANGE UP (ref 0.5–1.3)
CULTURE RESULTS: NO GROWTH — SIGNIFICANT CHANGE UP
GLUCOSE BLDC GLUCOMTR-MCNC: 172 MG/DL — HIGH (ref 70–99)
GLUCOSE BLDC GLUCOMTR-MCNC: 215 MG/DL — HIGH (ref 70–99)
GLUCOSE BLDC GLUCOMTR-MCNC: 296 MG/DL — HIGH (ref 70–99)
GLUCOSE SERPL-MCNC: 270 MG/DL — HIGH (ref 70–99)
GLUCOSE SERPL-MCNC: 329 MG/DL — HIGH (ref 70–99)
HBA1C BLD-MCNC: 8.3 % — HIGH (ref 4–5.6)
HCT VFR BLD CALC: 28.1 % — LOW (ref 34.5–45)
HCT VFR BLD CALC: 32.3 % — LOW (ref 34.5–45)
HGB BLD-MCNC: 10.8 G/DL — LOW (ref 11.5–15.5)
HGB BLD-MCNC: 9.4 G/DL — LOW (ref 11.5–15.5)
INR BLD: 1.04 RATIO — SIGNIFICANT CHANGE UP (ref 0.88–1.16)
MCHC RBC-ENTMCNC: 32.4 PG — SIGNIFICANT CHANGE UP (ref 27–34)
MCHC RBC-ENTMCNC: 32.6 PG — SIGNIFICANT CHANGE UP (ref 27–34)
MCHC RBC-ENTMCNC: 33.3 GM/DL — SIGNIFICANT CHANGE UP (ref 32–36)
MCHC RBC-ENTMCNC: 33.5 GM/DL — SIGNIFICANT CHANGE UP (ref 32–36)
MCV RBC AUTO: 96.8 FL — SIGNIFICANT CHANGE UP (ref 80–100)
MCV RBC AUTO: 98 FL — SIGNIFICANT CHANGE UP (ref 80–100)
PLATELET # BLD AUTO: 162 K/UL — SIGNIFICANT CHANGE UP (ref 150–400)
PLATELET # BLD AUTO: 173 K/UL — SIGNIFICANT CHANGE UP (ref 150–400)
POTASSIUM SERPL-MCNC: 4.6 MMOL/L — SIGNIFICANT CHANGE UP (ref 3.5–5.3)
POTASSIUM SERPL-MCNC: 4.7 MMOL/L — SIGNIFICANT CHANGE UP (ref 3.5–5.3)
POTASSIUM SERPL-SCNC: 4.6 MMOL/L — SIGNIFICANT CHANGE UP (ref 3.5–5.3)
POTASSIUM SERPL-SCNC: 4.7 MMOL/L — SIGNIFICANT CHANGE UP (ref 3.5–5.3)
PROTHROM AB SERPL-ACNC: 11.4 SEC — SIGNIFICANT CHANGE UP (ref 9.8–12.7)
RBC # BLD: 2.87 M/UL — LOW (ref 3.8–5.2)
RBC # BLD: 3.33 M/UL — LOW (ref 3.8–5.2)
RBC # FLD: 12.2 % — SIGNIFICANT CHANGE UP (ref 10.3–14.5)
RBC # FLD: 12.2 % — SIGNIFICANT CHANGE UP (ref 10.3–14.5)
SODIUM SERPL-SCNC: 138 MMOL/L — SIGNIFICANT CHANGE UP (ref 135–145)
SODIUM SERPL-SCNC: 142 MMOL/L — SIGNIFICANT CHANGE UP (ref 135–145)
SPECIMEN SOURCE: SIGNIFICANT CHANGE UP
WBC # BLD: 12.5 K/UL — HIGH (ref 3.8–10.5)
WBC # BLD: 13.8 K/UL — HIGH (ref 3.8–10.5)
WBC # FLD AUTO: 12.5 K/UL — HIGH (ref 3.8–10.5)
WBC # FLD AUTO: 13.8 K/UL — HIGH (ref 3.8–10.5)

## 2017-11-23 PROCEDURE — 73551 X-RAY EXAM OF FEMUR 1: CPT | Mod: 26,RT

## 2017-11-23 PROCEDURE — 27245 TREAT THIGH FRACTURE: CPT | Mod: RT

## 2017-11-23 RX ORDER — CALCIUM CARBONATE 500(1250)
3 TABLET ORAL EVERY 6 HOURS
Qty: 0 | Refills: 0 | Status: DISCONTINUED | OUTPATIENT
Start: 2017-11-23 | End: 2017-12-07

## 2017-11-23 RX ORDER — FAMOTIDINE 10 MG/ML
20 INJECTION INTRAVENOUS ONCE
Qty: 0 | Refills: 0 | Status: DISCONTINUED | OUTPATIENT
Start: 2017-11-23 | End: 2017-11-23

## 2017-11-23 RX ORDER — HYDROMORPHONE HYDROCHLORIDE 2 MG/ML
0.2 INJECTION INTRAMUSCULAR; INTRAVENOUS; SUBCUTANEOUS
Qty: 0 | Refills: 0 | Status: DISCONTINUED | OUTPATIENT
Start: 2017-11-23 | End: 2017-11-23

## 2017-11-23 RX ORDER — ENOXAPARIN SODIUM 100 MG/ML
30 INJECTION SUBCUTANEOUS DAILY
Qty: 0 | Refills: 0 | Status: DISCONTINUED | OUTPATIENT
Start: 2017-11-23 | End: 2017-12-07

## 2017-11-23 RX ORDER — ALPRAZOLAM 0.25 MG
0.25 TABLET ORAL DAILY
Qty: 0 | Refills: 0 | Status: DISCONTINUED | OUTPATIENT
Start: 2017-11-23 | End: 2017-11-23

## 2017-11-23 RX ORDER — CARVEDILOL PHOSPHATE 80 MG/1
25 CAPSULE, EXTENDED RELEASE ORAL EVERY 12 HOURS
Qty: 0 | Refills: 0 | Status: DISCONTINUED | OUTPATIENT
Start: 2017-11-23 | End: 2017-12-07

## 2017-11-23 RX ORDER — INSULIN LISPRO 100/ML
VIAL (ML) SUBCUTANEOUS AT BEDTIME
Qty: 0 | Refills: 0 | Status: DISCONTINUED | OUTPATIENT
Start: 2017-11-23 | End: 2017-11-24

## 2017-11-23 RX ORDER — AMLODIPINE BESYLATE 2.5 MG/1
5 TABLET ORAL
Qty: 0 | Refills: 0 | Status: DISCONTINUED | OUTPATIENT
Start: 2017-11-23 | End: 2017-11-23

## 2017-11-23 RX ORDER — FOLIC ACID 0.8 MG
1 TABLET ORAL DAILY
Qty: 0 | Refills: 0 | Status: DISCONTINUED | OUTPATIENT
Start: 2017-11-23 | End: 2017-12-07

## 2017-11-23 RX ORDER — QUETIAPINE FUMARATE 200 MG/1
25 TABLET, FILM COATED ORAL AT BEDTIME
Qty: 0 | Refills: 0 | Status: DISCONTINUED | OUTPATIENT
Start: 2017-11-23 | End: 2017-11-23

## 2017-11-23 RX ORDER — SODIUM CHLORIDE 9 MG/ML
1000 INJECTION, SOLUTION INTRAVENOUS
Qty: 0 | Refills: 0 | Status: DISCONTINUED | OUTPATIENT
Start: 2017-11-23 | End: 2017-11-24

## 2017-11-23 RX ORDER — LEVOTHYROXINE SODIUM 125 MCG
50 TABLET ORAL DAILY
Qty: 0 | Refills: 0 | Status: DISCONTINUED | OUTPATIENT
Start: 2017-11-23 | End: 2017-12-07

## 2017-11-23 RX ORDER — CARVEDILOL PHOSPHATE 80 MG/1
25 CAPSULE, EXTENDED RELEASE ORAL EVERY 12 HOURS
Qty: 0 | Refills: 0 | Status: DISCONTINUED | OUTPATIENT
Start: 2017-11-23 | End: 2017-11-23

## 2017-11-23 RX ORDER — ZALEPLON 10 MG
5 CAPSULE ORAL AT BEDTIME
Qty: 0 | Refills: 0 | Status: DISCONTINUED | OUTPATIENT
Start: 2017-11-23 | End: 2017-11-25

## 2017-11-23 RX ORDER — ATORVASTATIN CALCIUM 80 MG/1
40 TABLET, FILM COATED ORAL AT BEDTIME
Qty: 0 | Refills: 0 | Status: DISCONTINUED | OUTPATIENT
Start: 2017-11-23 | End: 2017-11-23

## 2017-11-23 RX ORDER — ATORVASTATIN CALCIUM 80 MG/1
40 TABLET, FILM COATED ORAL AT BEDTIME
Qty: 0 | Refills: 0 | Status: DISCONTINUED | OUTPATIENT
Start: 2017-11-23 | End: 2017-12-07

## 2017-11-23 RX ORDER — OXYCODONE HYDROCHLORIDE 5 MG/1
10 TABLET ORAL EVERY 4 HOURS
Qty: 0 | Refills: 0 | Status: DISCONTINUED | OUTPATIENT
Start: 2017-11-23 | End: 2017-11-24

## 2017-11-23 RX ORDER — ONDANSETRON 8 MG/1
4 TABLET, FILM COATED ORAL EVERY 6 HOURS
Qty: 0 | Refills: 0 | Status: DISCONTINUED | OUTPATIENT
Start: 2017-11-23 | End: 2017-12-07

## 2017-11-23 RX ORDER — PANTOPRAZOLE SODIUM 20 MG/1
20 TABLET, DELAYED RELEASE ORAL ONCE
Qty: 0 | Refills: 0 | Status: DISCONTINUED | OUTPATIENT
Start: 2017-11-23 | End: 2017-11-23

## 2017-11-23 RX ORDER — PANTOPRAZOLE SODIUM 20 MG/1
40 TABLET, DELAYED RELEASE ORAL
Qty: 0 | Refills: 0 | Status: CANCELLED | OUTPATIENT
Start: 2017-11-24 | End: 2017-11-23

## 2017-11-23 RX ORDER — SENNA PLUS 8.6 MG/1
2 TABLET ORAL AT BEDTIME
Qty: 0 | Refills: 0 | Status: DISCONTINUED | OUTPATIENT
Start: 2017-11-23 | End: 2017-12-07

## 2017-11-23 RX ORDER — FAMOTIDINE 10 MG/ML
20 INJECTION INTRAVENOUS ONCE
Qty: 0 | Refills: 0 | Status: COMPLETED | OUTPATIENT
Start: 2017-11-23 | End: 2017-11-23

## 2017-11-23 RX ORDER — GLUCAGON INJECTION, SOLUTION 0.5 MG/.1ML
1 INJECTION, SOLUTION SUBCUTANEOUS ONCE
Qty: 0 | Refills: 0 | Status: DISCONTINUED | OUTPATIENT
Start: 2017-11-23 | End: 2017-11-24

## 2017-11-23 RX ORDER — DOCUSATE SODIUM 100 MG
100 CAPSULE ORAL THREE TIMES A DAY
Qty: 0 | Refills: 0 | Status: DISCONTINUED | OUTPATIENT
Start: 2017-11-23 | End: 2017-12-07

## 2017-11-23 RX ORDER — DEXTROSE 50 % IN WATER 50 %
25 SYRINGE (ML) INTRAVENOUS ONCE
Qty: 0 | Refills: 0 | Status: DISCONTINUED | OUTPATIENT
Start: 2017-11-23 | End: 2017-11-24

## 2017-11-23 RX ORDER — FERROUS SULFATE 325(65) MG
325 TABLET ORAL
Qty: 0 | Refills: 0 | Status: DISCONTINUED | OUTPATIENT
Start: 2017-11-23 | End: 2017-11-24

## 2017-11-23 RX ORDER — METOCLOPRAMIDE HCL 10 MG
5 TABLET ORAL ONCE
Qty: 0 | Refills: 0 | Status: COMPLETED | OUTPATIENT
Start: 2017-11-23 | End: 2017-11-23

## 2017-11-23 RX ORDER — ONDANSETRON 8 MG/1
2 TABLET, FILM COATED ORAL ONCE
Qty: 0 | Refills: 0 | Status: DISCONTINUED | OUTPATIENT
Start: 2017-11-23 | End: 2017-11-23

## 2017-11-23 RX ORDER — SODIUM CHLORIDE 9 MG/ML
1000 INJECTION INTRAMUSCULAR; INTRAVENOUS; SUBCUTANEOUS
Qty: 0 | Refills: 0 | Status: DISCONTINUED | OUTPATIENT
Start: 2017-11-23 | End: 2017-11-24

## 2017-11-23 RX ORDER — DEXTROSE 50 % IN WATER 50 %
12.5 SYRINGE (ML) INTRAVENOUS ONCE
Qty: 0 | Refills: 0 | Status: DISCONTINUED | OUTPATIENT
Start: 2017-11-23 | End: 2017-11-24

## 2017-11-23 RX ORDER — DIPHENHYDRAMINE HCL 50 MG
50 CAPSULE ORAL EVERY 4 HOURS
Qty: 0 | Refills: 0 | Status: DISCONTINUED | OUTPATIENT
Start: 2017-11-23 | End: 2017-12-01

## 2017-11-23 RX ORDER — LEVOTHYROXINE SODIUM 125 MCG
50 TABLET ORAL DAILY
Qty: 0 | Refills: 0 | Status: DISCONTINUED | OUTPATIENT
Start: 2017-11-23 | End: 2017-11-23

## 2017-11-23 RX ORDER — ALPRAZOLAM 0.25 MG
0.25 TABLET ORAL DAILY
Qty: 0 | Refills: 0 | Status: DISCONTINUED | OUTPATIENT
Start: 2017-11-23 | End: 2017-11-30

## 2017-11-23 RX ORDER — ASPIRIN/CALCIUM CARB/MAGNESIUM 324 MG
81 TABLET ORAL DAILY
Qty: 0 | Refills: 0 | Status: DISCONTINUED | OUTPATIENT
Start: 2017-11-24 | End: 2017-12-07

## 2017-11-23 RX ORDER — CLOPIDOGREL BISULFATE 75 MG/1
75 TABLET, FILM COATED ORAL DAILY
Qty: 0 | Refills: 0 | Status: DISCONTINUED | OUTPATIENT
Start: 2017-11-24 | End: 2017-12-07

## 2017-11-23 RX ORDER — ASCORBIC ACID 60 MG
500 TABLET,CHEWABLE ORAL
Qty: 0 | Refills: 0 | Status: DISCONTINUED | OUTPATIENT
Start: 2017-11-23 | End: 2017-12-07

## 2017-11-23 RX ORDER — MORPHINE SULFATE 50 MG/1
4 CAPSULE, EXTENDED RELEASE ORAL EVERY 4 HOURS
Qty: 0 | Refills: 0 | Status: DISCONTINUED | OUTPATIENT
Start: 2017-11-23 | End: 2017-11-24

## 2017-11-23 RX ORDER — PANTOPRAZOLE SODIUM 20 MG/1
40 TABLET, DELAYED RELEASE ORAL
Qty: 0 | Refills: 0 | Status: DISCONTINUED | OUTPATIENT
Start: 2017-11-23 | End: 2017-12-07

## 2017-11-23 RX ORDER — ACETAMINOPHEN 500 MG
1000 TABLET ORAL ONCE
Qty: 0 | Refills: 0 | Status: COMPLETED | OUTPATIENT
Start: 2017-11-23 | End: 2017-11-23

## 2017-11-23 RX ORDER — INSULIN LISPRO 100/ML
VIAL (ML) SUBCUTANEOUS
Qty: 0 | Refills: 0 | Status: DISCONTINUED | OUTPATIENT
Start: 2017-11-23 | End: 2017-11-24

## 2017-11-23 RX ORDER — OXYCODONE HYDROCHLORIDE 5 MG/1
5 TABLET ORAL EVERY 4 HOURS
Qty: 0 | Refills: 0 | Status: DISCONTINUED | OUTPATIENT
Start: 2017-11-23 | End: 2017-11-24

## 2017-11-23 RX ORDER — AMLODIPINE BESYLATE 2.5 MG/1
5 TABLET ORAL
Qty: 0 | Refills: 0 | Status: DISCONTINUED | OUTPATIENT
Start: 2017-11-23 | End: 2017-12-05

## 2017-11-23 RX ORDER — ACETAMINOPHEN 500 MG
650 TABLET ORAL EVERY 6 HOURS
Qty: 0 | Refills: 0 | Status: DISCONTINUED | OUTPATIENT
Start: 2017-11-23 | End: 2017-12-07

## 2017-11-23 RX ORDER — DEXTROSE 50 % IN WATER 50 %
1 SYRINGE (ML) INTRAVENOUS ONCE
Qty: 0 | Refills: 0 | Status: DISCONTINUED | OUTPATIENT
Start: 2017-11-23 | End: 2017-11-24

## 2017-11-23 RX ORDER — BENZOCAINE AND MENTHOL 5; 1 G/100ML; G/100ML
1 LIQUID ORAL
Qty: 0 | Refills: 0 | Status: DISCONTINUED | OUTPATIENT
Start: 2017-11-23 | End: 2017-12-07

## 2017-11-23 RX ORDER — CEFAZOLIN SODIUM 1 G
2000 VIAL (EA) INJECTION EVERY 6 HOURS
Qty: 0 | Refills: 0 | Status: COMPLETED | OUTPATIENT
Start: 2017-11-23 | End: 2017-11-24

## 2017-11-23 RX ORDER — QUETIAPINE FUMARATE 200 MG/1
25 TABLET, FILM COATED ORAL AT BEDTIME
Qty: 0 | Refills: 0 | Status: DISCONTINUED | OUTPATIENT
Start: 2017-11-23 | End: 2017-12-07

## 2017-11-23 RX ADMIN — AMLODIPINE BESYLATE 5 MILLIGRAM(S): 2.5 TABLET ORAL at 05:55

## 2017-11-23 RX ADMIN — Medication 100 MILLIGRAM(S): at 17:04

## 2017-11-23 RX ADMIN — OXYCODONE HYDROCHLORIDE 10 MILLIGRAM(S): 5 TABLET ORAL at 16:05

## 2017-11-23 RX ADMIN — Medication 50 MICROGRAM(S): at 05:55

## 2017-11-23 RX ADMIN — ATORVASTATIN CALCIUM 40 MILLIGRAM(S): 80 TABLET, FILM COATED ORAL at 21:05

## 2017-11-23 RX ADMIN — Medication 3: at 13:22

## 2017-11-23 RX ADMIN — CARVEDILOL PHOSPHATE 25 MILLIGRAM(S): 80 CAPSULE, EXTENDED RELEASE ORAL at 05:55

## 2017-11-23 RX ADMIN — ONDANSETRON 4 MILLIGRAM(S): 8 TABLET, FILM COATED ORAL at 21:47

## 2017-11-23 RX ADMIN — Medication 50 MILLIGRAM(S): at 21:05

## 2017-11-23 RX ADMIN — Medication 1000 MILLIGRAM(S): at 13:00

## 2017-11-23 RX ADMIN — OXYCODONE HYDROCHLORIDE 10 MILLIGRAM(S): 5 TABLET ORAL at 05:56

## 2017-11-23 RX ADMIN — OXYCODONE HYDROCHLORIDE 10 MILLIGRAM(S): 5 TABLET ORAL at 16:35

## 2017-11-23 RX ADMIN — Medication 325 MILLIGRAM(S): at 17:05

## 2017-11-23 RX ADMIN — Medication 0.2 MILLIGRAM(S): at 06:00

## 2017-11-23 RX ADMIN — ONDANSETRON 4 MILLIGRAM(S): 8 TABLET, FILM COATED ORAL at 06:25

## 2017-11-23 RX ADMIN — Medication 5 MILLIGRAM(S): at 00:26

## 2017-11-23 RX ADMIN — Medication 5 MILLIGRAM(S): at 07:29

## 2017-11-23 RX ADMIN — Medication 1: at 17:05

## 2017-11-23 RX ADMIN — ONDANSETRON 4 MILLIGRAM(S): 8 TABLET, FILM COATED ORAL at 14:48

## 2017-11-23 RX ADMIN — FAMOTIDINE 20 MILLIGRAM(S): 10 INJECTION INTRAVENOUS at 17:04

## 2017-11-23 RX ADMIN — Medication 100 MILLIGRAM(S): at 21:05

## 2017-11-23 RX ADMIN — Medication 500 MILLIGRAM(S): at 17:05

## 2017-11-23 RX ADMIN — QUETIAPINE FUMARATE 25 MILLIGRAM(S): 200 TABLET, FILM COATED ORAL at 21:05

## 2017-11-23 RX ADMIN — Medication 400 MILLIGRAM(S): at 12:45

## 2017-11-23 NOTE — PROGRESS NOTE ADULT - SUBJECTIVE AND OBJECTIVE BOX
ORTHO ATTENDING POST OP    S/P IM FIXATION  R  HIP  WBAT R   LE  lovenox 30 QD  ASA/Plavix  venodynes  ancef 1 g x 24 h  OOB to cair in AM  rehab consult   f/u medicine  CBC in RR and AM

## 2017-11-23 NOTE — H&P ADULT - ASSESSMENT
89F with right intertrochanteric fracture    Pending medical optimization, plan for OR 10/23/17  NWB affected extremity  Pain control  NPO after midnight except meds  Hold chemical anticoagulation after midnight  IV fluids while NPO  Will discuss with attending and advise if change

## 2017-11-23 NOTE — PROGRESS NOTE ADULT - ASSESSMENT
89 YO female sustained a right hip fracture requiring surgical repair in the  OR.  Patient was seen and  examined,  There was no cardiac   or pulmonary  problems tat would preclude surgery in the am. Recommend Norvasc  5 mg po qd for BP control.

## 2017-11-23 NOTE — BRIEF OPERATIVE NOTE - PROCEDURE
<<-----Click on this checkbox to enter Procedure ORIF, fracture, femur, subtrochanteric  11/23/2017    Active  TMULRY

## 2017-11-23 NOTE — H&P ADULT - HISTORY OF PRESENT ILLNESS
HPI  89F complains of right hip pain for 1 day status post mechanical fall. Patient denies numbness, paresthesias, headstrike, loss of consciousness, or any other signs/symptoms at this time. Patient is a household ambulator with a walker at baseline.    Allergies: NKDA  PMH: hypothyroidism, CVA, HLD, HTN  PSH: left hip IMN (Dr Walters)  Social: Denies tobacco use  FH: Noncontributory  Imaging: XR right hip - intertrochanteric fracture

## 2017-11-23 NOTE — PROGRESS NOTE ADULT - SUBJECTIVE AND OBJECTIVE BOX
C/o nausea.  Denies chest pain, SOB.    T(C): 36.8 (11-23-17 @ 05:11)  T(F): 98.3 (11-23-17 @ 05:11)  HR: 98 (11-23-17 @ 05:11)  BP: 156/85 (11-23-17 @ 05:11)  RR: 18 (11-23-17 @ 05:11)  SpO2: 100% (11-23-17 @ 05:11)      Exam:  Alert and Oriented, No Acute Distress    R Lower Extremity:  Calf Soft, Non-tender  +PF/DF  Sensation grossly intact  +DP Pulse                          10.8   12.5  )-----------( 173      ( 23 Nov 2017 06:15 )             32.3        138  |  98  |  26<H>  ----------------------------<  329<H>  4.7   |  23  |  0.89        A/P: 89y Female S/P R hip IT fx, awaiting OR; Stable  -Pain Control  -Reglan 5mg IVP & pepcid 20mg IVP given  -Cont NPO / IVF  -Continue Current Tx.    Kathleen Deutsch PA-C  Orthopedic Surgery  Pagers [phone]

## 2017-11-23 NOTE — PROGRESS NOTE ADULT - SUBJECTIVE AND OBJECTIVE BOX
89 y/o female hx of CVA on ASA plavix, w/ bilateral vision loss, HTN, HLD, not on AC presents from assisted living [facility] with a fall. Per patient, pants were wet, so she walking to her room and she tripped and hit face on the floor. No LOC. Normally walks with a walker but was not using it today. Reports L sided face pain, R knee pain, L foot and ankle pain, and chipped front tooth. Was unable to get off floor by herself - aid had to help - on floor for 5 minutes. Denies any Chest pain, SOB, N/V/D, confusion. A+O x 3. Been following with Derm for R arm rash - will find out biopsy results on Monday.       MEDICATIONS  (STANDING):  amLODIPine   Tablet 5 milliGRAM(s) Oral two times a day  ascorbic acid 500 milliGRAM(s) Oral two times a day  atorvastatin 40 milliGRAM(s) Oral at bedtime  carvedilol 25 milliGRAM(s) Oral every 12 hours  ceFAZolin   IVPB 2000 milliGRAM(s) IV Intermittent every 6 hours  cloNIDine 0.2 milliGRAM(s) Oral daily  dextrose 5%. 1000 milliLiter(s) (50 mL/Hr) IV Continuous <Continuous>  dextrose 50% Injectable 12.5 Gram(s) IV Push once  dextrose 50% Injectable 25 Gram(s) IV Push once  dextrose 50% Injectable 25 Gram(s) IV Push once  docusate sodium 100 milliGRAM(s) Oral three times a day  famotidine Injectable 20 milliGRAM(s) IV Push once  ferrous    sulfate 325 milliGRAM(s) Oral three times a day with meals  folic acid 1 milliGRAM(s) Oral daily  insulin lispro (HumaLOG) corrective regimen sliding scale   SubCutaneous three times a day before meals  levothyroxine 50 MICROGram(s) Oral daily  multivitamin 1 Tablet(s) Oral daily  QUEtiapine 25 milliGRAM(s) Oral at bedtime  sodium chloride 0.9%. 1000 milliLiter(s) (75 mL/Hr) IV Continuous <Continuous>    MEDICATIONS  (PRN):  acetaminophen   Tablet 650 milliGRAM(s) Oral every 6 hours PRN For Temp greater than 38 C (100.4 F)  ALPRAZolam 0.25 milliGRAM(s) Oral daily PRN anxiety  benzocaine 15 mG/menthol 3.6 mG Lozenge 1 Lozenge Oral every 2 hours PRN Sore Throat  calcium carbonate 500 mG (Tums) Chewable 3 Tablet(s) Chew every 6 hours PRN Dyspepsia  dextrose Gel 1 Dose(s) Oral once PRN Blood Glucose LESS THAN 70 milliGRAM(s)/deciliter  diphenhydrAMINE   Capsule 50 milliGRAM(s) Oral every 4 hours PRN Rash and/or Itching  glucagon  Injectable 1 milliGRAM(s) IntraMuscular once PRN Glucose LESS THAN 70 milligrams/deciliter  morphine  - Injectable 4 milliGRAM(s) IV Push every 4 hours PRN Severe Pain  ondansetron Injectable 4 milliGRAM(s) IV Push every 6 hours PRN Nausea and/or Vomiting  oxyCODONE    IR 10 milliGRAM(s) Oral every 4 hours PRN Moderate Pain  oxyCODONE    IR 5 milliGRAM(s) Oral every 4 hours PRN Mild Pain  senna 2 Tablet(s) Oral at bedtime PRN Constipation  zaleplon 5 milliGRAM(s) Oral at bedtime PRN Insomnia      REVIEW OF SYSTEM:  CONSTITUTIONAL: No fever, No change in weight, No fatigue  HEAD: No headache, No dizziness, No recent trauma  EYES: BLIND  ENT:  No difficulty hearing, No tinnitus, No vertigo, No sinus pain, No throat pain  NECK: No pain, No stiffness  RESPIRATORY: No cough, No wheezing, No chills, No hemoptysis, No shortness of breath at rest or exertional shortness of breath  CARDIOVASCULAR: No chest pain, No palpitations, No dizziness, No CHF, No arrhythmia, No cardiomegaly, No leg swelling  GASTROINTESTINAL: No abdominal, No epigastric pain. No nausea, No vomiting, No hematemesis, No diarrhea, No constipation. No melena, No hematochezia. No GERD  GENITOURINARY: No dysuria, No frequency, No hematuria, No incontinence, No nocturia, No hesitancy,  SKIN: No itching, No burning, No rashes, No lesions   LYMPH NODES: No history of enlarged glands  ENDOCRINE: No heat or cold intolerance, No hair loss. No osteoporosis, No thyroid disease  MUSCULOSKELETAL:     (+)  RIGHT HIP PAIN  PSYCHIATRIC: No depression, No anxiety, No mood swings, No difficulty sleeping  HEME/LYMPH: No easy bruising, No anticoagulants, No bleeding disorder, No bleeding gums  ALLERGY AND IMMUNOLOGIC: No hives, No eczema  NEUROLOGICAL: No memory loss, No loss of strength, No numbness, No tremors    VITALS:   T(C): 36.3 (17 @ 14:50), Max: 37 (17 @ 22:45)  HR: 84 (17 @ 14:50) (62 - 98)  BP: 108/64 (17 @ 14:50) (108/64 - 207/87)  RR: 18 (17 @ 14:50) (13 - 22)  SpO2: 98% (17 @ 14:50) (93% - 100%)  Wt(kg): --    PHYSICAL EXAM:  GENERAL: NAD, well nourished and conversant  HEAD:  Atraumatic  EYES:  PATIENT I S BLIND  ENT: No tonsillar erythema, exudates, or enlargement, moist mucous membranes, good dentition, no lesions  NECK: Supple, No JVD, normal thyroid, carotids with normal upstrokes and no bruits  CHEST/LUNG: Clear to auscultation bilaterally, No rales, rhonchi, wheezing, or rubs  HEART: Regular rate and rhythm, No murmurs, rubs, or gallops  ABDOMEN: Soft, nondistended, no masses, guarding, tenderness or rebound, bowel sounds present  EXTREMITIES:  2+ Peripheral Pulses, No clubbing, cyanosis, or edema.   (+) RIGHT HIP PAIN C/W HIP FRACTURE  LYMPH: No lymphadenopathy noted  SKIN: No rashes or lesions  NERVOUS SYSTEM:  Alert & Oriented X3, normal cognitive function. Motor Strength 5/5 right upper and right lower.  5/5 left upper and left lower extremities, DTRs 2+ intact and symmetric    LABS:        CBC Full  -  ( 2017 12:48 )  WBC Count : 13.8 K/uL  Hemoglobin : 9.4 g/dL  Hematocrit : 28.1 %  Platelet Count - Automated : 162 K/uL  Mean Cell Volume : 98.0 fl  Mean Cell Hemoglobin : 32.6 pg  Mean Cell Hemoglobin Concentration : 33.3 gm/dL  Auto Neutrophil # : x  Auto Lymphocyte # : x  Auto Monocyte # : x  Auto Eosinophil # : x  Auto Basophil # : x  Auto Neutrophil % : x  Auto Lymphocyte % : x  Auto Monocyte % : x  Auto Eosinophil % : x  Auto Basophil % : x        142  |  102  |  28<H>  ----------------------------<  270<H>  4.6   |  23  |  1.00    Ca    8.2<L>      2017 12:48    TPro  6.3  /  Alb  3.8  /  TBili  0.2  /  DBili  x   /  AST  13  /  ALT  19  /  AlkPhos  82  11-22    LIVER FUNCTIONS - ( 2017 18:39 )  Alb: 3.8 g/dL / Pro: 6.3 g/dL / ALK PHOS: 82 U/L / ALT: 19 U/L RC / AST: 13 U/L / GGT: x           PT/INR - ( 2017 06:15 )   PT: 11.4 sec;   INR: 1.04 ratio         PTT - ( 2017 06:15 )  PTT:25.6 sec  Urinalysis Basic - ( 2017 21:09 )    Color: PL Yellow / Appearance: Clear / S.017 / pH: x  Gluc: x / Ketone: Negative  / Bili: Negative / Urobili: Negative   Blood: x / Protein: Negative / Nitrite: Negative   Leuk Esterase: Negative / RBC: 0-2 /HPF / WBC 0-2 /HPF   Sq Epi: x / Non Sq Epi: x / Bacteria: x      CAPILLARY BLOOD GLUCOSE      POCT Blood Glucose.: 296 mg/dL (2017 09:32)      RADIOLOGY & ADDITIONAL TESTS:

## 2017-11-23 NOTE — PROGRESS NOTE ADULT - SUBJECTIVE AND OBJECTIVE BOX
POST OP NOTE    Patient seen and examined. Pain controlled. No acute events since the OR. Patient will work with PT. Resting comfortably in bed    Allergies    No Known Allergies    Intolerances                            9.4    13.8  )-----------( 162      ( 23 Nov 2017 12:48 )             28.1     23 Nov 2017 12:48    142    |  102    |  28     ----------------------------<  270    4.6     |  23     |  1.00     Ca    8.2        23 Nov 2017 12:48    TPro  6.3    /  Alb  3.8    /  TBili  0.2    /  DBili  x      /  AST  13     /  ALT  19     /  AlkPhos  82     22 Nov 2017 18:39    PT/INR - ( 23 Nov 2017 06:15 )   PT: 11.4 sec;   INR: 1.04 ratio         PTT - ( 23 Nov 2017 06:15 )  PTT:25.6 sec  Vital Signs Last 24 Hrs  T(C): 36.3 (11-23-17 @ 14:50), Max: 37 (11-22-17 @ 22:45)  T(F): 97.4 (11-23-17 @ 14:50), Max: 98.6 (11-22-17 @ 22:45)  HR: 84 (11-23-17 @ 14:50) (62 - 98)  BP: 108/64 (11-23-17 @ 14:50) (108/64 - 207/87)  BP(mean): 87 (11-23-17 @ 13:30) (85 - 95)  RR: 18 (11-23-17 @ 14:50) (13 - 22)  SpO2: 98% (11-23-17 @ 14:50) (93% - 100%)    Physical Exam  Gen: NAD  RLE:   Dressing c/d/i  +ehl/fhl/ta/gs function  L2-S1 silt  Dp/pt pulse intact  No calf ttp  Compartments soft    A/P:  89y Female sp R IMN POD 0  Pain control  DVT ppx, lovenox 30, aspirin, plavix  PT/WBAT/OOB  FU labs  Medical management appreciated  Incentive spirometry  Dispo planning

## 2017-11-24 ENCOUNTER — TRANSCRIPTION ENCOUNTER (OUTPATIENT)
Age: 82
End: 2017-11-24

## 2017-11-24 DIAGNOSIS — E78.4 OTHER HYPERLIPIDEMIA: ICD-10-CM

## 2017-11-24 DIAGNOSIS — I10 ESSENTIAL (PRIMARY) HYPERTENSION: ICD-10-CM

## 2017-11-24 DIAGNOSIS — E11.65 TYPE 2 DIABETES MELLITUS WITH HYPERGLYCEMIA: ICD-10-CM

## 2017-11-24 LAB
ANION GAP SERPL CALC-SCNC: 13 MMOL/L — SIGNIFICANT CHANGE UP (ref 5–17)
BUN SERPL-MCNC: 28 MG/DL — HIGH (ref 7–23)
CALCIUM SERPL-MCNC: 7.8 MG/DL — LOW (ref 8.4–10.5)
CHLORIDE SERPL-SCNC: 103 MMOL/L — SIGNIFICANT CHANGE UP (ref 96–108)
CO2 SERPL-SCNC: 25 MMOL/L — SIGNIFICANT CHANGE UP (ref 22–31)
CREAT SERPL-MCNC: 1.16 MG/DL — SIGNIFICANT CHANGE UP (ref 0.5–1.3)
GLUCOSE BLDC GLUCOMTR-MCNC: 180 MG/DL — HIGH (ref 70–99)
GLUCOSE BLDC GLUCOMTR-MCNC: 181 MG/DL — HIGH (ref 70–99)
GLUCOSE BLDC GLUCOMTR-MCNC: 227 MG/DL — HIGH (ref 70–99)
GLUCOSE BLDC GLUCOMTR-MCNC: 243 MG/DL — HIGH (ref 70–99)
GLUCOSE SERPL-MCNC: 211 MG/DL — HIGH (ref 70–99)
HBA1C BLD-MCNC: 8.2 % — HIGH (ref 4–5.6)
HCT VFR BLD CALC: 23.6 % — LOW (ref 34.5–45)
HGB BLD-MCNC: 7.7 G/DL — LOW (ref 11.5–15.5)
MCHC RBC-ENTMCNC: 31.2 PG — SIGNIFICANT CHANGE UP (ref 27–34)
MCHC RBC-ENTMCNC: 32.6 GM/DL — SIGNIFICANT CHANGE UP (ref 32–36)
MCV RBC AUTO: 95.5 FL — SIGNIFICANT CHANGE UP (ref 80–100)
PLATELET # BLD AUTO: 144 K/UL — LOW (ref 150–400)
POTASSIUM SERPL-MCNC: 4 MMOL/L — SIGNIFICANT CHANGE UP (ref 3.5–5.3)
POTASSIUM SERPL-SCNC: 4 MMOL/L — SIGNIFICANT CHANGE UP (ref 3.5–5.3)
RBC # BLD: 2.47 M/UL — LOW (ref 3.8–5.2)
RBC # FLD: 14.2 % — SIGNIFICANT CHANGE UP (ref 10.3–14.5)
SODIUM SERPL-SCNC: 141 MMOL/L — SIGNIFICANT CHANGE UP (ref 135–145)
WBC # BLD: 11.51 K/UL — HIGH (ref 3.8–10.5)
WBC # FLD AUTO: 11.51 K/UL — HIGH (ref 3.8–10.5)

## 2017-11-24 PROCEDURE — 99222 1ST HOSP IP/OBS MODERATE 55: CPT | Mod: GC

## 2017-11-24 RX ORDER — HYDROMORPHONE HYDROCHLORIDE 2 MG/ML
0.5 INJECTION INTRAMUSCULAR; INTRAVENOUS; SUBCUTANEOUS EVERY 4 HOURS
Qty: 0 | Refills: 0 | Status: DISCONTINUED | OUTPATIENT
Start: 2017-11-24 | End: 2017-11-24

## 2017-11-24 RX ORDER — INSULIN LISPRO 100/ML
VIAL (ML) SUBCUTANEOUS
Qty: 0 | Refills: 0 | Status: DISCONTINUED | OUTPATIENT
Start: 2017-11-24 | End: 2017-11-28

## 2017-11-24 RX ORDER — INSULIN LISPRO 100/ML
VIAL (ML) SUBCUTANEOUS AT BEDTIME
Qty: 0 | Refills: 0 | Status: DISCONTINUED | OUTPATIENT
Start: 2017-11-24 | End: 2017-12-07

## 2017-11-24 RX ORDER — DEXTROSE 50 % IN WATER 50 %
25 SYRINGE (ML) INTRAVENOUS ONCE
Qty: 0 | Refills: 0 | Status: DISCONTINUED | OUTPATIENT
Start: 2017-11-24 | End: 2017-12-07

## 2017-11-24 RX ORDER — OXYCODONE HYDROCHLORIDE 5 MG/1
5 TABLET ORAL EVERY 4 HOURS
Qty: 0 | Refills: 0 | Status: DISCONTINUED | OUTPATIENT
Start: 2017-11-24 | End: 2017-11-26

## 2017-11-24 RX ORDER — DEXTROSE 50 % IN WATER 50 %
1 SYRINGE (ML) INTRAVENOUS ONCE
Qty: 0 | Refills: 0 | Status: DISCONTINUED | OUTPATIENT
Start: 2017-11-24 | End: 2017-12-07

## 2017-11-24 RX ORDER — SODIUM CHLORIDE 9 MG/ML
1000 INJECTION, SOLUTION INTRAVENOUS
Qty: 0 | Refills: 0 | Status: DISCONTINUED | OUTPATIENT
Start: 2017-11-24 | End: 2017-12-07

## 2017-11-24 RX ORDER — TRAMADOL HYDROCHLORIDE 50 MG/1
25 TABLET ORAL EVERY 4 HOURS
Qty: 0 | Refills: 0 | Status: DISCONTINUED | OUTPATIENT
Start: 2017-11-24 | End: 2017-11-26

## 2017-11-24 RX ORDER — TRAMADOL HYDROCHLORIDE 50 MG/1
50 TABLET ORAL EVERY 8 HOURS
Qty: 0 | Refills: 0 | Status: DISCONTINUED | OUTPATIENT
Start: 2017-11-24 | End: 2017-11-26

## 2017-11-24 RX ORDER — POLYETHYLENE GLYCOL 3350 17 G/17G
17 POWDER, FOR SOLUTION ORAL DAILY
Qty: 0 | Refills: 0 | Status: DISCONTINUED | OUTPATIENT
Start: 2017-11-24 | End: 2017-12-07

## 2017-11-24 RX ORDER — INSULIN LISPRO 100/ML
3 VIAL (ML) SUBCUTANEOUS
Qty: 0 | Refills: 0 | Status: DISCONTINUED | OUTPATIENT
Start: 2017-11-24 | End: 2017-11-28

## 2017-11-24 RX ORDER — DEXTROSE 50 % IN WATER 50 %
12.5 SYRINGE (ML) INTRAVENOUS ONCE
Qty: 0 | Refills: 0 | Status: DISCONTINUED | OUTPATIENT
Start: 2017-11-24 | End: 2017-12-07

## 2017-11-24 RX ORDER — INSULIN GLARGINE 100 [IU]/ML
10 INJECTION, SOLUTION SUBCUTANEOUS AT BEDTIME
Qty: 0 | Refills: 0 | Status: DISCONTINUED | OUTPATIENT
Start: 2017-11-24 | End: 2017-12-07

## 2017-11-24 RX ORDER — POTASSIUM CHLORIDE 20 MEQ
20 PACKET (EA) ORAL ONCE
Qty: 0 | Refills: 0 | Status: COMPLETED | OUTPATIENT
Start: 2017-11-24 | End: 2017-11-24

## 2017-11-24 RX ORDER — FUROSEMIDE 40 MG
20 TABLET ORAL ONCE
Qty: 0 | Refills: 0 | Status: COMPLETED | OUTPATIENT
Start: 2017-11-24 | End: 2017-11-24

## 2017-11-24 RX ORDER — GLUCAGON INJECTION, SOLUTION 0.5 MG/.1ML
1 INJECTION, SOLUTION SUBCUTANEOUS ONCE
Qty: 0 | Refills: 0 | Status: DISCONTINUED | OUTPATIENT
Start: 2017-11-24 | End: 2017-12-07

## 2017-11-24 RX ADMIN — QUETIAPINE FUMARATE 25 MILLIGRAM(S): 200 TABLET, FILM COATED ORAL at 21:14

## 2017-11-24 RX ADMIN — Medication 1 MILLIGRAM(S): at 11:51

## 2017-11-24 RX ADMIN — Medication 100 MILLIGRAM(S): at 21:14

## 2017-11-24 RX ADMIN — POLYETHYLENE GLYCOL 3350 17 GRAM(S): 17 POWDER, FOR SOLUTION ORAL at 11:54

## 2017-11-24 RX ADMIN — CARVEDILOL PHOSPHATE 25 MILLIGRAM(S): 80 CAPSULE, EXTENDED RELEASE ORAL at 17:35

## 2017-11-24 RX ADMIN — Medication 20 MILLIEQUIVALENT(S): at 15:15

## 2017-11-24 RX ADMIN — Medication 2: at 08:54

## 2017-11-24 RX ADMIN — Medication 100 MILLIGRAM(S): at 00:30

## 2017-11-24 RX ADMIN — Medication 1: at 17:35

## 2017-11-24 RX ADMIN — TRAMADOL HYDROCHLORIDE 50 MILLIGRAM(S): 50 TABLET ORAL at 21:14

## 2017-11-24 RX ADMIN — ENOXAPARIN SODIUM 30 MILLIGRAM(S): 100 INJECTION SUBCUTANEOUS at 11:50

## 2017-11-24 RX ADMIN — Medication 1 TABLET(S): at 11:51

## 2017-11-24 RX ADMIN — Medication 50 MICROGRAM(S): at 05:40

## 2017-11-24 RX ADMIN — AMLODIPINE BESYLATE 5 MILLIGRAM(S): 2.5 TABLET ORAL at 17:35

## 2017-11-24 RX ADMIN — Medication 0.2 MILLIGRAM(S): at 05:40

## 2017-11-24 RX ADMIN — ATORVASTATIN CALCIUM 40 MILLIGRAM(S): 80 TABLET, FILM COATED ORAL at 21:14

## 2017-11-24 RX ADMIN — AMLODIPINE BESYLATE 5 MILLIGRAM(S): 2.5 TABLET ORAL at 05:39

## 2017-11-24 RX ADMIN — PANTOPRAZOLE SODIUM 40 MILLIGRAM(S): 20 TABLET, DELAYED RELEASE ORAL at 05:39

## 2017-11-24 RX ADMIN — Medication 500 MILLIGRAM(S): at 17:35

## 2017-11-24 RX ADMIN — Medication 100 MILLIGRAM(S): at 13:48

## 2017-11-24 RX ADMIN — Medication 500 MILLIGRAM(S): at 05:39

## 2017-11-24 RX ADMIN — CARVEDILOL PHOSPHATE 25 MILLIGRAM(S): 80 CAPSULE, EXTENDED RELEASE ORAL at 05:39

## 2017-11-24 RX ADMIN — INSULIN GLARGINE 10 UNIT(S): 100 INJECTION, SOLUTION SUBCUTANEOUS at 21:14

## 2017-11-24 RX ADMIN — Medication 100 MILLIGRAM(S): at 05:40

## 2017-11-24 RX ADMIN — Medication 20 MILLIGRAM(S): at 15:15

## 2017-11-24 RX ADMIN — Medication 2: at 12:24

## 2017-11-24 RX ADMIN — Medication 325 MILLIGRAM(S): at 08:08

## 2017-11-24 RX ADMIN — Medication 3 UNIT(S): at 17:35

## 2017-11-24 RX ADMIN — CLOPIDOGREL BISULFATE 75 MILLIGRAM(S): 75 TABLET, FILM COATED ORAL at 11:51

## 2017-11-24 RX ADMIN — Medication 81 MILLIGRAM(S): at 11:50

## 2017-11-24 RX ADMIN — Medication 0.25 MILLIGRAM(S): at 05:40

## 2017-11-24 NOTE — PROGRESS NOTE ADULT - ASSESSMENT
Patient is a 89y old  Female who presents with a chief complaint of right hip pain  S/P Right Hip Intramedullary Nail (24 Nov 2017 10:37)      HPI:    MEDICATIONS  (STANDING):  amLODIPine   Tablet 5 milliGRAM(s) Oral two times a day  ascorbic acid 500 milliGRAM(s) Oral two times a day  aspirin enteric coated 81 milliGRAM(s) Oral daily  atorvastatin 40 milliGRAM(s) Oral at bedtime  carvedilol 25 milliGRAM(s) Oral every 12 hours  cloNIDine 0.2 milliGRAM(s) Oral daily  clopidogrel Tablet 75 milliGRAM(s) Oral daily  dextrose 5%. 1000 milliLiter(s) (50 mL/Hr) IV Continuous <Continuous>  dextrose 50% Injectable 12.5 Gram(s) IV Push once  dextrose 50% Injectable 25 Gram(s) IV Push once  dextrose 50% Injectable 25 Gram(s) IV Push once  docusate sodium 100 milliGRAM(s) Oral three times a day  enoxaparin Injectable 30 milliGRAM(s) SubCutaneous daily  folic acid 1 milliGRAM(s) Oral daily  insulin glargine Injectable (LANTUS) 10 Unit(s) SubCutaneous at bedtime  insulin lispro (HumaLOG) corrective regimen sliding scale   SubCutaneous three times a day before meals  insulin lispro (HumaLOG) corrective regimen sliding scale   SubCutaneous at bedtime  insulin lispro Injectable (HumaLOG) 3 Unit(s) SubCutaneous three times a day before meals  levothyroxine 50 MICROGram(s) Oral daily  multivitamin 1 Tablet(s) Oral daily  pantoprazole    Tablet 40 milliGRAM(s) Oral before breakfast  polyethylene glycol 3350 17 Gram(s) Oral daily  QUEtiapine 25 milliGRAM(s) Oral at bedtime    MEDICATIONS  (PRN):  acetaminophen   Tablet 650 milliGRAM(s) Oral every 6 hours PRN For Temp greater than 38 C (100.4 F)  ALPRAZolam 0.25 milliGRAM(s) Oral daily PRN anxiety  benzocaine 15 mG/menthol 3.6 mG Lozenge 1 Lozenge Oral every 2 hours PRN Sore Throat  calcium carbonate 500 mG (Tums) Chewable 3 Tablet(s) Chew every 6 hours PRN Dyspepsia  dextrose Gel 1 Dose(s) Oral once PRN Blood Glucose LESS THAN 70 milliGRAM(s)/deciliter  diphenhydrAMINE   Capsule 50 milliGRAM(s) Oral every 4 hours PRN Rash and/or Itching  glucagon  Injectable 1 milliGRAM(s) IntraMuscular once PRN Glucose LESS THAN 70 milligrams/deciliter  HYDROmorphone  Injectable 0.5 milliGRAM(s) IV Push every 4 hours PRN breakthrough  ondansetron Injectable 4 milliGRAM(s) IV Push every 6 hours PRN Nausea and/or Vomiting  oxyCODONE    IR 5 milliGRAM(s) Oral every 4 hours PRN Severe Pain (7 - 10)  senna 2 Tablet(s) Oral at bedtime PRN Constipation  traMADol 25 milliGRAM(s) Oral every 4 hours PRN Mild Pain (1 - 3)  traMADol 50 milliGRAM(s) Oral every 8 hours PRN Moderate Pain (4 - 6)  zaleplon 5 milliGRAM(s) Oral at bedtime PRN Insomnia      Allergies    No Known Allergies    Intolerances        REVIEW OF SYSTEM:  CONSTITUTIONAL: No fever, No change in weight, No fatigue  HEAD: No headache, No dizziness, No recent trauma  EYES: No eye pain, No visual disturbances, No discharge  ENT:  No difficulty hearing, No tinnitus, No vertigo, No sinus pain, No throat pain  NECK: No pain, No stiffness  BREASTS: No pain, No masses, No nipple discharge  RESPIRATORY: No cough, No wheezing, No chills, No hemoptysis, No shortness of breath at rest or exertional shortness of breath  CARDIOVASCULAR: No chest pain, No palpitations, No dizziness, No CHF, No arrhythmia, No cardiomegaly, No leg swelling  GASTROINTESTINAL: No abdominal, No epigastric pain. No nausea, No vomiting, No hematemesis, No diarrhea, No constipation. No melena, No hematochezia. No GERD  GENITOURINARY: No dysuria, No frequency, No hematuria, No incontinence, No nocturia, No hesitancy,  SKIN: No itching, No burning, No rashes, No lesions   LYMPH NODES: No history of enlarged glands  ENDOCRINE: No heat or cold intolerance, No hair loss. No osteoporosis, No thyroid disease  MUSCULOSKELETAL: No joint pain or swelling, No muscle, back, or extremity pain  PSYCHIATRIC: No depression, No anxiety, No mood swings, No difficulty sleeping  HEME/LYMPH: No easy bruising, No anticoagulants, No bleeding disorder, No bleeding gums  ALLERGY AND IMMUNOLOGIC: No hives, No eczema  NEUROLOGICAL: No memory loss, No loss of strength, No numbness, No tremors        VITALS:   T(C): 36.9 (11-24-17 @ 20:03), Max: 37.4 (11-24-17 @ 17:34)  HR: 84 (11-24-17 @ 20:03) (75 - 100)  BP: 116/64 (11-24-17 @ 20:03) (92/58 - 127/65)  RR: 18 (11-24-17 @ 20:03) (16 - 18)  SpO2: 93% (11-24-17 @ 20:03) (93% - 95%)  Wt(kg): --    11-23 @ 07:01  -  11-24 @ 07:00  --------------------------------------------------------  IN: 1429 mL / OUT: 635 mL / NET: 794 mL    11-24 @ 07:01  -  11-24 @ 23:47  --------------------------------------------------------  IN: 1060 mL / OUT: 500 mL / NET: 560 mL        PHYSICAL EXAM:  GENERAL: NAD, well nourished and conversant  HEAD:  Atraumatic  EYES: EOM, PERRLA, conjunctiva pink and sclera white  ENT: No tonsillar erythema, exudates, or enlargement, moist mucous membranes, good dentition, no lesions  NECK: Supple, No JVD, normal thyroid, carotids with normal upstrokes and no bruits  CHEST/LUNG: Clear to auscultation bilaterally, No rales, rhonchi, wheezing, or rubs  HEART: Regular rate and rhythm, No murmurs, rubs, or gallops  ABDOMEN: Soft, nondistended, no masses, guarding, tenderness or rebound, bowel sounds present  EXTREMITIES:  2+ Peripheral Pulses, No clubbing, cyanosis, or edema. No arthritis of shoulders, elbows, hands, hips, knees, ankles, or feet. No DJD C spine, T spine, or L/S spine  LYMPH: No lymphadenopathy noted  SKIN: No rashes or lesions  NERVOUS SYSTEM:  Alert & Oriented X3, normal cognitive function. Motor Strength 5/5 right upper and right lower.  5/5 left upper and left lower extremities, DTRs 2+ intact and symmetric    LABS:                          7.7    11.51 )-----------( 144      ( 24 Nov 2017 07:49 )             23.6     11-24    141  |  103  |  28<H>  ----------------------------<  211<H>  4.0   |  25  |  1.16  11-23    142  |  102  |  28<H>  ----------------------------<  270<H>  4.6   |  23  |  1.00  11-23    138  |  98  |  26<H>  ----------------------------<  329<H>  4.7   |  23  |  0.89    Ca    7.8<L>      24 Nov 2017 08:08  Ca    8.2<L>      23 Nov 2017 12:48  Ca    9.1      23 Nov 2017 06:15    TPro  6.3  /  Alb  3.8  /  TBili  0.2  /  DBili  x   /  AST  13  /  ALT  19  /  AlkPhos  82  11-22    CAPILLARY BLOOD GLUCOSE      POCT Blood Glucose.: 180 mg/dL (24 Nov 2017 21:11)  POCT Blood Glucose.: 181 mg/dL (24 Nov 2017 16:51)  POCT Blood Glucose.: 227 mg/dL (24 Nov 2017 12:11)  POCT Blood Glucose.: 243 mg/dL (24 Nov 2017 08:37)      RADIOLOGY & ADDITIONAL TESTS:      Consultant(s):    Care Discussed with Consultants/Other Providers [ ] YES  [ ] NO

## 2017-11-24 NOTE — OCCUPATIONAL THERAPY INITIAL EVALUATION ADULT - PERTINENT HX OF CURRENT PROBLEM, REHAB EVAL
89F complains of R hip pain for 1 day status post mechanical fall. Patient denies numbness, paresthesias, headstrike, loss of consciousness, or any other signs/symptoms at this time. Pt is a household ambulator with RW at baseline.CT head/cervical spine 11/22: (-). Cervical degenerative spondylosis.Xray R femur 11/23/ Xray R hip/ femur/pelvis 11/22:  Acute comminuted fx of R ...see below

## 2017-11-24 NOTE — PROGRESS NOTE ADULT - SUBJECTIVE AND OBJECTIVE BOX
Patient is a 89y old  Female who presents with a chief complaint of right hip pain  S/P Right Hip Intramedullary Nail (24 Nov 2017 10:37)      HPI:  Patinet s/p agitation . Confused intermittently   Patient is restless at bedtime  Recommend Haldol 0.5 mg po tid e  MEDICATIONS  (STANDING):  amLODIPine   Tablet 5 milliGRAM(s) Oral two times a day  ascorbic acid 500 milliGRAM(s) Oral two times a day  aspirin enteric coated 81 milliGRAM(s) Oral daily  atorvastatin 40 milliGRAM(s) Oral at bedtime  carvedilol 25 milliGRAM(s) Oral every 12 hours  cloNIDine 0.2 milliGRAM(s) Oral daily  clopidogrel Tablet 75 milliGRAM(s) Oral daily  dextrose 5%. 1000 milliLiter(s) (50 mL/Hr) IV Continuous <Continuous>  dextrose 50% Injectable 12.5 Gram(s) IV Push once  dextrose 50% Injectable 25 Gram(s) IV Push once  dextrose 50% Injectable 25 Gram(s) IV Push once  docusate sodium 100 milliGRAM(s) Oral three times a day  enoxaparin Injectable 30 milliGRAM(s) SubCutaneous daily  folic acid 1 milliGRAM(s) Oral daily  insulin glargine Injectable (LANTUS) 10 Unit(s) SubCutaneous at bedtime  insulin lispro (HumaLOG) corrective regimen sliding scale   SubCutaneous three times a day before meals  insulin lispro (HumaLOG) corrective regimen sliding scale   SubCutaneous at bedtime  insulin lispro Injectable (HumaLOG) 3 Unit(s) SubCutaneous three times a day before meals  levothyroxine 50 MICROGram(s) Oral daily  multivitamin 1 Tablet(s) Oral daily  pantoprazole    Tablet 40 milliGRAM(s) Oral before breakfast  polyethylene glycol 3350 17 Gram(s) Oral daily  QUEtiapine 25 milliGRAM(s) Oral at bedtime    MEDICATIONS  (PRN):  acetaminophen   Tablet 650 milliGRAM(s) Oral every 6 hours PRN For Temp greater than 38 C (100.4 F)  ALPRAZolam 0.25 milliGRAM(s) Oral daily PRN anxiety  benzocaine 15 mG/menthol 3.6 mG Lozenge 1 Lozenge Oral every 2 hours PRN Sore Throat  calcium carbonate 500 mG (Tums) Chewable 3 Tablet(s) Chew every 6 hours PRN Dyspepsia  dextrose Gel 1 Dose(s) Oral once PRN Blood Glucose LESS THAN 70 milliGRAM(s)/deciliter  diphenhydrAMINE   Capsule 50 milliGRAM(s) Oral every 4 hours PRN Rash and/or Itching  glucagon  Injectable 1 milliGRAM(s) IntraMuscular once PRN Glucose LESS THAN 70 milligrams/deciliter  HYDROmorphone  Injectable 0.5 milliGRAM(s) IV Push every 4 hours PRN breakthrough  ondansetron Injectable 4 milliGRAM(s) IV Push every 6 hours PRN Nausea and/or Vomiting  oxyCODONE    IR 5 milliGRAM(s) Oral every 4 hours PRN Severe Pain (7 - 10)  senna 2 Tablet(s) Oral at bedtime PRN Constipation  traMADol 25 milliGRAM(s) Oral every 4 hours PRN Mild Pain (1 - 3)  traMADol 50 milliGRAM(s) Oral every 8 hours PRN Moderate Pain (4 - 6)  zaleplon 5 milliGRAM(s) Oral at bedtime PRN Insomnia      Allergies    No Known Allergies    Intolerances        REVIEW OF SYSTEM:  CONSTITUTIONAL: No fever, No change in weight, No fatigue  HEAD: No headache, No dizziness, No recent trauma  EYES: No eye pain, No visual disturbances, No discharge  ENT:  No difficulty hearing, No tinnitus, No vertigo, No sinus pain, No throat pain  NECK: No pain, No stiffness  BREASTS: No pain, No masses, No nipple discharge  RESPIRATORY: No cough, No wheezing, No chills, No hemoptysis, No shortness of breath at rest or exertional shortness of breath  CARDIOVASCULAR: No chest pain, No palpitations, No dizziness, No CHF, No arrhythmia, No cardiomegaly, No leg swelling  GASTROINTESTINAL: No abdominal, No epigastric pain. No nausea, No vomiting, No hematemesis, No diarrhea, No constipation. No melena, No hematochezia. No GERD  GENITOURINARY: No dysuria, No frequency, No hematuria, No incontinence, No nocturia, No hesitancy,  SKIN: No itching, No burning, No rashes, No lesions   LYMPH NODES: No history of enlarged glands  ENDOCRINE: No heat or cold intolerance, No hair loss. No osteoporosis, No thyroid disease  MUSCULOSKELETAL: No joint pain or swelling, No muscle, back, or extremity pain  PSYCHIATRIC: No depression, No anxiety, No mood swings, No difficulty sleeping  HEME/LYMPH: No easy bruising, No anticoagulants, No bleeding disorder, No bleeding gums  ALLERGY AND IMMUNOLOGIC: No hives, No eczema  NEUROLOGICAL: No memory loss, No loss of strength, No numbness, No tremors        VITALS:   T(C): 36.9 (11-24-17 @ 20:03), Max: 37.4 (11-24-17 @ 17:34)  HR: 84 (11-24-17 @ 20:03) (75 - 100)  BP: 116/64 (11-24-17 @ 20:03) (92/58 - 127/65)  RR: 18 (11-24-17 @ 20:03) (16 - 18)  SpO2: 93% (11-24-17 @ 20:03) (93% - 95%)  Wt(kg): --    11-23 @ 07:01  -  11-24 @ 07:00  --------------------------------------------------------  IN: 1429 mL / OUT: 635 mL / NET: 794 mL    11-24 @ 07:01  -  11-24 @ 23:44  --------------------------------------------------------  IN: 1060 mL / OUT: 500 mL / NET: 560 mL        PHYSICAL EXAM:  GENERAL: NAD, well nourished and conversant  HEAD:  Atraumatic  EYES: EOM, PERRLA, conjunctiva pink and sclera white  ENT: No tonsillar erythema, exudates, or enlargement, moist mucous membranes, good dentition, no lesions  NECK: Supple, No JVD, normal thyroid, carotids with normal upstrokes and no bruits  CHEST/LUNG: Clear to auscultation bilaterally, No rales, rhonchi, wheezing, or rubs  HEART: Regular rate and rhythm, No murmurs, rubs, or gallops  ABDOMEN: Soft, nondistended, no masses, guarding, tenderness or rebound, bowel sounds present  EXTREMITIES:  2+ Peripheral Pulses, No clubbing, cyanosis, or edema. No arthritis of shoulders, elbows, hands, hips, knees, ankles, or feet. No DJD C spine, T spine, or L/S spine  LYMPH: No lymphadenopathy noted  SKIN: No rashes or lesions  NERVOUS SYSTEM:  Alert & Oriented X3, normal cognitive function. Motor Strength 5/5 right upper and right lower.  5/5 left upper and left lower extremities, DTRs 2+ intact and symmetric    LABS:                          7.7    11.51 )-----------( 144      ( 24 Nov 2017 07:49 )             23.6     11-24    141  |  103  |  28<H>  ----------------------------<  211<H>  4.0   |  25  |  1.16  11-23    142  |  102  |  28<H>  ----------------------------<  270<H>  4.6   |  23  |  1.00  11-23    138  |  98  |  26<H>  ----------------------------<  329<H>  4.7   |  23  |  0.89    Ca    7.8<L>      24 Nov 2017 08:08  Ca    8.2<L>      23 Nov 2017 12:48  Ca    9.1      23 Nov 2017 06:15    TPro  6.3  /  Alb  3.8  /  TBili  0.2  /  DBili  x   /  AST  13  /  ALT  19  /  AlkPhos  82  11-22    CAPILLARY BLOOD GLUCOSE      POCT Blood Glucose.: 180 mg/dL (24 Nov 2017 21:11)  POCT Blood Glucose.: 181 mg/dL (24 Nov 2017 16:51)  POCT Blood Glucose.: 227 mg/dL (24 Nov 2017 12:11)  POCT Blood Glucose.: 243 mg/dL (24 Nov 2017 08:37)      RADIOLOGY & ADDITIONAL TESTS:      Consultant(s):    Care Discussed with Consultants/Other Providers [ ] YES  [ ] NO

## 2017-11-24 NOTE — DISCHARGE NOTE ADULT - CARE PROVIDER_API CALL
Axel Ruby (MD), Orthopaedic Surgery  [address] [place] [address]  Phone: [phone]  Fax: [phone]

## 2017-11-24 NOTE — DISCHARGE NOTE ADULT - OTHER SIGNIFICANT FINDINGS
H/H: H/H: 8.4/25.1 11/27/17 H/H: 8.4/25.1 11/27/17    CXR (11/26)  FINDINGS:     There is left lung base retrocardiac area of increased density which may   represent atelectasis. Follow-up is recommended prn  Small bilateral pleural effusions. No pneumothorax.  No acute osseous abnormality. Spinal degenerative changes

## 2017-11-24 NOTE — DISCHARGE NOTE ADULT - NS AS DC STROKE ED MATERIALS
Stroke Education Booklet/Risk Factors for Stroke/Stroke Warning Signs and Symptoms/Prescribed Medications/Need for Followup After Discharge/Call 911 for Stroke

## 2017-11-24 NOTE — CONSULT NOTE ADULT - SUBJECTIVE AND OBJECTIVE BOX
Patient is a 89y old  Female who presents with a chief complaint of right hip pain S/P fall and sustaining Fx Right hip  S/P Right Hip Intramedullary Nail (2017 10:37)	  Today the patient feels well.  Offers no complaints  No CP, SOB or palps  Asked to see pt to assist with post op management.  Glucoses are runnigh high at around 200 on insulin coverage.  HBAIC is 8.3  HPI  89F complains of right hip pain for 1 day status post mechanical fall. Patient denies numbness, paresthesias, headstrike, loss of consciousness, or any other signs/symptoms at this time. Patient is a household ambulator with a walker at baseline.    Allergies: NKDA  PMH: hypothyroidism, CVA, HLD, HTN  PSH: left hip IMN (Dr Walters)  Social: Denies tobacco use  FH: Noncontributory  Imaging: XR right hip - intertrochanteric fracture (2017 00:25)      PAST MEDICAL & SURGICAL HISTORY:  Stroke  HLD (hyperlipidemia)  HTN (hypertension)  Hypothyroid  History of hip replacement, total, left        ALLERGIES:    No Known Allergies       MEDICATIONS  (STANDING):  amLODIPine   Tablet 5 milliGRAM(s) Oral two times a day  ascorbic acid 500 milliGRAM(s) Oral two times a day  aspirin enteric coated 81 milliGRAM(s) Oral daily  atorvastatin 40 milliGRAM(s) Oral at bedtime  carvedilol 25 milliGRAM(s) Oral every 12 hours  cloNIDine 0.2 milliGRAM(s) Oral daily  clopidogrel Tablet 75 milliGRAM(s) Oral daily  dextrose 5%. 1000 milliLiter(s) (50 mL/Hr) IV Continuous <Continuous>  dextrose 50% Injectable 12.5 Gram(s) IV Push once  dextrose 50% Injectable 25 Gram(s) IV Push once  dextrose 50% Injectable 25 Gram(s) IV Push once  docusate sodium 100 milliGRAM(s) Oral three times a day  enoxaparin Injectable 30 milliGRAM(s) SubCutaneous daily  folic acid 1 milliGRAM(s) Oral daily  furosemide    Tablet 20 milliGRAM(s) Oral once  insulin glargine Injectable (LANTUS) 10 Unit(s) SubCutaneous at bedtime  insulin lispro (HumaLOG) corrective regimen sliding scale   SubCutaneous three times a day before meals  insulin lispro (HumaLOG) corrective regimen sliding scale   SubCutaneous at bedtime  insulin lispro Injectable (HumaLOG) 3 Unit(s) SubCutaneous three times a day before meals  levothyroxine 50 MICROGram(s) Oral daily  multivitamin 1 Tablet(s) Oral daily  pantoprazole    Tablet 40 milliGRAM(s) Oral before breakfast  polyethylene glycol 3350 17 Gram(s) Oral daily  potassium chloride    Tablet ER 20 milliEquivalent(s) Oral once  QUEtiapine 25 milliGRAM(s) Oral at bedtime    MEDICATIONS  (PRN):  acetaminophen   Tablet 650 milliGRAM(s) Oral every 6 hours PRN For Temp greater than 38 C (100.4 F)  ALPRAZolam 0.25 milliGRAM(s) Oral daily PRN anxiety  benzocaine 15 mG/menthol 3.6 mG Lozenge 1 Lozenge Oral every 2 hours PRN Sore Throat  calcium carbonate 500 mG (Tums) Chewable 3 Tablet(s) Chew every 6 hours PRN Dyspepsia  dextrose Gel 1 Dose(s) Oral once PRN Blood Glucose LESS THAN 70 milliGRAM(s)/deciliter  diphenhydrAMINE   Capsule 50 milliGRAM(s) Oral every 4 hours PRN Rash and/or Itching  glucagon  Injectable 1 milliGRAM(s) IntraMuscular once PRN Glucose LESS THAN 70 milligrams/deciliter  HYDROmorphone  Injectable 0.5 milliGRAM(s) IV Push every 4 hours PRN breakthrough  ondansetron Injectable 4 milliGRAM(s) IV Push every 6 hours PRN Nausea and/or Vomiting  oxyCODONE    IR 5 milliGRAM(s) Oral every 4 hours PRN Severe Pain (7 - 10)  senna 2 Tablet(s) Oral at bedtime PRN Constipation  traMADol 25 milliGRAM(s) Oral every 4 hours PRN Mild Pain (1 - 3)  traMADol 50 milliGRAM(s) Oral every 8 hours PRN Moderate Pain (4 - 6)  zaleplon 5 milliGRAM(s) Oral at bedtime PRN Insomnia      FAMILY HISTORY:  No pertinent family history in first degree relatives      SOCIAL HISTORY:      ROS:     A comprehensive review of systems was performed and pertinent items are noted in the history above. A detailed ROS is as follows:  unremarkable    Vital Signs Last 24 Hrs  T(C): 36.8 (2017 11:40), Max: 37.3 (2017 23:58)  T(F): 98.3 (2017 11:40), Max: 99.1 (2017 23:58)  HR: 85 (2017 11:40) (75 - 100)  BP: 119/71 (2017 11:40) (92/58 - 138/76)  BP(mean): 87 (2017 13:30) (87 - 87)  RR: 16 (2017 11:40) (15 - 18)  SpO2: 95% (2017 11:40) (94% - 100%)    I&O's Summary    2017 07:01  -  2017 07:00  --------------------------------------------------------  IN: 1429 mL / OUT: 635 mL / NET: 794 mL    2017 07:01  -  2017 13:25  --------------------------------------------------------  IN: 120 mL / OUT: 0 mL / NET: 120 mL        PHYSICAL EXAM:  Daily     Daily   Drug Dosing Weight  Height (cm): 152.4 (2017 00:33)  Weight (kg): 63.503 (2017 00:33)  BMI (kg/m2): 27.3 (2017 00:33)  BSA (m2): 1.6 (2017 00:33)  General Appearance:    Comfortable, AAO X 3, in no distress.   HEENT:                       Atraumatic, PERRLA, EOMI, conjunctiva clear.   Neck:                          Supple, no adenopathy, no thyromegaly, no JVD, no carotid bruit  Back:                          Symmetric, no CV angle fullness or tenderness, no soft tissue tenderness.  Chest:                         Clear to auscultation, no wheezes, rales or rhonchi, symmetric air entry, no tachypnea, retractions or cyanosis  Heart:                          S1, S2 normal, no gallop, no murmur.  Abdomen:                    Soft, non-tender, bowel sounds active. No palpable masses.   Extremities:                 no cyanosis, no edema, no joint swelling. Peripheral pulses normal.  Right hip incision is C/D/I  Skin:                           Skin color, texture normal. No rashes   Neurologic:                  Alert and oriented X3, cranial nerves intact, sensory and motor normal.      TELEMETRY:    ECG: < from: 12 Lead ECG (17 @ 20:14) >  Ventricular Rate 70 BPM    Atrial Rate 70 BPM    P-R Interval 172 ms    QRS Duration 72 ms     ms    QTc 453 ms    P Axis 54 degrees    R Axis 43 degrees    T Axis 49 degrees    Diagnosis Line NORMAL SINUS RHYTHM  POSSIBLE LEFT ATRIAL ENLARGEMENT  BORDERLINE ECG    < end of copied text >      LABS:                            7.7    11.51 )-----------( 144      ( 2017 07:49 )             23.6     11-24    141  |  103  |  28<H>  ----------------------------<  211<H>  4.0   |  25  |  1.16    Ca    7.8<L>      2017 08:08    TPro  6.3  /  Alb  3.8  /  TBili  0.2  /  DBili  x   /  AST  13  /  ALT  19  /  AlkPhos  82  11-22          PT/INR - ( 2017 06:15 )   PT: 11.4 sec;   INR: 1.04 ratio         PTT - ( 2017 06:15 )  PTT:25.6 sec  Urinalysis Basic - ( 2017 21:09 )    Color: PL Yellow / Appearance: Clear / S.017 / pH: x  Gluc: x / Ketone: Negative  / Bili: Negative / Urobili: Negative   Blood: x / Protein: Negative / Nitrite: Negative   Leuk Esterase: Negative / RBC: 0-2 /HPF / WBC 0-2 /HPF   Sq Epi: x / Non Sq Epi: x / Bacteria: x            RADIOLOGY & ADDITIONAL STUDIES:    HEALTH ISSUES - PROBLEM Dx:  HLD (hyperlipidemia): HLD (hyperlipidemia)  HTN (hypertension): HTN (hypertension)  Stroke: Stroke  Femur fracture, right: Femur fracture, right  New Onset T2DM

## 2017-11-24 NOTE — DISCHARGE NOTE ADULT - MEDICATION SUMMARY - MEDICATIONS TO TAKE
I will START or STAY ON the medications listed below when I get home from the hospital:    acetaminophen 325 mg oral tablet  -- 2 tab(s) by mouth every 6 hours, As needed, For Temp greater than 38 C (100.4 F)  -- Indication: For Fever    acetaminophen 325 mg oral tablet  -- 1 tab(s) by mouth every 4 hours, As needed, Mild Pain (1 - 3)  -- Indication: For Pain    traMADol 50 mg oral tablet  -- 0.5 tab(s) by mouth every 4 hours, As needed, Moderate Pain (4 - 6)  -- Indication: For Pain    traMADol 50 mg oral tablet  -- 1 tab(s) by mouth every 4 hours, As needed, Severe Pain (7 - 10)  -- Indication: For Pain    aspirin 81 mg oral delayed release tablet  -- 1 tab(s) by mouth once a day  -- Indication: For Anti-Platelet Therapy    clonidine 0.2 mg oral tablet  -- 1 tab(s) by mouth once a day  -- Indication: For HTN (hypertension)    enoxaparin  -- 30 milligram(s) subcutaneous once a day    **CONTINUE FOR 6 WEEKS POSTOP**  -- Indication: For DVT Prophylaxis    metFORMIN 500 mg oral tablet  -- 1 tab(s) by mouth once a day    Make sure GFR >30  -- Indication: For Diabetes (Newly Diagnosed)    atorvastatin 40 mg oral tablet  -- 1 tab(s) by mouth once a day (at bedtime)  -- Indication: For HLD (hyperlipidemia)    clopidogrel 75 mg oral tablet  -- 1 tab(s) by mouth once a day  -- Indication: For Anti-Platelet Therapy    SEROquel 25 mg oral tablet  -- 1 tab(s) by mouth once a day (at bedtime)  -- Indication: For Anti-Psychotic    Xanax 0.25 mg oral tablet  -- 1 tab(s) by mouth once a day, As Needed MDD:1  -- Indication: For Anxiety    carvedilol 25 mg oral tablet  -- 1 tab(s) by mouth every 12 hours  -- Indication: For HTN (hypertension)    amLODIPine 5 mg oral tablet  -- 1 tab(s) by mouth once a day  -- Indication: For HTN (hypertension)    triamcinolone 0.025% topical cream  -- Apply on skin to affected area on right arm 2 times a day  -- Indication: For Dermatological Cream    senna 8.6 mg oral tablet  -- 2 tab(s) by mouth once a day (at bedtime)  -- Indication: For Mild Laxative    Colace 100 mg oral capsule  -- 3 cap(s) by mouth once a day (at bedtime)  -- Indication: For Stool Softener    Protonix 40 mg oral delayed release tablet  -- 1 tab(s) by mouth once a day  -- Indication: For GERD    Synthroid 50 mcg (0.05 mg) oral tablet  -- 1 tab(s) by mouth once a day  -- Indication: For Hypothyroid    Multiple Vitamins oral tablet  -- 1 tab(s) by mouth once a day  -- Indication: For Vitamin Supplementation    folic acid 1 mg oral tablet  -- 1 tab(s) by mouth once a day  -- Indication: For Vitamin Supplementation    ascorbic acid 500 mg oral tablet  -- 1 tab(s) by mouth 2 times a day  -- Indication: For Vitamin Supplementation    cholecalciferol oral tablet  -- 1000 unit(s) by mouth once a day  -- Indication: For Vitamin Supplementation I will START or STAY ON the medications listed below when I get home from the hospital:    acetaminophen 325 mg oral tablet  -- 2 tab(s) by mouth every 6 hours, As needed, For Temp greater than 38 C (100.4 F)  -- Indication: For Fever    acetaminophen 325 mg oral tablet  -- 1 tab(s) by mouth every 4 hours, As needed, Mild Pain (1 - 3)  -- Indication: For Pain    aspirin 81 mg oral delayed release tablet  -- 1 tab(s) by mouth once a day  -- Indication: For Anti-Platelet Therapy    traMADol 50 mg oral tablet  -- 0.5 tab(s) by mouth every 6 hours, As Needed severe pain  -- Indication: For Pain    clonidine 0.2 mg oral tablet  -- 1 tab(s) by mouth once a day  -- Indication: For HTN (hypertension)    enoxaparin  -- 30 milligram(s) subcutaneous once a day    **CONTINUE FOR 6 WEEKS POSTOP**  (may be dc'd from REHAB on Ecotrin 325mg BID for a TOTAL of 6 weeks )  -- Indication: For Post-op prophylasix    Januvia 25 mg oral tablet  -- 1 tab(s) by mouth once a day  ***As per Endocrine  -- Indication: For Type 2 diabetes mellitus with hyperglycemia, unspecified long term insulin use status    atorvastatin 40 mg oral tablet  -- 1 tab(s) by mouth once a day (at bedtime)  -- Indication: For HLD (hyperlipidemia)    clopidogrel 75 mg oral tablet  -- 1 tab(s) by mouth once a day  -- Indication: For Anti-Platelet Therapy    SEROquel 25 mg oral tablet  -- 1 tab(s) by mouth once a day (at bedtime)  -- Indication: For Anti-Psychotic    Xanax 0.25 mg oral tablet  -- 1 tab(s) by mouth once a day, As Needed MDD:1  -- Indication: For Anxiety    carvedilol 25 mg oral tablet  -- 1 tab(s) by mouth every 12 hours  -- Indication: For HTN (hypertension)    amLODIPine 5 mg oral tablet  -- 1 tab(s) by mouth once a day  -- Indication: For HTN (hypertension)    triamcinolone 0.025% topical cream  -- Apply on skin to affected area on right arm 2 times a day  -- Indication: For Dermatological Cream    senna 8.6 mg oral tablet  -- 2 tab(s) by mouth once a day (at bedtime)  -- Indication: For Mild Laxative    Colace 100 mg oral capsule  -- 3 cap(s) by mouth once a day (at bedtime)  -- Indication: For Stool Softener    Protonix 40 mg oral delayed release tablet  -- 1 tab(s) by mouth once a day  -- Indication: For GERD    Synthroid 50 mcg (0.05 mg) oral tablet  -- 1 tab(s) by mouth once a day  -- Indication: For Hypothyroid    Multiple Vitamins oral tablet  -- 1 tab(s) by mouth once a day  -- Indication: For Vitamin Supplementation    folic acid 1 mg oral tablet  -- 1 tab(s) by mouth once a day  -- Indication: For Vitamin Supplementation

## 2017-11-24 NOTE — DISCHARGE NOTE ADULT - SECONDARY DIAGNOSIS.
Delirium Anemia, unspecified type Hyperlipidemia, unspecified hyperlipidemia type Type 2 diabetes mellitus with hyperglycemia, unspecified long term insulin use status Hypertension, unspecified type Cerebrovascular accident (CVA), unspecified mechanism

## 2017-11-24 NOTE — OCCUPATIONAL THERAPY INITIAL EVALUATION ADULT - ADDITIONAL COMMENTS
proximal femur involving greater & lesser trochanters &subtrochanteric femur, extending inferiorly to the   proximal shaft. Vascular calcifications are noted. CXR 11/22:Stable mild blunting of R costophrenic angle. Opacity overlying R costophrenic angle, slightly more prominent than on the prior study and may represent worsening atelectasis versus pneumonia. ORIF, fracture, femur, subtrochanteric  11/23.

## 2017-11-24 NOTE — DISCHARGE NOTE ADULT - NS AS ACTIVITY OBS
Showering allowed/Walking-Indoors allowed/Walking-Outdoors allowed/Stairs allowed/No Heavy lifting/straining/Do not make important decisions/Do not drive or operate machinery

## 2017-11-24 NOTE — CONSULT NOTE ADULT - SUBJECTIVE AND OBJECTIVE BOX
HPI  89F admitted with R hip fracture s/p mechanical fall. Patient was told of elevated blood sugars in past but was never started on any medication. On admission found to have A1C 8.2. Patient has neuropathy and  nephropathy. Has decreased vision possibly secondary to retinopathy. Patient does have history of CVA but no history of  CAD or PAD. She does have a diet high in pasta and bread. Also drinks soda and fruit juice occasionally. Has noticed increased thirst and urination lately. Does not follow with endocrinologist.   Patient also has a history of hypothyroidism for which she takes synthroid 50mcg daily.  Denies constipation, brittle nails, dry skin. Does admit to weight gain and fatigue.     Allergies: NKDA  PMH: hypothyroidism, CVA, HLD, HTN  PSH: left hip IMN (Dr Walters)  Social: Denies tobacco use  FH: Noncontributory  Imaging: XR right hip - intertrochanteric fracture (23 Nov 2017 00:25)      PAST MEDICAL & SURGICAL HISTORY:  Stroke  HLD (hyperlipidemia)  HTN (hypertension)  Hypothyroid  History of hip replacement, total, left      FAMILY HISTORY:  No history of DM      Social History: nonsmoker, nondrinker    Outpatient Medications:  · 	clopidogrel 75 mg oral tablet: 1 tab(s) orally once a day, Last Dose Taken:    · 	clonidine 0.2 mg oral tablet: 1 tab(s) orally once a day, Last Dose Taken:    · 	carvedilol 25 mg oral tablet: 1 tab(s) orally every 12 hours, Last Dose Taken:    · 	Tylenol 325 mg oral tablet: 2 tab(s) orally every 6 hours, As Needed for pain, Last Dose Taken:    · 	atorvastatin 40 mg oral tablet: 1 tab(s) orally once a day (at bedtime), Last Dose Taken:    · 	aspirin 81 mg oral tablet: 1 tab(s) orally once a day, Last Dose Taken:    · 	SEROquel 25 mg oral tablet: 1 tab(s) orally once a day (at bedtime), Last Dose Taken:    · 	Xanax 0.25 mg oral tablet: 1 tab(s) orally once a day, As Needed MDD:1, Last Dose Taken:    · 	Protonix 40 mg oral delayed release tablet: 1 tab(s) orally once a day, Last Dose Taken:    · 	Synthroid 50 mcg (0.05 mg) oral tablet: 1 tab(s) orally once a day, Last Dose Taken:    · 	Multiple Vitamins oral tablet: 1 tab(s) orally once a day, Last Dose Taken:    · 	folic acid 1 mg oral tablet: 1 tab(s) orally once a day, Last Dose Taken:    · 	ascorbic acid 500 mg oral tablet: 1 tab(s) orally 2 times a day, Last Dose Taken:    · 	cholecalciferol oral tablet: 1000 unit(s) orally once a day, Last Dose Taken:  · 	senna 8.6 mg oral tablet: 2 tab(s) orally once a day (at bedtime), Last Dose Taken:    · 	Colace 100 mg oral capsule: 3 cap(s) orally once a day (at bedtime), Last Dose Taken:    · 	amLODIPine 5 mg oral tablet: 1 tab(s) orally once a day, Last Dose Taken:    · 	triamcinolone 0.025% topical cream: Apply topically to affected area on right arm 2 times a day, Last Dose Taken:          MEDICATIONS  (STANDING):  amLODIPine   Tablet 5 milliGRAM(s) Oral two times a day  ascorbic acid 500 milliGRAM(s) Oral two times a day  aspirin enteric coated 81 milliGRAM(s) Oral daily  atorvastatin 40 milliGRAM(s) Oral at bedtime  carvedilol 25 milliGRAM(s) Oral every 12 hours  cloNIDine 0.2 milliGRAM(s) Oral daily  clopidogrel Tablet 75 milliGRAM(s) Oral daily  dextrose 5%. 1000 milliLiter(s) (50 mL/Hr) IV Continuous <Continuous>  dextrose 50% Injectable 12.5 Gram(s) IV Push once  dextrose 50% Injectable 25 Gram(s) IV Push once  dextrose 50% Injectable 25 Gram(s) IV Push once  docusate sodium 100 milliGRAM(s) Oral three times a day  enoxaparin Injectable 30 milliGRAM(s) SubCutaneous daily  folic acid 1 milliGRAM(s) Oral daily  insulin glargine Injectable (LANTUS) 10 Unit(s) SubCutaneous at bedtime  insulin lispro (HumaLOG) corrective regimen sliding scale   SubCutaneous three times a day before meals  insulin lispro (HumaLOG) corrective regimen sliding scale   SubCutaneous at bedtime  insulin lispro Injectable (HumaLOG) 3 Unit(s) SubCutaneous three times a day before meals  levothyroxine 50 MICROGram(s) Oral daily  multivitamin 1 Tablet(s) Oral daily  pantoprazole    Tablet 40 milliGRAM(s) Oral before breakfast  polyethylene glycol 3350 17 Gram(s) Oral daily  QUEtiapine 25 milliGRAM(s) Oral at bedtime    MEDICATIONS  (PRN):  acetaminophen   Tablet 650 milliGRAM(s) Oral every 6 hours PRN For Temp greater than 38 C (100.4 F)  ALPRAZolam 0.25 milliGRAM(s) Oral daily PRN anxiety  benzocaine 15 mG/menthol 3.6 mG Lozenge 1 Lozenge Oral every 2 hours PRN Sore Throat  calcium carbonate 500 mG (Tums) Chewable 3 Tablet(s) Chew every 6 hours PRN Dyspepsia  dextrose Gel 1 Dose(s) Oral once PRN Blood Glucose LESS THAN 70 milliGRAM(s)/deciliter  diphenhydrAMINE   Capsule 50 milliGRAM(s) Oral every 4 hours PRN Rash and/or Itching  glucagon  Injectable 1 milliGRAM(s) IntraMuscular once PRN Glucose LESS THAN 70 milligrams/deciliter  HYDROmorphone  Injectable 0.5 milliGRAM(s) IV Push every 4 hours PRN breakthrough  ondansetron Injectable 4 milliGRAM(s) IV Push every 6 hours PRN Nausea and/or Vomiting  oxyCODONE    IR 5 milliGRAM(s) Oral every 4 hours PRN Severe Pain (7 - 10)  senna 2 Tablet(s) Oral at bedtime PRN Constipation  traMADol 25 milliGRAM(s) Oral every 4 hours PRN Mild Pain (1 - 3)  traMADol 50 milliGRAM(s) Oral every 8 hours PRN Moderate Pain (4 - 6)  zaleplon 5 milliGRAM(s) Oral at bedtime PRN Insomnia      Allergies    No Known Allergies    Intolerances      Review of Systems:  Constitutional: No fever  Eyes: No blurry vision  Neuro: No tremors  HEENT: No pain  Cardiovascular: No chest pain, palpitations  Respiratory: No SOB, no cough  GI: No nausea, vomiting, abdominal pain  : No dysuria  Skin: no rash  Psych: no depression  Endocrine: + polyuria, polydipsia      ALL OTHER SYSTEMS REVIEWED AND NEGATIVE        PHYSICAL EXAM:  VITALS: T(C): 36.9 (11-24-17 @ 15:09)  T(F): 98.4 (11-24-17 @ 15:09), Max: 99.1 (11-23-17 @ 23:58)  HR: 84 (11-24-17 @ 15:09) (75 - 100)  BP: 111/62 (11-24-17 @ 15:09) (92/58 - 138/76)  RR:  (16 - 18)  SpO2:  (94% - 96%)  Wt(kg): --  GENERAL: NAD, well-groomed, well-developed  EYES: No proptosis, no lid lag, anicteric  HEENT:  Atraumatic, Normocephalic, moist mucous membranes  THYROID: Normal size, no palpable nodules  RESPIRATORY: Clear to auscultation bilaterally; No rales, rhonchi, wheezing, or rubs  CARDIOVASCULAR: Regular rate and rhythm; No murmurs; no peripheral edema  GI: Soft, nontender, non distended, normal bowel sounds  SKIN: Dry, intact, No rashes or lesions  NEURO: sensation intact  PSYCH: Alert and oriented x 2, normal affect, normal mood      POCT Blood Glucose.: 227 mg/dL (11-24-17 @ 12:11)  POCT Blood Glucose.: 243 mg/dL (11-24-17 @ 08:37)  POCT Blood Glucose.: 215 mg/dL (11-23-17 @ 21:38)  POCT Blood Glucose.: 172 mg/dL (11-23-17 @ 16:53)  POCT Blood Glucose.: 296 mg/dL (11-23-17 @ 09:32)                            7.7    11.51 )-----------( 144      ( 24 Nov 2017 07:49 )             23.6       11-24    141  |  103  |  28<H>  ----------------------------<  211<H>  4.0   |  25  |  1.16    EGFR if : 47<L>  EGFR if non : 41<L>    Ca    7.8<L>      11-24    TPro  6.3  /  Alb  3.8  /  TBili  0.2  /  DBili  x   /  AST  13  /  ALT  19  /  AlkPhos  82  11-22          Hemoglobin A1C, Whole Blood: 8.2 % <H> [4.0 - 5.6] (11-24-17 @ 07:49)  Hemoglobin A1C, Whole Blood: 8.3 % <H> [4.0 - 5.6] (11-23-17 @ 11:59) HPI  89 yo female admitted with R hip fracture s/p mechanical fall. Patient was told of elevated blood sugars in past but was never started on any medication. On admission found to have A1C 8.2. Patient has neuropathy and  nephropathy. Has decreased vision possibly secondary to retinopathy. Patient does have history of CVA but no history of  CAD or PAD. She does have a diet high in pasta and bread. Also drinks soda and fruit juice occasionally. Has noticed increased thirst and urination lately. Does not follow with endocrinologist.   Patient also has a history of hypothyroidism for which she takes synthroid 50mcg daily.  Denies constipation, brittle nails, dry skin. Does admit to weight gain and fatigue.     Allergies: NKDA  PMH: hypothyroidism, CVA, HLD, HTN  PSH: left hip IMN (Dr Walters)  Social: Denies tobacco use  FH: Noncontributory  Imaging: XR right hip - intertrochanteric fracture (23 Nov 2017 00:25)      PAST MEDICAL & SURGICAL HISTORY:  Stroke  HLD (hyperlipidemia)  HTN (hypertension)  Hypothyroid  History of hip replacement, total, left      FAMILY HISTORY:  No history of DM      Social History: nonsmoker, nondrinker    Outpatient Medications:  · 	clopidogrel 75 mg oral tablet: 1 tab(s) orally once a day, Last Dose Taken:    · 	clonidine 0.2 mg oral tablet: 1 tab(s) orally once a day, Last Dose Taken:    · 	carvedilol 25 mg oral tablet: 1 tab(s) orally every 12 hours, Last Dose Taken:    · 	Tylenol 325 mg oral tablet: 2 tab(s) orally every 6 hours, As Needed for pain, Last Dose Taken:    · 	atorvastatin 40 mg oral tablet: 1 tab(s) orally once a day (at bedtime), Last Dose Taken:    · 	aspirin 81 mg oral tablet: 1 tab(s) orally once a day, Last Dose Taken:    · 	SEROquel 25 mg oral tablet: 1 tab(s) orally once a day (at bedtime), Last Dose Taken:    · 	Xanax 0.25 mg oral tablet: 1 tab(s) orally once a day, As Needed MDD:1, Last Dose Taken:    · 	Protonix 40 mg oral delayed release tablet: 1 tab(s) orally once a day, Last Dose Taken:    · 	Synthroid 50 mcg (0.05 mg) oral tablet: 1 tab(s) orally once a day, Last Dose Taken:    · 	Multiple Vitamins oral tablet: 1 tab(s) orally once a day, Last Dose Taken:    · 	folic acid 1 mg oral tablet: 1 tab(s) orally once a day, Last Dose Taken:    · 	ascorbic acid 500 mg oral tablet: 1 tab(s) orally 2 times a day, Last Dose Taken:    · 	cholecalciferol oral tablet: 1000 unit(s) orally once a day, Last Dose Taken:  · 	senna 8.6 mg oral tablet: 2 tab(s) orally once a day (at bedtime), Last Dose Taken:    · 	Colace 100 mg oral capsule: 3 cap(s) orally once a day (at bedtime), Last Dose Taken:    · 	amLODIPine 5 mg oral tablet: 1 tab(s) orally once a day, Last Dose Taken:    · 	triamcinolone 0.025% topical cream: Apply topically to affected area on right arm 2 times a day, Last Dose Taken:          MEDICATIONS  (STANDING):  amLODIPine   Tablet 5 milliGRAM(s) Oral two times a day  ascorbic acid 500 milliGRAM(s) Oral two times a day  aspirin enteric coated 81 milliGRAM(s) Oral daily  atorvastatin 40 milliGRAM(s) Oral at bedtime  carvedilol 25 milliGRAM(s) Oral every 12 hours  cloNIDine 0.2 milliGRAM(s) Oral daily  clopidogrel Tablet 75 milliGRAM(s) Oral daily  dextrose 5%. 1000 milliLiter(s) (50 mL/Hr) IV Continuous <Continuous>  dextrose 50% Injectable 12.5 Gram(s) IV Push once  dextrose 50% Injectable 25 Gram(s) IV Push once  dextrose 50% Injectable 25 Gram(s) IV Push once  docusate sodium 100 milliGRAM(s) Oral three times a day  enoxaparin Injectable 30 milliGRAM(s) SubCutaneous daily  folic acid 1 milliGRAM(s) Oral daily  insulin glargine Injectable (LANTUS) 10 Unit(s) SubCutaneous at bedtime  insulin lispro (HumaLOG) corrective regimen sliding scale   SubCutaneous three times a day before meals  insulin lispro (HumaLOG) corrective regimen sliding scale   SubCutaneous at bedtime  insulin lispro Injectable (HumaLOG) 3 Unit(s) SubCutaneous three times a day before meals  levothyroxine 50 MICROGram(s) Oral daily  multivitamin 1 Tablet(s) Oral daily  pantoprazole    Tablet 40 milliGRAM(s) Oral before breakfast  polyethylene glycol 3350 17 Gram(s) Oral daily  QUEtiapine 25 milliGRAM(s) Oral at bedtime    MEDICATIONS  (PRN):  acetaminophen   Tablet 650 milliGRAM(s) Oral every 6 hours PRN For Temp greater than 38 C (100.4 F)  ALPRAZolam 0.25 milliGRAM(s) Oral daily PRN anxiety  benzocaine 15 mG/menthol 3.6 mG Lozenge 1 Lozenge Oral every 2 hours PRN Sore Throat  calcium carbonate 500 mG (Tums) Chewable 3 Tablet(s) Chew every 6 hours PRN Dyspepsia  dextrose Gel 1 Dose(s) Oral once PRN Blood Glucose LESS THAN 70 milliGRAM(s)/deciliter  diphenhydrAMINE   Capsule 50 milliGRAM(s) Oral every 4 hours PRN Rash and/or Itching  glucagon  Injectable 1 milliGRAM(s) IntraMuscular once PRN Glucose LESS THAN 70 milligrams/deciliter  HYDROmorphone  Injectable 0.5 milliGRAM(s) IV Push every 4 hours PRN breakthrough  ondansetron Injectable 4 milliGRAM(s) IV Push every 6 hours PRN Nausea and/or Vomiting  oxyCODONE    IR 5 milliGRAM(s) Oral every 4 hours PRN Severe Pain (7 - 10)  senna 2 Tablet(s) Oral at bedtime PRN Constipation  traMADol 25 milliGRAM(s) Oral every 4 hours PRN Mild Pain (1 - 3)  traMADol 50 milliGRAM(s) Oral every 8 hours PRN Moderate Pain (4 - 6)  zaleplon 5 milliGRAM(s) Oral at bedtime PRN Insomnia      Allergies    No Known Allergies    Intolerances      Review of Systems:  Constitutional: No fever  Eyes: No blurry vision  Neuro: No tremors  HEENT: No pain  Cardiovascular: No chest pain, palpitations  Respiratory: No SOB, no cough  GI: No nausea, vomiting, abdominal pain  : No dysuria  Skin: no rash  Psych: no depression  Endocrine: + polyuria, polydipsia      ALL OTHER SYSTEMS REVIEWED AND NEGATIVE        PHYSICAL EXAM:  VITALS: T(C): 36.9 (11-24-17 @ 15:09)  T(F): 98.4 (11-24-17 @ 15:09), Max: 99.1 (11-23-17 @ 23:58)  HR: 84 (11-24-17 @ 15:09) (75 - 100)  BP: 111/62 (11-24-17 @ 15:09) (92/58 - 138/76)  RR:  (16 - 18)  SpO2:  (94% - 96%)  Wt(kg): --  GENERAL: NAD, well-developed  EYES: No proptosis, no lid lag, anicteric  HEENT:  Atraumatic, Normocephalic, moist mucous membranes  RESPIRATORY: Clear to auscultation bilaterally; No rales, rhonchi, wheezing, or rubs  CARDIOVASCULAR: Regular rate and rhythm; No murmurs; no peripheral edema  GI: Soft, nontender, non distended, normal bowel sounds  SKIN: Dry, intact, No rashes or lesions  NEURO: speech fluent, face symmetric   PSYCH: Alert and oriented x 2, normal affect, normal mood      POCT Blood Glucose.: 227 mg/dL (11-24-17 @ 12:11)  POCT Blood Glucose.: 243 mg/dL (11-24-17 @ 08:37)  POCT Blood Glucose.: 215 mg/dL (11-23-17 @ 21:38)  POCT Blood Glucose.: 172 mg/dL (11-23-17 @ 16:53)  POCT Blood Glucose.: 296 mg/dL (11-23-17 @ 09:32)                            7.7    11.51 )-----------( 144      ( 24 Nov 2017 07:49 )             23.6       11-24    141  |  103  |  28<H>  ----------------------------<  211<H>  4.0   |  25  |  1.16    EGFR if : 47<L>  EGFR if non : 41<L>    Ca    7.8<L>      11-24    TPro  6.3  /  Alb  3.8  /  TBili  0.2  /  DBili  x   /  AST  13  /  ALT  19  /  AlkPhos  82  11-22          Hemoglobin A1C, Whole Blood: 8.2 % <H> [4.0 - 5.6] (11-24-17 @ 07:49)  Hemoglobin A1C, Whole Blood: 8.3 % <H> [4.0 - 5.6] (11-23-17 @ 11:59)

## 2017-11-24 NOTE — DISCHARGE NOTE ADULT - CARE PLAN
Principal Discharge DX:	Closed nondisplaced intertrochanteric fracture of right femur, initial encounter  Goal:	Painless ambulation. Return to normal activities.  Instructions for follow-up, activity and diet:	Follow up with Dr. Ruby XXXX. Call office to schedule follow up visit upon discharge. You may bear weight as tolerated on Right lower extremity.  Keep dressing dry. Principal Discharge DX:	Closed nondisplaced intertrochanteric fracture of right femur, initial encounter  Goal:	Painless ambulation. Return to normal activities.  Instructions for follow-up, activity and diet:	Follow up with Dr. Ruby after discharge from rehab. Call office to schedule follow up visit upon discharge. You may bear weight as tolerated on Right lower extremity.  Keep dressing dry.  Diet as above. Principal Discharge DX:	Closed nondisplaced intertrochanteric fracture of right femur, initial encounter  Goal:	Painless ambulation. Return to normal activities.  Instructions for follow-up, activity and diet:	Follow up with Dr. Ruby after discharge from rehab.   Call office to schedule follow up visit upon discharge.   You may bear weight as tolerated on Right lower extremity.    Keep incision(s) dry.    Diet as above. Principal Discharge DX:	Closed nondisplaced intertrochanteric fracture of right femur, initial encounter  Goal:	Painless ambulation. Return to normal activities.  Instructions for follow-up, activity and diet:	Follow up with Dr. Ruby after discharge from rehab.   Call office to schedule follow up visit upon discharge.   You may bear weight as tolerated on Right lower extremity.    Keep incision(s) dry.    Diet as above.  Secondary Diagnosis:	Delirium  Instructions for follow-up, activity and diet:	stable.  Secondary Diagnosis:	Anemia, unspecified type  Instructions for follow-up, activity and diet:	hemoglobin stable.  Secondary Diagnosis:	Hyperlipidemia, unspecified hyperlipidemia type  Instructions for follow-up, activity and diet:	Follow up with PCP for treatment goals, continue medication, have liver function testing every 3 months as anti lipid medications can cause liver irritation, eat low fat, low cholesterol meals  Secondary Diagnosis:	Type 2 diabetes mellitus with hyperglycemia, unspecified long term insulin use status  Instructions for follow-up, activity and diet:	HgA1C this admission.  Make sure you get your HgA1c checked every three months.  If you take oral diabetes medications, check your blood glucose two times a day.  If you take insulin, check your blood glucose before meals and at bedtime.  It's important not to skip any meals.  Keep a log of your blood glucose results and always take it with you to your doctor appointments.  Keep a list of your current medications including injectables and over the counter medications and bring this medication list with you to all your doctor appointments.  If you have not seen your ophthalmologist this year call for appointment.  Check your feet daily for redness, sores, or openings. Do not self treat. If no improvement in two days call your primary care physician for an appointment.  Low blood sugar (hypoglycemia) is a blood sugar below 70mg/dl. Check your blood sugar if you feel signs/symptoms of hypoglycemia. If your blood sugar is below 70 take 15 grams of carbohydrates (ex 4 oz of apple juice, 3-4 glucose tablets, or 4-6 oz of regular soda) wait 15 minutes and repeat blood sugar to make sure it comes up above 70.  If your blood sugar is above 70 and you are due for a meal, have a meal.  If you are not due for a meal have a snack.  This snack helps keeps your blood sugar at a safe range.  Secondary Diagnosis:	Hypertension, unspecified type  Instructions for follow-up, activity and diet:	Low salt diet  Activity as tolerated.  Take all medication as prescribed.  Follow up with your medical doctor for routine blood pressure monitoring at your next visit.  Notify your doctor if you have any of the following symptoms:   Dizziness, Lightheadedness, Blurry vision, Headache, Chest pain, Shortness of breath Principal Discharge DX:	Closed nondisplaced intertrochanteric fracture of right femur, initial encounter  Goal:	Painless ambulation. Return to normal activities.  Instructions for follow-up, activity and diet:	Follow up with Dr. Ruby after discharge from rehab.   Call office to schedule follow up visit upon discharge.   You may bear weight as tolerated on Right lower extremity.    Keep incision(s) dry.    Diet as above.  Secondary Diagnosis:	Delirium  Instructions for follow-up, activity and diet:	stable.  MRI with new strokes.  no arrythmia on telemetry.   Continue fall precaution and maintain safety.  Re-orient pt.  Secondary Diagnosis:	Anemia, unspecified type  Instructions for follow-up, activity and diet:	hemoglobin stable.  F/u with your PMD to monitor Hg trend.  Secondary Diagnosis:	Hyperlipidemia, unspecified hyperlipidemia type  Instructions for follow-up, activity and diet:	Follow up with PCP for treatment goals, continue medication, have liver function testing every 3 months as anti lipid medications can cause liver irritation, eat low fat, low cholesterol meals  Secondary Diagnosis:	Type 2 diabetes mellitus with hyperglycemia, unspecified long term insulin use status  Instructions for follow-up, activity and diet:	HgA1C this admission.  Make sure you get your HgA1c checked every three months.  If you take oral diabetes medications, check your blood glucose two times a day.  If you take insulin, check your blood glucose before meals and at bedtime.  It's important not to skip any meals.  Keep a log of your blood glucose results and always take it with you to your doctor appointments.  Keep a list of your current medications including injectables and over the counter medications and bring this medication list with you to all your doctor appointments.  If you have not seen your ophthalmologist this year call for appointment.  Check your feet daily for redness, sores, or openings. Do not self treat. If no improvement in two days call your primary care physician for an appointment.  Low blood sugar (hypoglycemia) is a blood sugar below 70mg/dl. Check your blood sugar if you feel signs/symptoms of hypoglycemia. If your blood sugar is below 70 take 15 grams of carbohydrates (ex 4 oz of apple juice, 3-4 glucose tablets, or 4-6 oz of regular soda) wait 15 minutes and repeat blood sugar to make sure it comes up above 70.  If your blood sugar is above 70 and you are due for a meal, have a meal.  If you are not due for a meal have a snack.  This snack helps keeps your blood sugar at a safe range.  Secondary Diagnosis:	Hypertension, unspecified type  Instructions for follow-up, activity and diet:	Low salt diet  Activity as tolerated.  Take all medication as prescribed.  Follow up with your medical doctor for routine blood pressure monitoring at your next visit.  Notify your doctor if you have any of the following symptoms:   Dizziness, Lightheadedness, Blurry vision, Headache, Chest pain, Shortness of breath  Secondary Diagnosis:	Cerebrovascular accident (CVA), unspecified mechanism  Instructions for follow-up, activity and diet:	Continue with aspirin, Lipitor, Plavix, amlodipine, coreg, clonidine, and Januvia,  as directed.  Seek care immediately if:  •Your arm or leg feels warm, tender, and painful. It may look swollen and red.  •You have double vision or vision loss.  •You have unusual or heavy bleeding Principal Discharge DX:	Closed nondisplaced intertrochanteric fracture of right femur, initial encounter  Goal:	Painless ambulation. Return to normal activities.  Instructions for follow-up, activity and diet:	Follow up with Dr. Ruby after discharge from rehab.   Call office to schedule follow up visit upon discharge.   You may bear weight as tolerated on Right lower extremity.    Keep incision(s) dry.    Diet as above.  Secondary Diagnosis:	Delirium  Instructions for follow-up, activity and diet:	stable.  MRI with new strokes.  no arrythmia on telemetry.   Continue fall precaution and maintain safety.  Re-orient pt.  Secondary Diagnosis:	Anemia, unspecified type  Instructions for follow-up, activity and diet:	hemoglobin stable.  F/u with your PMD to monitor Hg trend.  Secondary Diagnosis:	Hyperlipidemia, unspecified hyperlipidemia type  Instructions for follow-up, activity and diet:	Follow up with PCP for treatment goals, continue medication, have liver function testing every 3 months as anti lipid medications can cause liver irritation, eat low fat, low cholesterol meals  Secondary Diagnosis:	Type 2 diabetes mellitus with hyperglycemia, unspecified long term insulin use status  Instructions for follow-up, activity and diet:	HgA1C this admission 8.2.  Make sure you get your HgA1c checked every three months.  If you take oral diabetes medications, check your blood glucose two times a day.  If you take insulin, check your blood glucose before meals and at bedtime.  It's important not to skip any meals.  Keep a log of your blood glucose results and always take it with you to your doctor appointments.  Keep a list of your current medications including injectables and over the counter medications and bring this medication list with you to all your doctor appointments.  If you have not seen your ophthalmologist this year call for appointment.  Check your feet daily for redness, sores, or openings. Do not self treat. If no improvement in two days call your primary care physician for an appointment.  Low blood sugar (hypoglycemia) is a blood sugar below 70mg/dl. Check your blood sugar if you feel signs/symptoms of hypoglycemia. If your blood sugar is below 70 take 15 grams of carbohydrates (ex 4 oz of apple juice, 3-4 glucose tablets, or 4-6 oz of regular soda) wait 15 minutes and repeat blood sugar to make sure it comes up above 70.  If your blood sugar is above 70 and you are due for a meal, have a meal.  If you are not due for a meal have a snack.  This snack helps keeps your blood sugar at a safe range.  Secondary Diagnosis:	Hypertension, unspecified type  Instructions for follow-up, activity and diet:	Low salt diet  Activity as tolerated.  Take all medication as prescribed.  Follow up with your medical doctor for routine blood pressure monitoring at your next visit.  Notify your doctor if you have any of the following symptoms:   Dizziness, Lightheadedness, Blurry vision, Headache, Chest pain, Shortness of breath  Secondary Diagnosis:	Cerebrovascular accident (CVA), unspecified mechanism  Instructions for follow-up, activity and diet:	Continue with aspirin, Lipitor, Plavix, amlodipine, coreg, clonidine, and Januvia,  as directed.  Seek care immediately if:  •Your arm or leg feels warm, tender, and painful. It may look swollen and red.  •You have double vision or vision loss.  •You have unusual or heavy bleeding

## 2017-11-24 NOTE — OCCUPATIONAL THERAPY INITIAL EVALUATION ADULT - LIVES WITH, PROFILE
Pt not reliable historian, H&P obtained from chart: Pt lives in memory section of [facility] living, is legally blind, A&Ox3 baseline, and needed assistance with ADLs/IADLs

## 2017-11-24 NOTE — DISCHARGE NOTE ADULT - REASON FOR ADMISSION
right hip pain  S/P Right Hip Intramedullary Nail Right Hip Fracture - Intramedullary Nail of the Right Hip

## 2017-11-24 NOTE — PROGRESS NOTE ADULT - SUBJECTIVE AND OBJECTIVE BOX
11-24-17 @ 06:04    Pt comfortable.  Pain well controlled.  Mild Confusion o/n per RN that has resolved.  T(C): 36.7 (11-24-17 @ 04:15), Max: 37.3 (11-23-17 @ 23:58)  HR: 100 (11-24-17 @ 04:15) (79 - 100)  BP: 127/62 (11-24-17 @ 04:15) (97/62 - 138/76)  RR: 18 (11-24-17 @ 04:15) (13 - 18)  SpO2: 94% (11-24-17 @ 04:15) (93% - 100%)    Right hip dressing clean/dry/intact.  +DF/PF.  +Distal Pulses.   Motor/Sensory function grossly intact.  Calves soft/NT with venodynes  intact.

## 2017-11-24 NOTE — DISCHARGE NOTE ADULT - PATIENT PORTAL LINK FT
“You can access the [facility] Patient Portal, offered by [facility], by registering with the following website: [email or web address]”

## 2017-11-24 NOTE — CONSULT NOTE ADULT - PROBLEM SELECTOR RECOMMENDATION 2
Continue coreg and clonidine to keep BP<130/80. Continue coreg and clonidine to keep BP<130/80, although at her age, ok if goal is relaxed a bit to avoid sx a/w hypotension.

## 2017-11-24 NOTE — CONSULT NOTE ADULT - ASSESSMENT
Stable as above.  No acute cardiovascular issues.  New Onset T2DM    Recommend  Diabetic diet  Insulin coverage  Consider metformin 500mg BID  Hospitalist and orthopedic follow up  Endo consult  Continue present management

## 2017-11-24 NOTE — CONSULT NOTE ADULT - PROBLEM SELECTOR RECOMMENDATION 9
Would start Lantus 10U daily and humalog 3U TID plus low dose correctional scale. Goal glucose 100-180. Would discharge on Januvia 25mg daily given renal insufficiency. F/U with PMD as outpatient. A1C goal 7.0-8.0 Would start Lantus 10U daily and humalog 3U TID plus low dose correctional scale. Goal glucose 100-180. Would discharge on Januvia 25mg daily given renal insufficiency. F/U with PMD as outpatient. A1C goal <7.5-8%.

## 2017-11-24 NOTE — DISCHARGE NOTE ADULT - HOSPITAL COURSE
Admit:11/22/17  Diagnosis: Right Intertrochanteric fracture  Procedure: Right Hip Open Reduction Internal Fixation, Intramedullary Nail   Surgeon: Dr. Axel Ruby    History of Present Illness:  Chief Complaint/Reason for Admission: right hip pain	  History of Present Illness: 	  HPI  89F complains of right hip pain for 1 day status post mechanical fall. Patient denies numbness, paresthesias, headstrike, loss of consciousness, or any other signs/symptoms at this time. Patient is a household ambulator with a walker at baseline.    Allergies: NKDA  PMH: hypothyroidism, CVA, HLD, HTN  PSH: left hip IMN (Dr Walters)  Social: Denies tobacco use  FH: Noncontributory  Imaging: XR right hip - intertrochanteric fracture       Review of Systems:  Other Review of Systems: All other review of systems negative, except as noted in HPI	      Allergies and Intolerances:        Allergies:  	No Known Allergies:     Home Medications:   * Patient Currently Takes Medications as of 22-Nov-2017 22:55 documented in Structured Notes  · 	clopidogrel 75 mg oral tablet: 1 tab(s) orally once a day, Last Dose Taken:    · 	clonidine 0.2 mg oral tablet: 1 tab(s) orally once a day, Last Dose Taken:    · 	carvedilol 25 mg oral tablet: 1 tab(s) orally every 12 hours, Last Dose Taken:    · 	Tylenol 325 mg oral tablet: 2 tab(s) orally every 6 hours, As Needed for pain, Last Dose Taken:    · 	atorvastatin 40 mg oral tablet: 1 tab(s) orally once a day (at bedtime), Last Dose Taken:    · 	aspirin 81 mg oral tablet: 1 tab(s) orally once a day, Last Dose Taken:    · 	SEROquel 25 mg oral tablet: 1 tab(s) orally once a day (at bedtime), Last Dose Taken:    · 	Xanax 0.25 mg oral tablet: 1 tab(s) orally once a day, As Needed MDD:1, Last Dose Taken:    · 	Protonix 40 mg oral delayed release tablet: 1 tab(s) orally once a day, Last Dose Taken:    · 	Synthroid 50 mcg (0.05 mg) oral tablet: 1 tab(s) orally once a day, Last Dose Taken:    · 	Multiple Vitamins oral tablet: 1 tab(s) orally once a day, Last Dose Taken:    · 	folic acid 1 mg oral tablet: 1 tab(s) orally once a day, Last Dose Taken:    · 	ascorbic acid 500 mg oral tablet: 1 tab(s) orally 2 times a day, Last Dose Taken:    · 	cholecalciferol oral tablet: 1000 unit(s) orally once a day, Last Dose Taken:    · 	senna 8.6 mg oral tablet: 2 tab(s) orally once a day (at bedtime), Last Dose Taken:    · 	Colace 100 mg oral capsule: 3 cap(s) orally once a day (at bedtime), Last Dose Taken:    · 	amLODIPine 5 mg oral tablet: 1 tab(s) orally once a day, Last Dose Taken:    · 	triamcinolone 0.025% topical cream: Apply topically to affected area on right arm 2 times a day, Last Dose Taken:      Patient History:    Past Medical History:  HLD (hyperlipidemia)    HTN (hypertension)    Hypothyroid    Stroke.     Past Surgical History:  History of hip replacement, total, left.    Hospital course:  11/22/17: Patient admitted to [facility] with a Right Intertrochanteric Fracture and was seen by medicine and cleared for surgery.   11/23/17: Patient underwent Right Hip Open Reduction Intenal Fixaton/Intramedullary Nail and tolerated procedure well. Patient tolrerated the procedure and was transferred from the Recovery Room ro 7 tower.   Patient stable for discharge: Admit:11/22/17  Diagnosis: Right Intertrochanteric fracture  Procedure: Right Hip Open Reduction Internal Fixation, Intramedullary Nail   Surgeon: Dr. Axel Ruby    History of Present Illness:  Chief Complaint/Reason for Admission: right hip pain	  History of Present Illness: 	  HPI  89F complains of right hip pain for 1 day status post mechanical fall. Patient denies numbness, paresthesias, headstrike, loss of consciousness, or any other signs/symptoms at this time. Patient is a household ambulator with a walker at baseline.    Allergies: NKDA  PMH: hypothyroidism, CVA, HLD, HTN  PSH: left hip IMN (Dr Walters)  Social: Denies tobacco use  FH: Noncontributory  Imaging: XR right hip - intertrochanteric fracture       Review of Systems:  Other Review of Systems: All other review of systems negative, except as noted in HPI	      Allergies and Intolerances:        Allergies:  	No Known Allergies:     Home Medications:   * Patient Currently Takes Medications as of 22-Nov-2017 22:55 documented in Structured Notes  · 	clopidogrel 75 mg oral tablet: 1 tab(s) orally once a day, Last Dose Taken:    · 	clonidine 0.2 mg oral tablet: 1 tab(s) orally once a day, Last Dose Taken:    · 	carvedilol 25 mg oral tablet: 1 tab(s) orally every 12 hours, Last Dose Taken:    · 	Tylenol 325 mg oral tablet: 2 tab(s) orally every 6 hours, As Needed for pain, Last Dose Taken:    · 	atorvastatin 40 mg oral tablet: 1 tab(s) orally once a day (at bedtime), Last Dose Taken:    · 	aspirin 81 mg oral tablet: 1 tab(s) orally once a day, Last Dose Taken:    · 	SEROquel 25 mg oral tablet: 1 tab(s) orally once a day (at bedtime), Last Dose Taken:    · 	Xanax 0.25 mg oral tablet: 1 tab(s) orally once a day, As Needed MDD:1, Last Dose Taken:    · 	Protonix 40 mg oral delayed release tablet: 1 tab(s) orally once a day, Last Dose Taken:    · 	Synthroid 50 mcg (0.05 mg) oral tablet: 1 tab(s) orally once a day, Last Dose Taken:    · 	Multiple Vitamins oral tablet: 1 tab(s) orally once a day, Last Dose Taken:    · 	folic acid 1 mg oral tablet: 1 tab(s) orally once a day, Last Dose Taken:    · 	ascorbic acid 500 mg oral tablet: 1 tab(s) orally 2 times a day, Last Dose Taken:    · 	cholecalciferol oral tablet: 1000 unit(s) orally once a day, Last Dose Taken:    · 	senna 8.6 mg oral tablet: 2 tab(s) orally once a day (at bedtime), Last Dose Taken:    · 	Colace 100 mg oral capsule: 3 cap(s) orally once a day (at bedtime), Last Dose Taken:    · 	amLODIPine 5 mg oral tablet: 1 tab(s) orally once a day, Last Dose Taken:    · 	triamcinolone 0.025% topical cream: Apply topically to affected area on right arm 2 times a day, Last Dose Taken:      Patient History:    Past Medical History:  HLD (hyperlipidemia)    HTN (hypertension)    Hypothyroid    Stroke.     Past Surgical History:  History of hip replacement, total, left.    Hospital course:  11/22/17: Patient admitted to [facility] with a Right Intertrochanteric Fracture and was seen by medicine and cleared for surgery.   11/23/17: Patient underwent Right Hip Open Reduction Intenal Fixaton/Intramedullary Nail and tolerated procedure well. Patient tolrerated the procedure and was transferred from the Recovery Room ro 7 tower.   Patient stable for discharge: 11/27/17 Admit:11/22/17  Diagnosis: Right Intertrochanteric fracture  Procedure: Right Hip Open Reduction Internal Fixation, Intramedullary Nail   Surgeon: Dr. Axel Ruby    History of Present Illness:  Chief Complaint/Reason for Admission: right hip pain	  History of Present Illness: 	  HPI  89F complains of right hip pain for 1 day status post mechanical fall. Patient denies numbness, paresthesias, headstrike, loss of consciousness, or any other signs/symptoms at this time. Patient is a household ambulator with a walker at baseline.    Allergies: NKDA  PMH: hypothyroidism, CVA, HLD, HTN  PSH: left hip IMN (Dr Walters)  Social: Denies tobacco use  FH: Noncontributory  Imaging: XR right hip - intertrochanteric fracture       Review of Systems:  Other Review of Systems: All other review of systems negative, except as noted in HPI	      Allergies and Intolerances:        Allergies:  	No Known Allergies:     Home Medications:   * Patient Currently Takes Medications as of 22-Nov-2017 22:55 documented in Structured Notes  · 	clopidogrel 75 mg oral tablet: 1 tab(s) orally once a day, Last Dose Taken:    · 	clonidine 0.2 mg oral tablet: 1 tab(s) orally once a day, Last Dose Taken:    · 	carvedilol 25 mg oral tablet: 1 tab(s) orally every 12 hours, Last Dose Taken:    · 	Tylenol 325 mg oral tablet: 2 tab(s) orally every 6 hours, As Needed for pain, Last Dose Taken:    · 	atorvastatin 40 mg oral tablet: 1 tab(s) orally once a day (at bedtime), Last Dose Taken:    · 	aspirin 81 mg oral tablet: 1 tab(s) orally once a day, Last Dose Taken:    · 	SEROquel 25 mg oral tablet: 1 tab(s) orally once a day (at bedtime), Last Dose Taken:    · 	Xanax 0.25 mg oral tablet: 1 tab(s) orally once a day, As Needed MDD:1, Last Dose Taken:    · 	Protonix 40 mg oral delayed release tablet: 1 tab(s) orally once a day, Last Dose Taken:    · 	Synthroid 50 mcg (0.05 mg) oral tablet: 1 tab(s) orally once a day, Last Dose Taken:    · 	Multiple Vitamins oral tablet: 1 tab(s) orally once a day, Last Dose Taken:    · 	folic acid 1 mg oral tablet: 1 tab(s) orally once a day, Last Dose Taken:    · 	ascorbic acid 500 mg oral tablet: 1 tab(s) orally 2 times a day, Last Dose Taken:    · 	cholecalciferol oral tablet: 1000 unit(s) orally once a day, Last Dose Taken:    · 	senna 8.6 mg oral tablet: 2 tab(s) orally once a day (at bedtime), Last Dose Taken:    · 	Colace 100 mg oral capsule: 3 cap(s) orally once a day (at bedtime), Last Dose Taken:    · 	amLODIPine 5 mg oral tablet: 1 tab(s) orally once a day, Last Dose Taken:    · 	triamcinolone 0.025% topical cream: Apply topically to affected area on right arm 2 times a day, Last Dose Taken:      Patient History:    Past Medical History:  HLD (hyperlipidemia)    HTN (hypertension)    Hypothyroid    Stroke.     Past Surgical History:  History of hip replacement, total, left.    Hospital course:  11/22/17: Patient admitted to [facility] with a Right Intertrochanteric Fracture and was seen by medicine and cleared for surgery.   11/23/17: Patient underwent Right Hip Open Reduction Intenal Fixaton/Intramedullary Nail and tolerated procedure well. Patient tolrerated the procedure and was transferred from the Recovery Room ro 7 tower.   Patient underwent transfusion of PRBCs for acute blood loss anemia on (11/27/17)  Patient stable for discharge: 11/28/17

## 2017-11-24 NOTE — CONSULT NOTE ADULT - ATTENDING COMMENTS
Agree with above.  Pt hasn't formally carried diabetes diagnosis until this stay. However, A1c is just above goal for age.  Would try to start low dose of oral therapy with fewest side effects possible.  First choice would be januvia 25 mg daily (renally dosed), if covered by insurance.  Pt with no h/o pancreatitis, no FH of MEN2, MTC, or pancreatic cancer.  If not covered by insurance, could alternately elect to start metformin 500 mg daily (GFR ranging between 41-59 here) but would need f/u to make sure GFR remained >30 or else metformin contraindicated. Agree with above.  Pt hasn't formally carried diabetes diagnosis until this stay. However, A1c is just above goal for age.  Would try to start low dose of oral therapy with fewest side effects possible.  First choice would be januvia 25 mg daily (renally dosed), if covered by insurance.  Pt with no h/o pancreatitis, no FH of MEN2, MTC, or pancreatic cancer.  If not covered by insurance, could alternately elect to start metformin 500 mg daily (GFR ranging between 41-59 here) but would need f/u to make sure GFR remained >30 or else metformin contraindicated.    Repeat TFT's as outpt given pt on LT4.

## 2017-11-24 NOTE — DISCHARGE NOTE ADULT - PLAN OF CARE
Painless ambulation. Return to normal activities. Follow up with Dr. Ruby XXXX. Call office to schedule follow up visit upon discharge. You may bear weight as tolerated on Right lower extremity.  Keep dressing dry. Follow up with Dr. Ruby after discharge from rehab. Call office to schedule follow up visit upon discharge. You may bear weight as tolerated on Right lower extremity.  Keep dressing dry.  Diet as above. Follow up with Dr. Ruby after discharge from rehab.   Call office to schedule follow up visit upon discharge.   You may bear weight as tolerated on Right lower extremity.    Keep incision(s) dry.    Diet as above. stable. hemoglobin stable. Follow up with PCP for treatment goals, continue medication, have liver function testing every 3 months as anti lipid medications can cause liver irritation, eat low fat, low cholesterol meals HgA1C this admission.  Make sure you get your HgA1c checked every three months.  If you take oral diabetes medications, check your blood glucose two times a day.  If you take insulin, check your blood glucose before meals and at bedtime.  It's important not to skip any meals.  Keep a log of your blood glucose results and always take it with you to your doctor appointments.  Keep a list of your current medications including injectables and over the counter medications and bring this medication list with you to all your doctor appointments.  If you have not seen your ophthalmologist this year call for appointment.  Check your feet daily for redness, sores, or openings. Do not self treat. If no improvement in two days call your primary care physician for an appointment.  Low blood sugar (hypoglycemia) is a blood sugar below 70mg/dl. Check your blood sugar if you feel signs/symptoms of hypoglycemia. If your blood sugar is below 70 take 15 grams of carbohydrates (ex 4 oz of apple juice, 3-4 glucose tablets, or 4-6 oz of regular soda) wait 15 minutes and repeat blood sugar to make sure it comes up above 70.  If your blood sugar is above 70 and you are due for a meal, have a meal.  If you are not due for a meal have a snack.  This snack helps keeps your blood sugar at a safe range. Low salt diet  Activity as tolerated.  Take all medication as prescribed.  Follow up with your medical doctor for routine blood pressure monitoring at your next visit.  Notify your doctor if you have any of the following symptoms:   Dizziness, Lightheadedness, Blurry vision, Headache, Chest pain, Shortness of breath stable.  MRI with new strokes.  no arrythmia on telemetry.   Continue fall precaution and maintain safety.  Re-orient pt. hemoglobin stable.  F/u with your PMD to monitor Hg trend. Continue with aspirin, Lipitor, Plavix, amlodipine, coreg, clonidine, and Januvia,  as directed.  Seek care immediately if:  •Your arm or leg feels warm, tender, and painful. It may look swollen and red.  •You have double vision or vision loss.  •You have unusual or heavy bleeding HgA1C this admission 8.2.  Make sure you get your HgA1c checked every three months.  If you take oral diabetes medications, check your blood glucose two times a day.  If you take insulin, check your blood glucose before meals and at bedtime.  It's important not to skip any meals.  Keep a log of your blood glucose results and always take it with you to your doctor appointments.  Keep a list of your current medications including injectables and over the counter medications and bring this medication list with you to all your doctor appointments.  If you have not seen your ophthalmologist this year call for appointment.  Check your feet daily for redness, sores, or openings. Do not self treat. If no improvement in two days call your primary care physician for an appointment.  Low blood sugar (hypoglycemia) is a blood sugar below 70mg/dl. Check your blood sugar if you feel signs/symptoms of hypoglycemia. If your blood sugar is below 70 take 15 grams of carbohydrates (ex 4 oz of apple juice, 3-4 glucose tablets, or 4-6 oz of regular soda) wait 15 minutes and repeat blood sugar to make sure it comes up above 70.  If your blood sugar is above 70 and you are due for a meal, have a meal.  If you are not due for a meal have a snack.  This snack helps keeps your blood sugar at a safe range.

## 2017-11-24 NOTE — DISCHARGE NOTE ADULT - ADDITIONAL INSTRUCTIONS
Take medications as prescribed. It is highly recommended you follow up with your Primary Care Physician within1 month upon discharge to discuss recent surgery and medications. Keep dressing dry. Take medications as prescribed. It is highly recommended you follow up with your Primary Care Physician within 1 month after discharge from rehab facility to discuss recent surgery and medications. Keep dressing dry.  Newly diagnosed Diabetes Mellitus.  Endocrine consultation recommended Metformin 500mg Daily (Keep GFR >30) and follow up with Primary Care Physician. Take medications as prescribed. It is highly recommended you follow up with your Primary Care Physician within 1 month after discharge from rehab facility to discuss recent surgery and medications. Keep dressing dry.  Newly diagnosed Diabetes Mellitus.  Endocrine consultation recommended Metformin 500mg Daily (Keep GFR >30) and follow up with Primary Care Physician. Have surgical staples/sutures removed and steri-strips applied post-operative day #14 (12/7/17) Take medications as prescribed. It is highly recommended you follow up with your Primary Care Physician within 1 month after discharge from rehab facility to discuss recent surgery and medications.   Keep dressing dry.    Newly diagnosed Diabetes Mellitus.    Endocrine consultation recommended upon DISCHARGE:  Januvia 25mg Daily (Keep GFR >30) and follow up with Primary Care Physician.   Have surgical staples/sutures removed and steri-strips applied post-operative day #14 (12/7/17) Take medications as prescribed. It is highly recommended you follow up with your Primary Care Physician within 1~2wks after discharge from rehab facility to discuss recent surgery and medications.  Keep dressing dry.   Newly diagnosed Diabetes Mellitus.   Endocrine consultation recommended upon DISCHARGE:  Januvia 25mg Daily (Keep GFR >30) and follow up with Primary Care Physician.   Have surgical staples/sutures removed and steri-strips applied post-operative day #14 (12/7/17) Take medications as prescribed. It is highly recommended you follow up with your Primary Care Physician within 1~2wks after discharge from rehab facility to discuss recent surgery and medications.  Keep dressing dry.   Newly diagnosed Diabetes Mellitus.   Endocrine consultation recommended upon DISCHARGE:  Januvia 25mg Daily (Keep GFR >30) and follow up with Primary Care Physician.   f/u with orthopedics within 2 wks for surgical staples/sutures removal.

## 2017-11-24 NOTE — CONSULT NOTE ADULT - ASSESSMENT
89 yr F admitted with R hip fracture and found to have newly diagnosed DM2 c/b CVA, neuropathy and nephropathy found to have A1C 8.2. 89 yo F admitted with R hip fracture and found to have newly diagnosed DM2 c/b CVA, neuropathy and nephropathy found to have A1C 8.2.

## 2017-11-24 NOTE — PROGRESS NOTE ADULT - SUBJECTIVE AND OBJECTIVE BOX
Orthopaedics      House Endocrinology consult requested.   As per Endocrinology, pt started on Lantus 10 units at bedtime, Humalog 3 units AC and low dose RISS.       Maximilian Munson, Orthopaedic Surgery  [phone]

## 2017-11-25 DIAGNOSIS — S72.144A NONDISPLACED INTERTROCHANTERIC FRACTURE OF RIGHT FEMUR, INITIAL ENCOUNTER FOR CLOSED FRACTURE: ICD-10-CM

## 2017-11-25 LAB
ANION GAP SERPL CALC-SCNC: 10 MMOL/L — SIGNIFICANT CHANGE UP (ref 5–17)
ANION GAP SERPL CALC-SCNC: 14 MMOL/L — SIGNIFICANT CHANGE UP (ref 5–17)
BASE EXCESS BLDA CALC-SCNC: 0.6 MMOL/L — SIGNIFICANT CHANGE UP (ref -2–2)
BUN SERPL-MCNC: 27 MG/DL — HIGH (ref 7–23)
BUN SERPL-MCNC: 29 MG/DL — HIGH (ref 7–23)
CALCIUM SERPL-MCNC: 8 MG/DL — LOW (ref 8.4–10.5)
CALCIUM SERPL-MCNC: 8.4 MG/DL — SIGNIFICANT CHANGE UP (ref 8.4–10.5)
CHLORIDE SERPL-SCNC: 103 MMOL/L — SIGNIFICANT CHANGE UP (ref 96–108)
CHLORIDE SERPL-SCNC: 106 MMOL/L — SIGNIFICANT CHANGE UP (ref 96–108)
CO2 BLDA-SCNC: 27 MMOL/L — SIGNIFICANT CHANGE UP (ref 22–30)
CO2 SERPL-SCNC: 25 MMOL/L — SIGNIFICANT CHANGE UP (ref 22–31)
CO2 SERPL-SCNC: 27 MMOL/L — SIGNIFICANT CHANGE UP (ref 22–31)
CREAT SERPL-MCNC: 0.86 MG/DL — SIGNIFICANT CHANGE UP (ref 0.5–1.3)
CREAT SERPL-MCNC: 0.95 MG/DL — SIGNIFICANT CHANGE UP (ref 0.5–1.3)
GAS PNL BLDA: SIGNIFICANT CHANGE UP
GLUCOSE BLDC GLUCOMTR-MCNC: 144 MG/DL — HIGH (ref 70–99)
GLUCOSE BLDC GLUCOMTR-MCNC: 178 MG/DL — HIGH (ref 70–99)
GLUCOSE BLDC GLUCOMTR-MCNC: 199 MG/DL — HIGH (ref 70–99)
GLUCOSE BLDC GLUCOMTR-MCNC: 201 MG/DL — HIGH (ref 70–99)
GLUCOSE BLDC GLUCOMTR-MCNC: 221 MG/DL — HIGH (ref 70–99)
GLUCOSE BLDC GLUCOMTR-MCNC: 241 MG/DL — HIGH (ref 70–99)
GLUCOSE SERPL-MCNC: 219 MG/DL — HIGH (ref 70–99)
GLUCOSE SERPL-MCNC: 244 MG/DL — HIGH (ref 70–99)
HCO3 BLDA-SCNC: 26 MMOL/L — SIGNIFICANT CHANGE UP (ref 21–29)
HCT VFR BLD CALC: 23.8 % — LOW (ref 34.5–45)
HCT VFR BLD CALC: 24.6 % — LOW (ref 34.5–45)
HGB BLD-MCNC: 8.1 G/DL — LOW (ref 11.5–15.5)
HGB BLD-MCNC: 8.2 G/DL — LOW (ref 11.5–15.5)
HOROWITZ INDEX BLDA+IHG-RTO: 36 — SIGNIFICANT CHANGE UP
MCHC RBC-ENTMCNC: 31 PG — SIGNIFICANT CHANGE UP (ref 27–34)
MCHC RBC-ENTMCNC: 32.9 GM/DL — SIGNIFICANT CHANGE UP (ref 32–36)
MCHC RBC-ENTMCNC: 33.1 PG — SIGNIFICANT CHANGE UP (ref 27–34)
MCHC RBC-ENTMCNC: 34.6 GM/DL — SIGNIFICANT CHANGE UP (ref 32–36)
MCV RBC AUTO: 94.3 FL — SIGNIFICANT CHANGE UP (ref 80–100)
MCV RBC AUTO: 95.8 FL — SIGNIFICANT CHANGE UP (ref 80–100)
PCO2 BLDA: 48 MMHG — HIGH (ref 32–46)
PH BLDA: 7.35 — SIGNIFICANT CHANGE UP (ref 7.35–7.45)
PLATELET # BLD AUTO: 117 K/UL — LOW (ref 150–400)
PLATELET # BLD AUTO: 119 K/UL — LOW (ref 150–400)
PO2 BLDA: 83 MMHG — SIGNIFICANT CHANGE UP (ref 74–108)
POTASSIUM SERPL-MCNC: 4.1 MMOL/L — SIGNIFICANT CHANGE UP (ref 3.5–5.3)
POTASSIUM SERPL-MCNC: 4.2 MMOL/L — SIGNIFICANT CHANGE UP (ref 3.5–5.3)
POTASSIUM SERPL-SCNC: 4.1 MMOL/L — SIGNIFICANT CHANGE UP (ref 3.5–5.3)
POTASSIUM SERPL-SCNC: 4.2 MMOL/L — SIGNIFICANT CHANGE UP (ref 3.5–5.3)
RBC # BLD: 2.48 M/UL — LOW (ref 3.8–5.2)
RBC # BLD: 2.61 M/UL — LOW (ref 3.8–5.2)
RBC # FLD: 12.9 % — SIGNIFICANT CHANGE UP (ref 10.3–14.5)
RBC # FLD: 15.4 % — HIGH (ref 10.3–14.5)
SAO2 % BLDA: 96 % — SIGNIFICANT CHANGE UP (ref 92–96)
SODIUM SERPL-SCNC: 142 MMOL/L — SIGNIFICANT CHANGE UP (ref 135–145)
SODIUM SERPL-SCNC: 143 MMOL/L — SIGNIFICANT CHANGE UP (ref 135–145)
WBC # BLD: 12.07 K/UL — HIGH (ref 3.8–10.5)
WBC # BLD: 12.7 K/UL — HIGH (ref 3.8–10.5)
WBC # FLD AUTO: 12.07 K/UL — HIGH (ref 3.8–10.5)
WBC # FLD AUTO: 12.7 K/UL — HIGH (ref 3.8–10.5)

## 2017-11-25 RX ADMIN — PANTOPRAZOLE SODIUM 40 MILLIGRAM(S): 20 TABLET, DELAYED RELEASE ORAL at 06:42

## 2017-11-25 RX ADMIN — Medication 100 MILLIGRAM(S): at 06:42

## 2017-11-25 RX ADMIN — QUETIAPINE FUMARATE 25 MILLIGRAM(S): 200 TABLET, FILM COATED ORAL at 23:06

## 2017-11-25 RX ADMIN — Medication 1 MILLIGRAM(S): at 11:44

## 2017-11-25 RX ADMIN — Medication 3 UNIT(S): at 17:43

## 2017-11-25 RX ADMIN — CLOPIDOGREL BISULFATE 75 MILLIGRAM(S): 75 TABLET, FILM COATED ORAL at 11:44

## 2017-11-25 RX ADMIN — Medication 500 MILLIGRAM(S): at 17:41

## 2017-11-25 RX ADMIN — Medication 3 UNIT(S): at 13:40

## 2017-11-25 RX ADMIN — AMLODIPINE BESYLATE 5 MILLIGRAM(S): 2.5 TABLET ORAL at 06:42

## 2017-11-25 RX ADMIN — CARVEDILOL PHOSPHATE 25 MILLIGRAM(S): 80 CAPSULE, EXTENDED RELEASE ORAL at 06:42

## 2017-11-25 RX ADMIN — Medication 100 MILLIGRAM(S): at 23:06

## 2017-11-25 RX ADMIN — Medication 0.2 MILLIGRAM(S): at 06:42

## 2017-11-25 RX ADMIN — TRAMADOL HYDROCHLORIDE 50 MILLIGRAM(S): 50 TABLET ORAL at 10:30

## 2017-11-25 RX ADMIN — Medication 3 UNIT(S): at 09:06

## 2017-11-25 RX ADMIN — POLYETHYLENE GLYCOL 3350 17 GRAM(S): 17 POWDER, FOR SOLUTION ORAL at 11:44

## 2017-11-25 RX ADMIN — Medication 50 MICROGRAM(S): at 06:42

## 2017-11-25 RX ADMIN — Medication 1: at 13:40

## 2017-11-25 RX ADMIN — Medication 100 MILLIGRAM(S): at 13:41

## 2017-11-25 RX ADMIN — TRAMADOL HYDROCHLORIDE 50 MILLIGRAM(S): 50 TABLET ORAL at 09:54

## 2017-11-25 RX ADMIN — ENOXAPARIN SODIUM 30 MILLIGRAM(S): 100 INJECTION SUBCUTANEOUS at 11:44

## 2017-11-25 RX ADMIN — Medication 1 TABLET(S): at 11:44

## 2017-11-25 RX ADMIN — ATORVASTATIN CALCIUM 40 MILLIGRAM(S): 80 TABLET, FILM COATED ORAL at 23:06

## 2017-11-25 RX ADMIN — INSULIN GLARGINE 10 UNIT(S): 100 INJECTION, SOLUTION SUBCUTANEOUS at 23:06

## 2017-11-25 RX ADMIN — Medication 81 MILLIGRAM(S): at 11:44

## 2017-11-25 RX ADMIN — Medication 1: at 09:06

## 2017-11-25 RX ADMIN — Medication 500 MILLIGRAM(S): at 06:42

## 2017-11-25 NOTE — PHYSICAL THERAPY INITIAL EVALUATION ADULT - ADDITIONAL COMMENTS
pt is a poor historian.  [facility] notes indicate pt admitted from the [facility].  pt requires assist with ADL's. pt is legally blind. pts prior level of function is unclear.

## 2017-11-25 NOTE — PROGRESS NOTE ADULT - ASSESSMENT
S/P IMN RT HIP    Plan:        ck labs      PT WBAT      D/C planning       Rosy Wing PA-C   Beeper    [phone]

## 2017-11-25 NOTE — PROGRESS NOTE ADULT - SUBJECTIVE AND OBJECTIVE BOX
Patient is a 89y old  Female who presents with a chief complaint of right hip pain  S/P Right Hip Intramedullary Nail (24 Nov 2017 10:37)      POST OPERATIVE DAY #: 2  Patient alert, confused       VS:  T(C): 36.6 (11-25-17 @ 04:34), Max: 37.4 (11-24-17 @ 17:34)  T(F): 97.8 (11-25-17 @ 04:34), Max: 99.4 (11-24-17 @ 17:34)  HR: 84 (11-25-17 @ 04:34) (75 - 88)  BP: 144/77 (11-25-17 @ 04:34) (92/58 - 144/77)  RR: 18 (11-25-17 @ 04:34) (16 - 18)  SpO2: 100% (11-25-17 @ 04:34) (93% - 100%)  Wt(kg): --      PHYSICAL EXAM:  NAD, Alert  EXT:    Rt Hip: Dressing  re enforced,  patient attempted to remove dressings             calf soft             foot warm and mobile            LABS:                        8.2<L>  12.7<H> )-----------( 117<L>    ( 25 Nov 2017 00:32 )             23.8<L>    11-25    143  |  106  |  29<H>  ----------------------------<  244<H>  4.1   |  27  |  0.95          RADIOLOGY & ADDITIONAL TESTS:

## 2017-11-25 NOTE — PROVIDER CONTACT NOTE (OTHER) - ACTION/TREATMENT ORDERED:
pt was moved closer to nursing station. labs drawn. pt placed on cont pulse ox and nasal canula. will cont to monitor.

## 2017-11-26 DIAGNOSIS — D64.9 ANEMIA, UNSPECIFIED: ICD-10-CM

## 2017-11-26 LAB
ANION GAP SERPL CALC-SCNC: 7 MMOL/L — SIGNIFICANT CHANGE UP (ref 5–17)
BLD GP AB SCN SERPL QL: NEGATIVE — SIGNIFICANT CHANGE UP
BUN SERPL-MCNC: 28 MG/DL — HIGH (ref 7–23)
CALCIUM SERPL-MCNC: 8.6 MG/DL — SIGNIFICANT CHANGE UP (ref 8.4–10.5)
CHLORIDE SERPL-SCNC: 104 MMOL/L — SIGNIFICANT CHANGE UP (ref 96–108)
CO2 SERPL-SCNC: 29 MMOL/L — SIGNIFICANT CHANGE UP (ref 22–31)
CREAT SERPL-MCNC: 0.83 MG/DL — SIGNIFICANT CHANGE UP (ref 0.5–1.3)
GLUCOSE BLDC GLUCOMTR-MCNC: 124 MG/DL — HIGH (ref 70–99)
GLUCOSE BLDC GLUCOMTR-MCNC: 174 MG/DL — HIGH (ref 70–99)
GLUCOSE BLDC GLUCOMTR-MCNC: 194 MG/DL — HIGH (ref 70–99)
GLUCOSE BLDC GLUCOMTR-MCNC: 209 MG/DL — HIGH (ref 70–99)
GLUCOSE SERPL-MCNC: 180 MG/DL — HIGH (ref 70–99)
HCT VFR BLD CALC: 23.2 % — LOW (ref 34.5–45)
HGB BLD-MCNC: 7.9 G/DL — LOW (ref 11.5–15.5)
MCHC RBC-ENTMCNC: 33.1 PG — SIGNIFICANT CHANGE UP (ref 27–34)
MCHC RBC-ENTMCNC: 34.1 GM/DL — SIGNIFICANT CHANGE UP (ref 32–36)
MCV RBC AUTO: 96.8 FL — SIGNIFICANT CHANGE UP (ref 80–100)
PLATELET # BLD AUTO: 146 K/UL — LOW (ref 150–400)
POTASSIUM SERPL-MCNC: 4.3 MMOL/L — SIGNIFICANT CHANGE UP (ref 3.5–5.3)
POTASSIUM SERPL-SCNC: 4.3 MMOL/L — SIGNIFICANT CHANGE UP (ref 3.5–5.3)
RBC # BLD: 2.4 M/UL — LOW (ref 3.8–5.2)
RBC # FLD: 12.7 % — SIGNIFICANT CHANGE UP (ref 10.3–14.5)
RH IG SCN BLD-IMP: POSITIVE — SIGNIFICANT CHANGE UP
SODIUM SERPL-SCNC: 140 MMOL/L — SIGNIFICANT CHANGE UP (ref 135–145)
WBC # BLD: 10.8 K/UL — HIGH (ref 3.8–10.5)
WBC # FLD AUTO: 10.8 K/UL — HIGH (ref 3.8–10.5)

## 2017-11-26 RX ORDER — TRAMADOL HYDROCHLORIDE 50 MG/1
50 TABLET ORAL EVERY 4 HOURS
Qty: 0 | Refills: 0 | Status: DISCONTINUED | OUTPATIENT
Start: 2017-11-26 | End: 2017-11-28

## 2017-11-26 RX ORDER — ACETAMINOPHEN 500 MG
325 TABLET ORAL EVERY 4 HOURS
Qty: 0 | Refills: 0 | Status: DISCONTINUED | OUTPATIENT
Start: 2017-11-26 | End: 2017-12-07

## 2017-11-26 RX ORDER — TRAMADOL HYDROCHLORIDE 50 MG/1
25 TABLET ORAL EVERY 4 HOURS
Qty: 0 | Refills: 0 | Status: DISCONTINUED | OUTPATIENT
Start: 2017-11-26 | End: 2017-11-28

## 2017-11-26 RX ADMIN — Medication 650 MILLIGRAM(S): at 17:28

## 2017-11-26 RX ADMIN — POLYETHYLENE GLYCOL 3350 17 GRAM(S): 17 POWDER, FOR SOLUTION ORAL at 11:43

## 2017-11-26 RX ADMIN — ATORVASTATIN CALCIUM 40 MILLIGRAM(S): 80 TABLET, FILM COATED ORAL at 21:30

## 2017-11-26 RX ADMIN — QUETIAPINE FUMARATE 25 MILLIGRAM(S): 200 TABLET, FILM COATED ORAL at 21:30

## 2017-11-26 RX ADMIN — Medication 3 UNIT(S): at 08:47

## 2017-11-26 RX ADMIN — Medication 50 MICROGRAM(S): at 06:18

## 2017-11-26 RX ADMIN — Medication 500 MILLIGRAM(S): at 06:17

## 2017-11-26 RX ADMIN — AMLODIPINE BESYLATE 5 MILLIGRAM(S): 2.5 TABLET ORAL at 17:23

## 2017-11-26 RX ADMIN — CARVEDILOL PHOSPHATE 25 MILLIGRAM(S): 80 CAPSULE, EXTENDED RELEASE ORAL at 06:18

## 2017-11-26 RX ADMIN — Medication 3 UNIT(S): at 17:23

## 2017-11-26 RX ADMIN — Medication 650 MILLIGRAM(S): at 11:50

## 2017-11-26 RX ADMIN — Medication 500 MILLIGRAM(S): at 17:23

## 2017-11-26 RX ADMIN — Medication 1 MILLIGRAM(S): at 11:42

## 2017-11-26 RX ADMIN — Medication 0.2 MILLIGRAM(S): at 06:18

## 2017-11-26 RX ADMIN — AMLODIPINE BESYLATE 5 MILLIGRAM(S): 2.5 TABLET ORAL at 06:17

## 2017-11-26 RX ADMIN — Medication 100 MILLIGRAM(S): at 21:30

## 2017-11-26 RX ADMIN — Medication 1 TABLET(S): at 11:43

## 2017-11-26 RX ADMIN — Medication 2: at 12:57

## 2017-11-26 RX ADMIN — Medication 100 MILLIGRAM(S): at 06:18

## 2017-11-26 RX ADMIN — Medication 1: at 08:48

## 2017-11-26 RX ADMIN — Medication 3 UNIT(S): at 12:56

## 2017-11-26 RX ADMIN — INSULIN GLARGINE 10 UNIT(S): 100 INJECTION, SOLUTION SUBCUTANEOUS at 23:30

## 2017-11-26 RX ADMIN — CLOPIDOGREL BISULFATE 75 MILLIGRAM(S): 75 TABLET, FILM COATED ORAL at 11:42

## 2017-11-26 RX ADMIN — PANTOPRAZOLE SODIUM 40 MILLIGRAM(S): 20 TABLET, DELAYED RELEASE ORAL at 06:17

## 2017-11-26 RX ADMIN — TRAMADOL HYDROCHLORIDE 50 MILLIGRAM(S): 50 TABLET ORAL at 20:46

## 2017-11-26 RX ADMIN — CARVEDILOL PHOSPHATE 25 MILLIGRAM(S): 80 CAPSULE, EXTENDED RELEASE ORAL at 17:23

## 2017-11-26 RX ADMIN — Medication 81 MILLIGRAM(S): at 11:46

## 2017-11-26 RX ADMIN — Medication 100 MILLIGRAM(S): at 13:00

## 2017-11-26 RX ADMIN — ENOXAPARIN SODIUM 30 MILLIGRAM(S): 100 INJECTION SUBCUTANEOUS at 11:43

## 2017-11-26 RX ADMIN — TRAMADOL HYDROCHLORIDE 50 MILLIGRAM(S): 50 TABLET ORAL at 21:16

## 2017-11-26 NOTE — PROGRESS NOTE ADULT - ASSESSMENT
S/P RT HIP IMN    Plan        ck labs      OOB/PT      D/C planning     Rosy Wing PA-C   Beeper    [phone]

## 2017-11-26 NOTE — PROGRESS NOTE ADULT - SUBJECTIVE AND OBJECTIVE BOX
89 y/o female hx of CVA on ASA plavix, w/ bilateral vision loss, HTN, HLD, not on AC presents from assisted living [facility] with a fall. Per patient, pants were wet, so she walking to her room and she tripped and hit face on the floor. No LOC. Normally walks with a walker but was not using it today. Reports L sided face pain, R knee pain, L foot and ankle pain, and chipped front tooth. Was unable to get off floor by herself - aid had to help - on floor for 5 minutes. Denies any Chest pain, SOB, N/V/D, confusion. A+O x 3. Been following with Derm for R arm rash - will find out biopsy results on Monday.         MEDICATIONS  (STANDING):  amLODIPine   Tablet 5 milliGRAM(s) Oral two times a day  ascorbic acid 500 milliGRAM(s) Oral two times a day  aspirin enteric coated 81 milliGRAM(s) Oral daily  atorvastatin 40 milliGRAM(s) Oral at bedtime  carvedilol 25 milliGRAM(s) Oral every 12 hours  cloNIDine 0.2 milliGRAM(s) Oral daily  clopidogrel Tablet 75 milliGRAM(s) Oral daily  dextrose 5%. 1000 milliLiter(s) (50 mL/Hr) IV Continuous <Continuous>  dextrose 50% Injectable 12.5 Gram(s) IV Push once  dextrose 50% Injectable 25 Gram(s) IV Push once  dextrose 50% Injectable 25 Gram(s) IV Push once  docusate sodium 100 milliGRAM(s) Oral three times a day  enoxaparin Injectable 30 milliGRAM(s) SubCutaneous daily  folic acid 1 milliGRAM(s) Oral daily  insulin glargine Injectable (LANTUS) 10 Unit(s) SubCutaneous at bedtime  insulin lispro (HumaLOG) corrective regimen sliding scale   SubCutaneous three times a day before meals  insulin lispro (HumaLOG) corrective regimen sliding scale   SubCutaneous at bedtime  insulin lispro Injectable (HumaLOG) 3 Unit(s) SubCutaneous three times a day before meals  levothyroxine 50 MICROGram(s) Oral daily  multivitamin 1 Tablet(s) Oral daily  pantoprazole    Tablet 40 milliGRAM(s) Oral before breakfast  polyethylene glycol 3350 17 Gram(s) Oral daily  QUEtiapine 25 milliGRAM(s) Oral at bedtime    MEDICATIONS  (PRN):  acetaminophen   Tablet 650 milliGRAM(s) Oral every 6 hours PRN For Temp greater than 38 C (100.4 F)  ALPRAZolam 0.25 milliGRAM(s) Oral daily PRN anxiety  benzocaine 15 mG/menthol 3.6 mG Lozenge 1 Lozenge Oral every 2 hours PRN Sore Throat  calcium carbonate 500 mG (Tums) Chewable 3 Tablet(s) Chew every 6 hours PRN Dyspepsia  dextrose Gel 1 Dose(s) Oral once PRN Blood Glucose LESS THAN 70 milliGRAM(s)/deciliter  diphenhydrAMINE   Capsule 50 milliGRAM(s) Oral every 4 hours PRN Rash and/or Itching  glucagon  Injectable 1 milliGRAM(s) IntraMuscular once PRN Glucose LESS THAN 70 milligrams/deciliter  HYDROmorphone  Injectable 0.5 milliGRAM(s) IV Push every 4 hours PRN breakthrough  ondansetron Injectable 4 milliGRAM(s) IV Push every 6 hours PRN Nausea and/or Vomiting  oxyCODONE    IR 5 milliGRAM(s) Oral every 4 hours PRN Severe Pain (7 - 10)  senna 2 Tablet(s) Oral at bedtime PRN Constipation  traMADol 25 milliGRAM(s) Oral every 4 hours PRN Mild Pain (1 - 3)  traMADol 50 milliGRAM(s) Oral every 8 hours PRN Moderate Pain (4 - 6)      REVIEW OF SYSTEM:  CONSTITUTIONAL: No fever, No change in weight, No fatigue  HEAD: No headache, No dizziness, No recent trauma  EYES: BLIND  ENT:  No difficulty hearing, No tinnitus, No vertigo, No sinus pain, No throat pain  NECK: No pain, No stiffness  RESPIRATORY: No cough, No wheezing, No chills, No hemoptysis, No shortness of breath at rest or exertional shortness of breath  CARDIOVASCULAR: No chest pain, No palpitations, No dizziness, No CHF, No arrhythmia, No cardiomegaly, No leg swelling  GASTROINTESTINAL: No abdominal, No epigastric pain. No nausea, No vomiting, No hematemesis, No diarrhea, No constipation. No melena, No hematochezia. No GERD  GENITOURINARY: No dysuria, No frequency, No hematuria, No incontinence, No nocturia, No hesitancy,  SKIN: No itching, No burning, No rashes, No lesions   LYMPH NODES: No history of enlarged glands  ENDOCRINE: No heat or cold intolerance, No hair loss. No osteoporosis, No thyroid disease  MUSCULOSKELETAL:     (+)  RIGHT HIP PAIN  PSYCHIATRIC: No depression, No anxiety, No mood swings, No difficulty sleeping  HEME/LYMPH: No easy bruising, No anticoagulants, No bleeding disorder, No bleeding gums  ALLERGY AND IMMUNOLOGIC: No hives, No eczema  NEUROLOGICAL: No memory loss, No loss of strength, No numbness, No tremors    VITALS:   T(C): 36.7 (11-26-17 @ 05:54), Max: 36.7 (11-26-17 @ 00:48)  HR: 99 (11-26-17 @ 05:54) (66 - 99)  BP: 147/55 (11-26-17 @ 05:54) (100/64 - 147/55)  RR: 18 (11-26-17 @ 05:54) (18 - 18)  SpO2: 93% (11-26-17 @ 00:48) (93% - 99%)  Wt(kg): --    PHYSICAL EXAM:  GENERAL: NAD, well nourished and conversant  HEAD:  Atraumatic  EYES:  PATIENT I S BLIND  ENT: No tonsillar erythema, exudates, or enlargement, moist mucous membranes, good dentition, no lesions  NECK: Supple, No JVD, normal thyroid, carotids with normal upstrokes and no bruits  CHEST/LUNG: Clear to auscultation bilaterally, No rales, rhonchi, wheezing, or rubs  HEART: Regular rate and rhythm, No murmurs, rubs, or gallops  ABDOMEN: Soft, nondistended, no masses, guarding, tenderness or rebound, bowel sounds present  EXTREMITIES:  2+ Peripheral Pulses, No clubbing, cyanosis, or edema.   (+) RIGHT HIP PAIN C/W HIP FRACTURE  LYMPH: No lymphadenopathy noted  SKIN: No rashes or lesions  NERVOUS SYSTEM:  Alert & Oriented X3, normal cognitive function. Motor Strength 5/5 right upper and right lower.  5/5 left upper and left lower extremities, DTRs 2+ intact and symmetric    LABS:  ABG - ( 25 Nov 2017 00:43 )  pH: 7.35  /  pCO2: 48    /  pO2: 83    / HCO3: 26    / Base Excess: .6    /  SaO2: 96                    CBC Full  -  ( 26 Nov 2017 06:42 )  WBC Count : 10.8 K/uL  Hemoglobin : 7.9 g/dL  Hematocrit : 23.2 %  Platelet Count - Automated : 146 K/uL  Mean Cell Volume : 96.8 fl  Mean Cell Hemoglobin : 33.1 pg  Mean Cell Hemoglobin Concentration : 34.1 gm/dL  Auto Neutrophil # : x  Auto Lymphocyte # : x  Auto Monocyte # : x  Auto Eosinophil # : x  Auto Basophil # : x  Auto Neutrophil % : x  Auto Lymphocyte % : x  Auto Monocyte % : x  Auto Eosinophil % : x  Auto Basophil % : x    11-26    140  |  104  |  28<H>  ----------------------------<  180<H>  4.3   |  29  |  0.83    Ca    8.6      26 Nov 2017 06:42            CAPILLARY BLOOD GLUCOSE      POCT Blood Glucose.: 194 mg/dL (26 Nov 2017 08:38)  POCT Blood Glucose.: 201 mg/dL (25 Nov 2017 22:02)  POCT Blood Glucose.: 144 mg/dL (25 Nov 2017 17:33)  POCT Blood Glucose.: 178 mg/dL (25 Nov 2017 13:33)      RADIOLOGY & ADDITIONAL TESTS:

## 2017-11-26 NOTE — PROGRESS NOTE ADULT - SUBJECTIVE AND OBJECTIVE BOX
Patient is a 89y old  Female who presents with a chief complaint of right hip pain  S/P Right Hip Intramedullary Nail (24 Nov 2017 10:37)      POST OPERATIVE DAY #: 3  Patient comfortable       VS:  T(C): 36.7 (11-26-17 @ 05:54), Max: 36.7 (11-26-17 @ 00:48)  T(F): 98.1 (11-26-17 @ 05:54), Max: 98.1 (11-26-17 @ 05:54)  HR: 99 (11-26-17 @ 05:54) (66 - 99)  BP: 147/55 (11-26-17 @ 05:54) (100/64 - 147/55)  RR: 18 (11-26-17 @ 05:54) (18 - 18)  SpO2: 93% (11-26-17 @ 00:48) (93% - 99%)  Wt(kg): --      PHYSICAL EXAM:     EXT:   Rt Hip: Dressings d/c'd              incisions clean and dry     No Calf Tenderness  (+) DF/PF; (+) Distal Pulses;  Sensation: No gross deficits noted      B/L, PAS     LABS:                        7.9<L>  10.8<H> )-----------( 146<L>    ( 26 Nov 2017 06:42 )             23.2<L>    11-26    140  |  104  |  28<H>  ----------------------------<  180<H>  4.3   |  29  |  0.83

## 2017-11-27 LAB
GLUCOSE BLDC GLUCOMTR-MCNC: 160 MG/DL — HIGH (ref 70–99)
GLUCOSE BLDC GLUCOMTR-MCNC: 177 MG/DL — HIGH (ref 70–99)
GLUCOSE BLDC GLUCOMTR-MCNC: 205 MG/DL — HIGH (ref 70–99)
GLUCOSE BLDC GLUCOMTR-MCNC: 277 MG/DL — HIGH (ref 70–99)
HCT VFR BLD CALC: 23 % — LOW (ref 34.5–45)
HGB BLD-MCNC: 7.6 G/DL — LOW (ref 11.5–15.5)
MCHC RBC-ENTMCNC: 32.9 PG — SIGNIFICANT CHANGE UP (ref 27–34)
MCHC RBC-ENTMCNC: 33.3 GM/DL — SIGNIFICANT CHANGE UP (ref 32–36)
MCV RBC AUTO: 98.7 FL — SIGNIFICANT CHANGE UP (ref 80–100)
PLATELET # BLD AUTO: 144 K/UL — LOW (ref 150–400)
RBC # BLD: 2.33 M/UL — LOW (ref 3.8–5.2)
RBC # FLD: 13.1 % — SIGNIFICANT CHANGE UP (ref 10.3–14.5)
WBC # BLD: 9.6 K/UL — SIGNIFICANT CHANGE UP (ref 3.8–10.5)
WBC # FLD AUTO: 9.6 K/UL — SIGNIFICANT CHANGE UP (ref 3.8–10.5)

## 2017-11-27 RX ORDER — ACETAMINOPHEN 500 MG
1 TABLET ORAL
Qty: 0 | Refills: 0 | DISCHARGE
Start: 2017-11-27

## 2017-11-27 RX ORDER — ENOXAPARIN SODIUM 100 MG/ML
30 INJECTION SUBCUTANEOUS
Qty: 0 | Refills: 0 | COMMUNITY
Start: 2017-11-27

## 2017-11-27 RX ORDER — ENOXAPARIN SODIUM 100 MG/ML
30 INJECTION SUBCUTANEOUS
Qty: 0 | Refills: 0 | DISCHARGE
Start: 2017-11-27

## 2017-11-27 RX ORDER — TRAMADOL HYDROCHLORIDE 50 MG/1
0.5 TABLET ORAL
Qty: 0 | Refills: 0 | DISCHARGE
Start: 2017-11-27

## 2017-11-27 RX ORDER — ACETAMINOPHEN 500 MG
2 TABLET ORAL
Qty: 0 | Refills: 0 | DISCHARGE
Start: 2017-11-27

## 2017-11-27 RX ORDER — TRAMADOL HYDROCHLORIDE 50 MG/1
0.5 TABLET ORAL
Qty: 0 | Refills: 0 | COMMUNITY
Start: 2017-11-27

## 2017-11-27 RX ORDER — ASPIRIN/CALCIUM CARB/MAGNESIUM 324 MG
1 TABLET ORAL
Qty: 30 | Refills: 0 | OUTPATIENT

## 2017-11-27 RX ORDER — TRAMADOL HYDROCHLORIDE 50 MG/1
1 TABLET ORAL
Qty: 0 | Refills: 0 | COMMUNITY
Start: 2017-11-27

## 2017-11-27 RX ORDER — ACETAMINOPHEN 500 MG
2 TABLET ORAL
Qty: 240 | Refills: 0 | OUTPATIENT

## 2017-11-27 RX ADMIN — ATORVASTATIN CALCIUM 40 MILLIGRAM(S): 80 TABLET, FILM COATED ORAL at 21:34

## 2017-11-27 RX ADMIN — Medication 1 MILLIGRAM(S): at 11:39

## 2017-11-27 RX ADMIN — QUETIAPINE FUMARATE 25 MILLIGRAM(S): 200 TABLET, FILM COATED ORAL at 21:34

## 2017-11-27 RX ADMIN — Medication 100 MILLIGRAM(S): at 06:42

## 2017-11-27 RX ADMIN — Medication 50 MICROGRAM(S): at 06:42

## 2017-11-27 RX ADMIN — CARVEDILOL PHOSPHATE 25 MILLIGRAM(S): 80 CAPSULE, EXTENDED RELEASE ORAL at 18:29

## 2017-11-27 RX ADMIN — Medication 1 TABLET(S): at 11:38

## 2017-11-27 RX ADMIN — CARVEDILOL PHOSPHATE 25 MILLIGRAM(S): 80 CAPSULE, EXTENDED RELEASE ORAL at 06:43

## 2017-11-27 RX ADMIN — Medication 2: at 12:50

## 2017-11-27 RX ADMIN — Medication 3 UNIT(S): at 08:54

## 2017-11-27 RX ADMIN — PANTOPRAZOLE SODIUM 40 MILLIGRAM(S): 20 TABLET, DELAYED RELEASE ORAL at 06:43

## 2017-11-27 RX ADMIN — CLOPIDOGREL BISULFATE 75 MILLIGRAM(S): 75 TABLET, FILM COATED ORAL at 11:38

## 2017-11-27 RX ADMIN — Medication 0.2 MILLIGRAM(S): at 06:42

## 2017-11-27 RX ADMIN — AMLODIPINE BESYLATE 5 MILLIGRAM(S): 2.5 TABLET ORAL at 18:29

## 2017-11-27 RX ADMIN — ENOXAPARIN SODIUM 30 MILLIGRAM(S): 100 INJECTION SUBCUTANEOUS at 11:43

## 2017-11-27 RX ADMIN — Medication 3 UNIT(S): at 12:51

## 2017-11-27 RX ADMIN — INSULIN GLARGINE 10 UNIT(S): 100 INJECTION, SOLUTION SUBCUTANEOUS at 22:16

## 2017-11-27 RX ADMIN — Medication 1: at 22:18

## 2017-11-27 RX ADMIN — Medication 100 MILLIGRAM(S): at 21:35

## 2017-11-27 RX ADMIN — POLYETHYLENE GLYCOL 3350 17 GRAM(S): 17 POWDER, FOR SOLUTION ORAL at 11:40

## 2017-11-27 RX ADMIN — Medication 81 MILLIGRAM(S): at 11:38

## 2017-11-27 RX ADMIN — Medication 500 MILLIGRAM(S): at 18:29

## 2017-11-27 RX ADMIN — Medication 500 MILLIGRAM(S): at 06:42

## 2017-11-27 RX ADMIN — AMLODIPINE BESYLATE 5 MILLIGRAM(S): 2.5 TABLET ORAL at 06:42

## 2017-11-27 RX ADMIN — Medication 100 MILLIGRAM(S): at 13:25

## 2017-11-27 RX ADMIN — Medication 1: at 08:53

## 2017-11-27 NOTE — PROGRESS NOTE ADULT - SUBJECTIVE AND OBJECTIVE BOX
Subjective: Pt. feels ok today. Post-op pain is controlled  Denies CP or SOB. Currently receiving 1 u of PRBC's due to sig  anemia.    MEDICATIONS  (STANDING):  amLODIPine   Tablet 5 milliGRAM(s) Oral two times a day  ascorbic acid 500 milliGRAM(s) Oral two times a day  aspirin enteric coated 81 milliGRAM(s) Oral daily  atorvastatin 40 milliGRAM(s) Oral at bedtime  carvedilol 25 milliGRAM(s) Oral every 12 hours  cloNIDine 0.2 milliGRAM(s) Oral daily  clopidogrel Tablet 75 milliGRAM(s) Oral daily  dextrose 5%. 1000 milliLiter(s) (50 mL/Hr) IV Continuous <Continuous>  dextrose 50% Injectable 12.5 Gram(s) IV Push once  dextrose 50% Injectable 25 Gram(s) IV Push once  dextrose 50% Injectable 25 Gram(s) IV Push once  docusate sodium 100 milliGRAM(s) Oral three times a day  enoxaparin Injectable 30 milliGRAM(s) SubCutaneous daily  folic acid 1 milliGRAM(s) Oral daily  insulin glargine Injectable (LANTUS) 10 Unit(s) SubCutaneous at bedtime  insulin lispro (HumaLOG) corrective regimen sliding scale   SubCutaneous three times a day before meals  insulin lispro (HumaLOG) corrective regimen sliding scale   SubCutaneous at bedtime  insulin lispro Injectable (HumaLOG) 3 Unit(s) SubCutaneous three times a day before meals  levothyroxine 50 MICROGram(s) Oral daily  multivitamin 1 Tablet(s) Oral daily  pantoprazole    Tablet 40 milliGRAM(s) Oral before breakfast  polyethylene glycol 3350 17 Gram(s) Oral daily  QUEtiapine 25 milliGRAM(s) Oral at bedtime    MEDICATIONS  (PRN):  acetaminophen   Tablet 650 milliGRAM(s) Oral every 6 hours PRN For Temp greater than 38 C (100.4 F)  acetaminophen   Tablet. 325 milliGRAM(s) Oral every 4 hours PRN Mild Pain (1 - 3)  ALPRAZolam 0.25 milliGRAM(s) Oral daily PRN anxiety  benzocaine 15 mG/menthol 3.6 mG Lozenge 1 Lozenge Oral every 2 hours PRN Sore Throat  calcium carbonate 500 mG (Tums) Chewable 3 Tablet(s) Chew every 6 hours PRN Dyspepsia  dextrose Gel 1 Dose(s) Oral once PRN Blood Glucose LESS THAN 70 milliGRAM(s)/deciliter  diphenhydrAMINE   Capsule 50 milliGRAM(s) Oral every 4 hours PRN Rash and/or Itching  glucagon  Injectable 1 milliGRAM(s) IntraMuscular once PRN Glucose LESS THAN 70 milligrams/deciliter  HYDROmorphone  Injectable 0.5 milliGRAM(s) IV Push every 4 hours PRN breakthrough  ondansetron Injectable 4 milliGRAM(s) IV Push every 6 hours PRN Nausea and/or Vomiting  senna 2 Tablet(s) Oral at bedtime PRN Constipation  traMADol 25 milliGRAM(s) Oral every 4 hours PRN Moderate Pain (4 - 6)  traMADol 50 milliGRAM(s) Oral every 4 hours PRN Severe Pain (7 - 10)      Vital Signs Last 24 Hrs  T(C): 36.7 (27 Nov 2017 10:25), Max: 36.8 (26 Nov 2017 20:14)  T(F): 98.1 (27 Nov 2017 10:25), Max: 98.2 (26 Nov 2017 20:14)  HR: 66 (27 Nov 2017 10:25) (66 - 90)  BP: 85/48 (27 Nov 2017 10:25) (85/48 - 133/69)  BP(mean): --  RR: 18 (27 Nov 2017 10:25) (18 - 18)  SpO2: 96% (27 Nov 2017 06:00) (91% - 96%)    PHYSICAL EXAM:    Constitutional: WD, WN in NAD  Neck: no JVD  Respiratory: clear lungs anteriorly  Cardiovascular: RRR with a 2/6 JYOTSNA to carotids  Gastrointestinal: BS present, no masses noted, NT  Extremities: chuck Venodynes in place, mild chuck edema noted  Vascular: intact pulses  Neurological: pt is legally blind, no focal deficits noted, but exam limited  Musculoskeletal: R hip wound C/D/I    TELEMETRY:    ECG:      LABS:                        7.6    9.6   )-----------( 144      ( 27 Nov 2017 06:42 )             23.0     11-26    140  |  104  |  28<H>  ----------------------------<  180<H>  4.3   |  29  |  0.83    Ca    8.6      26 Nov 2017 06:42          I&O's Summary    26 Nov 2017 07:01 - 27 Nov 2017 07:00  --------------------------------------------------------  IN: 600 mL / OUT: 0 mL / NET: 600 mL    27 Nov 2017 07:01 - 27 Nov 2017 11:08  --------------------------------------------------------  IN: 240 mL / OUT: 0 mL / NET: 240 mL      BNP  RADIOLOGY & ADDITIONAL STUDIES:    IMPRESSION:  R hip fracture from a mechanical fall  s/p ORIF of the R hip  HTN  AODM  h/o cerebellar CVA  HLD  Post-op anemia    RECOMMENDATIONS:  Continue current meds  Transfuse to Hgb of 9-10  Guaiac all stools  f/u CBC and BMP  GI/DVT prophylaxis  Ortho f/u  PT as tolerated

## 2017-11-27 NOTE — PROGRESS NOTE ADULT - SUBJECTIVE AND OBJECTIVE BOX
Pt p/w worsening hypoxia. Now requiring 4L O2 via NC, with max SpO2 of 96%. When O2 removed pt's saturation decreases to mid 80's.  She has removed her IV and is disrobing. Legally blind.  Denies SOB/CP. States she "feels great".    Vital Signs Last 24 Hrs  T(C): 36.9 (27 Nov 2017 20:50), Max: 36.9 (27 Nov 2017 13:35)  T(F): 98.4 (27 Nov 2017 20:50), Max: 98.5 (27 Nov 2017 13:35)  HR: 77 (27 Nov 2017 20:50) (66 - 81)  BP: 103/49 (27 Nov 2017 20:50) (85/48 - 124/69)  RR: 18 (27 Nov 2017 20:50) (18 - 18)  SpO2: 92% (27 Nov 2017 20:50) (92% - 96%)    Gen: NAD; A&O to person only.    Cardio: RRR S1, S2  Lungs: distant diffuse BS, mild crackles RLL      A/P: 89yo F s/p Called by RN,p/w worsening hypoxia. Now requiring 4L O2 via NC, with max SpO2 of 96%. When O2 removed pt's saturation decreases to mid 80's.  She has removed her IV and is disrobing. H/o legally blind.  Denies SOB/CP. States she "feels great".    Vital Signs Last 24 Hrs  T(C): 36.9 (27 Nov 2017 20:50), Max: 36.9 (27 Nov 2017 13:35)  T(F): 98.4 (27 Nov 2017 20:50), Max: 98.5 (27 Nov 2017 13:35)  HR: 77 (27 Nov 2017 20:50) (66 - 81)  BP: 103/49 (27 Nov 2017 20:50) (85/48 - 124/69)  RR: 18 (27 Nov 2017 20:50) (18 - 18)  SpO2: 92% (27 Nov 2017 20:50) (92% - 96%)    Gen: NAD; A&O to person only.    Cardio: RRR S1, S2  Lungs: distant diffuse BS, mild crackles RLL      A/P: 89yo F s/p R femur IMN, POD 4 p/w worsening hypoxia; stable  -New IV inserted  -FU urgent PCXR  -FU stat ABG  -FU CBC post transfusion  -Consider SICU c/s  -Consider CTPA  -Case signed out to Ortho resident for follow up  -Cont current tx     Kathleen Deutsch PA-C  Orthopedic Surgery  Pagers [phone] Called by RN,p/w worsening hypoxia. Now requiring 4L O2 via NC, with max SpO2 of 96%. When O2 removed pt's saturation decreases to mid 80's.  She has removed her IV and is disrobing. H/o legally blind.  Denies SOB/CP. States she "feels great".    Vital Signs Last 24 Hrs  T(C): 36.9 (27 Nov 2017 20:50), Max: 36.9 (27 Nov 2017 13:35)  T(F): 98.4 (27 Nov 2017 20:50), Max: 98.5 (27 Nov 2017 13:35)  HR: 77 (27 Nov 2017 20:50) (66 - 81)  BP: 103/49 (27 Nov 2017 20:50) (85/48 - 124/69)  RR: 18 (27 Nov 2017 20:50) (18 - 18)  SpO2: 92% (27 Nov 2017 20:50) (92% - 96%)    Gen: NAD; A&O to person only; ? mild confusion vs baseline    Cardio: RRR S1, S2  Lungs: distant diffuse BS, mild crackles RLL      A/P: 89yo F s/p R femur IMN, POD 4 p/w worsening hypoxia; stable  -New IV inserted  -FU urgent PCXR  -FU stat ABG  -FU CBC post transfusion  -Consider SICU c/s  -Consider CTPA  -Case signed out to Ortho resident for follow up  -Cont O2 via NC  -Cont IS, deep breathing exercises  -Cont reorientation  -Cont current tx     Kathleen Deutsch PA-C  Orthopedic Surgery  Pagers [phone]

## 2017-11-27 NOTE — PROGRESS NOTE ADULT - SUBJECTIVE AND OBJECTIVE BOX
89 y/o female hx of CVA on ASA plavix, w/ bilateral vision loss, HTN, HLD, not on AC presents from assisted living [facility] with a fall. Per patient, pants were wet, so she walking to her room and she tripped and hit face on the floor. No LOC. Normally walks with a walker but was not using it today. Reports L sided face pain, R knee pain, L foot and ankle pain, and chipped front tooth. Was unable to get off floor by herself - aid had to help - on floor for 5 minutes. Denies any Chest pain, SOB, N/V/D, confusion. A+O x 3. Been following with Derm for R arm rash - will find out biopsy results on Monday.         MEDICATIONS  (STANDING):  amLODIPine   Tablet 5 milliGRAM(s) Oral two times a day  ascorbic acid 500 milliGRAM(s) Oral two times a day  aspirin enteric coated 81 milliGRAM(s) Oral daily  atorvastatin 40 milliGRAM(s) Oral at bedtime  carvedilol 25 milliGRAM(s) Oral every 12 hours  cloNIDine 0.2 milliGRAM(s) Oral daily  clopidogrel Tablet 75 milliGRAM(s) Oral daily  dextrose 5%. 1000 milliLiter(s) (50 mL/Hr) IV Continuous <Continuous>  dextrose 50% Injectable 12.5 Gram(s) IV Push once  dextrose 50% Injectable 25 Gram(s) IV Push once  dextrose 50% Injectable 25 Gram(s) IV Push once  docusate sodium 100 milliGRAM(s) Oral three times a day  enoxaparin Injectable 30 milliGRAM(s) SubCutaneous daily  folic acid 1 milliGRAM(s) Oral daily  insulin glargine Injectable (LANTUS) 10 Unit(s) SubCutaneous at bedtime  insulin lispro (HumaLOG) corrective regimen sliding scale   SubCutaneous three times a day before meals  insulin lispro (HumaLOG) corrective regimen sliding scale   SubCutaneous at bedtime  insulin lispro Injectable (HumaLOG) 3 Unit(s) SubCutaneous three times a day before meals  levothyroxine 50 MICROGram(s) Oral daily  multivitamin 1 Tablet(s) Oral daily  pantoprazole    Tablet 40 milliGRAM(s) Oral before breakfast  polyethylene glycol 3350 17 Gram(s) Oral daily  QUEtiapine 25 milliGRAM(s) Oral at bedtime    MEDICATIONS  (PRN):  acetaminophen   Tablet 650 milliGRAM(s) Oral every 6 hours PRN For Temp greater than 38 C (100.4 F)  acetaminophen   Tablet. 325 milliGRAM(s) Oral every 4 hours PRN Mild Pain (1 - 3)  ALPRAZolam 0.25 milliGRAM(s) Oral daily PRN anxiety  benzocaine 15 mG/menthol 3.6 mG Lozenge 1 Lozenge Oral every 2 hours PRN Sore Throat  calcium carbonate 500 mG (Tums) Chewable 3 Tablet(s) Chew every 6 hours PRN Dyspepsia  dextrose Gel 1 Dose(s) Oral once PRN Blood Glucose LESS THAN 70 milliGRAM(s)/deciliter  diphenhydrAMINE   Capsule 50 milliGRAM(s) Oral every 4 hours PRN Rash and/or Itching  glucagon  Injectable 1 milliGRAM(s) IntraMuscular once PRN Glucose LESS THAN 70 milligrams/deciliter  HYDROmorphone  Injectable 0.5 milliGRAM(s) IV Push every 4 hours PRN breakthrough  ondansetron Injectable 4 milliGRAM(s) IV Push every 6 hours PRN Nausea and/or Vomiting  senna 2 Tablet(s) Oral at bedtime PRN Constipation  traMADol 25 milliGRAM(s) Oral every 4 hours PRN Moderate Pain (4 - 6)  traMADol 50 milliGRAM(s) Oral every 4 hours PRN Severe Pain (7 - 10)      REVIEW OF SYSTEM:  CONSTITUTIONAL: No fever, No change in weight, No fatigue  HEAD: No headache, No dizziness, No recent trauma  EYES: BLIND  ENT:  No difficulty hearing, No tinnitus, No vertigo, No sinus pain, No throat pain  NECK: No pain, No stiffness  RESPIRATORY: No cough, No wheezing, No chills, No hemoptysis, No shortness of breath at rest or exertional shortness of breath  CARDIOVASCULAR: No chest pain, No palpitations, No dizziness, No CHF, No arrhythmia, No cardiomegaly, No leg swelling  GASTROINTESTINAL: No abdominal, No epigastric pain. No nausea, No vomiting, No hematemesis, No diarrhea, No constipation. No melena, No hematochezia. No GERD  GENITOURINARY: No dysuria, No frequency, No hematuria, No incontinence, No nocturia, No hesitancy,  SKIN: No itching, No burning, No rashes, No lesions   LYMPH NODES: No history of enlarged glands  ENDOCRINE: No heat or cold intolerance, No hair loss. No osteoporosis, No thyroid disease  MUSCULOSKELETAL:     (+)  RIGHT HIP PAIN  PSYCHIATRIC: No depression, No anxiety, No mood swings, No difficulty sleeping  HEME/LYMPH: No easy bruising, No anticoagulants, No bleeding disorder, No bleeding gums  ALLERGY AND IMMUNOLOGIC: No hives, No eczema  NEUROLOGICAL: No memory loss, No loss of strength, No numbness, No tremors    VITALS:   T(C): 36.9 (11-27-17 @ 13:35), Max: 36.9 (11-27-17 @ 13:35)  HR: 71 (11-27-17 @ 13:35) (66 - 90)  BP: 109/57 (11-27-17 @ 13:35) (85/48 - 133/69)  RR: 18 (11-27-17 @ 13:35) (18 - 18)  SpO2: 96% (11-27-17 @ 06:00) (91% - 96%)  Wt(kg): --    PHYSICAL EXAM:  GENERAL: NAD, well nourished and conversant  HEAD:  Atraumatic  EYES:  PATIENT I S BLIND  ENT: No tonsillar erythema, exudates, or enlargement, moist mucous membranes, good dentition, no lesions  NECK: Supple, No JVD, normal thyroid, carotids with normal upstrokes and no bruits  CHEST/LUNG: Clear to auscultation bilaterally, No rales, rhonchi, wheezing, or rubs  HEART: Regular rate and rhythm, No murmurs, rubs, or gallops  ABDOMEN: Soft, nondistended, no masses, guarding, tenderness or rebound, bowel sounds present  EXTREMITIES:  2+ Peripheral Pulses, No clubbing, cyanosis, or edema.   (+) RIGHT HIP PAIN C/W HIP FRACTURE  LYMPH: No lymphadenopathy noted  SKIN: No rashes or lesions  NERVOUS SYSTEM:  Alert & Oriented X3, normal cognitive function. Motor Strength 5/5 right upper and right lower.  5/5 left upper and left lower extremities, DTRs 2+ intact and symmetric    LABS:        CBC Full  -  ( 27 Nov 2017 06:42 )  WBC Count : 9.6 K/uL  Hemoglobin : 7.6 g/dL  Hematocrit : 23.0 %  Platelet Count - Automated : 144 K/uL  Mean Cell Volume : 98.7 fl  Mean Cell Hemoglobin : 32.9 pg  Mean Cell Hemoglobin Concentration : 33.3 gm/dL  Auto Neutrophil # : x  Auto Lymphocyte # : x  Auto Monocyte # : x  Auto Eosinophil # : x  Auto Basophil # : x  Auto Neutrophil % : x  Auto Lymphocyte % : x  Auto Monocyte % : x  Auto Eosinophil % : x  Auto Basophil % : x    11-26    140  |  104  |  28<H>  ----------------------------<  180<H>  4.3   |  29  |  0.83    Ca    8.6      26 Nov 2017 06:42            CAPILLARY BLOOD GLUCOSE      POCT Blood Glucose.: 205 mg/dL (27 Nov 2017 12:25)  POCT Blood Glucose.: 177 mg/dL (27 Nov 2017 08:52)  POCT Blood Glucose.: 174 mg/dL (26 Nov 2017 21:44)  POCT Blood Glucose.: 124 mg/dL (26 Nov 2017 17:10)      RADIOLOGY & ADDITIONAL TESTS:

## 2017-11-27 NOTE — PROGRESS NOTE ADULT - SUBJECTIVE AND OBJECTIVE BOX
11-27-17 @ 06:39    Pt comfortable.  Pain well controlled.  No overnight events.    T(C): 36.6 (11-27-17 @ 06:00), Max: 36.8 (11-26-17 @ 20:14)  HR: 70 (11-27-17 @ 06:00) (70 - 90)  BP: 124/69 (11-27-17 @ 06:00) (95/55 - 133/69)  RR: 18 (11-27-17 @ 06:00) (18 - 18)  SpO2: 96% (11-27-17 @ 06:00) (91% - 96%)    Right hip incisions clean/dry/intact.  +DF/PF.  +Distal Pulses.   Motor/Sensory function grossly intact.  Calves soft/NT with venodynes  intact.

## 2017-11-28 LAB
GAS PNL BLDA: SIGNIFICANT CHANGE UP
GLUCOSE BLDC GLUCOMTR-MCNC: 136 MG/DL — HIGH (ref 70–99)
GLUCOSE BLDC GLUCOMTR-MCNC: 165 MG/DL — HIGH (ref 70–99)
GLUCOSE BLDC GLUCOMTR-MCNC: 174 MG/DL — HIGH (ref 70–99)
GLUCOSE BLDC GLUCOMTR-MCNC: 198 MG/DL — HIGH (ref 70–99)
HCT VFR BLD CALC: 25.1 % — LOW (ref 34.5–45)
HGB BLD-MCNC: 8.4 G/DL — LOW (ref 11.5–15.5)
MCHC RBC-ENTMCNC: 31.8 PG — SIGNIFICANT CHANGE UP (ref 27–34)
MCHC RBC-ENTMCNC: 33.3 GM/DL — SIGNIFICANT CHANGE UP (ref 32–36)
MCV RBC AUTO: 95.7 FL — SIGNIFICANT CHANGE UP (ref 80–100)
PLATELET # BLD AUTO: 147 K/UL — LOW (ref 150–400)
RBC # BLD: 2.62 M/UL — LOW (ref 3.8–5.2)
RBC # FLD: 14.3 % — SIGNIFICANT CHANGE UP (ref 10.3–14.5)
WBC # BLD: 8.3 K/UL — SIGNIFICANT CHANGE UP (ref 3.8–10.5)
WBC # FLD AUTO: 8.3 K/UL — SIGNIFICANT CHANGE UP (ref 3.8–10.5)

## 2017-11-28 PROCEDURE — 99232 SBSQ HOSP IP/OBS MODERATE 35: CPT

## 2017-11-28 PROCEDURE — 71010: CPT | Mod: 26

## 2017-11-28 RX ORDER — INSULIN LISPRO 100/ML
VIAL (ML) SUBCUTANEOUS
Qty: 0 | Refills: 0 | Status: DISCONTINUED | OUTPATIENT
Start: 2017-11-28 | End: 2017-12-07

## 2017-11-28 RX ORDER — INSULIN LISPRO 100/ML
4 VIAL (ML) SUBCUTANEOUS
Qty: 0 | Refills: 0 | Status: DISCONTINUED | OUTPATIENT
Start: 2017-11-28 | End: 2017-12-07

## 2017-11-28 RX ADMIN — Medication 500 MILLIGRAM(S): at 17:45

## 2017-11-28 RX ADMIN — Medication 0.2 MILLIGRAM(S): at 06:21

## 2017-11-28 RX ADMIN — Medication 500 MILLIGRAM(S): at 06:21

## 2017-11-28 RX ADMIN — POLYETHYLENE GLYCOL 3350 17 GRAM(S): 17 POWDER, FOR SOLUTION ORAL at 11:33

## 2017-11-28 RX ADMIN — CARVEDILOL PHOSPHATE 25 MILLIGRAM(S): 80 CAPSULE, EXTENDED RELEASE ORAL at 06:21

## 2017-11-28 RX ADMIN — Medication 4 UNIT(S): at 18:44

## 2017-11-28 RX ADMIN — CARVEDILOL PHOSPHATE 25 MILLIGRAM(S): 80 CAPSULE, EXTENDED RELEASE ORAL at 17:45

## 2017-11-28 RX ADMIN — Medication 3 UNIT(S): at 12:58

## 2017-11-28 RX ADMIN — ATORVASTATIN CALCIUM 40 MILLIGRAM(S): 80 TABLET, FILM COATED ORAL at 21:11

## 2017-11-28 RX ADMIN — SENNA PLUS 2 TABLET(S): 8.6 TABLET ORAL at 21:11

## 2017-11-28 RX ADMIN — INSULIN GLARGINE 10 UNIT(S): 100 INJECTION, SOLUTION SUBCUTANEOUS at 21:14

## 2017-11-28 RX ADMIN — Medication 1 TABLET(S): at 11:33

## 2017-11-28 RX ADMIN — AMLODIPINE BESYLATE 5 MILLIGRAM(S): 2.5 TABLET ORAL at 17:45

## 2017-11-28 RX ADMIN — Medication 1: at 12:58

## 2017-11-28 RX ADMIN — CLOPIDOGREL BISULFATE 75 MILLIGRAM(S): 75 TABLET, FILM COATED ORAL at 11:33

## 2017-11-28 RX ADMIN — QUETIAPINE FUMARATE 25 MILLIGRAM(S): 200 TABLET, FILM COATED ORAL at 21:11

## 2017-11-28 RX ADMIN — Medication 100 MILLIGRAM(S): at 21:12

## 2017-11-28 RX ADMIN — AMLODIPINE BESYLATE 5 MILLIGRAM(S): 2.5 TABLET ORAL at 06:21

## 2017-11-28 RX ADMIN — Medication 100 MILLIGRAM(S): at 06:21

## 2017-11-28 RX ADMIN — Medication 100 MILLIGRAM(S): at 13:00

## 2017-11-28 RX ADMIN — Medication 3 UNIT(S): at 09:25

## 2017-11-28 RX ADMIN — Medication 1 MILLIGRAM(S): at 11:33

## 2017-11-28 RX ADMIN — Medication 50 MICROGRAM(S): at 06:21

## 2017-11-28 RX ADMIN — Medication 1: at 09:26

## 2017-11-28 RX ADMIN — ENOXAPARIN SODIUM 30 MILLIGRAM(S): 100 INJECTION SUBCUTANEOUS at 11:34

## 2017-11-28 RX ADMIN — Medication 81 MILLIGRAM(S): at 11:33

## 2017-11-28 RX ADMIN — PANTOPRAZOLE SODIUM 40 MILLIGRAM(S): 20 TABLET, DELAYED RELEASE ORAL at 06:21

## 2017-11-28 NOTE — PROGRESS NOTE ADULT - SUBJECTIVE AND OBJECTIVE BOX
HPI:  Patient is a 89 yo female HTN, DM2 hx cerebellar infarct s/p mechanical fall and fracture R hip.  Pt is s/p ORIF R hip  PAST MEDICAL & SURGICAL HISTORY:  Stroke  HLD (hyperlipidemia)  HTN (hypertension)  Hypothyroid  History of hip replacement, total, left      Allergies    No Known Allergies    Intolerances          MEDICATIONS  (STANDING):  amLODIPine   Tablet 5 milliGRAM(s) Oral two times a day  ascorbic acid 500 milliGRAM(s) Oral two times a day  aspirin enteric coated 81 milliGRAM(s) Oral daily  atorvastatin 40 milliGRAM(s) Oral at bedtime  carvedilol 25 milliGRAM(s) Oral every 12 hours  cloNIDine 0.2 milliGRAM(s) Oral daily  clopidogrel Tablet 75 milliGRAM(s) Oral daily  dextrose 5%. 1000 milliLiter(s) (50 mL/Hr) IV Continuous <Continuous>  dextrose 50% Injectable 12.5 Gram(s) IV Push once  dextrose 50% Injectable 25 Gram(s) IV Push once  dextrose 50% Injectable 25 Gram(s) IV Push once  docusate sodium 100 milliGRAM(s) Oral three times a day  enoxaparin Injectable 30 milliGRAM(s) SubCutaneous daily  folic acid 1 milliGRAM(s) Oral daily  insulin glargine Injectable (LANTUS) 10 Unit(s) SubCutaneous at bedtime  insulin lispro (HumaLOG) corrective regimen sliding scale   SubCutaneous three times a day before meals  insulin lispro (HumaLOG) corrective regimen sliding scale   SubCutaneous at bedtime  insulin lispro Injectable (HumaLOG) 3 Unit(s) SubCutaneous three times a day before meals  levothyroxine 50 MICROGram(s) Oral daily  multivitamin 1 Tablet(s) Oral daily  pantoprazole    Tablet 40 milliGRAM(s) Oral before breakfast  polyethylene glycol 3350 17 Gram(s) Oral daily  QUEtiapine 25 milliGRAM(s) Oral at bedtime    MEDICATIONS  (PRN):  acetaminophen   Tablet 650 milliGRAM(s) Oral every 6 hours PRN For Temp greater than 38 C (100.4 F)  acetaminophen   Tablet. 325 milliGRAM(s) Oral every 4 hours PRN Mild Pain (1 - 3)  ALPRAZolam 0.25 milliGRAM(s) Oral daily PRN anxiety  benzocaine 15 mG/menthol 3.6 mG Lozenge 1 Lozenge Oral every 2 hours PRN Sore Throat  calcium carbonate 500 mG (Tums) Chewable 3 Tablet(s) Chew every 6 hours PRN Dyspepsia  dextrose Gel 1 Dose(s) Oral once PRN Blood Glucose LESS THAN 70 milliGRAM(s)/deciliter  diphenhydrAMINE   Capsule 50 milliGRAM(s) Oral every 4 hours PRN Rash and/or Itching  glucagon  Injectable 1 milliGRAM(s) IntraMuscular once PRN Glucose LESS THAN 70 milligrams/deciliter  HYDROmorphone  Injectable 0.5 milliGRAM(s) IV Push every 4 hours PRN breakthrough  ondansetron Injectable 4 milliGRAM(s) IV Push every 6 hours PRN Nausea and/or Vomiting  senna 2 Tablet(s) Oral at bedtime PRN Constipation  traMADol 25 milliGRAM(s) Oral every 4 hours PRN Moderate Pain (4 - 6)  traMADol 50 milliGRAM(s) Oral every 4 hours PRN Severe Pain (7 - 10)            Vital Signs Last 24 Hrs  T(C): 36.6 (28 Nov 2017 08:38), Max: 36.9 (27 Nov 2017 13:35)  T(F): 97.9 (28 Nov 2017 08:38), Max: 98.5 (27 Nov 2017 13:35)  HR: 71 (28 Nov 2017 08:38) (66 - 81)  BP: 114/66 (28 Nov 2017 08:38) (85/48 - 114/66)  BP(mean): --  RR: 18 (28 Nov 2017 08:38) (18 - 18)  SpO2: 95% (28 Nov 2017 08:38) (92% - 96%)  Daily     Daily   I&O's Detail    27 Nov 2017 07:01  -  28 Nov 2017 07:00  --------------------------------------------------------  IN:    Oral Fluid: 720 mL    Packed Red Blood Cells: 200 mL  Total IN: 920 mL    OUT:  Total OUT: 0 mL    Total NET: 920 mL          Physical Exam  Pat without complaint  Neck without JVD  Lungs clear  Cor s1s2 2/6 donny rusb  Abd soft  eXt without edema +venodynes  Pulses +2 DP  LABS          CBC Full  -  ( 27 Nov 2017 23:59 )  WBC Count : 8.3 K/uL  Hemoglobin : 8.4 g/dL  Hematocrit : 25.1 %  Platelet Count - Automated : 147 K/uL  Mean Cell Volume : 95.7 fl  Mean Cell Hemoglobin : 31.8 pg  Mean Cell Hemoglobin Concentration : 33.3 gm/dL  Auto Neutrophil # : x  Auto Lymphocyte # : x  Auto Monocyte # : x  Auto Eosinophil # : x  Auto Basophil # : x  Auto Neutrophil % : x  Auto Lymphocyte % : x  Auto Monocyte % : x  Auto Eosinophil % : x  Auto Basophil % : x      ABG - ( 28 Nov 2017 01:05 )  pH: 7.41  /  pCO2: 46    /  pO2: 66    / HCO3: 28    / Base Excess: 3.7   /  SaO2: 94        EKG:  < from: 12 Lead ECG (11.22.17 @ 20:14) >    Ventricular Rate 70 BPM    Atrial Rate 70 BPM    P-R Interval 172 ms    QRS Duration 72 ms     ms    QTc 453 ms    P Axis 54 degrees    R Axis 43 degrees    T Axis 49 degrees    Diagnosis Line NORMAL SINUS RHYTHM  POSSIBLE LEFT ATRIAL ENLARGEMENT  BORDERLINE ECG      CXR: 11/27  REAL        Assessment and Plan:  Patient is a 89 yo female HTN, DM2 hx cerebellar infarct s/p mechanical fall and fracture R hip.  Pt is s/p ORIF R hip  Agree with current rx.  Cardiac status stable  Incentive spirometry  For rehab

## 2017-11-28 NOTE — PROGRESS NOTE ADULT - SUBJECTIVE AND OBJECTIVE BOX
Pt seen and examined.    Sleeping comfortably.    O2 Sat 99% on 4L via nasal canula. HR 60s.    A-a gradient 26.2.    No further intervention at this time.    Continue to monitor on continuous pulse ox.    Follow up chest xray official read.

## 2017-11-28 NOTE — PROGRESS NOTE ADULT - SUBJECTIVE AND OBJECTIVE BOX
Post op Day [5 ]     Patient is a 89y old  Female who presents with a chief complaint of Right Hip Fracture - Intramedullary Nail of the Right Hip (24 Nov 2017 10:37  Patient had chest x-ray 11/27/17 PM, read this AM by Dr Medley, no acute pulmonary process, patient encouraged incentive spirometer  Laquita Warren PA-C  Orthopaedic Surgery  Team pager [phone]  [facility] [phone]  [phone]

## 2017-11-28 NOTE — PROGRESS NOTE ADULT - ASSESSMENT
Patient is a 89y old  woman hx of blindness presents with a right femur fx. s/p ORIF 89 y/o female hx of CVA on ASA plavix, w/ bilateral vision loss, HTN, HLD, not on AC presents from assisted living [place] with a fall. Per patient, pants were wet, so she walking to her room and she tripped and hit face on the floor. No LOC. Normally walks with a walker but was not using it today. Reports L sided face pain, R knee pain, L foot and ankle pain, and chipped front tooth. Was unable to get off floor by herself - aid had to help - on floor for 5 minutes. Denies any Chest pain, SOB, N/V/D, confusion. A+O x 3. Been following with Derm for R arm rash - will find out biopsy results on Monday. S/P ORIF Right Hip 11/23/2017 with no significant medical complications to date. At baseline dementia, post-op.

## 2017-11-28 NOTE — PROGRESS NOTE ADULT - SUBJECTIVE AND OBJECTIVE BOX
89 y/o female hx of CVA on ASA plavix, w/ bilateral vision loss, HTN, HLD, not on AC presents from assisted living [place] with a fall. Per patient, pants were wet, so she walking to her room and she tripped and hit face on the floor. No LOC. Normally walks with a walker but was not using it today. Reports L sided face pain, R knee pain, L foot and ankle pain, and chipped front tooth. Was unable to get off floor by herself - aid had to help - on floor for 5 minutes. Denies any Chest pain, SOB, N/V/D, confusion. A+O x 3. Been following with Derm for R arm rash - will find out biopsy results on Monday.         MEDICATIONS  (STANDING):  amLODIPine   Tablet 5 milliGRAM(s) Oral two times a day  ascorbic acid 500 milliGRAM(s) Oral two times a day  aspirin enteric coated 81 milliGRAM(s) Oral daily  atorvastatin 40 milliGRAM(s) Oral at bedtime  carvedilol 25 milliGRAM(s) Oral every 12 hours  cloNIDine 0.2 milliGRAM(s) Oral daily  clopidogrel Tablet 75 milliGRAM(s) Oral daily  dextrose 5%. 1000 milliLiter(s) (50 mL/Hr) IV Continuous <Continuous>  dextrose 50% Injectable 12.5 Gram(s) IV Push once  dextrose 50% Injectable 25 Gram(s) IV Push once  dextrose 50% Injectable 25 Gram(s) IV Push once  docusate sodium 100 milliGRAM(s) Oral three times a day  enoxaparin Injectable 30 milliGRAM(s) SubCutaneous daily  folic acid 1 milliGRAM(s) Oral daily  insulin glargine Injectable (LANTUS) 10 Unit(s) SubCutaneous at bedtime  insulin lispro (HumaLOG) corrective regimen sliding scale   SubCutaneous three times a day before meals  insulin lispro (HumaLOG) corrective regimen sliding scale   SubCutaneous at bedtime  insulin lispro Injectable (HumaLOG) 3 Unit(s) SubCutaneous three times a day before meals  levothyroxine 50 MICROGram(s) Oral daily  multivitamin 1 Tablet(s) Oral daily  pantoprazole    Tablet 40 milliGRAM(s) Oral before breakfast  polyethylene glycol 3350 17 Gram(s) Oral daily  QUEtiapine 25 milliGRAM(s) Oral at bedtime    MEDICATIONS  (PRN):  acetaminophen   Tablet 650 milliGRAM(s) Oral every 6 hours PRN For Temp greater than 38 C (100.4 F)  acetaminophen   Tablet. 325 milliGRAM(s) Oral every 4 hours PRN Mild Pain (1 - 3)  ALPRAZolam 0.25 milliGRAM(s) Oral daily PRN anxiety  benzocaine 15 mG/menthol 3.6 mG Lozenge 1 Lozenge Oral every 2 hours PRN Sore Throat  calcium carbonate 500 mG (Tums) Chewable 3 Tablet(s) Chew every 6 hours PRN Dyspepsia  dextrose Gel 1 Dose(s) Oral once PRN Blood Glucose LESS THAN 70 milliGRAM(s)/deciliter  diphenhydrAMINE   Capsule 50 milliGRAM(s) Oral every 4 hours PRN Rash and/or Itching  glucagon  Injectable 1 milliGRAM(s) IntraMuscular once PRN Glucose LESS THAN 70 milligrams/deciliter  HYDROmorphone  Injectable 0.5 milliGRAM(s) IV Push every 4 hours PRN breakthrough  ondansetron Injectable 4 milliGRAM(s) IV Push every 6 hours PRN Nausea and/or Vomiting  senna 2 Tablet(s) Oral at bedtime PRN Constipation  traMADol 25 milliGRAM(s) Oral every 4 hours PRN Moderate Pain (4 - 6)  traMADol 50 milliGRAM(s) Oral every 4 hours PRN Severe Pain (7 - 10)      REVIEW OF SYSTEM:  CONSTITUTIONAL: No fever, No change in weight, No fatigue  HEAD: No headache, No dizziness, No recent trauma  EYES: BLIND  ENT:  No difficulty hearing, No tinnitus, No vertigo, No sinus pain, No throat pain  NECK: No pain, No stiffness  RESPIRATORY: No cough, No wheezing, No chills, No hemoptysis, No shortness of breath at rest or exertional shortness of breath  CARDIOVASCULAR: No chest pain, No palpitations, No dizziness, No CHF, No arrhythmia, No cardiomegaly, No leg swelling  GASTROINTESTINAL: No abdominal, No epigastric pain. No nausea, No vomiting, No hematemesis, No diarrhea, No constipation. No melena, No hematochezia. No GERD  GENITOURINARY: No dysuria, No frequency, No hematuria, No incontinence, No nocturia, No hesitancy,  SKIN: No itching, No burning, No rashes, No lesions   LYMPH NODES: No history of enlarged glands  ENDOCRINE: No heat or cold intolerance, No hair loss. No osteoporosis, No thyroid disease  MUSCULOSKELETAL:     (+)  RIGHT HIP PAIN  PSYCHIATRIC: No depression, No anxiety, No mood swings, No difficulty sleeping  HEME/LYMPH: No easy bruising, No anticoagulants, No bleeding disorder, No bleeding gums  ALLERGY AND IMMUNOLOGIC: No hives, No eczema  NEUROLOGICAL: No memory loss, No loss of strength, No numbness, No tremors    VITALS:   T(C): 36.9 (11-27-17 @ 13:35), Max: 36.9 (11-27-17 @ 13:35)  HR: 71 (11-27-17 @ 13:35) (66 - 90)  BP: 109/57 (11-27-17 @ 13:35) (85/48 - 133/69)  RR: 18 (11-27-17 @ 13:35) (18 - 18)  SpO2: 96% (11-27-17 @ 06:00) (91% - 96%)  Wt(kg): --    PHYSICAL EXAM:  GENERAL: NAD, well nourished and conversant  HEAD:  Atraumatic  EYES:  PATIENT I S BLIND  ENT: No tonsillar erythema, exudates, or enlargement, moist mucous membranes, good dentition, no lesions  NECK: Supple, No JVD, normal thyroid, carotids with normal upstrokes and no bruits  CHEST/LUNG: Clear to auscultation bilaterally, No rales, rhonchi, wheezing, or rubs  HEART: Regular rate and rhythm, No murmurs, rubs, or gallops  ABDOMEN: Soft, nondistended, no masses, guarding, tenderness or rebound, bowel sounds present  EXTREMITIES:  2+ Peripheral Pulses, No clubbing, cyanosis, or edema.   (+) RIGHT HIP PAIN C/W HIP FRACTURE  LYMPH: No lymphadenopathy noted  SKIN: No rashes or lesions  NERVOUS SYSTEM:  Alert & Oriented X3, normal cognitive function. Motor Strength 5/5 right upper and right lower.  5/5 left upper and left lower extremities, DTRs 2+ intact and symmetric    LABS:        CBC Full  -  ( 27 Nov 2017 06:42 )  WBC Count : 9.6 K/uL  Hemoglobin : 7.6 g/dL  Hematocrit : 23.0 %  Platelet Count - Automated : 144 K/uL  Mean Cell Volume : 98.7 fl  Mean Cell Hemoglobin : 32.9 pg  Mean Cell Hemoglobin Concentration : 33.3 gm/dL  Auto Neutrophil # : x  Auto Lymphocyte # : x  Auto Monocyte # : x  Auto Eosinophil # : x  Auto Basophil # : x  Auto Neutrophil % : x  Auto Lymphocyte % : x  Auto Monocyte % : x  Auto Eosinophil % : x  Auto Basophil % : x    11-26    140  |  104  |  28<H>  ----------------------------<  180<H>  4.3   |  29  |  0.83    Ca    8.6      26 Nov 2017 06:42            CAPILLARY BLOOD GLUCOSE      POCT Blood Glucose.: 205 mg/dL (27 Nov 2017 12:25)  POCT Blood Glucose.: 177 mg/dL (27 Nov 2017 08:52)  POCT Blood Glucose.: 174 mg/dL (26 Nov 2017 21:44)  POCT Blood Glucose.: 124 mg/dL (26 Nov 2017 17:10)      RADIOLOGY & ADDITIONAL TESTS: 89 y/o female hx of CVA on ASA plavix, w/ bilateral vision loss, HTN, HLD, not on AC presents from assisted living [place] with a fall. Per patient, pants were wet, so she walking to her room and she tripped and hit face on the floor. No LOC. Normally walks with a walker but was not using it today. Reports L sided face pain, R knee pain, L foot and ankle pain, and chipped front tooth. Was unable to get off floor by herself - aid had to help - on floor for 5 minutes. Denies any Chest pain, SOB, N/V/D, confusion. A+O x 3. Been following with Derm for R arm rash - will find out biopsy results on Monday. S/P ORIF Right Hip 11/23/2017 with no significant medical complications to date. At baseline dementia, post-op.    MEDICATIONS  (STANDING):  amLODIPine   Tablet 5 milliGRAM(s) Oral two times a day  ascorbic acid 500 milliGRAM(s) Oral two times a day  aspirin enteric coated 81 milliGRAM(s) Oral daily  atorvastatin 40 milliGRAM(s) Oral at bedtime  carvedilol 25 milliGRAM(s) Oral every 12 hours  cloNIDine 0.2 milliGRAM(s) Oral daily  clopidogrel Tablet 75 milliGRAM(s) Oral daily  dextrose 5%. 1000 milliLiter(s) (50 mL/Hr) IV Continuous <Continuous>  dextrose 50% Injectable 12.5 Gram(s) IV Push once  dextrose 50% Injectable 25 Gram(s) IV Push once  dextrose 50% Injectable 25 Gram(s) IV Push once  docusate sodium 100 milliGRAM(s) Oral three times a day  enoxaparin Injectable 30 milliGRAM(s) SubCutaneous daily  folic acid 1 milliGRAM(s) Oral daily  insulin glargine Injectable (LANTUS) 10 Unit(s) SubCutaneous at bedtime  insulin lispro (HumaLOG) corrective regimen sliding scale   SubCutaneous at bedtime  insulin lispro (HumaLOG) corrective regimen sliding scale   SubCutaneous three times a day before meals  insulin lispro Injectable (HumaLOG) 4 Unit(s) SubCutaneous three times a day before meals  levothyroxine 50 MICROGram(s) Oral daily  multivitamin 1 Tablet(s) Oral daily  pantoprazole    Tablet 40 milliGRAM(s) Oral before breakfast  polyethylene glycol 3350 17 Gram(s) Oral daily  QUEtiapine 25 milliGRAM(s) Oral at bedtime    MEDICATIONS  (PRN):  acetaminophen   Tablet 650 milliGRAM(s) Oral every 6 hours PRN For Temp greater than 38 C (100.4 F)  acetaminophen   Tablet. 325 milliGRAM(s) Oral every 4 hours PRN Mild Pain (1 - 3)  ALPRAZolam 0.25 milliGRAM(s) Oral daily PRN anxiety  benzocaine 15 mG/menthol 3.6 mG Lozenge 1 Lozenge Oral every 2 hours PRN Sore Throat  calcium carbonate 500 mG (Tums) Chewable 3 Tablet(s) Chew every 6 hours PRN Dyspepsia  dextrose Gel 1 Dose(s) Oral once PRN Blood Glucose LESS THAN 70 milliGRAM(s)/deciliter  diphenhydrAMINE   Capsule 50 milliGRAM(s) Oral every 4 hours PRN Rash and/or Itching  glucagon  Injectable 1 milliGRAM(s) IntraMuscular once PRN Glucose LESS THAN 70 milligrams/deciliter  HYDROmorphone  Injectable 0.5 milliGRAM(s) IV Push every 4 hours PRN breakthrough  ondansetron Injectable 4 milliGRAM(s) IV Push every 6 hours PRN Nausea and/or Vomiting  senna 2 Tablet(s) Oral at bedtime PRN Constipation        REVIEW OF SYSTEM:  CONSTITUTIONAL: No fever, No change in weight, No fatigue  HEAD: No headache, No dizziness, No recent trauma  EYES: BLIND  ENT:  No difficulty hearing, No tinnitus, No vertigo, No sinus pain, No throat pain  NECK: No pain, No stiffness  RESPIRATORY: No cough, No wheezing, No chills, No hemoptysis, No shortness of breath at rest or exertional shortness of breath  CARDIOVASCULAR: No chest pain, No palpitations, No dizziness, No CHF, No arrhythmia, No cardiomegaly, No leg swelling  GASTROINTESTINAL: No abdominal, No epigastric pain. No nausea, No vomiting, No hematemesis, No diarrhea, No constipation. No melena, No hematochezia. No GERD  GENITOURINARY: No dysuria, No frequency, No hematuria, No incontinence, No nocturia, No hesitancy,  SKIN: No itching, No burning, No rashes, No lesions   LYMPH NODES: No history of enlarged glands  ENDOCRINE: No heat or cold intolerance, No hair loss. No osteoporosis, No thyroid disease  MUSCULOSKELETAL:     (+)  RIGHT HIP PAIN  PSYCHIATRIC: No depression, No anxiety, No mood swings, No difficulty sleeping  HEME/LYMPH: No easy bruising, No anticoagulants, No bleeding disorder, No bleeding gums  ALLERGY AND IMMUNOLOGIC: No hives, No eczema  NEUROLOGICAL: No memory loss, No loss of strength, No numbness, No tremors    Vital Signs Last 24 Hrs  T(C): 36.3 (28 Nov 2017 05:31), Max: 37.2 (29 Nov 2017 00:35)  T(F): 97.4 (28 Nov 2017 05:31), Max: 99 (29 Nov 2017 00:35)  HR: 82 (28 Nov 2017 05:31) (62 - 82)  BP: 162/70 (28 Nov 2017 05:31) (114/66 - 162/70)  BP(mean): --  RR: 18 (28 Nov 2017 05:31) (18 - 18)  SpO2: 95% (28 Nov 2017 05:31) (95% - 97%)      PHYSICAL EXAM:  GENERAL: NAD, well nourished and conversant  HEAD:  Atraumatic  EYES:  PATIENT I S BLIND  ENT: No tonsillar erythema, exudates, or enlargement, moist mucous membranes, good dentition, no lesions  NECK: Supple, No JVD, normal thyroid, carotids with normal upstrokes and no bruits  CHEST/LUNG: Clear to auscultation bilaterally, No rales, rhonchi, wheezing, or rubs  HEART: Regular rate and rhythm, No murmurs, rubs, or gallops  ABDOMEN: Soft, nondistended, no masses, guarding, tenderness or rebound, bowel sounds present  EXTREMITIES:  2+ Peripheral Pulses, No clubbing, cyanosis, or edema.   (+) RIGHT HIP PAIN C/W HIP FRACTURE  LYMPH: No lymphadenopathy noted  SKIN: No rashes or lesions  NERVOUS SYSTEM:  Alert & Oriented X3, normal cognitive function. Motor Strength 5/5 right upper and right lower.  5/5 left upper and left lower extremities, DTRs 2+ intact and symmetric    LABS:      ABG - ( 28 Nov 2017 01:05 )  pH: 7.41  /  pCO2: 46    /  pO2: 66    / HCO3: 28    / Base Excess: 3.7   /  SaO2: 94            CBC Full  -  ( 27 Nov 2017 23:59 )  WBC Count : 8.3 K/uL  Hemoglobin : 8.4 g/dL  Hematocrit : 25.1 %  Platelet Count - Automated : 147 K/uL  Mean Cell Volume : 95.7 fl  Mean Cell Hemoglobin : 31.8 pg  Mean Cell Hemoglobin Concentration : 33.3 gm/dL  Auto Neutrophil # : x  Auto Lymphocyte # : x  Auto Monocyte # : x  Auto Eosinophil # : x  Auto Basophil # : x  Auto Neutrophil % : x  Auto Lymphocyte % : x  Auto Monocyte % : x  Auto Eosinophil % : x  Auto Basophil % : x

## 2017-11-28 NOTE — PROGRESS NOTE ADULT - SUBJECTIVE AND OBJECTIVE BOX
Pt S/E at bedside, no acute events overnight, pain controlled    AVSS  Gen: NAD, AAOx3    RLE:  Incisions clean dry intact  +EHL/FHL/TA/GS  SILT L3-S1  +DP/PT Pulses  Compartments soft  No calf TTP B/L    Vital Signs Last 24 Hrs  T(C): 36.6 (28 Nov 2017 05:11), Max: 36.9 (27 Nov 2017 13:35)  T(F): 97.9 (28 Nov 2017 05:11), Max: 98.5 (27 Nov 2017 13:35)  HR: 77 (28 Nov 2017 05:11) (66 - 81)  BP: 110/67 (28 Nov 2017 05:11) (85/48 - 112/66)  BP(mean): --  RR: 18 (28 Nov 2017 05:11) (18 - 18)  SpO2: 95% (28 Nov 2017 05:11) (92% - 96%)    89 yo F s/p R RONDA POD# 5  -pain control  -DVT PPx  -WBAT  -PT  -FU Labs  -DC Planning

## 2017-11-28 NOTE — PROGRESS NOTE ADULT - SUBJECTIVE AND OBJECTIVE BOX
DIABETES FOLLOW UP NOTE: Saw pt earlier today  INTERVAL HX: 89 y/o F w/h/o HTN/HLD/CVA/Hypothyroidism. Here with hip fx s/p ORIF of R femur. Found to have new diagnosis of T2DM (A1C 8.2%) requiring insulin therapy while in hospital. Pt reports feeling well, tolerating POs (needs assistance with meals).  Glycemic control fair with BG 100s to 200s. Requiring total of 4 units ac meal Humalog doses most of the time. No hypoglycemia. Didn't receive any meal time insulin with dinner yesterday with rebound hyperglycemia at hs (277)    Review of Systems:   General: "Pain is getting better"  Cardiovascular: No chest pain, palpitations  Respiratory: No SOB, no cough  GI: No nausea, vomiting, abdominal pain  Endocrine: no polyuria, polydipsia or S&Sx of hypoglycemia    Allergies    No Known Allergies    Intolerances      MEDICATIONS   atorvastatin 40 milliGRAM(s) Oral at bedtime  insulin glargine Injectable (LANTUS) 10 Unit(s) SubCutaneous at bedtime  insulin lispro (HumaLOG) corrective regimen sliding scale   SubCutaneous three times a day before meals  insulin lispro (HumaLOG) corrective regimen sliding scale   SubCutaneous at bedtime  insulin lispro Injectable (HumaLOG) 3 Unit(s) SubCutaneous three times a day before meals  levothyroxine 50 MICROGram(s) Oral daily      PHYSICAL EXAM:  VITALS: T(C): 36.7 (11-28-17 @ 14:00)  T(F): 98 (11-28-17 @ 14:00), Max: 98.4 (11-27-17 @ 18:10)  HR: 62 (11-28-17 @ 14:00) (62 - 81)  BP: 128/77 (11-28-17 @ 14:00) (98/65 - 128/77)  RR:  (18 - 18)  SpO2:  (92% - 96%)  Wt(kg): --  GENERAL: NAD  Abdomen: Soft, nontender, non distended, normal bowel sounds  Extremities: Warm, trace edema around ankles. R Hip surgical site with staples D&I.   NEURO: A%Ox3. Able to answer all questions.    LABS:  POCT Blood Glucose.: 174 mg/dL (11-28-17 @ 11:54)  POCT Blood Glucose.: 198 mg/dL (11-28-17 @ 08:57)  POCT Blood Glucose.: 277 mg/dL (11-27-17 @ 22:24)  POCT Blood Glucose.: 160 mg/dL (11-27-17 @ 17:53)  POCT Blood Glucose.: 205 mg/dL (11-27-17 @ 12:25)  POCT Blood Glucose.: 177 mg/dL (11-27-17 @ 08:52)  POCT Blood Glucose.: 174 mg/dL (11-26-17 @ 21:44)  POCT Blood Glucose.: 124 mg/dL (11-26-17 @ 17:10)  POCT Blood Glucose.: 209 mg/dL (11-26-17 @ 12:53)  POCT Blood Glucose.: 194 mg/dL (11-26-17 @ 08:38)  POCT Blood Glucose.: 201 mg/dL (11-25-17 @ 22:02)  POCT Blood Glucose.: 144 mg/dL (11-25-17 @ 17:33)                            8.4    8.3   )-----------( 147      ( 27 Nov 2017 23:59 )             25.1       11-26    140  |  104  |  28<H>  ----------------------------<  180<H>  4.3   |  29  |  0.83    EGFR if : 71  EGFR if non : 61    Ca    8.6      11-26      Hemoglobin A1C, Whole Blood: 8.2 % <H> [4.0 - 5.6] (11-24-17 @ 07:49)  Hemoglobin A1C, Whole Blood: 8.3 % <H> [4.0 - 5.6] (11-23-17 @ 11:59)

## 2017-11-28 NOTE — PROGRESS NOTE ADULT - ASSESSMENT
89 y/o F w/h/o HTN/HLD/CVA/Hypothyroidism. Here with hip fx s/p ORIF of R femur. New T2DM diagnosis. Tolerating POs. Glycemic control fair with BG 100s to 200s. Requiring total of 4 units ac meal Humalog doses most of the time. No hypoglycemia. No need for tight glycemic control due to age. Will increase Humalog ac meals to 4 units since pt requires this doses most of the time and start Humalog correction scale for BG >200s to prevent hypoglycemia. Plan to discharge on oral DM med.     Spoke to pt about new DM diagnosis and treatment plan. Pt verbalized understanding.

## 2017-11-28 NOTE — PROGRESS NOTE ADULT - PROBLEM SELECTOR PLAN 1
Test BG ac and hs  Continue Lantus 10 units q hs  Increase Humalog to 4 units ac meals (Hold if BG <100 of pt not eating at least 50% of meal)  Change Humalog ac correction scale to start  for BG >200s (1-5 units)  -Plan to discharge on Januvia 25 mg daily  -Plan discussed with pt/team.  Contact info: [phone] (24/7). pager [phone]

## 2017-11-29 DIAGNOSIS — J98.11 ATELECTASIS: ICD-10-CM

## 2017-11-29 DIAGNOSIS — R09.02 HYPOXEMIA: ICD-10-CM

## 2017-11-29 LAB
GLUCOSE BLDC GLUCOMTR-MCNC: 147 MG/DL — HIGH (ref 70–99)
GLUCOSE BLDC GLUCOMTR-MCNC: 174 MG/DL — HIGH (ref 70–99)
GLUCOSE BLDC GLUCOMTR-MCNC: 178 MG/DL — HIGH (ref 70–99)
GLUCOSE BLDC GLUCOMTR-MCNC: 216 MG/DL — HIGH (ref 70–99)
HCT VFR BLD CALC: 27.3 % — LOW (ref 34.5–45)
HGB BLD-MCNC: 9.2 G/DL — LOW (ref 11.5–15.5)
MCHC RBC-ENTMCNC: 32.3 PG — SIGNIFICANT CHANGE UP (ref 27–34)
MCHC RBC-ENTMCNC: 33.8 GM/DL — SIGNIFICANT CHANGE UP (ref 32–36)
MCV RBC AUTO: 95.3 FL — SIGNIFICANT CHANGE UP (ref 80–100)
NT-PROBNP SERPL-SCNC: 2291 PG/ML — HIGH (ref 0–300)
PLATELET # BLD AUTO: 207 K/UL — SIGNIFICANT CHANGE UP (ref 150–400)
RBC # BLD: 2.87 M/UL — LOW (ref 3.8–5.2)
RBC # FLD: 13.8 % — SIGNIFICANT CHANGE UP (ref 10.3–14.5)
WBC # BLD: 8.4 K/UL — SIGNIFICANT CHANGE UP (ref 3.8–10.5)
WBC # FLD AUTO: 8.4 K/UL — SIGNIFICANT CHANGE UP (ref 3.8–10.5)

## 2017-11-29 RX ORDER — IPRATROPIUM/ALBUTEROL SULFATE 18-103MCG
3 AEROSOL WITH ADAPTER (GRAM) INHALATION EVERY 6 HOURS
Qty: 0 | Refills: 0 | Status: DISCONTINUED | OUTPATIENT
Start: 2017-11-29 | End: 2017-12-07

## 2017-11-29 RX ORDER — CHOLECALCIFEROL (VITAMIN D3) 125 MCG
1000 CAPSULE ORAL
Qty: 30000 | Refills: 0 | OUTPATIENT

## 2017-11-29 RX ORDER — ASCORBIC ACID 60 MG
1 TABLET,CHEWABLE ORAL
Qty: 60 | Refills: 0 | OUTPATIENT

## 2017-11-29 RX ORDER — METFORMIN HYDROCHLORIDE 850 MG/1
1 TABLET ORAL
Qty: 0 | Refills: 0 | COMMUNITY

## 2017-11-29 RX ADMIN — PANTOPRAZOLE SODIUM 40 MILLIGRAM(S): 20 TABLET, DELAYED RELEASE ORAL at 05:48

## 2017-11-29 RX ADMIN — Medication 1 TABLET(S): at 12:14

## 2017-11-29 RX ADMIN — Medication 4 UNIT(S): at 13:33

## 2017-11-29 RX ADMIN — Medication 500 MILLIGRAM(S): at 05:49

## 2017-11-29 RX ADMIN — ATORVASTATIN CALCIUM 40 MILLIGRAM(S): 80 TABLET, FILM COATED ORAL at 22:07

## 2017-11-29 RX ADMIN — Medication 1 MILLIGRAM(S): at 12:14

## 2017-11-29 RX ADMIN — Medication 500 MILLIGRAM(S): at 17:53

## 2017-11-29 RX ADMIN — CLOPIDOGREL BISULFATE 75 MILLIGRAM(S): 75 TABLET, FILM COATED ORAL at 12:14

## 2017-11-29 RX ADMIN — Medication 100 MILLIGRAM(S): at 05:49

## 2017-11-29 RX ADMIN — ENOXAPARIN SODIUM 30 MILLIGRAM(S): 100 INJECTION SUBCUTANEOUS at 12:16

## 2017-11-29 RX ADMIN — AMLODIPINE BESYLATE 5 MILLIGRAM(S): 2.5 TABLET ORAL at 05:49

## 2017-11-29 RX ADMIN — Medication 3 MILLILITER(S): at 17:53

## 2017-11-29 RX ADMIN — Medication 0.2 MILLIGRAM(S): at 05:49

## 2017-11-29 RX ADMIN — Medication 81 MILLIGRAM(S): at 12:14

## 2017-11-29 RX ADMIN — Medication 50 MICROGRAM(S): at 05:48

## 2017-11-29 RX ADMIN — AMLODIPINE BESYLATE 5 MILLIGRAM(S): 2.5 TABLET ORAL at 17:53

## 2017-11-29 RX ADMIN — Medication 4 UNIT(S): at 09:29

## 2017-11-29 RX ADMIN — QUETIAPINE FUMARATE 25 MILLIGRAM(S): 200 TABLET, FILM COATED ORAL at 22:07

## 2017-11-29 RX ADMIN — INSULIN GLARGINE 10 UNIT(S): 100 INJECTION, SOLUTION SUBCUTANEOUS at 22:08

## 2017-11-29 RX ADMIN — CARVEDILOL PHOSPHATE 25 MILLIGRAM(S): 80 CAPSULE, EXTENDED RELEASE ORAL at 17:53

## 2017-11-29 RX ADMIN — CARVEDILOL PHOSPHATE 25 MILLIGRAM(S): 80 CAPSULE, EXTENDED RELEASE ORAL at 05:49

## 2017-11-29 NOTE — CONSULT NOTE ADULT - SUBJECTIVE AND OBJECTIVE BOX
PULMONARY CONSULT    Initial HPI on admission: 89 y/o F with PMH of CVA, HTN, HLD, hypothyroid presents 11/22 s/p fall with R hip fracture s/p IM Nail 11/23. Called to eval for borderline hypoxia (91-93% on RA). Patient denies dyspnea, cough, chest pain, hemoptysis, leg pain.       PAST MEDICAL & SURGICAL HISTORY:  Stroke  HLD (hyperlipidemia)  HTN (hypertension)  Hypothyroid  History of hip replacement, total, left    Allergies    No Known Allergies    Intolerances      FAMILY HISTORY:  No pertinent family history in first degree relatives    Social history: Former Smoker, quit Age 30    Review of Systems:  CONSTITUTIONAL: No fever, chills, or fatigue  EYES: No eye pain, visual disturbances, or discharge  ENMT:  No difficulty hearing, tinnitus, vertigo; No sinus or throat pain  NECK: No pain or stiffness  RESPIRATORY: Per above  CARDIOVASCULAR: No chest pain, palpitations, dizziness, or leg swelling  GASTROINTESTINAL: No abdominal or epigastric pain. No nausea, vomiting, or hematemesis; No diarrhea or constipation. No melena or hematochezia.  GENITOURINARY: No dysuria, frequency, hematuria, or incontinence  NEUROLOGICAL: No headaches, memory loss, loss of strength, numbness, or tremors  SKIN: No itching, burning, rashes, or lesions   MUSCULOSKELETAL: No joint pain or swelling; No muscle, back, or extremity pain  PSYCHIATRIC: No depression, anxiety, mood swings, or difficulty sleeping      Medications:  MEDICATIONS  (STANDING):  ALBUTerol/ipratropium for Nebulization 3 milliLiter(s) Nebulizer every 6 hours  amLODIPine   Tablet 5 milliGRAM(s) Oral two times a day  ascorbic acid 500 milliGRAM(s) Oral two times a day  aspirin enteric coated 81 milliGRAM(s) Oral daily  atorvastatin 40 milliGRAM(s) Oral at bedtime  carvedilol 25 milliGRAM(s) Oral every 12 hours  cloNIDine 0.2 milliGRAM(s) Oral daily  clopidogrel Tablet 75 milliGRAM(s) Oral daily  dextrose 5%. 1000 milliLiter(s) (50 mL/Hr) IV Continuous <Continuous>  dextrose 50% Injectable 12.5 Gram(s) IV Push once  dextrose 50% Injectable 25 Gram(s) IV Push once  dextrose 50% Injectable 25 Gram(s) IV Push once  docusate sodium 100 milliGRAM(s) Oral three times a day  enoxaparin Injectable 30 milliGRAM(s) SubCutaneous daily  folic acid 1 milliGRAM(s) Oral daily  insulin glargine Injectable (LANTUS) 10 Unit(s) SubCutaneous at bedtime  insulin lispro (HumaLOG) corrective regimen sliding scale   SubCutaneous at bedtime  insulin lispro (HumaLOG) corrective regimen sliding scale   SubCutaneous three times a day before meals  insulin lispro Injectable (HumaLOG) 4 Unit(s) SubCutaneous three times a day before meals  levothyroxine 50 MICROGram(s) Oral daily  multivitamin 1 Tablet(s) Oral daily  pantoprazole    Tablet 40 milliGRAM(s) Oral before breakfast  polyethylene glycol 3350 17 Gram(s) Oral daily  QUEtiapine 25 milliGRAM(s) Oral at bedtime    MEDICATIONS  (PRN):  acetaminophen   Tablet 650 milliGRAM(s) Oral every 6 hours PRN For Temp greater than 38 C (100.4 F)  acetaminophen   Tablet. 325 milliGRAM(s) Oral every 4 hours PRN Mild Pain (1 - 3)  ALPRAZolam 0.25 milliGRAM(s) Oral daily PRN anxiety  benzocaine 15 mG/menthol 3.6 mG Lozenge 1 Lozenge Oral every 2 hours PRN Sore Throat  calcium carbonate 500 mG (Tums) Chewable 3 Tablet(s) Chew every 6 hours PRN Dyspepsia  dextrose Gel 1 Dose(s) Oral once PRN Blood Glucose LESS THAN 70 milliGRAM(s)/deciliter  diphenhydrAMINE   Capsule 50 milliGRAM(s) Oral every 4 hours PRN Rash and/or Itching  glucagon  Injectable 1 milliGRAM(s) IntraMuscular once PRN Glucose LESS THAN 70 milligrams/deciliter  HYDROmorphone  Injectable 0.5 milliGRAM(s) IV Push every 4 hours PRN breakthrough  ondansetron Injectable 4 milliGRAM(s) IV Push every 6 hours PRN Nausea and/or Vomiting  senna 2 Tablet(s) Oral at bedtime PRN Constipation            Vital Signs Last 24 Hrs  T(C): 36.6 (29 Nov 2017 15:48), Max: 37.2 (29 Nov 2017 00:35)  T(F): 97.8 (29 Nov 2017 15:48), Max: 99 (29 Nov 2017 00:35)  HR: 73 (29 Nov 2017 15:48) (71 - 82)  BP: 130/71 (29 Nov 2017 15:48) (110/65 - 162/70)  BP(mean): --  RR: 18 (29 Nov 2017 15:48) (18 - 18)  SpO2: 95% (29 Nov 2017 15:48) (95% - 98%) on RA    ABG - ( 28 Nov 2017 01:05 )  pH: 7.41  /  pCO2: 46    /  pO2: 66    / HCO3: 28    / Base Excess: 3.7   /  SaO2: 94                      11-28 @ 07:01  -  11-29 @ 07:00  --------------------------------------------------------  IN: 1360 mL / OUT: 0 mL / NET: 1360 mL          LABS:                        9.2    8.4   )-----------( 207      ( 29 Nov 2017 08:27 )             27.3                 CAPILLARY BLOOD GLUCOSE      POCT Blood Glucose.: 174 mg/dL (29 Nov 2017 12:00)          Serum Pro-Brain Natriuretic Peptide: 2291 pg/mL (11-29-17 @ 08:26)              CULTURES: (if applicable)  Culture Results:   No growth (11-22 @ 23:09)        Physical Examination:    General: No acute distress.      HEENT: Pupils equal, reactive to light.  Symmetric.    PULM: Clear to auscultation bilaterally, no significant sputum production    CVS: S1, S2 RRR    ABD: Soft, nondistended, nontender, normoactive bowel sounds, no masses    EXT: No edema, nontender    SKIN: Warm and well perfused, no rashes noted.    NEURO: Alert, oriented, interactive, nonfocal    RADIOLOGY REVIEWED  CXR: Bibasilar atelectasis PULMONARY CONSULT    Initial HPI on admission: 89 y/o F with PMH of CVA, HTN, HLD, hypothyroid presents 11/22 s/p fall with R hip fracture s/p IM Nail 11/23. Called to eval for borderline hypoxia (91-93% on RA). Patient denies dyspnea, cough, chest pain, hemoptysis, leg pain.       PAST MEDICAL & SURGICAL HISTORY:  Stroke  HLD (hyperlipidemia)  HTN (hypertension)  Hypothyroid  History of hip replacement, total, left    Allergies    No Known Allergies    Intolerances      FAMILY HISTORY:  No pertinent family history in first degree relatives    Social history: Former Smoker, quit Age 30    Review of Systems:  CONSTITUTIONAL: No fever, chills, or fatigue  EYES: No eye pain, visual disturbances, or discharge  ENMT:  No difficulty hearing, tinnitus, vertigo; No sinus or throat pain  NECK: No pain or stiffness  RESPIRATORY: Per above  CARDIOVASCULAR: No chest pain, palpitations, dizziness, or leg swelling  GASTROINTESTINAL: No abdominal or epigastric pain. No nausea, vomiting, or hematemesis; No diarrhea or constipation. No melena or hematochezia.  GENITOURINARY: No dysuria, frequency, hematuria, or incontinence  NEUROLOGICAL: No headaches, memory loss, loss of strength, numbness, or tremors  SKIN: No itching, burning, rashes, or lesions   MUSCULOSKELETAL: No joint pain or swelling; No muscle, back, or extremity pain  PSYCHIATRIC: No depression, anxiety, mood swings, or difficulty sleeping      Medications:  MEDICATIONS  (STANDING):  ALBUTerol/ipratropium for Nebulization 3 milliLiter(s) Nebulizer every 6 hours  amLODIPine   Tablet 5 milliGRAM(s) Oral two times a day  ascorbic acid 500 milliGRAM(s) Oral two times a day  aspirin enteric coated 81 milliGRAM(s) Oral daily  atorvastatin 40 milliGRAM(s) Oral at bedtime  carvedilol 25 milliGRAM(s) Oral every 12 hours  cloNIDine 0.2 milliGRAM(s) Oral daily  clopidogrel Tablet 75 milliGRAM(s) Oral daily  dextrose 5%. 1000 milliLiter(s) (50 mL/Hr) IV Continuous <Continuous>  dextrose 50% Injectable 12.5 Gram(s) IV Push once  dextrose 50% Injectable 25 Gram(s) IV Push once  dextrose 50% Injectable 25 Gram(s) IV Push once  docusate sodium 100 milliGRAM(s) Oral three times a day  enoxaparin Injectable 30 milliGRAM(s) SubCutaneous daily  folic acid 1 milliGRAM(s) Oral daily  insulin glargine Injectable (LANTUS) 10 Unit(s) SubCutaneous at bedtime  insulin lispro (HumaLOG) corrective regimen sliding scale   SubCutaneous at bedtime  insulin lispro (HumaLOG) corrective regimen sliding scale   SubCutaneous three times a day before meals  insulin lispro Injectable (HumaLOG) 4 Unit(s) SubCutaneous three times a day before meals  levothyroxine 50 MICROGram(s) Oral daily  multivitamin 1 Tablet(s) Oral daily  pantoprazole    Tablet 40 milliGRAM(s) Oral before breakfast  polyethylene glycol 3350 17 Gram(s) Oral daily  QUEtiapine 25 milliGRAM(s) Oral at bedtime    MEDICATIONS  (PRN):  acetaminophen   Tablet 650 milliGRAM(s) Oral every 6 hours PRN For Temp greater than 38 C (100.4 F)  acetaminophen   Tablet. 325 milliGRAM(s) Oral every 4 hours PRN Mild Pain (1 - 3)  ALPRAZolam 0.25 milliGRAM(s) Oral daily PRN anxiety  benzocaine 15 mG/menthol 3.6 mG Lozenge 1 Lozenge Oral every 2 hours PRN Sore Throat  calcium carbonate 500 mG (Tums) Chewable 3 Tablet(s) Chew every 6 hours PRN Dyspepsia  dextrose Gel 1 Dose(s) Oral once PRN Blood Glucose LESS THAN 70 milliGRAM(s)/deciliter  diphenhydrAMINE   Capsule 50 milliGRAM(s) Oral every 4 hours PRN Rash and/or Itching  glucagon  Injectable 1 milliGRAM(s) IntraMuscular once PRN Glucose LESS THAN 70 milligrams/deciliter  HYDROmorphone  Injectable 0.5 milliGRAM(s) IV Push every 4 hours PRN breakthrough  ondansetron Injectable 4 milliGRAM(s) IV Push every 6 hours PRN Nausea and/or Vomiting  senna 2 Tablet(s) Oral at bedtime PRN Constipation    Vital Signs Last 24 Hrs  T(C): 36.6 (29 Nov 2017 15:48), Max: 37.2 (29 Nov 2017 00:35)  T(F): 97.8 (29 Nov 2017 15:48), Max: 99 (29 Nov 2017 00:35)  HR: 73 (29 Nov 2017 15:48) (71 - 82)  BP: 130/71 (29 Nov 2017 15:48) (110/65 - 162/70)  BP(mean): --  RR: 18 (29 Nov 2017 15:48) (18 - 18)  SpO2: 95% (29 Nov 2017 15:48) (95% - 98%) on RA    ABG - ( 28 Nov 2017 01:05 )  pH: 7.41  /  pCO2: 46    /  pO2: 66    / HCO3: 28    / Base Excess: 3.7   /  SaO2: 94        11-28 @ 07:01  -  11-29 @ 07:00  --------------------------------------------------------  IN: 1360 mL / OUT: 0 mL / NET: 1360 mL    LABS:                        9.2    8.4   )-----------( 207      ( 29 Nov 2017 08:27 )             27.3     CAPILLARY BLOOD GLUCOSE      POCT Blood Glucose.: 174 mg/dL (29 Nov 2017 12:00)    Serum Pro-Brain Natriuretic Peptide: 2291 pg/mL (11-29-17 @ 08:26)    CULTURES: (if applicable)  Culture Results:   No growth (11-22 @ 23:09)        Physical Examination:    General: No acute distress.      HEENT: Pupils equal, reactive to light.  Symmetric.    PULM: Clear to auscultation bilaterally, no significant sputum production    CVS: S1, S2 RRR    ABD: Soft, nondistended, nontender, normoactive bowel sounds, no masses    EXT: No edema, nontender    SKIN: Warm and well perfused, no rashes noted.    NEURO: Alert, oriented, interactive, nonfocal    RADIOLOGY REVIEWED  CXR: Bibasilar atelectasis

## 2017-11-29 NOTE — CONSULT NOTE ADULT - ASSESSMENT
89y Female with a history of HT,HLD, strokes WITH WORSENED  confusion  MRI- ? new ischemia  TFT, B12  neuro chks-

## 2017-11-29 NOTE — CONSULT NOTE ADULT - PROBLEM SELECTOR RECOMMENDATION 9
Borderline hypoxia on RA, improves to 96% with deep breathing  -Likely 2nd atelectasis   -No leukocytosis, fever or cough, no focal infiltrates to suggest PNA  -would r/o VTE given recent hip surgery and immobility.

## 2017-11-29 NOTE — PROGRESS NOTE ADULT - PROBLEM SELECTOR PLAN 1
encourage participation with physical therapy   zofran for nausea  pain meds as needed follow for oversedation  change in mental status due to pain meds and change in environment   should improve with less meds and increased PT

## 2017-11-29 NOTE — PROGRESS NOTE ADULT - ASSESSMENT
89 y/o female hx of CVA on ASA plavix, w/ bilateral vision loss, HTN, HLD, not on AC presents from assisted living [place] with a fall. Per patient, pants were wet, so she walking to her room and she tripped and hit face on the floor. No LOC. Normally walks with a walker but was not using it today. Reports L sided face pain, R knee pain, L foot and ankle pain, and chipped front tooth. Was unable to get off floor by herself - aid had to help - on floor for 5 minutes. Denies any Chest pain, SOB, N/V/D, confusion. A+O x 3. Been following with Derm for R arm rash - will find out biopsy results on Monday. S/P ORIF Right Hip 11/23/2017 with no significant medical complications to date.

## 2017-11-29 NOTE — CONSULT NOTE ADULT - SUBJECTIVE AND OBJECTIVE BOX
Neurology Consult Note    Patient is a 89y old  Female who presents with a chief complaint of Right Hip Fracture - Intramedullary Nail of the Right Hip (24 Nov 2017 10:37)      The patient is a 89y Female with a history of HT,HLD, strokes asked to evaluate the patient with a complaint of confusion - intermittent, no c/o lateralized sx    MEDICATIONS  (STANDING):  amLODIPine   Tablet 5 milliGRAM(s) Oral two times a day  ascorbic acid 500 milliGRAM(s) Oral two times a day  aspirin enteric coated 81 milliGRAM(s) Oral daily  atorvastatin 40 milliGRAM(s) Oral at bedtime  carvedilol 25 milliGRAM(s) Oral every 12 hours  cloNIDine 0.2 milliGRAM(s) Oral daily  clopidogrel Tablet 75 milliGRAM(s) Oral daily  dextrose 5%. 1000 milliLiter(s) (50 mL/Hr) IV Continuous <Continuous>  dextrose 50% Injectable 12.5 Gram(s) IV Push once  dextrose 50% Injectable 25 Gram(s) IV Push once  dextrose 50% Injectable 25 Gram(s) IV Push once  docusate sodium 100 milliGRAM(s) Oral three times a day  enoxaparin Injectable 30 milliGRAM(s) SubCutaneous daily  folic acid 1 milliGRAM(s) Oral daily  insulin glargine Injectable (LANTUS) 10 Unit(s) SubCutaneous at bedtime  insulin lispro (HumaLOG) corrective regimen sliding scale   SubCutaneous at bedtime  insulin lispro (HumaLOG) corrective regimen sliding scale   SubCutaneous three times a day before meals  insulin lispro Injectable (HumaLOG) 4 Unit(s) SubCutaneous three times a day before meals  levothyroxine 50 MICROGram(s) Oral daily  multivitamin 1 Tablet(s) Oral daily  pantoprazole    Tablet 40 milliGRAM(s) Oral before breakfast  polyethylene glycol 3350 17 Gram(s) Oral daily  QUEtiapine 25 milliGRAM(s) Oral at bedtime    MEDICATIONS  (PRN):  acetaminophen   Tablet 650 milliGRAM(s) Oral every 6 hours PRN For Temp greater than 38 C (100.4 F)  acetaminophen   Tablet. 325 milliGRAM(s) Oral every 4 hours PRN Mild Pain (1 - 3)  ALPRAZolam 0.25 milliGRAM(s) Oral daily PRN anxiety  benzocaine 15 mG/menthol 3.6 mG Lozenge 1 Lozenge Oral every 2 hours PRN Sore Throat  calcium carbonate 500 mG (Tums) Chewable 3 Tablet(s) Chew every 6 hours PRN Dyspepsia  dextrose Gel 1 Dose(s) Oral once PRN Blood Glucose LESS THAN 70 milliGRAM(s)/deciliter  diphenhydrAMINE   Capsule 50 milliGRAM(s) Oral every 4 hours PRN Rash and/or Itching  glucagon  Injectable 1 milliGRAM(s) IntraMuscular once PRN Glucose LESS THAN 70 milligrams/deciliter  HYDROmorphone  Injectable 0.5 milliGRAM(s) IV Push every 4 hours PRN breakthrough  ondansetron Injectable 4 milliGRAM(s) IV Push every 6 hours PRN Nausea and/or Vomiting  senna 2 Tablet(s) Oral at bedtime PRN Constipation      PAST MEDICAL & SURGICAL HISTORY:  Stroke  HLD (hyperlipidemia)  HTN (hypertension)  Hypothyroid  History of hip replacement, total, left      FAMILY HISTORY:  No pertinent family history in first degree relatives      Allergies    No Known Allergies    Intolerances        SOCIAL HISTORY:  type ~  then ?H&P    REVIEW OF SYSTEMS  General:(-),Skin/Breast:(-),Ophthalmologic:(-),ENMT:(-),Respiratory and Thorax:(-),Cardiovascular:(-),Gastrointestinal:(-),Genitourinary:(-),Musculoskeletal:(-),Psychiatric:(-),Hematology/Lymphatics:(-),Endocrine:(-),Allergic/Immunologic:(-)    Vital Signs Last 24 Hrs  T(C): 36.3 (29 Nov 2017 05:31), Max: 37.2 (29 Nov 2017 00:35)  T(F): 97.4 (29 Nov 2017 05:31), Max: 99 (29 Nov 2017 00:35)  HR: 82 (29 Nov 2017 05:31) (62 - 82)  BP: 162/70 (29 Nov 2017 05:31) (119/72 - 162/70)  BP(mean): --  RR: 18 (29 Nov 2017 05:31) (18 - 18)  SpO2: 95% (29 Nov 2017 05:31) (95% - 97%)    Physical exam  GENERAL-WDWN  CARDIOVASCULAR- Cor- RRR s MGR, carotid- 2+, No bruits  EYES- non-icteric disks obscured  MENTAL STATUS- Alert, attends, fluent, non-dysarthric, follows commands, dis-oriented to place yr, poor memory and good affect.  CRANIAL NERVES-II Optic - Bilateral -  Visual Fields.-inconsistent, III-IV-VI EOMI & Pupils- irreg s/p sg - Bilateral - Normal Bilaterally. V Trigeminal - Bilateral - Normal bilateral facial sensation and jaw movement. VII Facial - Normal bilateral facial movement. VIII Acoustic - Bilateral - Hearing normal to voice bilaterally. IX Glossopharyngeal / X Vagus - Normal palate elevates symmetrically. XI Accessory - Normal Bilaterally. XII Hypoglossal - Tongue protrudes midline and moves symmetrically.  MOTOR-Bulk and Contour - Normal. Tone - Normal tone, no abnormal movements. Strength - 5/5 normal muscle strength xc legs limited by fx - Strength full throughout.  SENSORY-inconsistent  REFLEXES- DTR's 0-1+ throughout.  COORDINATION-Normal  , FNF - bilaterally.  GAIT-NA    CBC Full  -  ( 27 Nov 2017 23:59 )  WBC Count : 8.3 K/uL  Hemoglobin : 8.4 g/dL  Hematocrit : 25.1 %  Platelet Count - Automated : 147 K/uL  Mean Cell Volume : 95.7 fl  Mean Cell Hemoglobin : 31.8 pg  Mean Cell Hemoglobin Concentration : 33.3 gm/dL  Auto Neutrophil # : x  Auto Lymphocyte # : x  Auto Monocyte # : x  Auto Eosinophil # : x  Auto Basophil # : x  Auto Neutrophil % : x  Auto Lymphocyte % : x  Auto Monocyte % : x  Auto Eosinophil % : x  Auto Basophil % : x                      ct brAin -NOTE CONFLICT IN REPORT- HEME vs NO HEME-- D/W RADIO--THEY'LL CORRECT TO NO HEME Neurology Consult Note    Patient is a 89y old  Female who presents with a chief complaint of Right Hip Fracture - Intramedullary Nail of the Right Hip (24 Nov 2017 10:37)      The patient is a 89y Female with a history of HT,HLD, strokes asked to evaluate the patient with a complaint of confusion - intermittent, no c/o lateralized sx    MEDICATIONS  (STANDING):  amLODIPine   Tablet 5 milliGRAM(s) Oral two times a day  ascorbic acid 500 milliGRAM(s) Oral two times a day  aspirin enteric coated 81 milliGRAM(s) Oral daily  atorvastatin 40 milliGRAM(s) Oral at bedtime  carvedilol 25 milliGRAM(s) Oral every 12 hours  cloNIDine 0.2 milliGRAM(s) Oral daily  clopidogrel Tablet 75 milliGRAM(s) Oral daily  dextrose 5%. 1000 milliLiter(s) (50 mL/Hr) IV Continuous <Continuous>  dextrose 50% Injectable 12.5 Gram(s) IV Push once  dextrose 50% Injectable 25 Gram(s) IV Push once  dextrose 50% Injectable 25 Gram(s) IV Push once  docusate sodium 100 milliGRAM(s) Oral three times a day  enoxaparin Injectable 30 milliGRAM(s) SubCutaneous daily  folic acid 1 milliGRAM(s) Oral daily  insulin glargine Injectable (LANTUS) 10 Unit(s) SubCutaneous at bedtime  insulin lispro (HumaLOG) corrective regimen sliding scale   SubCutaneous at bedtime  insulin lispro (HumaLOG) corrective regimen sliding scale   SubCutaneous three times a day before meals  insulin lispro Injectable (HumaLOG) 4 Unit(s) SubCutaneous three times a day before meals  levothyroxine 50 MICROGram(s) Oral daily  multivitamin 1 Tablet(s) Oral daily  pantoprazole    Tablet 40 milliGRAM(s) Oral before breakfast  polyethylene glycol 3350 17 Gram(s) Oral daily  QUEtiapine 25 milliGRAM(s) Oral at bedtime    MEDICATIONS  (PRN):  acetaminophen   Tablet 650 milliGRAM(s) Oral every 6 hours PRN For Temp greater than 38 C (100.4 F)  acetaminophen   Tablet. 325 milliGRAM(s) Oral every 4 hours PRN Mild Pain (1 - 3)  ALPRAZolam 0.25 milliGRAM(s) Oral daily PRN anxiety  benzocaine 15 mG/menthol 3.6 mG Lozenge 1 Lozenge Oral every 2 hours PRN Sore Throat  calcium carbonate 500 mG (Tums) Chewable 3 Tablet(s) Chew every 6 hours PRN Dyspepsia  dextrose Gel 1 Dose(s) Oral once PRN Blood Glucose LESS THAN 70 milliGRAM(s)/deciliter  diphenhydrAMINE   Capsule 50 milliGRAM(s) Oral every 4 hours PRN Rash and/or Itching  glucagon  Injectable 1 milliGRAM(s) IntraMuscular once PRN Glucose LESS THAN 70 milligrams/deciliter  HYDROmorphone  Injectable 0.5 milliGRAM(s) IV Push every 4 hours PRN breakthrough  ondansetron Injectable 4 milliGRAM(s) IV Push every 6 hours PRN Nausea and/or Vomiting  senna 2 Tablet(s) Oral at bedtime PRN Constipation      PAST MEDICAL & SURGICAL HISTORY:  Stroke  HLD (hyperlipidemia)  HTN (hypertension)  Hypothyroid  History of hip replacement, total, left      FAMILY HISTORY:  No pertinent family history in first degree relatives      Allergies    No Known Allergies    Intolerances        SOCIAL HISTORY:  type ~  then ?H&P    REVIEW OF SYSTEMS  General:(-),Skin/Breast:(-),Ophthalmologic:(-),ENMT:(-),Respiratory and Thorax:(-),Cardiovascular:(-),Gastrointestinal:(-),Genitourinary:(-),Musculoskeletal:(-),Psychiatric:(-),Hematology/Lymphatics:(-),Endocrine:(-),Allergic/Immunologic:(-)    Vital Signs Last 24 Hrs  T(C): 36.3 (29 Nov 2017 05:31), Max: 37.2 (29 Nov 2017 00:35)  T(F): 97.4 (29 Nov 2017 05:31), Max: 99 (29 Nov 2017 00:35)  HR: 82 (29 Nov 2017 05:31) (62 - 82)  BP: 162/70 (29 Nov 2017 05:31) (119/72 - 162/70)  BP(mean): --  RR: 18 (29 Nov 2017 05:31) (18 - 18)  SpO2: 95% (29 Nov 2017 05:31) (95% - 97%)    Physical exam  GENERAL-WDWN  CARDIOVASCULAR- Cor- RRR JYOTSNA,, carotid- 2+, No bruits  EYES- non-icteric disks obscured  MENTAL STATUS- Alert, attends, fluent, non-dysarthric, follows commands, dis-oriented to place yr, poor memory and good affect.  CRANIAL NERVES-II Optic - Bilateral -  Visual Fields.-inconsistent, III-IV-VI EOMI & Pupils- irreg s/p sg - Bilateral - Normal Bilaterally. V Trigeminal - Bilateral - Normal bilateral facial sensation and jaw movement. VII Facial - Normal bilateral facial movement. VIII Acoustic - Bilateral - Hearing normal to voice bilaterally. IX Glossopharyngeal / X Vagus - Normal palate elevates symmetrically. XI Accessory - Normal Bilaterally. XII Hypoglossal - Tongue protrudes midline and moves symmetrically.  MOTOR-Bulk and Contour - Normal. Tone - Normal tone, no abnormal movements. Strength - 5/5 normal muscle strength xc legs limited by fx - Strength full throughout.  SENSORY-inconsistent  REFLEXES- DTR's 0-1+ throughout.  COORDINATION-Normal  , FNF - bilaterally.  GAIT-NA    CBC Full  -  ( 27 Nov 2017 23:59 )  WBC Count : 8.3 K/uL  Hemoglobin : 8.4 g/dL  Hematocrit : 25.1 %  Platelet Count - Automated : 147 K/uL  Mean Cell Volume : 95.7 fl  Mean Cell Hemoglobin : 31.8 pg  Mean Cell Hemoglobin Concentration : 33.3 gm/dL  Auto Neutrophil # : x  Auto Lymphocyte # : x  Auto Monocyte # : x  Auto Eosinophil # : x  Auto Basophil # : x  Auto Neutrophil % : x  Auto Lymphocyte % : x  Auto Monocyte % : x  Auto Eosinophil % : x  Auto Basophil % : x                      ct brAin -NOTE CONFLICT IN REPORT- HEME vs NO HEME-- D/W RADIO--THEY'LL CORRECT TO NO HEME

## 2017-11-29 NOTE — CONSULT NOTE ADULT - ASSESSMENT
89 y/o F with PMH of CVA, HTN, HLD, hypothyroid presents 11/22 s/p fall with R hip fracture s/p IM Nail 11/23. Called to eval for borderline hypoxia (91-93% on RA).

## 2017-11-29 NOTE — CONSULT NOTE ADULT - PROBLEM SELECTOR PROBLEM 1
Hypoxia
Femur fracture, right
Type 2 diabetes mellitus with hyperglycemia, unspecified long term insulin use status

## 2017-11-29 NOTE — PROGRESS NOTE ADULT - SUBJECTIVE AND OBJECTIVE BOX
Patient is a 89y old  Female who presents with a chief complaint of Right Hip Fracture - Intramedullary Nail of the Right Hip (24 Nov 2017 10:37)      POST OPERATIVE DAY #:6  Patient comfortable  No complaints    VS:  T(C): 36.3 (11-29-17 @ 05:31), Max: 37.2 (11-29-17 @ 00:35)  T(F): 97.4 (11-29-17 @ 05:31), Max: 99 (11-29-17 @ 00:35)  HR: 82 (11-29-17 @ 05:31) (62 - 82)  BP: 162/70 (11-29-17 @ 05:31) (114/66 - 162/70)  RR: 18 (11-29-17 @ 05:31) (18 - 18)  SpO2: 95% (11-29-17 @ 05:31) (95% - 97%)  Wt(kg): --      PHYSICAL EXAM:  NAD, Alert  EXT:      Rt hip:  incisions clean and dry  No Calf Tenderness  (+) DF/PF; (+) Distal Pulses;  Sensation: No gross deficits noted  Pulses +2   B/L, PAS     LABS:                        8.4<L>  8.3   )-----------( 147<L>    ( 27 Nov 2017 23:59 )             25.1<L>

## 2017-11-29 NOTE — PROGRESS NOTE ADULT - SUBJECTIVE AND OBJECTIVE BOX
89 y/o female hx of CVA on ASA plavix, w/ bilateral vision loss, HTN, HLD, not on AC presents from assisted living [place] with a fall. Per patient, pants were wet, so she walking to her room and she tripped and hit face on the floor. No LOC. Normally walks with a walker but was not using it today. Reports L sided face pain, R knee pain, L foot and ankle pain, and chipped front tooth. Was unable to get off floor by herself - aid had to help - on floor for 5 minutes. Denies any Chest pain, SOB, N/V/D, confusion. A+O x 3. Been following with Derm for R arm rash - will find out biopsy results on Monday. S/P ORIF Right Hip 11/23/2017 with no significant medical complications to date. discussed with Patient nephew. Patient is not usually confused at home        MEDICATIONS  (STANDING):  ALBUTerol/ipratropium for Nebulization 3 milliLiter(s) Nebulizer every 6 hours  amLODIPine   Tablet 5 milliGRAM(s) Oral two times a day  ascorbic acid 500 milliGRAM(s) Oral two times a day  aspirin enteric coated 81 milliGRAM(s) Oral daily  atorvastatin 40 milliGRAM(s) Oral at bedtime  carvedilol 25 milliGRAM(s) Oral every 12 hours  cloNIDine 0.2 milliGRAM(s) Oral daily  clopidogrel Tablet 75 milliGRAM(s) Oral daily  dextrose 5%. 1000 milliLiter(s) (50 mL/Hr) IV Continuous <Continuous>  dextrose 50% Injectable 12.5 Gram(s) IV Push once  dextrose 50% Injectable 25 Gram(s) IV Push once  dextrose 50% Injectable 25 Gram(s) IV Push once  docusate sodium 100 milliGRAM(s) Oral three times a day  enoxaparin Injectable 30 milliGRAM(s) SubCutaneous daily  folic acid 1 milliGRAM(s) Oral daily  insulin glargine Injectable (LANTUS) 10 Unit(s) SubCutaneous at bedtime  insulin lispro (HumaLOG) corrective regimen sliding scale   SubCutaneous at bedtime  insulin lispro (HumaLOG) corrective regimen sliding scale   SubCutaneous three times a day before meals  insulin lispro Injectable (HumaLOG) 4 Unit(s) SubCutaneous three times a day before meals  levothyroxine 50 MICROGram(s) Oral daily  multivitamin 1 Tablet(s) Oral daily  pantoprazole    Tablet 40 milliGRAM(s) Oral before breakfast  polyethylene glycol 3350 17 Gram(s) Oral daily  QUEtiapine 25 milliGRAM(s) Oral at bedtime    MEDICATIONS  (PRN):  acetaminophen   Tablet 650 milliGRAM(s) Oral every 6 hours PRN For Temp greater than 38 C (100.4 F)  acetaminophen   Tablet. 325 milliGRAM(s) Oral every 4 hours PRN Mild Pain (1 - 3)  ALPRAZolam 0.25 milliGRAM(s) Oral daily PRN anxiety  benzocaine 15 mG/menthol 3.6 mG Lozenge 1 Lozenge Oral every 2 hours PRN Sore Throat  calcium carbonate 500 mG (Tums) Chewable 3 Tablet(s) Chew every 6 hours PRN Dyspepsia  dextrose Gel 1 Dose(s) Oral once PRN Blood Glucose LESS THAN 70 milliGRAM(s)/deciliter  diphenhydrAMINE   Capsule 50 milliGRAM(s) Oral every 4 hours PRN Rash and/or Itching  glucagon  Injectable 1 milliGRAM(s) IntraMuscular once PRN Glucose LESS THAN 70 milligrams/deciliter  HYDROmorphone  Injectable 0.5 milliGRAM(s) IV Push every 4 hours PRN breakthrough  ondansetron Injectable 4 milliGRAM(s) IV Push every 6 hours PRN Nausea and/or Vomiting  senna 2 Tablet(s) Oral at bedtime PRN Constipation      REVIEW OF SYSTEM:  CONSTITUTIONAL: No fever, No change in weight, No fatigue  HEAD: No headache, No dizziness, No recent trauma  EYES: BLIND  ENT:  No difficulty hearing, No tinnitus, No vertigo, No sinus pain, No throat pain  NECK: No pain, No stiffness  RESPIRATORY: No cough, No wheezing, No chills, No hemoptysis, No shortness of breath at rest or exertional shortness of breath  CARDIOVASCULAR: No chest pain, No palpitations, No dizziness, No CHF, No arrhythmia, No cardiomegaly, No leg swelling  GASTROINTESTINAL: No abdominal, No epigastric pain. No nausea, No vomiting, No hematemesis, No diarrhea, No constipation. No melena, No hematochezia. No GERD  GENITOURINARY: No dysuria, No frequency, No hematuria, No incontinence, No nocturia, No hesitancy,  SKIN: No itching, No burning, No rashes, No lesions   LYMPH NODES: No history of enlarged glands  ENDOCRINE: No heat or cold intolerance, No hair loss. No osteoporosis, No thyroid disease  MUSCULOSKELETAL:     (+)  RIGHT HIP PAIN  PSYCHIATRIC: No depression, No anxiety, No mood swings, No difficulty sleeping  HEME/LYMPH: No easy bruising, No anticoagulants, No bleeding disorder, No bleeding gums  ALLERGY AND IMMUNOLOGIC: No hives, No eczema  NEUROLOGICAL: No memory loss, No loss of strength, No numbness, No tremors    VITALS:   T(C): 36.8 (11-29-17 @ 20:29), Max: 37.2 (11-29-17 @ 00:35)  HR: 75 (11-29-17 @ 20:29) (71 - 82)  BP: 125/69 (11-29-17 @ 20:29) (110/65 - 162/70)  RR: 18 (11-29-17 @ 20:29) (18 - 18)  SpO2: 90% (11-29-17 @ 20:29) (90% - 98%)  Wt(kg): --    PHYSICAL EXAM:  GENERAL: NAD, well nourished and conversant  HEAD:  Atraumatic  EYES:  PATIENT IS BLIND  ENT: No tonsillar erythema, exudates, or enlargement, moist mucous membranes, good dentition, no lesions  NECK: Supple, No JVD, normal thyroid, carotids with normal upstrokes and no bruits  CHEST/LUNG: Clear to auscultation bilaterally, No rales, rhonchi, wheezing, or rubs  HEART: Regular rate and rhythm, No murmurs, rubs, or gallops  ABDOMEN: Soft, nondistended, no masses, guarding, tenderness or rebound, bowel sounds present  EXTREMITIES:  2+ Peripheral Pulses, No clubbing, cyanosis, or edema.   (+) RIGHT HIP PAIN C/W HIP FRACTURE  LYMPH: No lymphadenopathy noted  SKIN: No rashes or lesions  NERVOUS SYSTEM:  Alert & Oriented X3, normal cognitive function. Motor Strength 5/5 right upper and right lower.  5/5 left upper and left lower extremities, DTRs 2+ intact and symmetric    LABS:  ABG - ( 28 Nov 2017 01:05 )  pH: 7.41  /  pCO2: 46    /  pO2: 66    / HCO3: 28    / Base Excess: 3.7   /  SaO2: 94                    CBC Full  -  ( 29 Nov 2017 08:27 )  WBC Count : 8.4 K/uL  Hemoglobin : 9.2 g/dL  Hematocrit : 27.3 %  Platelet Count - Automated : 207 K/uL  Mean Cell Volume : 95.3 fl  Mean Cell Hemoglobin : 32.3 pg  Mean Cell Hemoglobin Concentration : 33.8 gm/dL  Auto Neutrophil # : x  Auto Lymphocyte # : x  Auto Monocyte # : x  Auto Eosinophil # : x  Auto Basophil # : x  Auto Neutrophil % : x  Auto Lymphocyte % : x  Auto Monocyte % : x  Auto Eosinophil % : x  Auto Basophil % : x                CAPILLARY BLOOD GLUCOSE      POCT Blood Glucose.: 216 mg/dL (29 Nov 2017 22:04)  POCT Blood Glucose.: 147 mg/dL (29 Nov 2017 17:58)  POCT Blood Glucose.: 174 mg/dL (29 Nov 2017 12:00)  POCT Blood Glucose.: 178 mg/dL (29 Nov 2017 08:21)      RADIOLOGY & ADDITIONAL TESTS:

## 2017-11-30 DIAGNOSIS — R41.82 ALTERED MENTAL STATUS, UNSPECIFIED: ICD-10-CM

## 2017-11-30 LAB
GLUCOSE BLDC GLUCOMTR-MCNC: 184 MG/DL — HIGH (ref 70–99)
GLUCOSE BLDC GLUCOMTR-MCNC: 204 MG/DL — HIGH (ref 70–99)
GLUCOSE BLDC GLUCOMTR-MCNC: 205 MG/DL — HIGH (ref 70–99)
GLUCOSE BLDC GLUCOMTR-MCNC: 215 MG/DL — HIGH (ref 70–99)
T3 SERPL-MCNC: 57 NG/DL — LOW (ref 80–200)
T4 AB SER-ACNC: 7.2 UG/DL — SIGNIFICANT CHANGE UP (ref 4.6–12)
TSH SERPL-MCNC: 4.23 UIU/ML — HIGH (ref 0.27–4.2)
VIT B12 SERPL-MCNC: 365 PG/ML — SIGNIFICANT CHANGE UP (ref 243–894)

## 2017-11-30 PROCEDURE — 99232 SBSQ HOSP IP/OBS MODERATE 35: CPT

## 2017-11-30 PROCEDURE — 93970 EXTREMITY STUDY: CPT | Mod: 26

## 2017-11-30 RX ADMIN — PANTOPRAZOLE SODIUM 40 MILLIGRAM(S): 20 TABLET, DELAYED RELEASE ORAL at 06:01

## 2017-11-30 RX ADMIN — Medication 1 TABLET(S): at 13:22

## 2017-11-30 RX ADMIN — Medication 50 MICROGRAM(S): at 06:01

## 2017-11-30 RX ADMIN — CARVEDILOL PHOSPHATE 25 MILLIGRAM(S): 80 CAPSULE, EXTENDED RELEASE ORAL at 17:09

## 2017-11-30 RX ADMIN — Medication 500 MILLIGRAM(S): at 17:09

## 2017-11-30 RX ADMIN — Medication 325 MILLIGRAM(S): at 13:22

## 2017-11-30 RX ADMIN — ATORVASTATIN CALCIUM 40 MILLIGRAM(S): 80 TABLET, FILM COATED ORAL at 21:18

## 2017-11-30 RX ADMIN — ENOXAPARIN SODIUM 30 MILLIGRAM(S): 100 INJECTION SUBCUTANEOUS at 13:23

## 2017-11-30 RX ADMIN — INSULIN GLARGINE 10 UNIT(S): 100 INJECTION, SOLUTION SUBCUTANEOUS at 22:24

## 2017-11-30 RX ADMIN — Medication 500 MILLIGRAM(S): at 06:02

## 2017-11-30 RX ADMIN — Medication 1: at 13:22

## 2017-11-30 RX ADMIN — Medication 81 MILLIGRAM(S): at 13:22

## 2017-11-30 RX ADMIN — Medication 3 MILLILITER(S): at 13:23

## 2017-11-30 RX ADMIN — Medication 4 UNIT(S): at 13:23

## 2017-11-30 RX ADMIN — Medication 0.2 MILLIGRAM(S): at 06:01

## 2017-11-30 RX ADMIN — Medication 325 MILLIGRAM(S): at 06:02

## 2017-11-30 RX ADMIN — Medication 1: at 18:36

## 2017-11-30 RX ADMIN — Medication 4 UNIT(S): at 18:36

## 2017-11-30 RX ADMIN — Medication 325 MILLIGRAM(S): at 06:32

## 2017-11-30 RX ADMIN — Medication 3 MILLILITER(S): at 06:01

## 2017-11-30 RX ADMIN — CLOPIDOGREL BISULFATE 75 MILLIGRAM(S): 75 TABLET, FILM COATED ORAL at 13:22

## 2017-11-30 RX ADMIN — Medication 1 MILLIGRAM(S): at 13:22

## 2017-11-30 RX ADMIN — Medication 325 MILLIGRAM(S): at 14:00

## 2017-11-30 RX ADMIN — AMLODIPINE BESYLATE 5 MILLIGRAM(S): 2.5 TABLET ORAL at 17:09

## 2017-11-30 RX ADMIN — CARVEDILOL PHOSPHATE 25 MILLIGRAM(S): 80 CAPSULE, EXTENDED RELEASE ORAL at 06:02

## 2017-11-30 RX ADMIN — QUETIAPINE FUMARATE 25 MILLIGRAM(S): 200 TABLET, FILM COATED ORAL at 21:18

## 2017-11-30 RX ADMIN — Medication 0.25 MILLIGRAM(S): at 17:09

## 2017-11-30 RX ADMIN — Medication 3 MILLILITER(S): at 17:09

## 2017-11-30 RX ADMIN — AMLODIPINE BESYLATE 5 MILLIGRAM(S): 2.5 TABLET ORAL at 06:01

## 2017-11-30 NOTE — PROGRESS NOTE ADULT - SUBJECTIVE AND OBJECTIVE BOX
Patient is a 89y old  Female who presents with a chief complaint of Right Hip Fracture - Intramedullary Nail of the Right Hip (24 Nov 2017 10:37)  POST OPERATIVE DAY #:  [7 ]   Patient comfortable  No complaints    T(C): 36.9 (11-30-17 @ 05:09), Max: 36.9 (11-30-17 @ 05:09)  HR: 70 (11-30-17 @ 05:09) (70 - 77)  BP: 167/73 (11-30-17 @ 05:09) (110/65 - 167/73)  RR: 18 (11-30-17 @ 05:09) (18 - 18)  SpO2: 97% (11-30-17 @ 05:09) (90% - 99%)  Wt(kg): --    PHYSICAL EXAM:  NAD, Alert  [ right] Hip: incision C/D/I; sensation grossly intact to light touch; (+) DF/PF; (+) Distal Pulses; No Calf tenderness B/L, PAS     LABS:                     9.2    8.4   )-----------( 207      ( 29 Nov 2017 08:27 )             27.3

## 2017-11-30 NOTE — PROGRESS NOTE ADULT - PROBLEM SELECTOR PLAN 1
Test BG ac and hs  Continue Lantus 10 units q hs  Continue Humalog 4 units ac meals (Hold if BG <100 OR if pt not eating at least 50% of meal)  Continue Humalog ac correction scale starting for BG >200s (1-5 units)  -DISCHARGE PLAN : on discharge home, send on Januvia 25 mg daily and no insulin at discharge (if goes to a rehab, could consider continuing same doses of insulin required here on transfer to rehab, but on discharge from rehab, still agree with plan to send on januvia 25 mg daily; alternately could send to rehab on januvia 25 mg daily at that time as long as rehab carries that med)    WILL SIGN OFF. Please call with future questions, or if issues arise with persistent high or low BG inpt. Test BG ac and hs  Continue Lantus 10 units q hs  Continue Humalog 4 units ac meals (Hold if BG <100 OR if pt not eating at least 50% of meal)  Continue Humalog ac correction scale starting for BG >200s (1-5 units)  Please ensure she is receiving scheduled insulin (did not receive sliding scal this morning when it was indicated for example, and sugar cody in that setting)     -DISCHARGE PLAN : on discharge home, send on Januvia 25 mg daily and no insulin at discharge (if goes to a rehab, could consider continuing same doses of insulin required here on transfer to rehab, but on discharge from rehab, still agree with plan to send on januvia 25 mg daily; alternately could send to rehab on januvia 25 mg daily at that time as long as rehab carries that med)    WILL SIGN OFF. Please call with future questions, or if issues arise with persistent high or low BG inpt.

## 2017-11-30 NOTE — PROGRESS NOTE ADULT - SUBJECTIVE AND OBJECTIVE BOX
89 y/o female hx of CVA on ASA plavix, w/ bilateral vision loss, HTN, HLD, not on AC presents from assisted living [place] with a fall. Per patient, pants were wet, so she walking to her room and she tripped and hit face on the floor. No LOC. Normally walks with a walker but was not using it today. Reports L sided face pain, R knee pain, L foot and ankle pain, and chipped front tooth. Was unable to get off floor by herself - aid had to help - on floor for 5 minutes. Denies any Chest pain, SOB, N/V/D, confusion. A+O x 3. Been following with Derm for R arm rash - will find out biopsy results on Monday. S/P ORIF Right Hip 11/23/2017 with no significant medical complications to date. discussed with Patient nephew. Patient is not usually confused at home      MEDICATIONS  (STANDING):  ALBUTerol/ipratropium for Nebulization 3 milliLiter(s) Nebulizer every 6 hours  amLODIPine   Tablet 5 milliGRAM(s) Oral two times a day  ascorbic acid 500 milliGRAM(s) Oral two times a day  aspirin enteric coated 81 milliGRAM(s) Oral daily  atorvastatin 40 milliGRAM(s) Oral at bedtime  carvedilol 25 milliGRAM(s) Oral every 12 hours  cloNIDine 0.2 milliGRAM(s) Oral daily  clopidogrel Tablet 75 milliGRAM(s) Oral daily  dextrose 5%. 1000 milliLiter(s) (50 mL/Hr) IV Continuous <Continuous>  dextrose 50% Injectable 12.5 Gram(s) IV Push once  dextrose 50% Injectable 25 Gram(s) IV Push once  dextrose 50% Injectable 25 Gram(s) IV Push once  docusate sodium 100 milliGRAM(s) Oral three times a day  enoxaparin Injectable 30 milliGRAM(s) SubCutaneous daily  folic acid 1 milliGRAM(s) Oral daily  insulin glargine Injectable (LANTUS) 10 Unit(s) SubCutaneous at bedtime  insulin lispro (HumaLOG) corrective regimen sliding scale   SubCutaneous at bedtime  insulin lispro (HumaLOG) corrective regimen sliding scale   SubCutaneous three times a day before meals  insulin lispro Injectable (HumaLOG) 4 Unit(s) SubCutaneous three times a day before meals  levothyroxine 50 MICROGram(s) Oral daily  multivitamin 1 Tablet(s) Oral daily  pantoprazole    Tablet 40 milliGRAM(s) Oral before breakfast  polyethylene glycol 3350 17 Gram(s) Oral daily  QUEtiapine 25 milliGRAM(s) Oral at bedtime    MEDICATIONS  (PRN):  acetaminophen   Tablet 650 milliGRAM(s) Oral every 6 hours PRN For Temp greater than 38 C (100.4 F)  acetaminophen   Tablet. 325 milliGRAM(s) Oral every 4 hours PRN Mild Pain (1 - 3)  benzocaine 15 mG/menthol 3.6 mG Lozenge 1 Lozenge Oral every 2 hours PRN Sore Throat  calcium carbonate 500 mG (Tums) Chewable 3 Tablet(s) Chew every 6 hours PRN Dyspepsia  dextrose Gel 1 Dose(s) Oral once PRN Blood Glucose LESS THAN 70 milliGRAM(s)/deciliter  diphenhydrAMINE   Capsule 50 milliGRAM(s) Oral every 4 hours PRN Rash and/or Itching  glucagon  Injectable 1 milliGRAM(s) IntraMuscular once PRN Glucose LESS THAN 70 milligrams/deciliter  HYDROmorphone  Injectable 0.5 milliGRAM(s) IV Push every 4 hours PRN breakthrough  ondansetron Injectable 4 milliGRAM(s) IV Push every 6 hours PRN Nausea and/or Vomiting  senna 2 Tablet(s) Oral at bedtime PRN Constipation          REVIEW OF SYSTEM:  CONSTITUTIONAL: No fever, No change in weight, No fatigue  HEAD: No headache, No dizziness, No recent trauma  EYES: BLIND  ENT:  No difficulty hearing, No tinnitus, No vertigo, No sinus pain, No throat pain  NECK: No pain, No stiffness  RESPIRATORY: No cough, No wheezing, No chills, No hemoptysis, No shortness of breath at rest or exertional shortness of breath  CARDIOVASCULAR: No chest pain, No palpitations, No dizziness, No CHF, No arrhythmia, No cardiomegaly, No leg swelling  GASTROINTESTINAL: No abdominal, No epigastric pain. No nausea, No vomiting, No hematemesis, No diarrhea, No constipation. No melena, No hematochezia. No GERD  GENITOURINARY: No dysuria, No frequency, No hematuria, No incontinence, No nocturia, No hesitancy,  SKIN: No itching, No burning, No rashes, No lesions   LYMPH NODES: No history of enlarged glands  ENDOCRINE: No heat or cold intolerance, No hair loss. No osteoporosis, No thyroid disease  MUSCULOSKELETAL:     (+)  RIGHT HIP PAIN  PSYCHIATRIC: No depression, No anxiety, No mood swings, No difficulty sleeping  HEME/LYMPH: No easy bruising, No anticoagulants, No bleeding disorder, No bleeding gums  ALLERGY AND IMMUNOLOGIC: No hives, No eczema  NEUROLOGICAL: No memory loss, No loss of strength, No numbness, No tremors    VITALS:   T(C): 36.7 (11-30-17 @ 20:39), Max: 36.9 (11-30-17 @ 05:09)  HR: 74 (11-30-17 @ 20:39) (68 - 77)  BP: 115/76 (11-30-17 @ 20:39) (115/76 - 167/73)  RR: 18 (11-30-17 @ 20:39) (18 - 18)  SpO2: 94% (11-30-17 @ 20:39) (94% - 99%)  Wt(kg): --    PHYSICAL EXAM:  GENERAL: NAD, well nourished and conversant  HEAD:  Atraumatic  EYES:  PATIENT IS BLIND  ENT: No tonsillar erythema, exudates, or enlargement, moist mucous membranes, good dentition, no lesions  NECK: Supple, No JVD, normal thyroid, carotids with normal upstrokes and no bruits  CHEST/LUNG: Clear to auscultation bilaterally, No rales, rhonchi, wheezing, or rubs  HEART: Regular rate and rhythm, No murmurs, rubs, or gallops  ABDOMEN: Soft, nondistended, no masses, guarding, tenderness or rebound, bowel sounds present  EXTREMITIES:  2+ Peripheral Pulses, No clubbing, cyanosis, or edema.   (+) RIGHT HIP PAIN C/W HIP FRACTURE  LYMPH: No lymphadenopathy noted  SKIN: No rashes or lesions  NERVOUS SYSTEM:  Alert & Oriented X3, normal cognitive function. Motor Strength 5/5 right upper and right lower.  5/5 left upper and left lower extremities, DTRs 2+ intact and symmetric    LABS:        CBC Full  -  ( 29 Nov 2017 08:27 )  WBC Count : 8.4 K/uL  Hemoglobin : 9.2 g/dL  Hematocrit : 27.3 %  Platelet Count - Automated : 207 K/uL  Mean Cell Volume : 95.3 fl  Mean Cell Hemoglobin : 32.3 pg  Mean Cell Hemoglobin Concentration : 33.8 gm/dL  Auto Neutrophil # : x  Auto Lymphocyte # : x  Auto Monocyte # : x  Auto Eosinophil # : x  Auto Basophil # : x  Auto Neutrophil % : x  Auto Lymphocyte % : x  Auto Monocyte % : x  Auto Eosinophil % : x  Auto Basophil % : x                CAPILLARY BLOOD GLUCOSE      POCT Blood Glucose.: 184 mg/dL (30 Nov 2017 22:17)  POCT Blood Glucose.: 204 mg/dL (30 Nov 2017 18:32)  POCT Blood Glucose.: 215 mg/dL (30 Nov 2017 13:17)  POCT Blood Glucose.: 205 mg/dL (30 Nov 2017 09:17)      RADIOLOGY & ADDITIONAL TESTS:

## 2017-11-30 NOTE — PROGRESS NOTE ADULT - SUBJECTIVE AND OBJECTIVE BOX
Neurology Progress Note      the patient's symptoms  --- are  unchanged    MEDICATIONS  (STANDING):  ALBUTerol/ipratropium for Nebulization 3 milliLiter(s) Nebulizer every 6 hours  amLODIPine   Tablet 5 milliGRAM(s) Oral two times a day  ascorbic acid 500 milliGRAM(s) Oral two times a day  aspirin enteric coated 81 milliGRAM(s) Oral daily  atorvastatin 40 milliGRAM(s) Oral at bedtime  carvedilol 25 milliGRAM(s) Oral every 12 hours  cloNIDine 0.2 milliGRAM(s) Oral daily  clopidogrel Tablet 75 milliGRAM(s) Oral daily  dextrose 5%. 1000 milliLiter(s) (50 mL/Hr) IV Continuous <Continuous>  dextrose 50% Injectable 12.5 Gram(s) IV Push once  dextrose 50% Injectable 25 Gram(s) IV Push once  dextrose 50% Injectable 25 Gram(s) IV Push once  docusate sodium 100 milliGRAM(s) Oral three times a day  enoxaparin Injectable 30 milliGRAM(s) SubCutaneous daily  folic acid 1 milliGRAM(s) Oral daily  insulin glargine Injectable (LANTUS) 10 Unit(s) SubCutaneous at bedtime  insulin lispro (HumaLOG) corrective regimen sliding scale   SubCutaneous at bedtime  insulin lispro (HumaLOG) corrective regimen sliding scale   SubCutaneous three times a day before meals  insulin lispro Injectable (HumaLOG) 4 Unit(s) SubCutaneous three times a day before meals  levothyroxine 50 MICROGram(s) Oral daily  multivitamin 1 Tablet(s) Oral daily  pantoprazole    Tablet 40 milliGRAM(s) Oral before breakfast  polyethylene glycol 3350 17 Gram(s) Oral daily  QUEtiapine 25 milliGRAM(s) Oral at bedtime    MEDICATIONS  (PRN):  acetaminophen   Tablet 650 milliGRAM(s) Oral every 6 hours PRN For Temp greater than 38 C (100.4 F)  acetaminophen   Tablet. 325 milliGRAM(s) Oral every 4 hours PRN Mild Pain (1 - 3)  ALPRAZolam 0.25 milliGRAM(s) Oral daily PRN anxiety  benzocaine 15 mG/menthol 3.6 mG Lozenge 1 Lozenge Oral every 2 hours PRN Sore Throat  calcium carbonate 500 mG (Tums) Chewable 3 Tablet(s) Chew every 6 hours PRN Dyspepsia  dextrose Gel 1 Dose(s) Oral once PRN Blood Glucose LESS THAN 70 milliGRAM(s)/deciliter  diphenhydrAMINE   Capsule 50 milliGRAM(s) Oral every 4 hours PRN Rash and/or Itching  glucagon  Injectable 1 milliGRAM(s) IntraMuscular once PRN Glucose LESS THAN 70 milligrams/deciliter  HYDROmorphone  Injectable 0.5 milliGRAM(s) IV Push every 4 hours PRN breakthrough  ondansetron Injectable 4 milliGRAM(s) IV Push every 6 hours PRN Nausea and/or Vomiting  senna 2 Tablet(s) Oral at bedtime PRN Constipation              Vital Signs Last 24 Hrs  T(C): 36.9 (30 Nov 2017 14:19), Max: 36.9 (30 Nov 2017 05:09)  T(F): 98.5 (30 Nov 2017 14:19), Max: 98.5 (30 Nov 2017 14:19)  HR: 69 (30 Nov 2017 14:19) (68 - 77)  BP: 137/74 (30 Nov 2017 14:19) (125/69 - 167/73)  BP(mean): --  RR: 18 (30 Nov 2017 14:19) (18 - 18)  SpO2: 95% (30 Nov 2017 14:19) (90% - 99%)    Physical exam  MENTAL STATUS- Alert, attends, fluent, non-dysarthric, follows commands, oriented, good memory and affect.  CRANIAL NERVES-II Optic - Bilateral - Normal Visual Fields. III-IV-VI EOMI & Pupils - Bilateral - Normal Bilaterally. V Trigeminal - Bilateral - Normal bilateral facial sensation and jaw movement. VII Facial - Normal bilateral facial movement. VIII Acoustic - Bilateral - Hearing normal to voice bilaterally. IX Glossopharyngeal / X Vagus - Normal palate elevates symmetrically. XI Accessory - Normal Bilaterally. XII Hypoglossal - Tongue protrudes midline and moves symmetrically.  MOTOR-Bulk and Contour - Normal. Tone - Normal tone, no abnormal movements. Strength - 5/5 normal muscle strength - Strength full throughout.  SENSORY-Normal - LT, DSS, Vibration.  REFLEXES- DTR's 2+ throughout.  COORDINATION-Normal FFM, DARCIE, FNF - bilaterally.  GAIT-Normal base, heel, toe, tandem.-    -  -CBC Full  -  ( 29 Nov 2017 08:27 )  WBC Count : 8.4 K/uL  Hemoglobin : 9.2 g/dL  Hematocrit : 27.3 %  Platelet Count - Automated : 207 K/uL  Mean Cell Volume : 95.3 fl  Mean Cell Hemoglobin : 32.3 pg  Mean Cell Hemoglobin Concentration : 33.8 gm/dL  Auto Neutrophil # : x  Auto Lymphocyte # : x  Auto Monocyte # : x  Auto Eosinophil # : x  Auto Basophil # : x  Auto Neutrophil % : x  Auto Lymphocyte % : x  Auto Monocyte % : x  Auto Eosinophil % : x  Auto Basophil % : x                      radiology type ( ~?rad) Neurology Progress Note      the patient's symptoms  --- are  unchanged    MEDICATIONS  (STANDING):  ALBUTerol/ipratropium for Nebulization 3 milliLiter(s) Nebulizer every 6 hours  amLODIPine   Tablet 5 milliGRAM(s) Oral two times a day  ascorbic acid 500 milliGRAM(s) Oral two times a day  aspirin enteric coated 81 milliGRAM(s) Oral daily  atorvastatin 40 milliGRAM(s) Oral at bedtime  carvedilol 25 milliGRAM(s) Oral every 12 hours  cloNIDine 0.2 milliGRAM(s) Oral daily  clopidogrel Tablet 75 milliGRAM(s) Oral daily  dextrose 5%. 1000 milliLiter(s) (50 mL/Hr) IV Continuous <Continuous>  dextrose 50% Injectable 12.5 Gram(s) IV Push once  dextrose 50% Injectable 25 Gram(s) IV Push once  dextrose 50% Injectable 25 Gram(s) IV Push once  docusate sodium 100 milliGRAM(s) Oral three times a day  enoxaparin Injectable 30 milliGRAM(s) SubCutaneous daily  folic acid 1 milliGRAM(s) Oral daily  insulin glargine Injectable (LANTUS) 10 Unit(s) SubCutaneous at bedtime  insulin lispro (HumaLOG) corrective regimen sliding scale   SubCutaneous at bedtime  insulin lispro (HumaLOG) corrective regimen sliding scale   SubCutaneous three times a day before meals  insulin lispro Injectable (HumaLOG) 4 Unit(s) SubCutaneous three times a day before meals  levothyroxine 50 MICROGram(s) Oral daily  multivitamin 1 Tablet(s) Oral daily  pantoprazole    Tablet 40 milliGRAM(s) Oral before breakfast  polyethylene glycol 3350 17 Gram(s) Oral daily  QUEtiapine 25 milliGRAM(s) Oral at bedtime    MEDICATIONS  (PRN):  acetaminophen   Tablet 650 milliGRAM(s) Oral every 6 hours PRN For Temp greater than 38 C (100.4 F)  acetaminophen   Tablet. 325 milliGRAM(s) Oral every 4 hours PRN Mild Pain (1 - 3)  ALPRAZolam 0.25 milliGRAM(s) Oral daily PRN anxiety  benzocaine 15 mG/menthol 3.6 mG Lozenge 1 Lozenge Oral every 2 hours PRN Sore Throat  calcium carbonate 500 mG (Tums) Chewable 3 Tablet(s) Chew every 6 hours PRN Dyspepsia  dextrose Gel 1 Dose(s) Oral once PRN Blood Glucose LESS THAN 70 milliGRAM(s)/deciliter  diphenhydrAMINE   Capsule 50 milliGRAM(s) Oral every 4 hours PRN Rash and/or Itching  glucagon  Injectable 1 milliGRAM(s) IntraMuscular once PRN Glucose LESS THAN 70 milligrams/deciliter  HYDROmorphone  Injectable 0.5 milliGRAM(s) IV Push every 4 hours PRN breakthrough  ondansetron Injectable 4 milliGRAM(s) IV Push every 6 hours PRN Nausea and/or Vomiting  senna 2 Tablet(s) Oral at bedtime PRN Constipation              Vital Signs Last 24 Hrs  T(C): 36.9 (30 Nov 2017 14:19), Max: 36.9 (30 Nov 2017 05:09)  T(F): 98.5 (30 Nov 2017 14:19), Max: 98.5 (30 Nov 2017 14:19)  HR: 69 (30 Nov 2017 14:19) (68 - 77)  BP: 137/74 (30 Nov 2017 14:19) (125/69 - 167/73)  BP(mean): --  RR: 18 (30 Nov 2017 14:19) (18 - 18)  SpO2: 95% (30 Nov 2017 14:19) (90% - 99%)    Physical exam  -MENTAL STATUS- Alert, attends, fluent, non-dysarthric, follows commands, dis-oriented to place yr, poor memory and good affect.  CRANIAL NERVES-II Optic - Bilateral -  Visual Fields.-inconsistent, III-IV-VI EOMI & Pupils- irreg s/p sg - Bilateral - Normal Bilaterally. V Trigeminal - Bilateral - Normal bilateral facial sensation and jaw movement. VII Facial - Normal bilateral facial movement. VIII Acoustic - Bilateral - Hearing normal to voice bilaterally. IX Glossopharyngeal / X Vagus - Normal palate elevates symmetrically. XI Accessory - Normal Bilaterally. XII Hypoglossal - Tongue protrudes midline and moves symmetrically.  MOTOR-Bulk and Contour - Normal. Tone - Normal tone, no abnormal movements. Strength - 5/5 normal muscle strength xc legs limited by fx  & R shoulder- Strength full throughout.  SENSORY-inconsistent  REFLEXES- DTR's 0-1+ throughout.  COORDINATION-Normal  , FNF - bilaterally.  GAIT-NA    -  -CBC Full  -  ( 29 Nov 2017 08:27 )  WBC Count : 8.4 K/uL  Hemoglobin : 9.2 g/dL  Hematocrit : 27.3 %  Platelet Count - Automated : 207 K/uL  Mean Cell Volume : 95.3 fl  Mean Cell Hemoglobin : 32.3 pg  Mean Cell Hemoglobin Concentration : 33.8 gm/dL  Auto Neutrophil # : x  Auto Lymphocyte # : x  Auto Monocyte # : x  Auto Eosinophil # : x  Auto Basophil # : x  Auto Neutrophil % : x  Auto Lymphocyte % : x  Auto Monocyte % : x  Auto Eosinophil % : x  Auto Basophil % : x                      radiology type ( ~?rad)

## 2017-11-30 NOTE — PROGRESS NOTE ADULT - SUBJECTIVE AND OBJECTIVE BOX
Follow-up Pulm Progress Note    No new respiratory events overnight.  Denies SOB/CP.   Confused, wants to leave  94% on RA    Medications:  MEDICATIONS  (STANDING):  ALBUTerol/ipratropium for Nebulization 3 milliLiter(s) Nebulizer every 6 hours  amLODIPine   Tablet 5 milliGRAM(s) Oral two times a day  ascorbic acid 500 milliGRAM(s) Oral two times a day  aspirin enteric coated 81 milliGRAM(s) Oral daily  atorvastatin 40 milliGRAM(s) Oral at bedtime  carvedilol 25 milliGRAM(s) Oral every 12 hours  cloNIDine 0.2 milliGRAM(s) Oral daily  clopidogrel Tablet 75 milliGRAM(s) Oral daily  dextrose 5%. 1000 milliLiter(s) (50 mL/Hr) IV Continuous <Continuous>  dextrose 50% Injectable 12.5 Gram(s) IV Push once  dextrose 50% Injectable 25 Gram(s) IV Push once  dextrose 50% Injectable 25 Gram(s) IV Push once  docusate sodium 100 milliGRAM(s) Oral three times a day  enoxaparin Injectable 30 milliGRAM(s) SubCutaneous daily  folic acid 1 milliGRAM(s) Oral daily  insulin glargine Injectable (LANTUS) 10 Unit(s) SubCutaneous at bedtime  insulin lispro (HumaLOG) corrective regimen sliding scale   SubCutaneous at bedtime  insulin lispro (HumaLOG) corrective regimen sliding scale   SubCutaneous three times a day before meals  insulin lispro Injectable (HumaLOG) 4 Unit(s) SubCutaneous three times a day before meals  levothyroxine 50 MICROGram(s) Oral daily  multivitamin 1 Tablet(s) Oral daily  pantoprazole    Tablet 40 milliGRAM(s) Oral before breakfast  polyethylene glycol 3350 17 Gram(s) Oral daily  QUEtiapine 25 milliGRAM(s) Oral at bedtime    MEDICATIONS  (PRN):  acetaminophen   Tablet 650 milliGRAM(s) Oral every 6 hours PRN For Temp greater than 38 C (100.4 F)  acetaminophen   Tablet. 325 milliGRAM(s) Oral every 4 hours PRN Mild Pain (1 - 3)  ALPRAZolam 0.25 milliGRAM(s) Oral daily PRN anxiety  benzocaine 15 mG/menthol 3.6 mG Lozenge 1 Lozenge Oral every 2 hours PRN Sore Throat  calcium carbonate 500 mG (Tums) Chewable 3 Tablet(s) Chew every 6 hours PRN Dyspepsia  dextrose Gel 1 Dose(s) Oral once PRN Blood Glucose LESS THAN 70 milliGRAM(s)/deciliter  diphenhydrAMINE   Capsule 50 milliGRAM(s) Oral every 4 hours PRN Rash and/or Itching  glucagon  Injectable 1 milliGRAM(s) IntraMuscular once PRN Glucose LESS THAN 70 milligrams/deciliter  HYDROmorphone  Injectable 0.5 milliGRAM(s) IV Push every 4 hours PRN breakthrough  ondansetron Injectable 4 milliGRAM(s) IV Push every 6 hours PRN Nausea and/or Vomiting  senna 2 Tablet(s) Oral at bedtime PRN Constipation          Vital Signs Last 24 Hrs  T(C): 36.9 (30 Nov 2017 14:19), Max: 36.9 (30 Nov 2017 05:09)  T(F): 98.5 (30 Nov 2017 14:19), Max: 98.5 (30 Nov 2017 14:19)  HR: 69 (30 Nov 2017 14:19) (68 - 77)  BP: 137/74 (30 Nov 2017 14:19) (125/69 - 167/73)  BP(mean): --  RR: 18 (30 Nov 2017 14:19) (18 - 18)  SpO2: 95% (30 Nov 2017 14:19) (90% - 99%) on RA          11-29 @ 07:01  -  11-30 @ 07:00  --------------------------------------------------------  IN: 1540 mL / OUT: 0 mL / NET: 1540 mL          LABS:                        9.2    8.4   )-----------( 207      ( 29 Nov 2017 08:27 )             27.3                 CAPILLARY BLOOD GLUCOSE      POCT Blood Glucose.: 215 mg/dL (30 Nov 2017 13:17)          Serum Pro-Brain Natriuretic Peptide: 2291 pg/mL (11-29-17 @ 08:26)            Physical Examination:  PULM: Clear to auscultation bilaterally, no significant sputum production  CVS: S1, S2 heard    RADIOLOGY REVIEWED  CXR: Bibasilar Atelectasis with trace effusion    VA duplex: No DVT

## 2017-11-30 NOTE — PROGRESS NOTE ADULT - ASSESSMENT
89 y/o fm s/p right hip IM nail, POD#7, f/u MRI, attempted 11/29, however patient uncooperative  Laquita Warren PA-C  Orthopaedic Surgery  Team pager [phone]  [facility] [phone]  [phone]

## 2017-11-30 NOTE — PROGRESS NOTE ADULT - SUBJECTIVE AND OBJECTIVE BOX
Chief Complaint: type 2 diabetes     Interval history: Pt states she is feeling well, appears weak on interview.  Has assistance with eating lunch (being fed while I'm in the room) and states she has a good appetite.  On BG review, did not receive her CDI as indicated for breakfast this morning (no reason left in nursing charting).      MEDICATIONS  (STANDING):  ALBUTerol/ipratropium for Nebulization 3 milliLiter(s) Nebulizer every 6 hours  amLODIPine   Tablet 5 milliGRAM(s) Oral two times a day  ascorbic acid 500 milliGRAM(s) Oral two times a day  aspirin enteric coated 81 milliGRAM(s) Oral daily  atorvastatin 40 milliGRAM(s) Oral at bedtime  carvedilol 25 milliGRAM(s) Oral every 12 hours  cloNIDine 0.2 milliGRAM(s) Oral daily  clopidogrel Tablet 75 milliGRAM(s) Oral daily  dextrose 5%. 1000 milliLiter(s) (50 mL/Hr) IV Continuous <Continuous>  dextrose 50% Injectable 12.5 Gram(s) IV Push once  dextrose 50% Injectable 25 Gram(s) IV Push once  dextrose 50% Injectable 25 Gram(s) IV Push once  docusate sodium 100 milliGRAM(s) Oral three times a day  enoxaparin Injectable 30 milliGRAM(s) SubCutaneous daily  folic acid 1 milliGRAM(s) Oral daily  insulin glargine Injectable (LANTUS) 10 Unit(s) SubCutaneous at bedtime  insulin lispro (HumaLOG) corrective regimen sliding scale   SubCutaneous at bedtime  insulin lispro (HumaLOG) corrective regimen sliding scale   SubCutaneous three times a day before meals  insulin lispro Injectable (HumaLOG) 4 Unit(s) SubCutaneous three times a day before meals  levothyroxine 50 MICROGram(s) Oral daily  multivitamin 1 Tablet(s) Oral daily  pantoprazole    Tablet 40 milliGRAM(s) Oral before breakfast  polyethylene glycol 3350 17 Gram(s) Oral daily  QUEtiapine 25 milliGRAM(s) Oral at bedtime    MEDICATIONS  (PRN):  acetaminophen   Tablet 650 milliGRAM(s) Oral every 6 hours PRN For Temp greater than 38 C (100.4 F)  acetaminophen   Tablet. 325 milliGRAM(s) Oral every 4 hours PRN Mild Pain (1 - 3)  ALPRAZolam 0.25 milliGRAM(s) Oral daily PRN anxiety  benzocaine 15 mG/menthol 3.6 mG Lozenge 1 Lozenge Oral every 2 hours PRN Sore Throat  calcium carbonate 500 mG (Tums) Chewable 3 Tablet(s) Chew every 6 hours PRN Dyspepsia  dextrose Gel 1 Dose(s) Oral once PRN Blood Glucose LESS THAN 70 milliGRAM(s)/deciliter  diphenhydrAMINE   Capsule 50 milliGRAM(s) Oral every 4 hours PRN Rash and/or Itching  glucagon  Injectable 1 milliGRAM(s) IntraMuscular once PRN Glucose LESS THAN 70 milligrams/deciliter  HYDROmorphone  Injectable 0.5 milliGRAM(s) IV Push every 4 hours PRN breakthrough  ondansetron Injectable 4 milliGRAM(s) IV Push every 6 hours PRN Nausea and/or Vomiting  senna 2 Tablet(s) Oral at bedtime PRN Constipation      Allergies    No Known Allergies    Intolerances      Review of Systems:  Skin: no rash  Endocrine: no polyuria, polydipsia    ALL OTHER SYSTEMS REVIEWED AND NEGATIVE    PHYSICAL EXAM:  VITALS: T(C): 36.9 (11-30-17 @ 14:19)  T(F): 98.5 (11-30-17 @ 14:19), Max: 98.5 (11-30-17 @ 14:19)  HR: 69 (11-30-17 @ 14:19) (68 - 77)  BP: 137/74 (11-30-17 @ 14:19) (125/69 - 167/73)  RR:  (18 - 18)  SpO2:  (90% - 99%)  Wt(kg): --  GENERAL: NAD, well-developed  EYES: No proptosis, sclera anicteric  HEENT:  Atraumatic, Normocephalic, moist mucous membranes  SKIN: Dry, intact, No diffuse rashes   PSYCH: normal affect, normal mood    POCT Blood Glucose.: 215 mg/dL (11-30-17 @ 13:17)  POCT Blood Glucose.: 205 mg/dL (11-30-17 @ 09:17)  POCT Blood Glucose.: 216 mg/dL (11-29-17 @ 22:04)  POCT Blood Glucose.: 147 mg/dL (11-29-17 @ 17:58)  POCT Blood Glucose.: 174 mg/dL (11-29-17 @ 12:00)  POCT Blood Glucose.: 178 mg/dL (11-29-17 @ 08:21)  POCT Blood Glucose.: 136 mg/dL (11-28-17 @ 22:48)  POCT Blood Glucose.: 165 mg/dL (11-28-17 @ 18:27)  POCT Blood Glucose.: 174 mg/dL (11-28-17 @ 11:54)  POCT Blood Glucose.: 198 mg/dL (11-28-17 @ 08:57)  POCT Blood Glucose.: 277 mg/dL (11-27-17 @ 22:24)  POCT Blood Glucose.: 160 mg/dL (11-27-17 @ 17:53)                  Thyroid Function Tests:      Hemoglobin A1C, Whole Blood: 8.2 % <H> [4.0 - 5.6] (11-24-17 @ 07:49)  Hemoglobin A1C, Whole Blood: 8.3 % <H> [4.0 - 5.6] (11-23-17 @ 11:59)

## 2017-11-30 NOTE — PROGRESS NOTE ADULT - ASSESSMENT
89 y/o female hx of CVA on ASA plavix, w/ bilateral vision loss, HTN, HLD, not on AC presents from assisted living [place] with a fall. Per patient, pants were wet, so she walking to her room and she tripped and hit face on the floor. No LOC. Normally walks with a walker but was not using it today. Reports L sided face pain, R knee pain, L foot and ankle pain, and chipped front tooth. Was unable to get off floor by herself - aid had to help - on floor for 5 minutes. Denies any Chest pain, SOB, N/V/D, confusion. A+O x 3. Been following with Derm for R arm rash - will find out biopsy results on Monday. S/P ORIF Right Hip 11/23/2017 with no significant medical complications to date.

## 2017-11-30 NOTE — CHART NOTE - NSCHARTNOTEFT_GEN_A_CORE
Received call from nurse stating patient did not tolerate second attempt at MRI brain.  Patient received xanax before procedure but still became agitated and refused test.  Will follow up with Neuro in AM to discuss plan.    Ken Whitten PA-C  Orthopedic Surgery  Pager: [phone]  Spectra: [phone]

## 2017-11-30 NOTE — PROGRESS NOTE ADULT - ASSESSMENT
89 y/o F w/h/o HTN/HLD/CVA/Hypothyroidism. Here with hip fx s/p ORIF of R femur. New T2DM diagnosis. Tolerating POs. Glycemic control good partiuclarly for age with goal to avoid low BG.  No hypoglycemia. No need for tight glycemic control due to age.  Plan to discharge on oral DM med only, and discussed with pt and her family on admission this plan.

## 2017-12-01 LAB
GLUCOSE BLDC GLUCOMTR-MCNC: 152 MG/DL — HIGH (ref 70–99)
GLUCOSE BLDC GLUCOMTR-MCNC: 156 MG/DL — HIGH (ref 70–99)
GLUCOSE BLDC GLUCOMTR-MCNC: 186 MG/DL — HIGH (ref 70–99)
GLUCOSE BLDC GLUCOMTR-MCNC: 226 MG/DL — HIGH (ref 70–99)

## 2017-12-01 PROCEDURE — 70551 MRI BRAIN STEM W/O DYE: CPT | Mod: 26

## 2017-12-01 RX ORDER — ALPRAZOLAM 0.25 MG
1 TABLET ORAL ONCE
Qty: 0 | Refills: 0 | Status: DISCONTINUED | OUTPATIENT
Start: 2017-12-01 | End: 2017-12-01

## 2017-12-01 RX ADMIN — ENOXAPARIN SODIUM 30 MILLIGRAM(S): 100 INJECTION SUBCUTANEOUS at 12:42

## 2017-12-01 RX ADMIN — CLOPIDOGREL BISULFATE 75 MILLIGRAM(S): 75 TABLET, FILM COATED ORAL at 12:41

## 2017-12-01 RX ADMIN — Medication 4 UNIT(S): at 19:17

## 2017-12-01 RX ADMIN — Medication 500 MILLIGRAM(S): at 19:18

## 2017-12-01 RX ADMIN — Medication 1 MILLIGRAM(S): at 12:41

## 2017-12-01 RX ADMIN — Medication 1: at 12:41

## 2017-12-01 RX ADMIN — Medication 81 MILLIGRAM(S): at 12:41

## 2017-12-01 RX ADMIN — Medication 1 TABLET(S): at 12:41

## 2017-12-01 RX ADMIN — Medication 0.2 MILLIGRAM(S): at 05:56

## 2017-12-01 RX ADMIN — Medication 4 UNIT(S): at 12:41

## 2017-12-01 RX ADMIN — Medication 325 MILLIGRAM(S): at 05:56

## 2017-12-01 RX ADMIN — INSULIN GLARGINE 10 UNIT(S): 100 INJECTION, SOLUTION SUBCUTANEOUS at 22:30

## 2017-12-01 RX ADMIN — Medication 3 MILLILITER(S): at 05:58

## 2017-12-01 RX ADMIN — AMLODIPINE BESYLATE 5 MILLIGRAM(S): 2.5 TABLET ORAL at 19:18

## 2017-12-01 RX ADMIN — Medication 3 MILLILITER(S): at 12:42

## 2017-12-01 RX ADMIN — QUETIAPINE FUMARATE 25 MILLIGRAM(S): 200 TABLET, FILM COATED ORAL at 22:30

## 2017-12-01 RX ADMIN — AMLODIPINE BESYLATE 5 MILLIGRAM(S): 2.5 TABLET ORAL at 05:56

## 2017-12-01 RX ADMIN — Medication 1 MILLIGRAM(S): at 15:58

## 2017-12-01 RX ADMIN — CARVEDILOL PHOSPHATE 25 MILLIGRAM(S): 80 CAPSULE, EXTENDED RELEASE ORAL at 19:18

## 2017-12-01 RX ADMIN — CARVEDILOL PHOSPHATE 25 MILLIGRAM(S): 80 CAPSULE, EXTENDED RELEASE ORAL at 05:56

## 2017-12-01 RX ADMIN — Medication 500 MILLIGRAM(S): at 05:56

## 2017-12-01 RX ADMIN — Medication 3 MILLILITER(S): at 19:18

## 2017-12-01 RX ADMIN — Medication 325 MILLIGRAM(S): at 06:25

## 2017-12-01 RX ADMIN — POLYETHYLENE GLYCOL 3350 17 GRAM(S): 17 POWDER, FOR SOLUTION ORAL at 12:42

## 2017-12-01 RX ADMIN — PANTOPRAZOLE SODIUM 40 MILLIGRAM(S): 20 TABLET, DELAYED RELEASE ORAL at 06:03

## 2017-12-01 RX ADMIN — Medication 50 MICROGRAM(S): at 05:56

## 2017-12-01 RX ADMIN — Medication 3 MILLILITER(S): at 00:55

## 2017-12-01 RX ADMIN — Medication 4 UNIT(S): at 08:38

## 2017-12-01 RX ADMIN — ATORVASTATIN CALCIUM 40 MILLIGRAM(S): 80 TABLET, FILM COATED ORAL at 22:30

## 2017-12-01 NOTE — DIETITIAN INITIAL EVALUATION ADULT. - PT NOT SOURCE
Pt with worsened confusion/change in mental status per chart, pt able to answer simple questions, question accuracy of information obtained.

## 2017-12-01 NOTE — DIETITIAN INITIAL EVALUATION ADULT. - OTHER INFO
Nutrition assessment for length of stay. Based on RD note on 7/24/17, pt's wt appears stable with a 3 pounds wt gain from 137 pounds to current admission wt of 140 pounds. Pt reports her appetite and po intake remain good, RN and PCA confirm. No reports of nausea or vomiting. Pt with multiple BMs, as per RN holding stool softeners. No reports of chewing/swallowing difficulty. No food allergies documented.

## 2017-12-01 NOTE — PROGRESS NOTE ADULT - ASSESSMENT
Pt s/p R Hip IM Nail  -Orthopaedically Stable for Discharge  -DVT Prophylaxis  -WBAT LARISSA Moreira PACatiaC  [phone]

## 2017-12-01 NOTE — PROGRESS NOTE ADULT - SUBJECTIVE AND OBJECTIVE BOX
Patient is a 89y old  Female who presents with a chief complaint of Right Hip Fracture - Intramedullary Nail of the Right Hip (24 Nov 2017 10:37)      HPI:    MEDICATIONS  (STANDING):  ALBUTerol/ipratropium for Nebulization 3 milliLiter(s) Nebulizer every 6 hours  amLODIPine   Tablet 5 milliGRAM(s) Oral two times a day  ascorbic acid 500 milliGRAM(s) Oral two times a day  aspirin enteric coated 81 milliGRAM(s) Oral daily  atorvastatin 40 milliGRAM(s) Oral at bedtime  carvedilol 25 milliGRAM(s) Oral every 12 hours  cloNIDine 0.2 milliGRAM(s) Oral daily  clopidogrel Tablet 75 milliGRAM(s) Oral daily  dextrose 5%. 1000 milliLiter(s) (50 mL/Hr) IV Continuous <Continuous>  dextrose 50% Injectable 12.5 Gram(s) IV Push once  dextrose 50% Injectable 25 Gram(s) IV Push once  dextrose 50% Injectable 25 Gram(s) IV Push once  docusate sodium 100 milliGRAM(s) Oral three times a day  enoxaparin Injectable 30 milliGRAM(s) SubCutaneous daily  folic acid 1 milliGRAM(s) Oral daily  insulin glargine Injectable (LANTUS) 10 Unit(s) SubCutaneous at bedtime  insulin lispro (HumaLOG) corrective regimen sliding scale   SubCutaneous at bedtime  insulin lispro (HumaLOG) corrective regimen sliding scale   SubCutaneous three times a day before meals  insulin lispro Injectable (HumaLOG) 4 Unit(s) SubCutaneous three times a day before meals  levothyroxine 50 MICROGram(s) Oral daily  multivitamin 1 Tablet(s) Oral daily  pantoprazole    Tablet 40 milliGRAM(s) Oral before breakfast  polyethylene glycol 3350 17 Gram(s) Oral daily  QUEtiapine 25 milliGRAM(s) Oral at bedtime    MEDICATIONS  (PRN):  acetaminophen   Tablet 650 milliGRAM(s) Oral every 6 hours PRN For Temp greater than 38 C (100.4 F)  acetaminophen   Tablet. 325 milliGRAM(s) Oral every 4 hours PRN Mild Pain (1 - 3)  benzocaine 15 mG/menthol 3.6 mG Lozenge 1 Lozenge Oral every 2 hours PRN Sore Throat  calcium carbonate 500 mG (Tums) Chewable 3 Tablet(s) Chew every 6 hours PRN Dyspepsia  dextrose Gel 1 Dose(s) Oral once PRN Blood Glucose LESS THAN 70 milliGRAM(s)/deciliter  glucagon  Injectable 1 milliGRAM(s) IntraMuscular once PRN Glucose LESS THAN 70 milligrams/deciliter  HYDROmorphone  Injectable 0.5 milliGRAM(s) IV Push every 4 hours PRN breakthrough  ondansetron Injectable 4 milliGRAM(s) IV Push every 6 hours PRN Nausea and/or Vomiting  senna 2 Tablet(s) Oral at bedtime PRN Constipation      Allergies    No Known Allergies    Intolerances        REVIEW OF SYSTEM:  CONSTITUTIONAL: No fever, No change in weight, No fatigue  HEAD: No headache, No dizziness, No recent trauma  EYES: No eye pain, No visual disturbances, No discharge  ENT:  No difficulty hearing, No tinnitus, No vertigo, No sinus pain, No throat pain  NECK: No pain, No stiffness  BREASTS: No pain, No masses, No nipple discharge  RESPIRATORY: No cough, No wheezing, No chills, No hemoptysis, No shortness of breath at rest or exertional shortness of breath  CARDIOVASCULAR: No chest pain, No palpitations, No dizziness, No CHF, No arrhythmia, No cardiomegaly, No leg swelling  GASTROINTESTINAL: No abdominal, No epigastric pain. No nausea, No vomiting, No hematemesis, No diarrhea, No constipation. No melena, No hematochezia. No GERD  GENITOURINARY: No dysuria, No frequency, No hematuria, No incontinence, No nocturia, No hesitancy,  SKIN: No itching, No burning, No rashes, No lesions   LYMPH NODES: No history of enlarged glands  ENDOCRINE: No heat or cold intolerance, No hair loss. No osteoporosis, No thyroid disease  MUSCULOSKELETAL: No joint pain or swelling, No muscle, back, or extremity pain  PSYCHIATRIC: No depression, No anxiety, No mood swings, No difficulty sleeping  HEME/LYMPH: No easy bruising, No anticoagulants, No bleeding disorder, No bleeding gums  ALLERGY AND IMMUNOLOGIC: No hives, No eczema  NEUROLOGICAL: No memory loss, No loss of strength, No numbness, No tremors        VITALS:   T(C): 36.4 (12-01-17 @ 20:35), Max: 37.2 (12-01-17 @ 00:25)  HR: 63 (12-01-17 @ 20:35) (63 - 78)  BP: 126/73 (12-01-17 @ 20:35) (122/74 - 149/76)  RR: 16 (12-01-17 @ 20:35) (16 - 18)  SpO2: 94% (12-01-17 @ 20:35) (93% - 98%)  Wt(kg): --    11-30 @ 07:01  -  12-01 @ 07:00  --------------------------------------------------------  IN: 950 mL / OUT: 0 mL / NET: 950 mL    12-01 @ 07:01  -  12-01 @ 23:21  --------------------------------------------------------  IN: 800 mL / OUT: 0 mL / NET: 800 mL        PHYSICAL EXAM:  GENERAL: NAD, well nourished and conversant  HEAD:  Atraumatic  EYES: EOM, PERRLA, conjunctiva pink and sclera white  ENT: No tonsillar erythema, exudates, or enlargement, moist mucous membranes, good dentition, no lesions  NECK: Supple, No JVD, normal thyroid, carotids with normal upstrokes and no bruits  CHEST/LUNG: Clear to auscultation bilaterally, No rales, rhonchi, wheezing, or rubs  HEART: Regular rate and rhythm, No murmurs, rubs, or gallops  ABDOMEN: Soft, nondistended, no masses, guarding, tenderness or rebound, bowel sounds present  EXTREMITIES:  2+ Peripheral Pulses, No clubbing, cyanosis, or edema. No arthritis of shoulders, elbows, hands, hips, knees, ankles, or feet. No DJD C spine, T spine, or L/S spine  LYMPH: No lymphadenopathy noted  SKIN: No rashes or lesions  NERVOUS SYSTEM:  Alert & Oriented X3, normal cognitive function. Motor Strength 5/5 right upper and right lower.  5/5 left upper and left lower extremities, DTRs 2+ intact and symmetric    LABS:            CAPILLARY BLOOD GLUCOSE  226 (01 Dec 2017 12:28)      POCT Blood Glucose.: 152 mg/dL (01 Dec 2017 22:08)  POCT Blood Glucose.: 156 mg/dL (01 Dec 2017 19:10)  POCT Blood Glucose.: 226 mg/dL (01 Dec 2017 12:18)  POCT Blood Glucose.: 186 mg/dL (01 Dec 2017 07:55)      RADIOLOGY & ADDITIONAL TESTS:      Consultant(s):    Care Discussed with Consultants/Other Providers [ ] YES  [ ] NO Patient is a 89y old  Female who presents with a chief complaint of Right Hip Fracture - Intramedullary Nail of the Right Hip (24 Nov 2017 10:37)      HPI:  Patient with altered mental status,   Will  attempt to get MRI of brain with sedation. Discussed possible palliative care consult with Dr Parker patient 's nephew .    MEDICATIONS  (STANDING):  ALBUTerol/ipratropium for Nebulization 3 milliLiter(s) Nebulizer every 6 hours  amLODIPine   Tablet 5 milliGRAM(s) Oral two times a day  ascorbic acid 500 milliGRAM(s) Oral two times a day  aspirin enteric coated 81 milliGRAM(s) Oral daily  atorvastatin 40 milliGRAM(s) Oral at bedtime  carvedilol 25 milliGRAM(s) Oral every 12 hours  cloNIDine 0.2 milliGRAM(s) Oral daily  clopidogrel Tablet 75 milliGRAM(s) Oral daily  dextrose 5%. 1000 milliLiter(s) (50 mL/Hr) IV Continuous <Continuous>  dextrose 50% Injectable 12.5 Gram(s) IV Push once  dextrose 50% Injectable 25 Gram(s) IV Push once  dextrose 50% Injectable 25 Gram(s) IV Push once  docusate sodium 100 milliGRAM(s) Oral three times a day  enoxaparin Injectable 30 milliGRAM(s) SubCutaneous daily  folic acid 1 milliGRAM(s) Oral daily  insulin glargine Injectable (LANTUS) 10 Unit(s) SubCutaneous at bedtime  insulin lispro (HumaLOG) corrective regimen sliding scale   SubCutaneous at bedtime  insulin lispro (HumaLOG) corrective regimen sliding scale   SubCutaneous three times a day before meals  insulin lispro Injectable (HumaLOG) 4 Unit(s) SubCutaneous three times a day before meals  levothyroxine 50 MICROGram(s) Oral daily  multivitamin 1 Tablet(s) Oral daily  pantoprazole    Tablet 40 milliGRAM(s) Oral before breakfast  polyethylene glycol 3350 17 Gram(s) Oral daily  QUEtiapine 25 milliGRAM(s) Oral at bedtime    MEDICATIONS  (PRN):  acetaminophen   Tablet 650 milliGRAM(s) Oral every 6 hours PRN For Temp greater than 38 C (100.4 F)  acetaminophen   Tablet. 325 milliGRAM(s) Oral every 4 hours PRN Mild Pain (1 - 3)  benzocaine 15 mG/menthol 3.6 mG Lozenge 1 Lozenge Oral every 2 hours PRN Sore Throat  calcium carbonate 500 mG (Tums) Chewable 3 Tablet(s) Chew every 6 hours PRN Dyspepsia  dextrose Gel 1 Dose(s) Oral once PRN Blood Glucose LESS THAN 70 milliGRAM(s)/deciliter  glucagon  Injectable 1 milliGRAM(s) IntraMuscular once PRN Glucose LESS THAN 70 milligrams/deciliter  HYDROmorphone  Injectable 0.5 milliGRAM(s) IV Push every 4 hours PRN breakthrough  ondansetron Injectable 4 milliGRAM(s) IV Push every 6 hours PRN Nausea and/or Vomiting  senna 2 Tablet(s) Oral at bedtime PRN Constipation      Allergies    No Known Allergies    Intolerances        REVIEW OF SYSTEM:  Unable patient non verbal and lethargic        VITALS:   T(C): 36.4 (12-01-17 @ 20:35), Max: 37.2 (12-01-17 @ 00:25)  HR: 63 (12-01-17 @ 20:35) (63 - 78)  BP: 126/73 (12-01-17 @ 20:35) (122/74 - 149/76)  RR: 16 (12-01-17 @ 20:35) (16 - 18)  SpO2: 94% (12-01-17 @ 20:35) (93% - 98%)  Wt(kg): --    11-30 @ 07:01  -  12-01 @ 07:00  --------------------------------------------------------  IN: 950 mL / OUT: 0 mL / NET: 950 mL    12-01 @ 07:01  -  12-01 @ 23:21  --------------------------------------------------------  IN: 800 mL / OUT: 0 mL / NET: 800 mL        PHYSICAL EXAM:  GENERAL: NAD, well nourished and not conversant  HEAD:  Atraumatic  EYES:  conjunctiva pink and sclera white  NECK: Supple, No JVD, normal thyroid, carotids with normal upstrokes and no bruits  CHEST/LUNG: Clear to auscultation bilaterally, No rales, rhonchi, wheezing, or rubs  HEART: Regular rate and rhythm, No murmurs, rubs, or gallops  ABDOMEN: Soft, nondistended, no masses, guarding, tenderness or rebound, bowel sounds present  EXTREMITIES:  2+ Peripheral Pulses, No clubbing, cyanosis, or edema. es, or feet.     (+)IM nail right hip  LYMPH: No lymphadenopathy noted  SKIN: No rashes or lesions  NERVOUS SYSTEM:  lethargic and non resposive     LABS:            CAPILLARY BLOOD GLUCOSE  226 (01 Dec 2017 12:28)      POCT Blood Glucose.: 152 mg/dL (01 Dec 2017 22:08)  POCT Blood Glucose.: 156 mg/dL (01 Dec 2017 19:10)  POCT Blood Glucose.: 226 mg/dL (01 Dec 2017 12:18)  POCT Blood Glucose.: 186 mg/dL (01 Dec 2017 07:55)      RADIOLOGY & ADDITIONAL TESTS:      Consultant(s):    Care Discussed with Consultants/Other Providers [ ] YES  [ ] NO

## 2017-12-01 NOTE — DIETITIAN INITIAL EVALUATION ADULT. - ORAL INTAKE PTA
good/Pt came from assisted living. Typical intake:  coffee, bagel with butter and juice for breakfast; sandwich and soup for lunch; hamburger for dinner. Pt noted take MVI, Vitamin C, folic acid and Vitamin D PTA per H&P.

## 2017-12-01 NOTE — PROGRESS NOTE ADULT - ASSESSMENT
89y Female with a history of HT,HLD, strokes WITH WORSENED  confusion  re attempt MRI w ativan larger relative dose--- for poss? new ischemia  TFT, B12  neuro chks-

## 2017-12-01 NOTE — PROGRESS NOTE ADULT - ASSESSMENT
89 y/o female hx of CVA on ASA plavix, w/ bilateral vision loss, HTN, HLD, not on AC presents from assisted living [place] with a fall. Per patient, pants were wet, so she walking to her room and she tripped and hit face on the floor. No LOC. Normally walks with a walker but was not using it today. Reports L sided face pain, R knee pain, L foot and ankle pain, and chipped front tooth. Was unable to get off floor by herself - aid had to help - on floor for 5 minutes. Denies any Chest pain, SOB, N/V/D, confusion. A+O x 3. Been following with Derm for R arm rash - will find out biopsy results on Monday. S/P ORIF Right Hip 11/23/2017 with altered mental status and possible stroke .Trying to get MRI of Brain.  Palliative care consult at request of patient's family.

## 2017-12-01 NOTE — PROGRESS NOTE ADULT - SUBJECTIVE AND OBJECTIVE BOX
Neurology Progress Note      the patient's symptoms  AMS are  unchanged    MEDICATIONS  (STANDING):  ALBUTerol/ipratropium for Nebulization 3 milliLiter(s) Nebulizer every 6 hours  amLODIPine   Tablet 5 milliGRAM(s) Oral two times a day  ascorbic acid 500 milliGRAM(s) Oral two times a day  aspirin enteric coated 81 milliGRAM(s) Oral daily  atorvastatin 40 milliGRAM(s) Oral at bedtime  carvedilol 25 milliGRAM(s) Oral every 12 hours  cloNIDine 0.2 milliGRAM(s) Oral daily  clopidogrel Tablet 75 milliGRAM(s) Oral daily  dextrose 5%. 1000 milliLiter(s) (50 mL/Hr) IV Continuous <Continuous>  dextrose 50% Injectable 12.5 Gram(s) IV Push once  dextrose 50% Injectable 25 Gram(s) IV Push once  dextrose 50% Injectable 25 Gram(s) IV Push once  docusate sodium 100 milliGRAM(s) Oral three times a day  enoxaparin Injectable 30 milliGRAM(s) SubCutaneous daily  folic acid 1 milliGRAM(s) Oral daily  insulin glargine Injectable (LANTUS) 10 Unit(s) SubCutaneous at bedtime  insulin lispro (HumaLOG) corrective regimen sliding scale   SubCutaneous at bedtime  insulin lispro (HumaLOG) corrective regimen sliding scale   SubCutaneous three times a day before meals  insulin lispro Injectable (HumaLOG) 4 Unit(s) SubCutaneous three times a day before meals  levothyroxine 50 MICROGram(s) Oral daily  multivitamin 1 Tablet(s) Oral daily  pantoprazole    Tablet 40 milliGRAM(s) Oral before breakfast  polyethylene glycol 3350 17 Gram(s) Oral daily  QUEtiapine 25 milliGRAM(s) Oral at bedtime    MEDICATIONS  (PRN):  acetaminophen   Tablet 650 milliGRAM(s) Oral every 6 hours PRN For Temp greater than 38 C (100.4 F)  acetaminophen   Tablet. 325 milliGRAM(s) Oral every 4 hours PRN Mild Pain (1 - 3)  benzocaine 15 mG/menthol 3.6 mG Lozenge 1 Lozenge Oral every 2 hours PRN Sore Throat  calcium carbonate 500 mG (Tums) Chewable 3 Tablet(s) Chew every 6 hours PRN Dyspepsia  dextrose Gel 1 Dose(s) Oral once PRN Blood Glucose LESS THAN 70 milliGRAM(s)/deciliter  glucagon  Injectable 1 milliGRAM(s) IntraMuscular once PRN Glucose LESS THAN 70 milligrams/deciliter  HYDROmorphone  Injectable 0.5 milliGRAM(s) IV Push every 4 hours PRN breakthrough  ondansetron Injectable 4 milliGRAM(s) IV Push every 6 hours PRN Nausea and/or Vomiting  senna 2 Tablet(s) Oral at bedtime PRN Constipation              Vital Signs Last 24 Hrs  T(C): 36.6 (01 Dec 2017 07:54), Max: 37.2 (01 Dec 2017 00:25)  T(F): 97.9 (01 Dec 2017 07:54), Max: 98.9 (01 Dec 2017 00:25)  HR: 76 (01 Dec 2017 07:54) (69 - 78)  BP: 122/74 (01 Dec 2017 07:54) (115/76 - 149/76)  BP(mean): --  RR: 18 (01 Dec 2017 07:54) (18 - 18)  SpO2: 98% (01 Dec 2017 07:54) (94% - 98%)    Physical exam    MENTAL STATUS- Alert, attends, fluent, non-dysarthric, follows commands, oriented to place month not yr, poor memory and good affect.  CRANIAL NERVES-II Optic - Bilateral -  Visual Fields.-inconsistent, III-IV-VI EOMI & Pupils- irreg s/p sg - Bilateral - Normal Bilaterally. V Trigeminal - Bilateral - Normal bilateral facial sensation and jaw movement. VII Facial - Normal bilateral facial movement. VIII Acoustic - Bilateral - Hearing normal to voice bilaterally. IX Glossopharyngeal / X Vagus - Normal palate elevates symmetrically. XI Accessory - Normal Bilaterally. XII Hypoglossal - Tongue protrudes midline and moves symmetrically.  MOTOR-Bulk and Contour - Normal. Tone - Normal tone, no abnormal movements. Strength - 5/5 normal muscle strength xc legs limited by fx and r shoulder - Strength full throughout.  SENSORY-inconsistent  REFLEXES- DTR's 0-1+ throughout.  COORDINATION-Normal  , FNF - bilaterally.  GAIT-NA

## 2017-12-01 NOTE — DIETITIAN INITIAL EVALUATION ADULT. - ADHERENCE
Pt unaware of modified diet or T2DM medication PTA. Per Endocrinology, new diagnosis. Noted previous Hba1c 9.6% on 7/24; 11/24: Hba1c 8.2%

## 2017-12-01 NOTE — PROGRESS NOTE ADULT - SUBJECTIVE AND OBJECTIVE BOX
12-01-17 @ 07:01    Pt comfortable.  Pain well controlled.  No overnight events.    T(C): 36.9 (12-01-17 @ 04:42), Max: 37.2 (12-01-17 @ 00:25)  HR: 74 (12-01-17 @ 05:55) (68 - 78)  BP: 139/74 (12-01-17 @ 05:55) (115/76 - 149/76)  RR: 18 (12-01-17 @ 04:42) (18 - 18)  SpO2: 97% (12-01-17 @ 04:42) (94% - 97%)    Right hip incisions clean/dry/intact.  +DF/PF.  +Distal Pulses.   Motor/Sensory function grossly intact.  Calves soft/NT with venodynes  intact.

## 2017-12-01 NOTE — DIETITIAN INITIAL EVALUATION ADULT. - PERTINENT MEDS FT
Humalog corrective sliding scale, Humalog, Lantus, Humalog corrective sliding scale, Miralax, Vitamin C, TUMS, colace, folic acid, MVI, Protonix, Zofran, senna

## 2017-12-01 NOTE — DIETITIAN INITIAL EVALUATION ADULT. - ENERGY NEEDS
IBW: 100 lbs (+/-10%), %IBW: 140%  Pertinent Information: Pt S/P right hip IM Nail with worsened confusion/change in mental status.   2+ right hip edema, no pressure injury

## 2017-12-02 LAB
GLUCOSE BLDC GLUCOMTR-MCNC: 120 MG/DL — HIGH (ref 70–99)
GLUCOSE BLDC GLUCOMTR-MCNC: 149 MG/DL — HIGH (ref 70–99)
GLUCOSE BLDC GLUCOMTR-MCNC: 170 MG/DL — HIGH (ref 70–99)
GLUCOSE BLDC GLUCOMTR-MCNC: 219 MG/DL — HIGH (ref 70–99)

## 2017-12-02 RX ADMIN — Medication 3 MILLILITER(S): at 00:41

## 2017-12-02 RX ADMIN — QUETIAPINE FUMARATE 25 MILLIGRAM(S): 200 TABLET, FILM COATED ORAL at 21:36

## 2017-12-02 RX ADMIN — INSULIN GLARGINE 10 UNIT(S): 100 INJECTION, SOLUTION SUBCUTANEOUS at 21:37

## 2017-12-02 RX ADMIN — Medication 325 MILLIGRAM(S): at 21:37

## 2017-12-02 RX ADMIN — AMLODIPINE BESYLATE 5 MILLIGRAM(S): 2.5 TABLET ORAL at 18:11

## 2017-12-02 RX ADMIN — Medication 1 TABLET(S): at 11:55

## 2017-12-02 RX ADMIN — Medication 50 MICROGRAM(S): at 05:36

## 2017-12-02 RX ADMIN — Medication 325 MILLIGRAM(S): at 22:37

## 2017-12-02 RX ADMIN — Medication 1 MILLIGRAM(S): at 11:55

## 2017-12-02 RX ADMIN — AMLODIPINE BESYLATE 5 MILLIGRAM(S): 2.5 TABLET ORAL at 05:36

## 2017-12-02 RX ADMIN — ENOXAPARIN SODIUM 30 MILLIGRAM(S): 100 INJECTION SUBCUTANEOUS at 11:56

## 2017-12-02 RX ADMIN — Medication 4 UNIT(S): at 09:01

## 2017-12-02 RX ADMIN — CARVEDILOL PHOSPHATE 25 MILLIGRAM(S): 80 CAPSULE, EXTENDED RELEASE ORAL at 18:11

## 2017-12-02 RX ADMIN — Medication 81 MILLIGRAM(S): at 11:55

## 2017-12-02 RX ADMIN — ATORVASTATIN CALCIUM 40 MILLIGRAM(S): 80 TABLET, FILM COATED ORAL at 21:36

## 2017-12-02 RX ADMIN — Medication 4 UNIT(S): at 13:00

## 2017-12-02 RX ADMIN — Medication 3 MILLILITER(S): at 05:36

## 2017-12-02 RX ADMIN — Medication 3 MILLILITER(S): at 11:56

## 2017-12-02 RX ADMIN — Medication 500 MILLIGRAM(S): at 05:36

## 2017-12-02 RX ADMIN — Medication 0.2 MILLIGRAM(S): at 05:40

## 2017-12-02 RX ADMIN — POLYETHYLENE GLYCOL 3350 17 GRAM(S): 17 POWDER, FOR SOLUTION ORAL at 11:55

## 2017-12-02 RX ADMIN — CLOPIDOGREL BISULFATE 75 MILLIGRAM(S): 75 TABLET, FILM COATED ORAL at 11:55

## 2017-12-02 RX ADMIN — Medication 500 MILLIGRAM(S): at 18:11

## 2017-12-02 RX ADMIN — CARVEDILOL PHOSPHATE 25 MILLIGRAM(S): 80 CAPSULE, EXTENDED RELEASE ORAL at 05:36

## 2017-12-02 RX ADMIN — Medication 3 MILLILITER(S): at 18:10

## 2017-12-02 RX ADMIN — PANTOPRAZOLE SODIUM 40 MILLIGRAM(S): 20 TABLET, DELAYED RELEASE ORAL at 05:36

## 2017-12-02 NOTE — CONSULT NOTE ADULT - NSHPATTENDINGPLANDISCUSS_GEN_ALL_CORE
patient, staff and Dr. Parker
PA to arrange
PA and medical student on stroke consult service
Patient and family

## 2017-12-02 NOTE — PROGRESS NOTE ADULT - ASSESSMENT
89y Female with a history of HT,HLD, strokes WITH WORSENED  confusion  -likely due to new strokes on MRI- likely due due previous intracranial stenosis, anemia hypotension  rec high normotensive BP   maintain nl intravascular volume and H/H  called stroke team for other ? suggestions  TFT, B12  neuro chks- 89y Female with a history of HT,HLD, strokes WITH WORSENED  confusion  -likely due to new strokes on MRI- likely due due previous intracranial stenosis, anemia hypotension  rec high normotensive BP   maintain nl intravascular volume and H/H  dual antiplts  high dose statin  Rec holter tele  called stroke team for other ? suggestions  TFT, B12  neuro chks-

## 2017-12-02 NOTE — CONSULT NOTE ADULT - ASSESSMENT
89F complains of right hip pain for 1 day status post mechanical fall.  Patient is a household ambulator with a walker at baseline.  She was diagnosed with Right Hip Fracture and underwent Intramedullary Nail of the Right Hip (24 Nov 2017). Neurology was initially was consulted for intermittent confusion. MRI brain was ordered which showed multiple embolic stroke. Her examination was unremarkable.     Impression     acute small infarction in left internal capsule, left thalamus and bilateral centrum semiovale due to embolic source possibly from recent hip surgery     Plan 89F complains of right hip pain for 1 day status post mechanical fall.  Patient is a household ambulator with a walker at baseline.  She was diagnosed with Right Hip Fracture and underwent Intramedullary Nail of the Right Hip (24 Nov 2017). Neurology was initially was consulted for intermittent confusion. MRI brain was ordered which showed multiple embolic stroke. Her examination was unremarkable.     Impression     acute small infarction in left internal capsule, left thalamus and bilateral centrum semiovale due to embolic source possibly from recent hip surgery     Plan     BP goal : gradual normotensive   Aspirin and Plavix for 3 weeks followed by aspirin   MRA head   MRA neck with contrast   Echo with bubble study   Telemetry monitoring  Atorvastatin 80   LDL     Thank you for the opportunity to assist in the care of your patient.  If you have any questions, please feel free to contact us at [phone]

## 2017-12-02 NOTE — CONSULT NOTE ADULT - ATTENDING COMMENTS
I have seen and examined this patient with the stroke neurology team.     History was reviewed with the patient and/or available family members.   ROS: All negative except documented in the HPI.   Neurological exam was performed and agree with exam as documented above.   Laboratory results and imaging studies were reviewed by me.   I agree with the neurology resident note as documented above.    89 Y/O woman was admitted to [facility] for right hip surgery. She was evaluated by Dr. Tomlinson for evaluation of altered mental status/confusion on 11/29. MRI brain on 12/1 showed left PCA and bilateral MCA distribution punctate ischemic infarcts, moderate leukoaraiosis and old strokes involving bilateral occipital/temporal occipital lobes. Neurological examination today shows cognitive dysfunction without any obvious evidence of aphasia, bilateral vision loss by blink to threat - likely from previous strokes without any focal motor weakness (RUE 5-/5 due to pain and not able to move RLE proximally due to recent surgery for fracture but was able to move right foot without noticeable weakness).    Impression:  Cerebral embolism with cerebral infarction. Left PCA and bilateral MCA distribution stroke - likely etiology being embolism from a proximal source like cardiac/paradoxical source of embolism versus fat embolism secondary to large bone fracture, favoring the former    Plan:  - Aspirin 81 mg once a day for secondary stroke prevention  - Atorvastatin 80 mg at bedtime, further dose adjustment as per LDL  - HbA1c pending  - Transthoracic echocardiogram with bubble study and continued telemetry to screen for cardiac source of embolism  - Consider prolonged cardiac monitoring with ICM based on results of above mentioned cardiac tests  - Continue with aggressive vascular risk factors modification  - PT/OT/speech and swallow evaluation  - Would cont to follow    Above-mentioned plan as discussed the patient in detail. All the questions were answered and concerns were addressed. I have seen and examined this patient with the stroke neurology team.     History was reviewed with the patient and/or available family members.   ROS: All negative except documented in the HPI.   Neurological exam was performed and agree with exam as documented above.   Laboratory results and imaging studies were reviewed by me.   I agree with the neurology resident note as documented above.    89 Y/O woman was admitted to [facility] for right hip surgery. She was evaluated by Dr. Tomlinson for evaluation of altered mental status/confusion on 11/29. MRI brain on 12/1 showed left PCA and bilateral MCA distribution punctate ischemic infarcts, moderate leukoaraiosis and old strokes involving bilateral occipital/temporal occipital lobes. Neurological examination today shows cognitive dysfunction without any obvious evidence of aphasia, bilateral vision loss by blink to threat - likely from previous strokes without any focal motor weakness (RUE 5-/5 due to pain and not able to move RLE proximally due to recent surgery for fracture but was able to move right foot without noticeable weakness).    Impression:  Cerebral embolism with cerebral infarction. Left PCA and bilateral MCA distribution stroke - likely etiology being embolism from a proximal source like cardiac/paradoxical source of embolism versus fat embolism secondary to large bone fracture, favoring the former    Plan:  - Aspirin 81 mg once a day for secondary stroke prevention stand point   - Atorvastatin 80 mg at bedtime, further dose adjustment as per LDL  - HbA1c - pending  - Transthoracic echocardiogram with bubble study and continued telemetry to screen for cardiac source of embolism  - Consider prolonged cardiac monitoring with ICM based on results of above mentioned cardiac tests  - Continue with aggressive vascular risk factors modification  - PT/OT/speech and swallow evaluation  - Would cont to follow    Above-mentioned plan as discussed the patient in detail. All the questions were answered and concerns were addressed. I have seen and examined this patient with the stroke neurology team.     History was reviewed with the patient and/or available family members.   ROS: All negative except documented in the HPI.   Neurological exam was performed and agree with exam as documented above.   Laboratory results and imaging studies were reviewed by me.   I agree with the neurology resident note as documented above.    89 Y/O woman was admitted to [facility] for right hip surgery. She was evaluated by Dr. Tomlinson for evaluation of altered mental status/confusion on 11/29. MRI brain on 12/1 showed left PCA and bilateral MCA distribution punctate ischemic infarcts, moderate leukoaraiosis and old strokes involving bilateral occipital/temporal occipital lobes. MRA head and neck in 2013 showed diffuse intracranial and extracranial moderate to severe large vessel severe stenosis involving both anterior and posterior circulation. Neurological examination today shows cognitive dysfunction without any obvious evidence of aphasia, bilateral vision loss by blink to threat - likely from previous strokes without any focal motor weakness (RUE 5-/5 due to pain and not able to move RLE proximally due to recent surgery for fracture but was able to move right foot without noticeable weakness).    Impression:  Cerebral embolism with cerebral infarction. Left PCA and bilateral MCA distribution stroke - likely etiology being embolism from a proximal source like cardiac/paradoxical source (fat embolism secondary to large bone fracture) of embolism versus large vessel disease, favoring the former    Plan:  - Agree to continue with aspirin and clopidogrel as per home dose   - Atorvastatin 80 mg at bedtime, further dose adjustment as per LDL  - HbA1c - pending  - Transthoracic echocardiogram with bubble study and continued telemetry to screen for cardiac source of embolism  - Consider prolonged cardiac monitoring with ICM based on results of above mentioned cardiac tests  - Continue with aggressive vascular risk factors modification  - PT/OT/speech and swallow evaluation  - Would cont to follow    Above-mentioned plan as discussed the patient in detail. All the questions were answered and concerns were addressed.

## 2017-12-02 NOTE — PROVIDER CONTACT NOTE (OTHER) - BACKGROUND
Pt admitted for fall. Pt transferred to telemetry for new strokes on MRI. Pts problem dx change in mental status.

## 2017-12-02 NOTE — PROVIDER CONTACT NOTE (OTHER) - ACTION/TREATMENT ORDERED:
NELY Mishra aware, will continue to educate pt on importance of IV access and telemetry monitoring. Will continue to assess VS as ordered. Will continue to monitor pt.

## 2017-12-02 NOTE — PROGRESS NOTE ADULT - SUBJECTIVE AND OBJECTIVE BOX
89 y/o female hx of CVA on ASA plavix, w/ bilateral vision loss, HTN, HLD, not on AC presents from assisted living [facility] with a fall. Per patient, pants were wet, so she walking to her room and she tripped and hit face on the floor. No LOC. Normally walks with a walker but was not using it today. Reports L sided face pain, R knee pain, L foot and ankle pain, and chipped front tooth. Was unable to get off floor by herself - aid had to help - on floor for 5 minutes. Denies any Chest pain, SOB, N/V/D, confusion. A+O x 3. Been following with Derm for R arm rash - will find out biopsy results on Monday. S/P ORIF Right Hip 11/23/2017 with no significant medical complications to date. discussed with Patient nephew. Patient is not usually confused at home)        MEDICATIONS  (STANDING):  ALBUTerol/ipratropium for Nebulization 3 milliLiter(s) Nebulizer every 6 hours  amLODIPine   Tablet 5 milliGRAM(s) Oral two times a day  ascorbic acid 500 milliGRAM(s) Oral two times a day  aspirin enteric coated 81 milliGRAM(s) Oral daily  atorvastatin 40 milliGRAM(s) Oral at bedtime  carvedilol 25 milliGRAM(s) Oral every 12 hours  cloNIDine 0.2 milliGRAM(s) Oral daily  clopidogrel Tablet 75 milliGRAM(s) Oral daily  dextrose 5%. 1000 milliLiter(s) (50 mL/Hr) IV Continuous <Continuous>  dextrose 50% Injectable 12.5 Gram(s) IV Push once  dextrose 50% Injectable 25 Gram(s) IV Push once  dextrose 50% Injectable 25 Gram(s) IV Push once  docusate sodium 100 milliGRAM(s) Oral three times a day  enoxaparin Injectable 30 milliGRAM(s) SubCutaneous daily  folic acid 1 milliGRAM(s) Oral daily  insulin glargine Injectable (LANTUS) 10 Unit(s) SubCutaneous at bedtime  insulin lispro (HumaLOG) corrective regimen sliding scale   SubCutaneous at bedtime  insulin lispro (HumaLOG) corrective regimen sliding scale   SubCutaneous three times a day before meals  insulin lispro Injectable (HumaLOG) 4 Unit(s) SubCutaneous three times a day before meals  levothyroxine 50 MICROGram(s) Oral daily  multivitamin 1 Tablet(s) Oral daily  pantoprazole    Tablet 40 milliGRAM(s) Oral before breakfast  polyethylene glycol 3350 17 Gram(s) Oral daily  QUEtiapine 25 milliGRAM(s) Oral at bedtime    MEDICATIONS  (PRN):  acetaminophen   Tablet 650 milliGRAM(s) Oral every 6 hours PRN For Temp greater than 38 C (100.4 F)  acetaminophen   Tablet. 325 milliGRAM(s) Oral every 4 hours PRN Mild Pain (1 - 3)  benzocaine 15 mG/menthol 3.6 mG Lozenge 1 Lozenge Oral every 2 hours PRN Sore Throat  calcium carbonate 500 mG (Tums) Chewable 3 Tablet(s) Chew every 6 hours PRN Dyspepsia  dextrose Gel 1 Dose(s) Oral once PRN Blood Glucose LESS THAN 70 milliGRAM(s)/deciliter  glucagon  Injectable 1 milliGRAM(s) IntraMuscular once PRN Glucose LESS THAN 70 milligrams/deciliter  ondansetron Injectable 4 milliGRAM(s) IV Push every 6 hours PRN Nausea and/or Vomiting  senna 2 Tablet(s) Oral at bedtime PRN Constipation      REVIEW OF SYSTEM:  CONSTITUTIONAL: No fever, No change in weight, No fatigue  HEAD: No headache, No dizziness, No recent trauma  EYES: BLIND  ENT:  No difficulty hearing, No tinnitus, No vertigo, No sinus pain, No throat pain  NECK: No pain, No stiffness  RESPIRATORY: No cough, No wheezing, No chills, No hemoptysis, No shortness of breath at rest or exertional shortness of breath  CARDIOVASCULAR: No chest pain, No palpitations, No dizziness, No CHF, No arrhythmia, No cardiomegaly, No leg swelling  GASTROINTESTINAL: No abdominal, No epigastric pain. No nausea, No vomiting, No hematemesis, No diarrhea, No constipation. No melena, No hematochezia. No GERD  GENITOURINARY: No dysuria, No frequency, No hematuria, No incontinence, No nocturia, No hesitancy,  SKIN: No itching, No burning, No rashes, No lesions   LYMPH NODES: No history of enlarged glands  ENDOCRINE: No heat or cold intolerance, No hair loss. No osteoporosis, No thyroid disease  MUSCULOSKELETAL:     (+)  RIGHT HIP PAIN  PSYCHIATRIC: No depression, No anxiety, No mood swings, No difficulty sleeping  HEME/LYMPH: No easy bruising, No anticoagulants, No bleeding disorder, No bleeding gums  ALLERGY AND IMMUNOLOGIC: No hives, No eczema  NEUROLOGICAL: No memory loss, No loss of strength, No numbness, No tremors      VITALS:   T(C): 36.8 (12-02-17 @ 20:31), Max: 37.1 (12-02-17 @ 00:38)  HR: 78 (12-02-17 @ 20:31) (67 - 79)  BP: 98/58 (12-02-17 @ 20:31) (98/58 - 152/66)  RR: 18 (12-02-17 @ 20:31) (16 - 18)  SpO2: 94% (12-02-17 @ 20:31) (94% - 98%)  Wt(kg): --      PHYSICAL EXAM:  GENERAL: NAD, well nourished and conversant  HEAD:  Atraumatic  EYES:  PATIENT IS BLIND  ENT: No tonsillar erythema, exudates, or enlargement, moist mucous membranes, good dentition, no lesions  NECK: Supple, No JVD, normal thyroid, carotids with normal upstrokes and no bruits  CHEST/LUNG: Clear to auscultation bilaterally, No rales, rhonchi, wheezing, or rubs  HEART: Regular rate and rhythm, No murmurs, rubs, or gallops  ABDOMEN: Soft, nondistended, no masses, guarding, tenderness or rebound, bowel sounds present  EXTREMITIES:  2+ Peripheral Pulses, No clubbing, cyanosis, or edema.   (+) RIGHT HIP PAIN C/W HIP FRACTURE  LYMPH: No lymphadenopathy noted  SKIN: No rashes or lesions  NERVOUS SYSTEM:  Alert & Oriented X3, normal cognitive function. Motor Strength 5/5 right upper and right lower.  5/5 left upper and left lower extremities, DTRs 2+ intact and symmetric  LABS:                      CAPILLARY BLOOD GLUCOSE  120 (02 Dec 2017 18:08)      POCT Blood Glucose.: 120 mg/dL (02 Dec 2017 18:08)  POCT Blood Glucose.: 149 mg/dL (02 Dec 2017 11:55)  POCT Blood Glucose.: 170 mg/dL (02 Dec 2017 08:16)  POCT Blood Glucose.: 152 mg/dL (01 Dec 2017 22:08)      RADIOLOGY & ADDITIONAL TESTS:

## 2017-12-02 NOTE — PROGRESS NOTE ADULT - ASSESSMENT
89 y/o female hx of CVA on ASA plavix, w/ bilateral vision loss, HTN, HLD, not on AC presents from assisted living [facility] with a fall. Per patient, pants were wet, so she walking to her room and she tripped and hit face on the floor. No LOC. Normally walks with a walker but was not using it today. Reports L sided face pain, R knee pain, L foot and ankle pain, and chipped front tooth. Was unable to get off floor by herself - aid had to help - on floor for 5 minutes. Denies any Chest pain, SOB, N/V/D, confusion. A+O x 3. Been following with Derm for R arm rash - will find out biopsy results on Monday. S/P ORIF Right Hip 11/23/2017 with altered mental status and possible stroke .Trying to get MRI of Brain.  Palliative care consult at request of patient's family. Patient moved to [facility] with new cerebrovascular accident to r/o PAF

## 2017-12-02 NOTE — CHART NOTE - NSCHARTNOTEFT_GEN_A_CORE
Notified earlier in day that pt. with acute infarcts, likely new stroke. Dr. Tomlinson notified and made aware final results pending.  Consult appreciated. Pt. transferred to telemetry, TFT'S and vitamin b12 ordered. [facility] Notified earlier in day that pt. with acute infarcts, likely new stroke. Dr. Tomlinson notified and made aware final results pending.  Consult appreciated. Pt. transferred to telemetry per attending Dr. Rios , TFT'S and vitamin b12 ordered. Will await Stroke team reccs.  [facility]

## 2017-12-02 NOTE — CONSULT NOTE ADULT - SUBJECTIVE AND OBJECTIVE BOX
HPI:    89F complains of right hip pain for 1 day status post mechanical fall.  Patient is a household ambulator with a walker at baseline.    She was diagnosed with Right Hip Fracture and underwent Intramedullary Nail of the Right Hip (24 Nov 2017). Neurology was initially was consulted for intermittent confusion. MRI brain was ordered which showed multiple embolic stroke.     Allergies: NKDA  PMH: hypothyroidism, CVA, HLD, HTN  PSH: left hip IMN (Dr Walters)  Social: Denies tobacco use  FH: Noncontributory  Imaging: XR right hip - intertrochanteric fracture (23 Nov 2017 00:25)    MEDICATIONS  (STANDING):  ALBUTerol/ipratropium for Nebulization 3 milliLiter(s) Nebulizer every 6 hours  amLODIPine   Tablet 5 milliGRAM(s) Oral two times a day  ascorbic acid 500 milliGRAM(s) Oral two times a day  aspirin enteric coated 81 milliGRAM(s) Oral daily  atorvastatin 40 milliGRAM(s) Oral at bedtime  carvedilol 25 milliGRAM(s) Oral every 12 hours  cloNIDine 0.2 milliGRAM(s) Oral daily  clopidogrel Tablet 75 milliGRAM(s) Oral daily  dextrose 5%. 1000 milliLiter(s) (50 mL/Hr) IV Continuous <Continuous>  dextrose 50% Injectable 12.5 Gram(s) IV Push once  dextrose 50% Injectable 25 Gram(s) IV Push once  dextrose 50% Injectable 25 Gram(s) IV Push once  docusate sodium 100 milliGRAM(s) Oral three times a day  enoxaparin Injectable 30 milliGRAM(s) SubCutaneous daily  folic acid 1 milliGRAM(s) Oral daily  insulin glargine Injectable (LANTUS) 10 Unit(s) SubCutaneous at bedtime  insulin lispro (HumaLOG) corrective regimen sliding scale   SubCutaneous at bedtime  insulin lispro (HumaLOG) corrective regimen sliding scale   SubCutaneous three times a day before meals  insulin lispro Injectable (HumaLOG) 4 Unit(s) SubCutaneous three times a day before meals  levothyroxine 50 MICROGram(s) Oral daily  multivitamin 1 Tablet(s) Oral daily  pantoprazole    Tablet 40 milliGRAM(s) Oral before breakfast  polyethylene glycol 3350 17 Gram(s) Oral daily  QUEtiapine 25 milliGRAM(s) Oral at bedtime    MEDICATIONS  (PRN):  acetaminophen   Tablet 650 milliGRAM(s) Oral every 6 hours PRN For Temp greater than 38 C (100.4 F)  acetaminophen   Tablet. 325 milliGRAM(s) Oral every 4 hours PRN Mild Pain (1 - 3)  benzocaine 15 mG/menthol 3.6 mG Lozenge 1 Lozenge Oral every 2 hours PRN Sore Throat  calcium carbonate 500 mG (Tums) Chewable 3 Tablet(s) Chew every 6 hours PRN Dyspepsia  dextrose Gel 1 Dose(s) Oral once PRN Blood Glucose LESS THAN 70 milliGRAM(s)/deciliter  glucagon  Injectable 1 milliGRAM(s) IntraMuscular once PRN Glucose LESS THAN 70 milligrams/deciliter  ondansetron Injectable 4 milliGRAM(s) IV Push every 6 hours PRN Nausea and/or Vomiting  senna 2 Tablet(s) Oral at bedtime PRN Constipation      PAST MEDICAL & SURGICAL HISTORY:  Stroke  HLD (hyperlipidemia)  HTN (hypertension)  Hypothyroid  History of hip replacement, total, left      FAMILY HISTORY:  No pertinent family history in first degree relatives      Allergies    No Known Allergies    Intolerances          SHx - No smoking, No ETOH, No drug abuse      Review of Systems:  CONSTITUTIONAL:  No weight loss, fever, chills, weakness or fatigue.  HEENT:  Eyes:  No visual loss, blurred vision, double vision or yellow sclerae. Ears, Nose, Throat:  No hearing loss, sneezing, congestion, runny nose or sore throat.  SKIN:  No rash or itching.  CARDIOVASCULAR:  No chest pain, chest pressure or chest discomfort. No palpitations or edema.  RESPIRATORY:  No shortness of breath, cough or sputum.  GASTROINTESTINAL:  No anorexia, nausea, vomiting or diarrhea. No abdominal pain or blood.  GENITOURINARY:  NO Burning on urination.   NEUROLOGICAL: See HPI  MUSCULOSKELETAL:  No muscle, back pain, joint pain or stiffness.  HEMATOLOGIC:  No anemia, bleeding or bruising.  LYMPHATICS:  No enlarged nodes. No history of splenectomy.  PSYCHIATRIC:  No history of depression or anxiety.  ENDOCRINOLOGIC:  No reports of sweating, cold or heat intolerance. No polyuria or polydipsia.  ALLERGIES:  No history of asthma, hives, eczema or rhinitis.        Vital Signs Last 24 Hrs  T(C): 36.7 (02 Dec 2017 17:36), Max: 37.1 (02 Dec 2017 00:38)  T(F): 98 (02 Dec 2017 17:36), Max: 98.7 (02 Dec 2017 00:38)  HR: 71 (02 Dec 2017 17:36) (63 - 79)  BP: 111/63 (02 Dec 2017 17:36) (111/63 - 152/66)  BP(mean): --  RR: 18 (02 Dec 2017 17:36) (16 - 18)  SpO2: 96% (02 Dec 2017 17:36) (93% - 98%)    General Exam:   General appearance: No acute distress                   Neurological Exam:    MENTAL STATUS- Alert, attends, fluent, non-dysarthric, follows commands, dis-oriented to place yr, poor memory and good affect.  CRANIAL NERVES- PERRL, EOMI, legally blind, facial symmetric, Tongue protrudes midline and moves symmetrically.  MOTOR-Bulk and Contour - Normal. Tone - Normal tone, no abnormal movements. Strength - intact   SENSORY-inconsistent  REFLEXES- DTR's 0-1+ throughout.  COORDINATION- unable to obtain   GAIT-NA    Other:    Radiology        MRI brain     < from: MR Head No Cont (12.01.17 @ 18:19) >  IMPRESSION: Small acute infarcts involve the left internal capsule, left   thalamus and bilateral centrum semiovale. Consider an embolic source.    Encephalomalacia/gliosis involving bilateral occipital lobes and   posterior right temporal lobe, likely sequela of prior infarct.    < end of copied text >

## 2017-12-02 NOTE — CONSULT NOTE ADULT - CONSULT REQUESTED DATE/TIME
02-Dec-2017 17:49
22-Nov-2017 23:24
24-Nov-2017 15:24
24-Nov-2017 13:23
29-Nov-2017 09:00
29-Nov-2017

## 2017-12-02 NOTE — PROVIDER CONTACT NOTE (OTHER) - ASSESSMENT
Pt is A&Ox1 confused and noncompliant. Pt denies chest pain, dizziness, palpitations, and SOB. VSS. Temp 98.3F oral, HR 78, BP 98/58, RR 18, SpO2 94% on 2L nasal cannula. Pt refusing full neurological examination.

## 2017-12-02 NOTE — PROGRESS NOTE ADULT - SUBJECTIVE AND OBJECTIVE BOX
Neurology Progress Note      the patient's symptoms  --- are  unchanged    MEDICATIONS  (STANDING):  ALBUTerol/ipratropium for Nebulization 3 milliLiter(s) Nebulizer every 6 hours  amLODIPine   Tablet 5 milliGRAM(s) Oral two times a day  ascorbic acid 500 milliGRAM(s) Oral two times a day  aspirin enteric coated 81 milliGRAM(s) Oral daily  atorvastatin 40 milliGRAM(s) Oral at bedtime  carvedilol 25 milliGRAM(s) Oral every 12 hours  cloNIDine 0.2 milliGRAM(s) Oral daily  clopidogrel Tablet 75 milliGRAM(s) Oral daily  dextrose 5%. 1000 milliLiter(s) (50 mL/Hr) IV Continuous <Continuous>  dextrose 50% Injectable 12.5 Gram(s) IV Push once  dextrose 50% Injectable 25 Gram(s) IV Push once  dextrose 50% Injectable 25 Gram(s) IV Push once  docusate sodium 100 milliGRAM(s) Oral three times a day  enoxaparin Injectable 30 milliGRAM(s) SubCutaneous daily  folic acid 1 milliGRAM(s) Oral daily  insulin glargine Injectable (LANTUS) 10 Unit(s) SubCutaneous at bedtime  insulin lispro (HumaLOG) corrective regimen sliding scale   SubCutaneous at bedtime  insulin lispro (HumaLOG) corrective regimen sliding scale   SubCutaneous three times a day before meals  insulin lispro Injectable (HumaLOG) 4 Unit(s) SubCutaneous three times a day before meals  levothyroxine 50 MICROGram(s) Oral daily  multivitamin 1 Tablet(s) Oral daily  pantoprazole    Tablet 40 milliGRAM(s) Oral before breakfast  polyethylene glycol 3350 17 Gram(s) Oral daily  QUEtiapine 25 milliGRAM(s) Oral at bedtime    MEDICATIONS  (PRN):  acetaminophen   Tablet 650 milliGRAM(s) Oral every 6 hours PRN For Temp greater than 38 C (100.4 F)  acetaminophen   Tablet. 325 milliGRAM(s) Oral every 4 hours PRN Mild Pain (1 - 3)  benzocaine 15 mG/menthol 3.6 mG Lozenge 1 Lozenge Oral every 2 hours PRN Sore Throat  calcium carbonate 500 mG (Tums) Chewable 3 Tablet(s) Chew every 6 hours PRN Dyspepsia  dextrose Gel 1 Dose(s) Oral once PRN Blood Glucose LESS THAN 70 milliGRAM(s)/deciliter  glucagon  Injectable 1 milliGRAM(s) IntraMuscular once PRN Glucose LESS THAN 70 milligrams/deciliter  ondansetron Injectable 4 milliGRAM(s) IV Push every 6 hours PRN Nausea and/or Vomiting  senna 2 Tablet(s) Oral at bedtime PRN Constipation              Vital Signs Last 24 Hrs  T(C): 36.2 (02 Dec 2017 14:19), Max: 37.1 (02 Dec 2017 00:38)  T(F): 97.2 (02 Dec 2017 14:19), Max: 98.7 (02 Dec 2017 00:38)  HR: 79 (02 Dec 2017 14:19) (63 - 79)  BP: 126/63 (02 Dec 2017 14:19) (115/64 - 152/66)  BP(mean): --  RR: 16 (02 Dec 2017 14:19) (16 - 17)  SpO2: 96% (02 Dec 2017 14:19) (93% - 98%)    Physical exam    MENTAL STATUS- Alert, attends, fluent, non-dysarthric, follows commands, oriented to place month not yr, poor memory and good affect.  CRANIAL NERVES-II Optic - Bilateral -  Visual Fields.-inconsistent, III-IV-VI EOMI & Pupils- irreg s/p sg - Bilateral - Normal Bilaterally. V Trigeminal - Bilateral - Normal bilateral facial sensation and jaw movement. VII Facial - Normal bilateral facial movement. VIII Acoustic - Bilateral - Hearing normal to voice bilaterally. IX Glossopharyngeal / X Vagus - Normal palate elevates symmetrically. XI Accessory - Normal Bilaterally. XII Hypoglossal - Tongue protrudes midline and moves symmetrically.  MOTOR-Bulk and Contour - Normal. Tone - Normal tone, no abnormal movements. Strength - 5/5 normal muscle strength xc legs limited by fx and r shoulder - Strength full throughout.  SENSORY-inconsistent  REFLEXES- DTR's 0-1+ throughout.  COORDINATION-Normal  , FNF - bilaterally.  GAIT-NA                < from: MR Head No Cont (12.01.17 @ 18:19) >   Small acute infarcts involve the left internal capsule, left   thalamus and bilateral centrum semiovale. Consider an embolic source.    Encephalomalacia/gliosis involving bilateral occipital lobes and   posterior right temporal lobe, likely sequela of prior infarct.    < end of copied text >

## 2017-12-02 NOTE — PROGRESS NOTE ADULT - SUBJECTIVE AND OBJECTIVE BOX
Patient is a 89y old  Female who presents with a chief complaint of Right Hip Fracture - Intramedullary Nail of the Right Hip (24 Nov 2017 10:37)      POST OPERATIVE DAY #:  [ 9]   Patient comfortable  pleasantly confused    VS:  T(C): 36.3 (12-02-17 @ 05:21), Max: 37.1 (12-02-17 @ 00:38)  T(F): 97.4 (12-02-17 @ 05:21), Max: 98.7 (12-02-17 @ 00:38)  HR: 77 (12-02-17 @ 05:21) (63 - 77)  BP: 152/66 (12-02-17 @ 05:21) (118/70 - 152/66)  RR: 16 (12-02-17 @ 05:21) (16 - 18)  SpO2: 97% (12-02-17 @ 05:21) (93% - 98%)  Wt(kg): --      PHYSICAL EXAM:  NAD, Alert  EXT:  RLE  incision c/d  calves soft  n/v/i

## 2017-12-03 LAB
ANION GAP SERPL CALC-SCNC: 10 MMOL/L — SIGNIFICANT CHANGE UP (ref 5–17)
BUN SERPL-MCNC: 33 MG/DL — HIGH (ref 7–23)
CALCIUM SERPL-MCNC: 8.8 MG/DL — SIGNIFICANT CHANGE UP (ref 8.4–10.5)
CHLORIDE SERPL-SCNC: 97 MMOL/L — SIGNIFICANT CHANGE UP (ref 96–108)
CO2 SERPL-SCNC: 27 MMOL/L — SIGNIFICANT CHANGE UP (ref 22–31)
CREAT SERPL-MCNC: 0.79 MG/DL — SIGNIFICANT CHANGE UP (ref 0.5–1.3)
GLUCOSE BLDC GLUCOMTR-MCNC: 141 MG/DL — HIGH (ref 70–99)
GLUCOSE BLDC GLUCOMTR-MCNC: 150 MG/DL — HIGH (ref 70–99)
GLUCOSE BLDC GLUCOMTR-MCNC: 151 MG/DL — HIGH (ref 70–99)
GLUCOSE BLDC GLUCOMTR-MCNC: 173 MG/DL — HIGH (ref 70–99)
GLUCOSE SERPL-MCNC: 143 MG/DL — HIGH (ref 70–99)
HCT VFR BLD CALC: 30.4 % — LOW (ref 34.5–45)
HGB BLD-MCNC: 9.7 G/DL — LOW (ref 11.5–15.5)
MCHC RBC-ENTMCNC: 30.2 PG — SIGNIFICANT CHANGE UP (ref 27–34)
MCHC RBC-ENTMCNC: 31.9 GM/DL — LOW (ref 32–36)
MCV RBC AUTO: 94.7 FL — SIGNIFICANT CHANGE UP (ref 80–100)
PLATELET # BLD AUTO: 296 K/UL — SIGNIFICANT CHANGE UP (ref 150–400)
POTASSIUM SERPL-MCNC: 3.8 MMOL/L — SIGNIFICANT CHANGE UP (ref 3.5–5.3)
POTASSIUM SERPL-SCNC: 3.8 MMOL/L — SIGNIFICANT CHANGE UP (ref 3.5–5.3)
RBC # BLD: 3.21 M/UL — LOW (ref 3.8–5.2)
RBC # FLD: 15.2 % — HIGH (ref 10.3–14.5)
SODIUM SERPL-SCNC: 134 MMOL/L — LOW (ref 135–145)
T3 SERPL-MCNC: 57 NG/DL — LOW (ref 80–200)
T4 AB SER-ACNC: 7.1 UG/DL — SIGNIFICANT CHANGE UP (ref 4.6–12)
VIT B12 SERPL-MCNC: 550 PG/ML — SIGNIFICANT CHANGE UP (ref 243–894)
WBC # BLD: 7.09 K/UL — SIGNIFICANT CHANGE UP (ref 3.8–10.5)
WBC # FLD AUTO: 7.09 K/UL — SIGNIFICANT CHANGE UP (ref 3.8–10.5)

## 2017-12-03 PROCEDURE — 70547 MR ANGIOGRAPHY NECK W/O DYE: CPT | Mod: 26

## 2017-12-03 PROCEDURE — 99223 1ST HOSP IP/OBS HIGH 75: CPT | Mod: GC

## 2017-12-03 PROCEDURE — 70544 MR ANGIOGRAPHY HEAD W/O DYE: CPT | Mod: 26

## 2017-12-03 RX ADMIN — Medication 325 MILLIGRAM(S): at 19:39

## 2017-12-03 RX ADMIN — INSULIN GLARGINE 10 UNIT(S): 100 INJECTION, SOLUTION SUBCUTANEOUS at 21:25

## 2017-12-03 RX ADMIN — ENOXAPARIN SODIUM 30 MILLIGRAM(S): 100 INJECTION SUBCUTANEOUS at 11:44

## 2017-12-03 RX ADMIN — Medication 3 MILLILITER(S): at 11:45

## 2017-12-03 RX ADMIN — Medication 100 MILLIGRAM(S): at 13:56

## 2017-12-03 RX ADMIN — Medication 0.2 MILLIGRAM(S): at 06:06

## 2017-12-03 RX ADMIN — Medication 50 MICROGRAM(S): at 06:06

## 2017-12-03 RX ADMIN — Medication 4 UNIT(S): at 17:40

## 2017-12-03 RX ADMIN — Medication 325 MILLIGRAM(S): at 06:36

## 2017-12-03 RX ADMIN — Medication 100 MILLIGRAM(S): at 21:15

## 2017-12-03 RX ADMIN — Medication 500 MILLIGRAM(S): at 06:06

## 2017-12-03 RX ADMIN — CARVEDILOL PHOSPHATE 25 MILLIGRAM(S): 80 CAPSULE, EXTENDED RELEASE ORAL at 17:41

## 2017-12-03 RX ADMIN — Medication 3 MILLILITER(S): at 06:06

## 2017-12-03 RX ADMIN — Medication 325 MILLIGRAM(S): at 06:06

## 2017-12-03 RX ADMIN — AMLODIPINE BESYLATE 5 MILLIGRAM(S): 2.5 TABLET ORAL at 17:42

## 2017-12-03 RX ADMIN — ATORVASTATIN CALCIUM 40 MILLIGRAM(S): 80 TABLET, FILM COATED ORAL at 21:15

## 2017-12-03 RX ADMIN — Medication 1 MILLIGRAM(S): at 11:44

## 2017-12-03 RX ADMIN — QUETIAPINE FUMARATE 25 MILLIGRAM(S): 200 TABLET, FILM COATED ORAL at 21:15

## 2017-12-03 RX ADMIN — Medication 325 MILLIGRAM(S): at 20:39

## 2017-12-03 RX ADMIN — Medication 3 MILLILITER(S): at 17:40

## 2017-12-03 RX ADMIN — Medication 500 MILLIGRAM(S): at 17:43

## 2017-12-03 RX ADMIN — PANTOPRAZOLE SODIUM 40 MILLIGRAM(S): 20 TABLET, DELAYED RELEASE ORAL at 06:05

## 2017-12-03 RX ADMIN — Medication 4 UNIT(S): at 13:56

## 2017-12-03 RX ADMIN — Medication 1 TABLET(S): at 11:44

## 2017-12-03 RX ADMIN — CLOPIDOGREL BISULFATE 75 MILLIGRAM(S): 75 TABLET, FILM COATED ORAL at 11:44

## 2017-12-03 RX ADMIN — Medication 81 MILLIGRAM(S): at 11:44

## 2017-12-03 RX ADMIN — CARVEDILOL PHOSPHATE 25 MILLIGRAM(S): 80 CAPSULE, EXTENDED RELEASE ORAL at 06:05

## 2017-12-03 RX ADMIN — AMLODIPINE BESYLATE 5 MILLIGRAM(S): 2.5 TABLET ORAL at 06:06

## 2017-12-03 NOTE — PROGRESS NOTE ADULT - ASSESSMENT
89 y/o female hx of CVA on ASA plavix, w/ bilateral vision loss, HTN, HLD, not on AC presents from assisted living [place] with a fall. Per patient, pants were wet, so she walking to her room and she tripped and hit face on the floor. No LOC. Normally walks with a walker but was not using it today. Reports L sided face pain, R knee pain, L foot and ankle pain, and chipped front tooth. Was unable to get off floor by herself - aid had to help - on floor for 5 minutes. Denies any Chest pain, SOB, N/V/D, confusion. A+O x 3. Been following with Derm for R arm rash - will find out biopsy results on Monday. S/P ORIF Right Hip 11/23/2017 with altered mental status and possible stroke .Trying to get MRI of Brain.  Palliative care consult at request of patient's family. Patient moved to [facility] with new cerebrovascular accident to r/o PAF

## 2017-12-03 NOTE — PROGRESS NOTE ADULT - SUBJECTIVE AND OBJECTIVE BOX
Neurology Progress Note      the patient's symptoms  --- are  unchanged    MEDICATIONS  (STANDING):  ALBUTerol/ipratropium for Nebulization 3 milliLiter(s) Nebulizer every 6 hours  amLODIPine   Tablet 5 milliGRAM(s) Oral two times a day  ascorbic acid 500 milliGRAM(s) Oral two times a day  aspirin enteric coated 81 milliGRAM(s) Oral daily  atorvastatin 40 milliGRAM(s) Oral at bedtime  carvedilol 25 milliGRAM(s) Oral every 12 hours  cloNIDine 0.2 milliGRAM(s) Oral daily  clopidogrel Tablet 75 milliGRAM(s) Oral daily  dextrose 5%. 1000 milliLiter(s) (50 mL/Hr) IV Continuous <Continuous>  dextrose 50% Injectable 12.5 Gram(s) IV Push once  dextrose 50% Injectable 25 Gram(s) IV Push once  dextrose 50% Injectable 25 Gram(s) IV Push once  docusate sodium 100 milliGRAM(s) Oral three times a day  enoxaparin Injectable 30 milliGRAM(s) SubCutaneous daily  folic acid 1 milliGRAM(s) Oral daily  insulin glargine Injectable (LANTUS) 10 Unit(s) SubCutaneous at bedtime  insulin lispro (HumaLOG) corrective regimen sliding scale   SubCutaneous at bedtime  insulin lispro (HumaLOG) corrective regimen sliding scale   SubCutaneous three times a day before meals  insulin lispro Injectable (HumaLOG) 4 Unit(s) SubCutaneous three times a day before meals  levothyroxine 50 MICROGram(s) Oral daily  multivitamin 1 Tablet(s) Oral daily  pantoprazole    Tablet 40 milliGRAM(s) Oral before breakfast  polyethylene glycol 3350 17 Gram(s) Oral daily  QUEtiapine 25 milliGRAM(s) Oral at bedtime    MEDICATIONS  (PRN):  acetaminophen   Tablet 650 milliGRAM(s) Oral every 6 hours PRN For Temp greater than 38 C (100.4 F)  acetaminophen   Tablet. 325 milliGRAM(s) Oral every 4 hours PRN Mild Pain (1 - 3)  benzocaine 15 mG/menthol 3.6 mG Lozenge 1 Lozenge Oral every 2 hours PRN Sore Throat  calcium carbonate 500 mG (Tums) Chewable 3 Tablet(s) Chew every 6 hours PRN Dyspepsia  dextrose Gel 1 Dose(s) Oral once PRN Blood Glucose LESS THAN 70 milliGRAM(s)/deciliter  glucagon  Injectable 1 milliGRAM(s) IntraMuscular once PRN Glucose LESS THAN 70 milligrams/deciliter  ondansetron Injectable 4 milliGRAM(s) IV Push every 6 hours PRN Nausea and/or Vomiting  senna 2 Tablet(s) Oral at bedtime PRN Constipation              Vital Signs Last 24 Hrs  T(C): 36.5 (03 Dec 2017 12:04), Max: 36.8 (02 Dec 2017 20:31)  T(F): 97.7 (03 Dec 2017 12:04), Max: 98.3 (02 Dec 2017 20:31)  HR: 62 (03 Dec 2017 12:04) (62 - 79)  BP: 104/55 (03 Dec 2017 12:04) (98/58 - 154/70)  BP(mean): --  RR: 18 (03 Dec 2017 12:04) (16 - 18)  SpO2: 99% (03 Dec 2017 12:04) (94% - 99%)    Physical exam  MENTAL STATUS- Alert, attends, fluent, non-dysarthric, follows commands, oriented to place month not yr, poor memory and good affect.  CRANIAL NERVES-II Optic - Bilateral -  Visual Fields.-inconsistent, III-IV-VI EOMI & Pupils- irreg s/p sg - Bilateral - Normal Bilaterally. V Trigeminal - Bilateral - Normal bilateral facial sensation and jaw movement. VII Facial - Normal bilateral facial movement. VIII Acoustic - Bilateral - Hearing normal to voice bilaterally. IX Glossopharyngeal / X Vagus - Normal palate elevates symmetrically. XI Accessory - Normal Bilaterally. XII Hypoglossal - Tongue protrudes midline and moves symmetrically.  MOTOR-Bulk and Contour - Normal. Tone - Normal tone, no abnormal movements. Strength - 5/5 normal muscle strength xc legs limited by fx and r shoulder - Strength full throughout.  SENSORY-inconsistent  REFLEXES- DTR's 0-1+ throughout.  COORDINATION-Normal  , FNF - bilaterally.  GAIT-NA  -    CBC Full  -  ( 03 Dec 2017 08:24 )  WBC Count : 7.09 K/uL  Hemoglobin : 9.7 g/dL  Hematocrit : 30.4 %  Platelet Count - Automated : 296 K/uL  Mean Cell Volume : 94.7 fl  Mean Cell Hemoglobin : 30.2 pg  Mean Cell Hemoglobin Concentration : 31.9 gm/dL  Auto Neutrophil # : x  Auto Lymphocyte # : x  Auto Monocyte # : x  Auto Eosinophil # : x  Auto Basophil # : x  Auto Neutrophil % : x  Auto Lymphocyte % : x  Auto Monocyte % : x  Auto Eosinophil % : x  Auto Basophil % : x      12-03    134<L>  |  97  |  33<H>  ----------------------------<  143<H>  3.8   |  27  |  0.79    Ca    8.8      03 Dec 2017 08:12

## 2017-12-03 NOTE — PROGRESS NOTE ADULT - ASSESSMENT
89y Female with a history of HT,HLD, strokes WITH WORSENED  confusion  -likely due to new strokes on MRI- likely due due previous intracranial stenosis, anemia hypotension  rec high normotensive BP   maintain nl intravascular volume and H/H  dual antiplts  high dose statin  Rec holter tele,echo  TFT, B12  neuro chks- 89y Female with a history of HT,HLD, strokes WITH WORSENED  confusion  -likely due to new strokes on MRI- likely due due previous intracranial stenosis, anemia hypotension  rec high normotensive BP   maintain nl intravascular volume and H/H  dual antiplts  high dose statin  Rec holter tele,echo,MRA  neuro chks-

## 2017-12-03 NOTE — PROGRESS NOTE ADULT - ASSESSMENT
Agree with present management  Pt is receiving optimal medical management  Neuro and Pulmonary follow up  Ortho follow up

## 2017-12-03 NOTE — PROGRESS NOTE ADULT - SUBJECTIVE AND OBJECTIVE BOX
89 y/o female hx of CVA on ASA plavix, w/ bilateral vision loss, HTN, HLD, not on AC presents from assisted living [place] with a fall. Per patient, pants were wet, so she walking to her room and she tripped and hit face on the floor. No LOC. Normally walks with a walker but was not using it today. Reports L sided face pain, R knee pain, L foot and ankle pain, and chipped front tooth. Was unable to get off floor by herself - aid had to help - on floor for 5 minutes. Denies any Chest pain, SOB, N/V/D, confusion. A+O x 3. Been following with Derm for R arm rash - will find out biopsy results on Monday. S/P ORIF Right Hip 11/23/2017 with no significant medical complications to date. discussed with Patient nephew. Patient is not usually confused at home)        MEDICATIONS  (STANDING):  ALBUTerol/ipratropium for Nebulization 3 milliLiter(s) Nebulizer every 6 hours  amLODIPine   Tablet 5 milliGRAM(s) Oral two times a day  ascorbic acid 500 milliGRAM(s) Oral two times a day  aspirin enteric coated 81 milliGRAM(s) Oral daily  atorvastatin 40 milliGRAM(s) Oral at bedtime  carvedilol 25 milliGRAM(s) Oral every 12 hours  cloNIDine 0.2 milliGRAM(s) Oral daily  clopidogrel Tablet 75 milliGRAM(s) Oral daily  dextrose 5%. 1000 milliLiter(s) (50 mL/Hr) IV Continuous <Continuous>  dextrose 50% Injectable 12.5 Gram(s) IV Push once  dextrose 50% Injectable 25 Gram(s) IV Push once  dextrose 50% Injectable 25 Gram(s) IV Push once  docusate sodium 100 milliGRAM(s) Oral three times a day  enoxaparin Injectable 30 milliGRAM(s) SubCutaneous daily  folic acid 1 milliGRAM(s) Oral daily  insulin glargine Injectable (LANTUS) 10 Unit(s) SubCutaneous at bedtime  insulin lispro (HumaLOG) corrective regimen sliding scale   SubCutaneous at bedtime  insulin lispro (HumaLOG) corrective regimen sliding scale   SubCutaneous three times a day before meals  insulin lispro Injectable (HumaLOG) 4 Unit(s) SubCutaneous three times a day before meals  levothyroxine 50 MICROGram(s) Oral daily  LORazepam     Tablet 1 milliGRAM(s) Oral once  multivitamin 1 Tablet(s) Oral daily  pantoprazole    Tablet 40 milliGRAM(s) Oral before breakfast  polyethylene glycol 3350 17 Gram(s) Oral daily  QUEtiapine 25 milliGRAM(s) Oral at bedtime    MEDICATIONS  (PRN):  acetaminophen   Tablet 650 milliGRAM(s) Oral every 6 hours PRN For Temp greater than 38 C (100.4 F)  acetaminophen   Tablet. 325 milliGRAM(s) Oral every 4 hours PRN Mild Pain (1 - 3)  benzocaine 15 mG/menthol 3.6 mG Lozenge 1 Lozenge Oral every 2 hours PRN Sore Throat  calcium carbonate 500 mG (Tums) Chewable 3 Tablet(s) Chew every 6 hours PRN Dyspepsia  dextrose Gel 1 Dose(s) Oral once PRN Blood Glucose LESS THAN 70 milliGRAM(s)/deciliter  glucagon  Injectable 1 milliGRAM(s) IntraMuscular once PRN Glucose LESS THAN 70 milligrams/deciliter  ondansetron Injectable 4 milliGRAM(s) IV Push every 6 hours PRN Nausea and/or Vomiting  senna 2 Tablet(s) Oral at bedtime PRN Constipation    REVIEW OF SYSTEM:  CONSTITUTIONAL: No fever, No change in weight, No fatigue  HEAD: No headache, No dizziness, No recent trauma  EYES: BLIND  ENT:  No difficulty hearing, No tinnitus, No vertigo, No sinus pain, No throat pain  NECK: No pain, No stiffness  RESPIRATORY: No cough, No wheezing, No chills, No hemoptysis, No shortness of breath at rest or exertional shortness of breath  CARDIOVASCULAR: No chest pain, No palpitations, No dizziness, No CHF, No arrhythmia, No cardiomegaly, No leg swelling  GASTROINTESTINAL: No abdominal, No epigastric pain. No nausea, No vomiting, No hematemesis, No diarrhea, No constipation. No melena, No hematochezia. No GERD  GENITOURINARY: No dysuria, No frequency, No hematuria, No incontinence, No nocturia, No hesitancy,  SKIN: No itching, No burning, No rashes, No lesions   LYMPH NODES: No history of enlarged glands  ENDOCRINE: No heat or cold intolerance, No hair loss. No osteoporosis, No thyroid disease  MUSCULOSKELETAL:     (+)  RIGHT HIP PAIN  PSYCHIATRIC: No depression, No anxiety, No mood swings, No difficulty sleeping  HEME/LYMPH: No easy bruising, No anticoagulants, No bleeding disorder, No bleeding gums  ALLERGY AND IMMUNOLOGIC: No hives, No eczema  NEUROLOGICAL: No memory loss, No loss of strength, No numbness, No tremors      VITALS:   T(C): 36.5 (12-03-17 @ 12:04), Max: 36.8 (12-02-17 @ 20:31)  HR: 67 (12-03-17 @ 17:38) (62 - 78)  BP: 133/73 (12-03-17 @ 17:38) (98/58 - 154/70)  RR: 18 (12-03-17 @ 12:04) (18 - 18)  SpO2: 99% (12-03-17 @ 12:04) (94% - 99%)  Wt(kg): --    PHYSICAL EXAM:  GENERAL: NAD, well nourished and conversant  HEAD:  Atraumatic  EYES:  PATIENT IS BLIND  ENT: No tonsillar erythema, exudates, or enlargement, moist mucous membranes, good dentition, no lesions  NECK: Supple, No JVD, normal thyroid, carotids with normal upstrokes and no bruits  CHEST/LUNG: Clear to auscultation bilaterally, No rales, rhonchi, wheezing, or rubs  HEART: Regular rate and rhythm, No murmurs, rubs, or gallops  ABDOMEN: Soft, nondistended, no masses, guarding, tenderness or rebound, bowel sounds present  EXTREMITIES:  2+ Peripheral Pulses, No clubbing, cyanosis, or edema.   (+) RIGHT HIP PAIN C/W HIP FRACTURE  LYMPH: No lymphadenopathy noted  SKIN: No rashes or lesions  NERVOUS SYSTEM:  Alert & Oriented X3, normal cognitive function. Motor Strength 5/5 right upper and right lower.  5/5 left upper and left lower extremities, DTRs 2+ intact and symmetric    LABS:        CBC Full  -  ( 03 Dec 2017 08:24 )  WBC Count : 7.09 K/uL  Hemoglobin : 9.7 g/dL  Hematocrit : 30.4 %  Platelet Count - Automated : 296 K/uL  Mean Cell Volume : 94.7 fl  Mean Cell Hemoglobin : 30.2 pg  Mean Cell Hemoglobin Concentration : 31.9 gm/dL  Auto Neutrophil # : x  Auto Lymphocyte # : x  Auto Monocyte # : x  Auto Eosinophil # : x  Auto Basophil # : x  Auto Neutrophil % : x  Auto Lymphocyte % : x  Auto Monocyte % : x  Auto Eosinophil % : x  Auto Basophil % : x    12-03    134<L>  |  97  |  33<H>  ----------------------------<  143<H>  3.8   |  27  |  0.79    Ca    8.8      03 Dec 2017 08:12            CAPILLARY BLOOD GLUCOSE      POCT Blood Glucose.: 151 mg/dL (03 Dec 2017 16:46)  POCT Blood Glucose.: 150 mg/dL (03 Dec 2017 11:19)  POCT Blood Glucose.: 141 mg/dL (03 Dec 2017 07:25)  POCT Blood Glucose.: 219 mg/dL (02 Dec 2017 21:03)      RADIOLOGY & ADDITIONAL TESTS:

## 2017-12-03 NOTE — PROGRESS NOTE ADULT - SUBJECTIVE AND OBJECTIVE BOX
Patient is a 89y old  Female who presents with a chief complaint of Right Hip Fracture - Intramedullary Nail of the Right Hip (2017 10:37).    Pt feels well today.  NO CP or SOB  Resting comfortably    Allergies:   No Known Allergies      MEDICATIONS  (STANDING):  ALBUTerol/ipratropium for Nebulization 3 milliLiter(s) Nebulizer every 6 hours  amLODIPine   Tablet 5 milliGRAM(s) Oral two times a day  ascorbic acid 500 milliGRAM(s) Oral two times a day  aspirin enteric coated 81 milliGRAM(s) Oral daily  atorvastatin 40 milliGRAM(s) Oral at bedtime  carvedilol 25 milliGRAM(s) Oral every 12 hours  cloNIDine 0.2 milliGRAM(s) Oral daily  clopidogrel Tablet 75 milliGRAM(s) Oral daily  dextrose 5%. 1000 milliLiter(s) (50 mL/Hr) IV Continuous <Continuous>  dextrose 50% Injectable 12.5 Gram(s) IV Push once  dextrose 50% Injectable 25 Gram(s) IV Push once  dextrose 50% Injectable 25 Gram(s) IV Push once  docusate sodium 100 milliGRAM(s) Oral three times a day  enoxaparin Injectable 30 milliGRAM(s) SubCutaneous daily  folic acid 1 milliGRAM(s) Oral daily  insulin glargine Injectable (LANTUS) 10 Unit(s) SubCutaneous at bedtime  insulin lispro (HumaLOG) corrective regimen sliding scale   SubCutaneous at bedtime  insulin lispro (HumaLOG) corrective regimen sliding scale   SubCutaneous three times a day before meals  insulin lispro Injectable (HumaLOG) 4 Unit(s) SubCutaneous three times a day before meals  levothyroxine 50 MICROGram(s) Oral daily  LORazepam     Tablet 1 milliGRAM(s) Oral once  multivitamin 1 Tablet(s) Oral daily  pantoprazole    Tablet 40 milliGRAM(s) Oral before breakfast  polyethylene glycol 3350 17 Gram(s) Oral daily  QUEtiapine 25 milliGRAM(s) Oral at bedtime    MEDICATIONS  (PRN):  acetaminophen   Tablet 650 milliGRAM(s) Oral every 6 hours PRN For Temp greater than 38 C (100.4 F)  acetaminophen   Tablet. 325 milliGRAM(s) Oral every 4 hours PRN Mild Pain (1 - 3)  benzocaine 15 mG/menthol 3.6 mG Lozenge 1 Lozenge Oral every 2 hours PRN Sore Throat  calcium carbonate 500 mG (Tums) Chewable 3 Tablet(s) Chew every 6 hours PRN Dyspepsia  dextrose Gel 1 Dose(s) Oral once PRN Blood Glucose LESS THAN 70 milliGRAM(s)/deciliter  glucagon  Injectable 1 milliGRAM(s) IntraMuscular once PRN Glucose LESS THAN 70 milligrams/deciliter  ondansetron Injectable 4 milliGRAM(s) IV Push every 6 hours PRN Nausea and/or Vomiting  senna 2 Tablet(s) Oral at bedtime PRN Constipation      ROS:     A comprehensive review of systems was performed and pertinent items are noted in the history above. A detailed ROS is as follows:    non-contributory    Daily     Daily   Drug Dosing Weight  Height (cm): 152.4 (2017 00:33)  Weight (kg): 63.503 (2017 00:33)  BMI (kg/m2): 27.3 (2017 00:33)  BSA (m2): 1.6 (2017 00:33)  Vital Signs Last 24 Hrs  T(C): 36.5 (03 Dec 2017 12:04), Max: 36.8 (02 Dec 2017 20:31)  T(F): 97.7 (03 Dec 2017 12:04), Max: 98.3 (02 Dec 2017 20:31)  HR: 67 (03 Dec 2017 17:38) (62 - 78)  BP: 133/73 (03 Dec 2017 17:38) (98/58 - 154/70)  BP(mean): --  RR: 18 (03 Dec 2017 12:04) (18 - 18)  SpO2: 99% (03 Dec 2017 12:04) (94% - 99%)    I&O's Summary    02 Dec 2017 07:01  -  03 Dec 2017 07:00  --------------------------------------------------------  IN: 240 mL / OUT: 0 mL / NET: 240 mL        PHYSICAL EXAM:    General Appearance:    Comfortable, AAO X 2, in no distress.   HEENT:                       Atraumatic, PERRLA, EOMI, conjunctiva clear.   Neck:                          Supple, no adenopathy, no thyromegaly, no JVD, no carotid bruit  Back:                          Symmetric, no CV angle fullness or tenderness, no soft tissue tenderness.  Chest:                         Clear to auscultation, no wheezes, rales or rhonchi, symmetric air entry, no tachypnea, retractions or cyanosis  Heart:                          S1, S2 normal, no gallop, no murmur.  Abdomen:                    Soft, non-tender, bowel sounds active. No palpable masses.   Extremities:                 no cyanosis, no edema, no joint swelling. Peripheral pulses normal  Skin:                           Skin color, texture normal. No rashes   Neurologic:                  Alert and oriented X3, cranial nerves intact, sensory and motor normal.      INTERPRETATION OF TELEMETRY: RSR    EC17  Ventricular Rate 70 BPM    Atrial Rate 70 BPM    P-R Interval 172 ms    QRS Duration 72 ms     ms    QTc 453 ms    P Axis 54 degrees    R Axis 43 degrees    T Axis 49 degrees    Diagnosis Line NORMAL SINUS RHYTHM  POSSIBLE LEFT ATRIAL ENLARGEMENT  BORDERLINE ECG      LABS:                        9.7    7.09  )-----------( 296      ( 03 Dec 2017 08:24 )             30.4     12-    134<L>  |  97  |  33<H>  ----------------------------<  143<H>  3.8   |  27  |  0.79    Ca    8.8      03 Dec 2017 08:12            Pro BNP  2291  @ 08:26  D Dimer  --  @ 08:26              RADIOLOGY & ADDITIONAL STUDIES:    HEALTH ISSUES - PROBLEM Dx:  Change in mental status: Change in mental status - CVA  Atelectasis: Atelectasis  Hypoxia: Hypoxia  Anemia: Anemia  Closed nondisplaced intertrochanteric fracture of right femur, initial encounter: Closed nondisplaced intertrochanteric fracture of right femur, initial encounter  Other hyperlipidemia: Other hyperlipidemia  Hypertension, unspecified type: Hypertension, unspecified type  Type 2 diabetes mellitus with hyperglycemia, unspecified long term insulin use status: Type 2 diabetes mellitus with hyperglycemia, unspecified long term insulin use status  HLD (hyperlipidemia): HLD (hyperlipidemia)  HTN (hypertension): HTN (hypertension)  Stroke: Stroke  Femur fracture, right: Femur fracture, right

## 2017-12-04 LAB
ANION GAP SERPL CALC-SCNC: 17 MMOL/L — SIGNIFICANT CHANGE UP (ref 5–17)
BUN SERPL-MCNC: 32 MG/DL — HIGH (ref 7–23)
CALCIUM SERPL-MCNC: 9 MG/DL — SIGNIFICANT CHANGE UP (ref 8.4–10.5)
CHLORIDE SERPL-SCNC: 101 MMOL/L — SIGNIFICANT CHANGE UP (ref 96–108)
CHOLEST SERPL-MCNC: 150 MG/DL — SIGNIFICANT CHANGE UP (ref 10–199)
CO2 SERPL-SCNC: 23 MMOL/L — SIGNIFICANT CHANGE UP (ref 22–31)
CREAT SERPL-MCNC: 0.8 MG/DL — SIGNIFICANT CHANGE UP (ref 0.5–1.3)
GLUCOSE BLDC GLUCOMTR-MCNC: 127 MG/DL — HIGH (ref 70–99)
GLUCOSE BLDC GLUCOMTR-MCNC: 166 MG/DL — HIGH (ref 70–99)
GLUCOSE BLDC GLUCOMTR-MCNC: 175 MG/DL — HIGH (ref 70–99)
GLUCOSE BLDC GLUCOMTR-MCNC: 186 MG/DL — HIGH (ref 70–99)
GLUCOSE SERPL-MCNC: 157 MG/DL — HIGH (ref 70–99)
HDLC SERPL-MCNC: 32 MG/DL — LOW (ref 40–125)
LIPID PNL WITH DIRECT LDL SERPL: 92 MG/DL — SIGNIFICANT CHANGE UP
POTASSIUM SERPL-MCNC: 4.2 MMOL/L — SIGNIFICANT CHANGE UP (ref 3.5–5.3)
POTASSIUM SERPL-SCNC: 4.2 MMOL/L — SIGNIFICANT CHANGE UP (ref 3.5–5.3)
SODIUM SERPL-SCNC: 141 MMOL/L — SIGNIFICANT CHANGE UP (ref 135–145)
TOTAL CHOLESTEROL/HDL RATIO MEASUREMENT: 4.7 RATIO — SIGNIFICANT CHANGE UP (ref 3.3–7.1)
TRIGL SERPL-MCNC: 129 MG/DL — SIGNIFICANT CHANGE UP (ref 10–149)

## 2017-12-04 RX ADMIN — Medication 1 TABLET(S): at 13:24

## 2017-12-04 RX ADMIN — Medication 3 MILLILITER(S): at 17:20

## 2017-12-04 RX ADMIN — PANTOPRAZOLE SODIUM 40 MILLIGRAM(S): 20 TABLET, DELAYED RELEASE ORAL at 06:24

## 2017-12-04 RX ADMIN — Medication 1 MILLIGRAM(S): at 13:24

## 2017-12-04 RX ADMIN — Medication 3 MILLILITER(S): at 22:21

## 2017-12-04 RX ADMIN — Medication 4 UNIT(S): at 17:20

## 2017-12-04 RX ADMIN — CARVEDILOL PHOSPHATE 25 MILLIGRAM(S): 80 CAPSULE, EXTENDED RELEASE ORAL at 17:20

## 2017-12-04 RX ADMIN — Medication 100 MILLIGRAM(S): at 13:24

## 2017-12-04 RX ADMIN — INSULIN GLARGINE 10 UNIT(S): 100 INJECTION, SOLUTION SUBCUTANEOUS at 22:21

## 2017-12-04 RX ADMIN — Medication 4 UNIT(S): at 13:24

## 2017-12-04 RX ADMIN — Medication 500 MILLIGRAM(S): at 06:25

## 2017-12-04 RX ADMIN — Medication 325 MILLIGRAM(S): at 06:25

## 2017-12-04 RX ADMIN — Medication 50 MICROGRAM(S): at 06:25

## 2017-12-04 RX ADMIN — Medication 500 MILLIGRAM(S): at 17:20

## 2017-12-04 RX ADMIN — Medication 325 MILLIGRAM(S): at 07:25

## 2017-12-04 RX ADMIN — AMLODIPINE BESYLATE 5 MILLIGRAM(S): 2.5 TABLET ORAL at 06:24

## 2017-12-04 RX ADMIN — AMLODIPINE BESYLATE 5 MILLIGRAM(S): 2.5 TABLET ORAL at 17:21

## 2017-12-04 RX ADMIN — Medication 3 MILLILITER(S): at 13:24

## 2017-12-04 RX ADMIN — Medication 3 MILLILITER(S): at 00:20

## 2017-12-04 RX ADMIN — Medication 4 UNIT(S): at 08:21

## 2017-12-04 RX ADMIN — CARVEDILOL PHOSPHATE 25 MILLIGRAM(S): 80 CAPSULE, EXTENDED RELEASE ORAL at 06:25

## 2017-12-04 RX ADMIN — Medication 100 MILLIGRAM(S): at 06:25

## 2017-12-04 RX ADMIN — POLYETHYLENE GLYCOL 3350 17 GRAM(S): 17 POWDER, FOR SOLUTION ORAL at 13:25

## 2017-12-04 RX ADMIN — ENOXAPARIN SODIUM 30 MILLIGRAM(S): 100 INJECTION SUBCUTANEOUS at 13:24

## 2017-12-04 RX ADMIN — Medication 100 MILLIGRAM(S): at 22:21

## 2017-12-04 RX ADMIN — CLOPIDOGREL BISULFATE 75 MILLIGRAM(S): 75 TABLET, FILM COATED ORAL at 13:25

## 2017-12-04 RX ADMIN — Medication 0.2 MILLIGRAM(S): at 06:25

## 2017-12-04 RX ADMIN — Medication 3 MILLILITER(S): at 06:25

## 2017-12-04 RX ADMIN — ATORVASTATIN CALCIUM 40 MILLIGRAM(S): 80 TABLET, FILM COATED ORAL at 22:21

## 2017-12-04 RX ADMIN — Medication 81 MILLIGRAM(S): at 13:26

## 2017-12-04 RX ADMIN — QUETIAPINE FUMARATE 25 MILLIGRAM(S): 200 TABLET, FILM COATED ORAL at 22:21

## 2017-12-04 NOTE — PROGRESS NOTE ADULT - SUBJECTIVE AND OBJECTIVE BOX
assisted living [facility] with a fall. Per patient, pants were wet, so she walking to her room and she tripped and hit face on the floor. No LOC. Normally walks with a walker but was not using it today. Reports L sided face pain, R knee pain, L foot and ankle pain, and chipped front tooth. Was unable to get off floor by herself - aid had to help - on floor for 5 minutes. Denies any Chest pain, SOB, N/V/D, confusion. A+O x 3. Been following with Derm for R arm rash - will find out biopsy results on Monday. S/P ORIF Right Hip 11/23/2017 with no significant medical complications to date. discussed with Patient nephew. Patient is not usually confused at home)    MEDICATIONS  (STANDING):  ALBUTerol/ipratropium for Nebulization 3 milliLiter(s) Nebulizer every 6 hours  amLODIPine   Tablet 5 milliGRAM(s) Oral two times a day  ascorbic acid 500 milliGRAM(s) Oral two times a day  aspirin enteric coated 81 milliGRAM(s) Oral daily  atorvastatin 40 milliGRAM(s) Oral at bedtime  carvedilol 25 milliGRAM(s) Oral every 12 hours  cloNIDine 0.2 milliGRAM(s) Oral daily  clopidogrel Tablet 75 milliGRAM(s) Oral daily  dextrose 5%. 1000 milliLiter(s) (50 mL/Hr) IV Continuous <Continuous>  dextrose 50% Injectable 12.5 Gram(s) IV Push once  dextrose 50% Injectable 25 Gram(s) IV Push once  dextrose 50% Injectable 25 Gram(s) IV Push once  docusate sodium 100 milliGRAM(s) Oral three times a day  enoxaparin Injectable 30 milliGRAM(s) SubCutaneous daily  folic acid 1 milliGRAM(s) Oral daily  insulin glargine Injectable (LANTUS) 10 Unit(s) SubCutaneous at bedtime  insulin lispro (HumaLOG) corrective regimen sliding scale   SubCutaneous at bedtime  insulin lispro (HumaLOG) corrective regimen sliding scale   SubCutaneous three times a day before meals  insulin lispro Injectable (HumaLOG) 4 Unit(s) SubCutaneous three times a day before meals  levothyroxine 50 MICROGram(s) Oral daily  LORazepam     Tablet 1 milliGRAM(s) Oral once  multivitamin 1 Tablet(s) Oral daily  pantoprazole    Tablet 40 milliGRAM(s) Oral before breakfast  polyethylene glycol 3350 17 Gram(s) Oral daily  QUEtiapine 25 milliGRAM(s) Oral at bedtime    MEDICATIONS  (PRN):  acetaminophen   Tablet 650 milliGRAM(s) Oral every 6 hours PRN For Temp greater than 38 C (100.4 F)  acetaminophen   Tablet. 325 milliGRAM(s) Oral every 4 hours PRN Mild Pain (1 - 3)  benzocaine 15 mG/menthol 3.6 mG Lozenge 1 Lozenge Oral every 2 hours PRN Sore Throat  calcium carbonate 500 mG (Tums) Chewable 3 Tablet(s) Chew every 6 hours PRN Dyspepsia  dextrose Gel 1 Dose(s) Oral once PRN Blood Glucose LESS THAN 70 milliGRAM(s)/deciliter  glucagon  Injectable 1 milliGRAM(s) IntraMuscular once PRN Glucose LESS THAN 70 milligrams/deciliter  ondansetron Injectable 4 milliGRAM(s) IV Push every 6 hours PRN Nausea and/or Vomiting  senna 2 Tablet(s) Oral at bedtime PRN Constipation        REVIEW OF SYSTEM:  CONSTITUTIONAL: No fever, No change in weight, No fatigue  HEAD: No headache, No dizziness, No recent trauma  EYES: BLIND  ENT:  No difficulty hearing, No tinnitus, No vertigo, No sinus pain, No throat pain  NECK: No pain, No stiffness  RESPIRATORY: No cough, No wheezing, No chills, No hemoptysis, No shortness of breath at rest or exertional shortness of breath  CARDIOVASCULAR: No chest pain, No palpitations, No dizziness, No CHF, No arrhythmia, No cardiomegaly, No leg swelling  GASTROINTESTINAL: No abdominal, No epigastric pain. No nausea, No vomiting, No hematemesis, No diarrhea, No constipation. No melena, No hematochezia. No GERD  GENITOURINARY: No dysuria, No frequency, No hematuria, No incontinence, No nocturia, No hesitancy,  SKIN: No itching, No burning, No rashes, No lesions   LYMPH NODES: No history of enlarged glands  ENDOCRINE: No heat or cold intolerance, No hair loss. No osteoporosis, No thyroid disease  MUSCULOSKELETAL:     (+)  RIGHT HIP PAIN  PSYCHIATRIC: No depression, No anxiety, No mood swings, No difficulty sleeping  HEME/LYMPH: No easy bruising, No anticoagulants, No bleeding disorder, No bleeding gums  ALLERGY AND IMMUNOLOGIC: No hives, No eczema  NEUROLOGICAL: No memory loss, No loss of strength, No numbness, No tremors    VITALS:   T(C): 36.4 (12-04-17 @ 11:53), Max: 37.2 (12-04-17 @ 04:52)  HR: 70 (12-04-17 @ 17:26) (63 - 73)  BP: 120/75 (12-04-17 @ 17:26) (120/75 - 144/70)  RR: 18 (12-04-17 @ 11:53) (17 - 18)  SpO2: 96% (12-04-17 @ 11:53) (93% - 96%)  Wt(kg): --    PHYSICAL EXAM:  GENERAL: NAD, well nourished and conversant  HEAD:  Atraumatic  EYES:  PATIENT IS BLIND  ENT: No tonsillar erythema, exudates, or enlargement, moist mucous membranes, good dentition, no lesions  NECK: Supple, No JVD, normal thyroid, carotids with normal upstrokes and no bruits  CHEST/LUNG: Clear to auscultation bilaterally, No rales, rhonchi, wheezing, or rubs  HEART: Regular rate and rhythm, No murmurs, rubs, or gallops  ABDOMEN: Soft, nondistended, no masses, guarding, tenderness or rebound, bowel sounds present  EXTREMITIES:  2+ Peripheral Pulses, No clubbing, cyanosis, or edema.   (+) RIGHT HIP PAIN C/W HIP FRACTURE  LYMPH: No lymphadenopathy noted  SKIN: No rashes or lesions  NERVOUS SYSTEM:  Alert & Oriented X3, normal cognitive function. Motor Strength 5/5 right upper and right lower.  5/5 left upper and left lower extremities, DTRs 2+ intact and symmetric    LABS:        CBC Full  -  ( 03 Dec 2017 08:24 )  WBC Count : 7.09 K/uL  Hemoglobin : 9.7 g/dL  Hematocrit : 30.4 %  Platelet Count - Automated : 296 K/uL  Mean Cell Volume : 94.7 fl  Mean Cell Hemoglobin : 30.2 pg  Mean Cell Hemoglobin Concentration : 31.9 gm/dL  Auto Neutrophil # : x  Auto Lymphocyte # : x  Auto Monocyte # : x  Auto Eosinophil # : x  Auto Basophil # : x  Auto Neutrophil % : x  Auto Lymphocyte % : x  Auto Monocyte % : x  Auto Eosinophil % : x  Auto Basophil % : x    12-04    141  |  101  |  32<H>  ----------------------------<  157<H>  4.2   |  23  |  0.80    Ca    9.0      04 Dec 2017 08:17            CAPILLARY BLOOD GLUCOSE      POCT Blood Glucose.: 127 mg/dL (04 Dec 2017 15:55)  POCT Blood Glucose.: 166 mg/dL (04 Dec 2017 11:49)  POCT Blood Glucose.: 175 mg/dL (04 Dec 2017 07:26)  POCT Blood Glucose.: 173 mg/dL (03 Dec 2017 21:09)      RADIOLOGY & ADDITIONAL TESTS:

## 2017-12-04 NOTE — PROGRESS NOTE ADULT - SUBJECTIVE AND OBJECTIVE BOX
Subjective: Pt feels well without pain, CP or SOB. Denies palpitations.  No new issues. She seems alert but confused as to date and who is the   current [place] president.    MEDICATIONS  (STANDING):  ALBUTerol/ipratropium for Nebulization 3 milliLiter(s) Nebulizer every 6 hours  amLODIPine   Tablet 5 milliGRAM(s) Oral two times a day  ascorbic acid 500 milliGRAM(s) Oral two times a day  aspirin enteric coated 81 milliGRAM(s) Oral daily  atorvastatin 40 milliGRAM(s) Oral at bedtime  carvedilol 25 milliGRAM(s) Oral every 12 hours  cloNIDine 0.2 milliGRAM(s) Oral daily  clopidogrel Tablet 75 milliGRAM(s) Oral daily  dextrose 5%. 1000 milliLiter(s) (50 mL/Hr) IV Continuous <Continuous>  dextrose 50% Injectable 12.5 Gram(s) IV Push once  dextrose 50% Injectable 25 Gram(s) IV Push once  dextrose 50% Injectable 25 Gram(s) IV Push once  docusate sodium 100 milliGRAM(s) Oral three times a day  enoxaparin Injectable 30 milliGRAM(s) SubCutaneous daily  folic acid 1 milliGRAM(s) Oral daily  insulin glargine Injectable (LANTUS) 10 Unit(s) SubCutaneous at bedtime  insulin lispro (HumaLOG) corrective regimen sliding scale   SubCutaneous at bedtime  insulin lispro (HumaLOG) corrective regimen sliding scale   SubCutaneous three times a day before meals  insulin lispro Injectable (HumaLOG) 4 Unit(s) SubCutaneous three times a day before meals  levothyroxine 50 MICROGram(s) Oral daily  LORazepam     Tablet 1 milliGRAM(s) Oral once  multivitamin 1 Tablet(s) Oral daily  pantoprazole    Tablet 40 milliGRAM(s) Oral before breakfast  polyethylene glycol 3350 17 Gram(s) Oral daily  QUEtiapine 25 milliGRAM(s) Oral at bedtime    MEDICATIONS  (PRN):  acetaminophen   Tablet 650 milliGRAM(s) Oral every 6 hours PRN For Temp greater than 38 C (100.4 F)  acetaminophen   Tablet. 325 milliGRAM(s) Oral every 4 hours PRN Mild Pain (1 - 3)  benzocaine 15 mG/menthol 3.6 mG Lozenge 1 Lozenge Oral every 2 hours PRN Sore Throat  calcium carbonate 500 mG (Tums) Chewable 3 Tablet(s) Chew every 6 hours PRN Dyspepsia  dextrose Gel 1 Dose(s) Oral once PRN Blood Glucose LESS THAN 70 milliGRAM(s)/deciliter  glucagon  Injectable 1 milliGRAM(s) IntraMuscular once PRN Glucose LESS THAN 70 milligrams/deciliter  ondansetron Injectable 4 milliGRAM(s) IV Push every 6 hours PRN Nausea and/or Vomiting  senna 2 Tablet(s) Oral at bedtime PRN Constipation      Vital Signs Last 24 Hrs  T(C): 37.2 (04 Dec 2017 04:52), Max: 37.2 (04 Dec 2017 04:52)  T(F): 99 (04 Dec 2017 04:52), Max: 99 (04 Dec 2017 04:52)  HR: 68 (04 Dec 2017 06:20) (62 - 73)  BP: 123/61 (04 Dec 2017 06:20) (104/55 - 144/70)  BP(mean): --  RR: 18 (04 Dec 2017 04:52) (17 - 18)  SpO2: 96% (04 Dec 2017 04:52) (93% - 99%)    PHYSICAL EXAM:    Constitutional: WD, WN in NAD  Neck: no JVD noted  Respiratory: clear lungs chuck  Cardiovascular: RRR with a 1/6 JYOTSNA at the LUSB  Gastrointestinal: BS present, NT, no masses  Extremities: no edema noted  Neurological: alert, but confused, no focal deficits  Musculoskeletal: R leg wound C/D/I    TELEMETRY: RSR with PVC's; no AF    ECG:      LABS:                        9.7    7.09  )-----------( 296      ( 03 Dec 2017 08:24 )             30.4     12-04    141  |  101  |  32<H>  ----------------------------<  157<H>  4.2   |  23  |  0.80    Ca    9.0      04 Dec 2017 08:17        I&O's Summary    03 Dec 2017 07:01  -  04 Dec 2017 07:00  --------------------------------------------------------  IN: 200 mL / OUT: 0 mL / NET: 200 mL      BNP  RADIOLOGY & ADDITIONAL STUDIES:< from: MR Head No Cont (12.01.17 @ 18:19) >  EXAM:  MR BRAIN                            PROCEDURE DATE:  12/01/2017            INTERPRETATION:  CLINICAL INDICATION: Altered mental status.    TECHNIQUE: Multiplanar, multisequence MRI of the brain was performed   without intravenous contrast.    COMPARISON: Brain MRI of 4/30/2014. Brain CT of 11/22/2017..    FINDINGS:  Foci of restricted diffusion involves the left internal capsule, left   thalamus and bilateral centrum semiovale compatible with acute infarcts.   There is no acute intracranial hemorrhage or mass effect.    Encephalomalacia and gliosis involves the bilateral occipital lobes and   posterior right temporal lobe. Chronic lacunar infarcts involve the right   cerebellum.    Periventricular white matter T2 FLAIR hyperintensityis most compatible   with chronic microvascular white matter ischemic disease.    Signal voids are seen within the major intracranial vessels consistent   with their patency. There is no hydrocephalus.    The sella and suprasellar structures are unremarkable.    The visualized paranasal sinuses and mastoid air cells are clear.     IMPRESSION: Small acute infarcts involve the left internal capsule, left   thalamus and bilateral centrum semiovale. Consider an embolic source.    Encephalomalacia/gliosis involving bilateral occipital lobes and   posterior right temporal lobe, likely sequela of prior infarct.    Dr. Holt discussed the above findings with ADRY Marcelo at 12:00 PM   on 12/2/2017 with read back.                DL HOLT D.O., RADIOLOGY RESIDENT  This document has been electronically signed.  SABRINA MCCAULEY M.D., ATTENDING RADIOLOGIST  This document has been electronically signed. Dec  2 2017  1:08PM        < end of copied text >    < from: MR Angio Neck No Cont (12.03.17 @ 15:49) >  EXAM:  MR ANGIO BRAIN                          EXAM:  MR ANGIO NECK                            PROCEDURE DATE:  12/03/2017            INTERPRETATION:  Noncontrast MRA of the neck and [place]    CLINICAL INDICATION: Embolic stroke    TECHNIQUE: 3-D time-of-flight images through the [place] were   obtained without administration of intravenous contrast.  Images were   reformatted using MIP protocol targeted over the head.  2-D and 3-D time   of flight technique was utilized to image the vessels of the neck    COMPARISON: Brain MRI dated 12/1/2017    FINDINGS:    Neck MRA: The common carotid arteries are normal. The left   internal/external carotid artery are patent.    There is mild narrowing of the proximal right internal carotid artery   approximately 1 cm from the bifurcation. . There is a dominant right   vertebral artery. It is poorly visualized and likely hypoplastic. This   can be further evaluated with contrast..    Brain MRA:    There is no identifiable flow within the left vertebral artery along its   proximal intradural segment. Flow is identified distally. The left   vertebral artery does contribute to the basilar artery. Both PICA and   superior cerebellar arteries are identified. The right vertebral artery   appears normal as does the basilar artery. The anterior circulation is   not remarkable in appearance.    Impression:    Neck MRA: No evidence for hemodynamically significance stenosis within   the anterior circulation. 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 Further evaluation with contrast study is   recommended.    Brain MRA: Lack of flow within the proximal intradural segment of the   left vertebral artery with filling distally. Further evaluation with   contrast-enhanced MRA or CTA is recommended for further evaluation.   Intact anterior circulation.                    SABRINA MCCAULEY M.D., ATTENDING RADIOLOGIST  This document hasbeen electronically signed. Dec  4 2017  9:29AM    < end of copied text >    IMPRESSION:  Multiple acute CVA's- ? embolic in origen  HTN  HLD  Recent R hip fracture from a fall  Post-op anemia  ? recent diastolic HF  AODM    RECOMMENDATIONS:  Continue current meds with adequate BP control  Would obtain a TTE for now- no RICKY as per family request  Continue telemetry monitoring  Ortho f/u and PT as tolerated  Rehab placement when medically stable  F/u CBC and BMP

## 2017-12-04 NOTE — PROGRESS NOTE ADULT - SUBJECTIVE AND OBJECTIVE BOX
Neurology Progress Note      the patient's symptoms  --- are  unchanged    MEDICATIONS  (STANDING):  ALBUTerol/ipratropium for Nebulization 3 milliLiter(s) Nebulizer every 6 hours  amLODIPine   Tablet 5 milliGRAM(s) Oral two times a day  ascorbic acid 500 milliGRAM(s) Oral two times a day  aspirin enteric coated 81 milliGRAM(s) Oral daily  atorvastatin 40 milliGRAM(s) Oral at bedtime  carvedilol 25 milliGRAM(s) Oral every 12 hours  cloNIDine 0.2 milliGRAM(s) Oral daily  clopidogrel Tablet 75 milliGRAM(s) Oral daily  dextrose 5%. 1000 milliLiter(s) (50 mL/Hr) IV Continuous <Continuous>  dextrose 50% Injectable 12.5 Gram(s) IV Push once  dextrose 50% Injectable 25 Gram(s) IV Push once  dextrose 50% Injectable 25 Gram(s) IV Push once  docusate sodium 100 milliGRAM(s) Oral three times a day  enoxaparin Injectable 30 milliGRAM(s) SubCutaneous daily  folic acid 1 milliGRAM(s) Oral daily  insulin glargine Injectable (LANTUS) 10 Unit(s) SubCutaneous at bedtime  insulin lispro (HumaLOG) corrective regimen sliding scale   SubCutaneous at bedtime  insulin lispro (HumaLOG) corrective regimen sliding scale   SubCutaneous three times a day before meals  insulin lispro Injectable (HumaLOG) 4 Unit(s) SubCutaneous three times a day before meals  levothyroxine 50 MICROGram(s) Oral daily  LORazepam     Tablet 1 milliGRAM(s) Oral once  multivitamin 1 Tablet(s) Oral daily  pantoprazole    Tablet 40 milliGRAM(s) Oral before breakfast  polyethylene glycol 3350 17 Gram(s) Oral daily  QUEtiapine 25 milliGRAM(s) Oral at bedtime    MEDICATIONS  (PRN):  acetaminophen   Tablet 650 milliGRAM(s) Oral every 6 hours PRN For Temp greater than 38 C (100.4 F)  acetaminophen   Tablet. 325 milliGRAM(s) Oral every 4 hours PRN Mild Pain (1 - 3)  benzocaine 15 mG/menthol 3.6 mG Lozenge 1 Lozenge Oral every 2 hours PRN Sore Throat  calcium carbonate 500 mG (Tums) Chewable 3 Tablet(s) Chew every 6 hours PRN Dyspepsia  dextrose Gel 1 Dose(s) Oral once PRN Blood Glucose LESS THAN 70 milliGRAM(s)/deciliter  glucagon  Injectable 1 milliGRAM(s) IntraMuscular once PRN Glucose LESS THAN 70 milligrams/deciliter  ondansetron Injectable 4 milliGRAM(s) IV Push every 6 hours PRN Nausea and/or Vomiting  senna 2 Tablet(s) Oral at bedtime PRN Constipation              Vital Signs Last 24 Hrs  T(C): 37.2 (04 Dec 2017 04:52), Max: 37.2 (04 Dec 2017 04:52)  T(F): 99 (04 Dec 2017 04:52), Max: 99 (04 Dec 2017 04:52)  HR: 68 (04 Dec 2017 06:20) (62 - 73)  BP: 123/61 (04 Dec 2017 06:20) (104/55 - 144/70)  BP(mean): --  RR: 18 (04 Dec 2017 04:52) (17 - 18)  SpO2: 96% (04 Dec 2017 04:52) (93% - 99%)    Physical exam  -MENTAL STATUS- Alert, attends, fluent, non-dysarthric, follows commands, oriented to place month not yr, poor memory and good affect.  CRANIAL NERVES-II Optic - Bilateral -  Visual Fields.-inconsistent, III-IV-VI EOMI & Pupils- irreg s/p sg - Bilateral - Normal Bilaterally. V Trigeminal - Bilateral - Normal bilateral facial sensation and jaw movement. VII Facial - Normal bilateral facial movement. VIII Acoustic - Bilateral - Hearing normal to voice bilaterally. IX Glossopharyngeal / X Vagus - Normal palate elevates symmetrically. XI Accessory - Normal Bilaterally. XII Hypoglossal - Tongue protrudes midline and moves symmetrically.  MOTOR-Bulk and Contour - Normal. Tone - Normal tone, no abnormal movements. Strength - 5/5 normal muscle strength xc legs limited by fx and r shoulder - Strength full throughout.  SENSORY-inconsistent  REFLEXES- DTR's 0-1+ throughout.  COORDINATION-Normal  , FNF - bilaterally.  GAIT-NA  -      CBC Full  -  ( 03 Dec 2017 08:24 )  WBC Count : 7.09 K/uL  Hemoglobin : 9.7 g/dL  Hematocrit : 30.4 %  Platelet Count - Automated : 296 K/uL  Mean Cell Volume : 94.7 fl  Mean Cell Hemoglobin : 30.2 pg  Mean Cell Hemoglobin Concentration : 31.9 gm/dL  Auto Neutrophil # : x  Auto Lymphocyte # : x  Auto Monocyte # : x  Auto Eosinophil # : x  Auto Basophil # : x  Auto Neutrophil % : x  Auto Lymphocyte % : x  Auto Monocyte % : x  Auto Eosinophil % : x  Auto Basophil % : x      12-03    134<L>  |  97  |  33<H>  ----------------------------<  143<H>  3.8   |  27  |  0.79    Ca    8.8      03 Dec 2017 08:12                radiology type ( ~?rad)

## 2017-12-04 NOTE — PROGRESS NOTE ADULT - SUBJECTIVE AND OBJECTIVE BOX
Follow-up Pulm Progress Note    Appears comfortable.     Medications:  MEDICATIONS  (STANDING):  ALBUTerol/ipratropium for Nebulization 3 milliLiter(s) Nebulizer every 6 hours  amLODIPine   Tablet 5 milliGRAM(s) Oral two times a day  ascorbic acid 500 milliGRAM(s) Oral two times a day  aspirin enteric coated 81 milliGRAM(s) Oral daily  atorvastatin 40 milliGRAM(s) Oral at bedtime  carvedilol 25 milliGRAM(s) Oral every 12 hours  cloNIDine 0.2 milliGRAM(s) Oral daily  clopidogrel Tablet 75 milliGRAM(s) Oral daily  dextrose 5%. 1000 milliLiter(s) (50 mL/Hr) IV Continuous <Continuous>  dextrose 50% Injectable 12.5 Gram(s) IV Push once  dextrose 50% Injectable 25 Gram(s) IV Push once  dextrose 50% Injectable 25 Gram(s) IV Push once  docusate sodium 100 milliGRAM(s) Oral three times a day  enoxaparin Injectable 30 milliGRAM(s) SubCutaneous daily  folic acid 1 milliGRAM(s) Oral daily  insulin glargine Injectable (LANTUS) 10 Unit(s) SubCutaneous at bedtime  insulin lispro (HumaLOG) corrective regimen sliding scale   SubCutaneous at bedtime  insulin lispro (HumaLOG) corrective regimen sliding scale   SubCutaneous three times a day before meals  insulin lispro Injectable (HumaLOG) 4 Unit(s) SubCutaneous three times a day before meals  levothyroxine 50 MICROGram(s) Oral daily  LORazepam     Tablet 1 milliGRAM(s) Oral once  multivitamin 1 Tablet(s) Oral daily  pantoprazole    Tablet 40 milliGRAM(s) Oral before breakfast  polyethylene glycol 3350 17 Gram(s) Oral daily  QUEtiapine 25 milliGRAM(s) Oral at bedtime    MEDICATIONS  (PRN):  acetaminophen   Tablet 650 milliGRAM(s) Oral every 6 hours PRN For Temp greater than 38 C (100.4 F)  acetaminophen   Tablet. 325 milliGRAM(s) Oral every 4 hours PRN Mild Pain (1 - 3)  benzocaine 15 mG/menthol 3.6 mG Lozenge 1 Lozenge Oral every 2 hours PRN Sore Throat  calcium carbonate 500 mG (Tums) Chewable 3 Tablet(s) Chew every 6 hours PRN Dyspepsia  dextrose Gel 1 Dose(s) Oral once PRN Blood Glucose LESS THAN 70 milliGRAM(s)/deciliter  glucagon  Injectable 1 milliGRAM(s) IntraMuscular once PRN Glucose LESS THAN 70 milligrams/deciliter  ondansetron Injectable 4 milliGRAM(s) IV Push every 6 hours PRN Nausea and/or Vomiting  senna 2 Tablet(s) Oral at bedtime PRN Constipation    Vital Signs Last 24 Hrs  T(C): 37.2 (04 Dec 2017 04:52), Max: 37.2 (04 Dec 2017 04:52)  T(F): 99 (04 Dec 2017 04:52), Max: 99 (04 Dec 2017 04:52)  HR: 68 (04 Dec 2017 06:20) (62 - 73)  BP: 123/61 (04 Dec 2017 06:20) (104/55 - 144/70)  BP(mean): --  RR: 18 (04 Dec 2017 04:52) (17 - 18)  SpO2: 96% (04 Dec 2017 04:52) (93% - 99%)    12-03 @ 07:01  -  12-04 @ 07:00  --------------------------------------------------------  IN: 200 mL / OUT: 0 mL / NET: 200 mL    LABS:                        9.7    7.09  )-----------( 296      ( 03 Dec 2017 08:24 )             30.4     12-04    141  |  101  |  32<H>  ----------------------------<  157<H>  4.2   |  23  |  0.80    Ca    9.0      04 Dec 2017 08:17    CAPILLARY BLOOD GLUCOSE  120 (02 Dec 2017 18:08)      POCT Blood Glucose.: 175 mg/dL (04 Dec 2017 07:26)    CULTURES: (if applicable)    Physical Examination:  PULM: Decreased BS at bases  CVS: S1, S2 heard    RADIOLOGY REVIEWED  CXR:     CT chest:    TTE:

## 2017-12-04 NOTE — PROGRESS NOTE ADULT - ASSESSMENT
89y Female with a history of HT,HLD, strokes WITH WORSENED  confusion  -likely due to new strokes on MRI- likely due due previous intracranial stenosis, anemia hypotension  rec high normotensive BP   maintain nl intravascular volume and H/H  dual antiplts  high dose statin  Rec holter/ tele,echo,  MRA(done -results p)  neuro chks-

## 2017-12-04 NOTE — PROGRESS NOTE ADULT - ASSESSMENT
89 YO female sustained a right hip fracture requiring surgical repair in the  OR.  Patient was seen and  examined,  There was no cardiac   or pulmonary  problems tat would preclude surgery in the am. Recommend Norvasc  5 mg po qd for BP control.

## 2017-12-04 NOTE — PROVIDER CONTACT NOTE (OTHER) - SITUATION
Pt noncompliant with telemetry monitoring/ continuous pulse oximeter at times. Pt is confused, refuses cardiac monitoring and continuous pulse oximeter at times.

## 2017-12-05 LAB
ANION GAP SERPL CALC-SCNC: 13 MMOL/L — SIGNIFICANT CHANGE UP (ref 5–17)
BUN SERPL-MCNC: 29 MG/DL — HIGH (ref 7–23)
CALCIUM SERPL-MCNC: 9.1 MG/DL — SIGNIFICANT CHANGE UP (ref 8.4–10.5)
CHLORIDE SERPL-SCNC: 102 MMOL/L — SIGNIFICANT CHANGE UP (ref 96–108)
CO2 SERPL-SCNC: 27 MMOL/L — SIGNIFICANT CHANGE UP (ref 22–31)
CREAT SERPL-MCNC: 0.69 MG/DL — SIGNIFICANT CHANGE UP (ref 0.5–1.3)
GLUCOSE BLDC GLUCOMTR-MCNC: 142 MG/DL — HIGH (ref 70–99)
GLUCOSE BLDC GLUCOMTR-MCNC: 205 MG/DL — HIGH (ref 70–99)
GLUCOSE BLDC GLUCOMTR-MCNC: 210 MG/DL — HIGH (ref 70–99)
GLUCOSE BLDC GLUCOMTR-MCNC: 248 MG/DL — HIGH (ref 70–99)
GLUCOSE SERPL-MCNC: 130 MG/DL — HIGH (ref 70–99)
HCT VFR BLD CALC: 34.9 % — SIGNIFICANT CHANGE UP (ref 34.5–45)
HGB BLD-MCNC: 10.8 G/DL — LOW (ref 11.5–15.5)
MCHC RBC-ENTMCNC: 29.6 PG — SIGNIFICANT CHANGE UP (ref 27–34)
MCHC RBC-ENTMCNC: 30.9 GM/DL — LOW (ref 32–36)
MCV RBC AUTO: 95.6 FL — SIGNIFICANT CHANGE UP (ref 80–100)
PLATELET # BLD AUTO: 360 K/UL — SIGNIFICANT CHANGE UP (ref 150–400)
POTASSIUM SERPL-MCNC: 3.9 MMOL/L — SIGNIFICANT CHANGE UP (ref 3.5–5.3)
POTASSIUM SERPL-SCNC: 3.9 MMOL/L — SIGNIFICANT CHANGE UP (ref 3.5–5.3)
RBC # BLD: 3.65 M/UL — LOW (ref 3.8–5.2)
RBC # FLD: 15.3 % — HIGH (ref 10.3–14.5)
SODIUM SERPL-SCNC: 142 MMOL/L — SIGNIFICANT CHANGE UP (ref 135–145)
WBC # BLD: 8.38 K/UL — SIGNIFICANT CHANGE UP (ref 3.8–10.5)
WBC # FLD AUTO: 8.38 K/UL — SIGNIFICANT CHANGE UP (ref 3.8–10.5)

## 2017-12-05 RX ORDER — AMLODIPINE BESYLATE 2.5 MG/1
5 TABLET ORAL DAILY
Qty: 0 | Refills: 0 | Status: DISCONTINUED | OUTPATIENT
Start: 2017-12-05 | End: 2017-12-07

## 2017-12-05 RX ADMIN — Medication 0.2 MILLIGRAM(S): at 05:03

## 2017-12-05 RX ADMIN — Medication 3 MILLILITER(S): at 12:11

## 2017-12-05 RX ADMIN — Medication 500 MILLIGRAM(S): at 17:26

## 2017-12-05 RX ADMIN — Medication 500 MILLIGRAM(S): at 05:03

## 2017-12-05 RX ADMIN — POLYETHYLENE GLYCOL 3350 17 GRAM(S): 17 POWDER, FOR SOLUTION ORAL at 12:11

## 2017-12-05 RX ADMIN — Medication 100 MILLIGRAM(S): at 05:03

## 2017-12-05 RX ADMIN — Medication 50 MICROGRAM(S): at 05:03

## 2017-12-05 RX ADMIN — Medication 1: at 16:52

## 2017-12-05 RX ADMIN — AMLODIPINE BESYLATE 5 MILLIGRAM(S): 2.5 TABLET ORAL at 17:25

## 2017-12-05 RX ADMIN — Medication 4 UNIT(S): at 08:06

## 2017-12-05 RX ADMIN — Medication 3 MILLILITER(S): at 21:17

## 2017-12-05 RX ADMIN — Medication 3 MILLILITER(S): at 17:25

## 2017-12-05 RX ADMIN — INSULIN GLARGINE 10 UNIT(S): 100 INJECTION, SOLUTION SUBCUTANEOUS at 21:15

## 2017-12-05 RX ADMIN — Medication 1 MILLIGRAM(S): at 12:10

## 2017-12-05 RX ADMIN — Medication 4 UNIT(S): at 16:52

## 2017-12-05 RX ADMIN — ENOXAPARIN SODIUM 30 MILLIGRAM(S): 100 INJECTION SUBCUTANEOUS at 12:11

## 2017-12-05 RX ADMIN — CLOPIDOGREL BISULFATE 75 MILLIGRAM(S): 75 TABLET, FILM COATED ORAL at 12:10

## 2017-12-05 RX ADMIN — AMLODIPINE BESYLATE 5 MILLIGRAM(S): 2.5 TABLET ORAL at 05:03

## 2017-12-05 RX ADMIN — CARVEDILOL PHOSPHATE 25 MILLIGRAM(S): 80 CAPSULE, EXTENDED RELEASE ORAL at 05:03

## 2017-12-05 RX ADMIN — Medication 4 UNIT(S): at 13:35

## 2017-12-05 RX ADMIN — Medication 1 TABLET(S): at 12:11

## 2017-12-05 RX ADMIN — CARVEDILOL PHOSPHATE 25 MILLIGRAM(S): 80 CAPSULE, EXTENDED RELEASE ORAL at 17:26

## 2017-12-05 RX ADMIN — ATORVASTATIN CALCIUM 40 MILLIGRAM(S): 80 TABLET, FILM COATED ORAL at 21:16

## 2017-12-05 RX ADMIN — Medication 3 MILLILITER(S): at 05:03

## 2017-12-05 RX ADMIN — Medication 100 MILLIGRAM(S): at 21:16

## 2017-12-05 RX ADMIN — Medication 1: at 13:35

## 2017-12-05 RX ADMIN — QUETIAPINE FUMARATE 25 MILLIGRAM(S): 200 TABLET, FILM COATED ORAL at 21:16

## 2017-12-05 RX ADMIN — PANTOPRAZOLE SODIUM 40 MILLIGRAM(S): 20 TABLET, DELAYED RELEASE ORAL at 05:03

## 2017-12-05 RX ADMIN — Medication 81 MILLIGRAM(S): at 12:10

## 2017-12-05 NOTE — PROGRESS NOTE ADULT - SUBJECTIVE AND OBJECTIVE BOX
Follow-up Pulm Progress Note    No new respiratory events overnight.  Appears comfortable.     Medications:  MEDICATIONS  (STANDING):  ALBUTerol/ipratropium for Nebulization 3 milliLiter(s) Nebulizer every 6 hours  amLODIPine   Tablet 5 milliGRAM(s) Oral two times a day  ascorbic acid 500 milliGRAM(s) Oral two times a day  aspirin enteric coated 81 milliGRAM(s) Oral daily  atorvastatin 40 milliGRAM(s) Oral at bedtime  carvedilol 25 milliGRAM(s) Oral every 12 hours  cloNIDine 0.2 milliGRAM(s) Oral daily  clopidogrel Tablet 75 milliGRAM(s) Oral daily  dextrose 5%. 1000 milliLiter(s) (50 mL/Hr) IV Continuous <Continuous>  dextrose 50% Injectable 12.5 Gram(s) IV Push once  dextrose 50% Injectable 25 Gram(s) IV Push once  dextrose 50% Injectable 25 Gram(s) IV Push once  docusate sodium 100 milliGRAM(s) Oral three times a day  enoxaparin Injectable 30 milliGRAM(s) SubCutaneous daily  folic acid 1 milliGRAM(s) Oral daily  insulin glargine Injectable (LANTUS) 10 Unit(s) SubCutaneous at bedtime  insulin lispro (HumaLOG) corrective regimen sliding scale   SubCutaneous at bedtime  insulin lispro (HumaLOG) corrective regimen sliding scale   SubCutaneous three times a day before meals  insulin lispro Injectable (HumaLOG) 4 Unit(s) SubCutaneous three times a day before meals  levothyroxine 50 MICROGram(s) Oral daily  LORazepam     Tablet 1 milliGRAM(s) Oral once  multivitamin 1 Tablet(s) Oral daily  pantoprazole    Tablet 40 milliGRAM(s) Oral before breakfast  polyethylene glycol 3350 17 Gram(s) Oral daily  QUEtiapine 25 milliGRAM(s) Oral at bedtime    MEDICATIONS  (PRN):  acetaminophen   Tablet 650 milliGRAM(s) Oral every 6 hours PRN For Temp greater than 38 C (100.4 F)  acetaminophen   Tablet. 325 milliGRAM(s) Oral every 4 hours PRN Mild Pain (1 - 3)  benzocaine 15 mG/menthol 3.6 mG Lozenge 1 Lozenge Oral every 2 hours PRN Sore Throat  calcium carbonate 500 mG (Tums) Chewable 3 Tablet(s) Chew every 6 hours PRN Dyspepsia  dextrose Gel 1 Dose(s) Oral once PRN Blood Glucose LESS THAN 70 milliGRAM(s)/deciliter  glucagon  Injectable 1 milliGRAM(s) IntraMuscular once PRN Glucose LESS THAN 70 milligrams/deciliter  ondansetron Injectable 4 milliGRAM(s) IV Push every 6 hours PRN Nausea and/or Vomiting  senna 2 Tablet(s) Oral at bedtime PRN Constipation    Vital Signs Last 24 Hrs  T(C): 36.8 (05 Dec 2017 04:29), Max: 36.8 (05 Dec 2017 04:29)  T(F): 98.3 (05 Dec 2017 04:29), Max: 98.3 (05 Dec 2017 04:29)  HR: 68 (05 Dec 2017 05:01) (63 - 72)  BP: 132/69 (05 Dec 2017 11:03) (120/75 - 136/73)  BP(mean): --  RR: 18 (05 Dec 2017 04:29) (18 - 18)  SpO2: 94% (05 Dec 2017 04:29) (92% - 96%)    12-04 @ 07:01  -  12-05 @ 07:00  --------------------------------------------------------  IN: 1020 mL / OUT: 0 mL / NET: 1020 mL    LABS:                        10.8   8.38  )-----------( 360      ( 05 Dec 2017 07:28 )             34.9     12-05    142  |  102  |  29<H>  ----------------------------<  130<H>  3.9   |  27  |  0.69    Ca    9.1      05 Dec 2017 07:16    CAPILLARY BLOOD GLUCOSE      POCT Blood Glucose.: 142 mg/dL (05 Dec 2017 07:29)    CULTURES: (if applicable)    Physical Examination:  PULM: Clear to auscultation bilaterally, no significant sputum production  CVS: S1, S2 heard    RADIOLOGY REVIEWED  CXR:     CT chest:    TTE:

## 2017-12-05 NOTE — PROGRESS NOTE ADULT - SUBJECTIVE AND OBJECTIVE BOX
Neurology Progress Note      the patient's symptoms  --- are  unchanged    MEDICATIONS  (STANDING):  ALBUTerol/ipratropium for Nebulization 3 milliLiter(s) Nebulizer every 6 hours  amLODIPine   Tablet 5 milliGRAM(s) Oral two times a day  ascorbic acid 500 milliGRAM(s) Oral two times a day  aspirin enteric coated 81 milliGRAM(s) Oral daily  atorvastatin 40 milliGRAM(s) Oral at bedtime  carvedilol 25 milliGRAM(s) Oral every 12 hours  cloNIDine 0.2 milliGRAM(s) Oral daily  clopidogrel Tablet 75 milliGRAM(s) Oral daily  dextrose 5%. 1000 milliLiter(s) (50 mL/Hr) IV Continuous <Continuous>  dextrose 50% Injectable 12.5 Gram(s) IV Push once  dextrose 50% Injectable 25 Gram(s) IV Push once  dextrose 50% Injectable 25 Gram(s) IV Push once  docusate sodium 100 milliGRAM(s) Oral three times a day  enoxaparin Injectable 30 milliGRAM(s) SubCutaneous daily  folic acid 1 milliGRAM(s) Oral daily  insulin glargine Injectable (LANTUS) 10 Unit(s) SubCutaneous at bedtime  insulin lispro (HumaLOG) corrective regimen sliding scale   SubCutaneous at bedtime  insulin lispro (HumaLOG) corrective regimen sliding scale   SubCutaneous three times a day before meals  insulin lispro Injectable (HumaLOG) 4 Unit(s) SubCutaneous three times a day before meals  levothyroxine 50 MICROGram(s) Oral daily  LORazepam     Tablet 1 milliGRAM(s) Oral once  multivitamin 1 Tablet(s) Oral daily  pantoprazole    Tablet 40 milliGRAM(s) Oral before breakfast  polyethylene glycol 3350 17 Gram(s) Oral daily  QUEtiapine 25 milliGRAM(s) Oral at bedtime    MEDICATIONS  (PRN):  acetaminophen   Tablet 650 milliGRAM(s) Oral every 6 hours PRN For Temp greater than 38 C (100.4 F)  acetaminophen   Tablet. 325 milliGRAM(s) Oral every 4 hours PRN Mild Pain (1 - 3)  benzocaine 15 mG/menthol 3.6 mG Lozenge 1 Lozenge Oral every 2 hours PRN Sore Throat  calcium carbonate 500 mG (Tums) Chewable 3 Tablet(s) Chew every 6 hours PRN Dyspepsia  dextrose Gel 1 Dose(s) Oral once PRN Blood Glucose LESS THAN 70 milliGRAM(s)/deciliter  glucagon  Injectable 1 milliGRAM(s) IntraMuscular once PRN Glucose LESS THAN 70 milligrams/deciliter  ondansetron Injectable 4 milliGRAM(s) IV Push every 6 hours PRN Nausea and/or Vomiting  senna 2 Tablet(s) Oral at bedtime PRN Constipation              Vital Signs Last 24 Hrs  T(C): 36.8 (05 Dec 2017 04:29), Max: 36.8 (05 Dec 2017 04:29)  T(F): 98.3 (05 Dec 2017 04:29), Max: 98.3 (05 Dec 2017 04:29)  HR: 68 (05 Dec 2017 05:01) (63 - 72)  BP: 128/64 (05 Dec 2017 05:01) (120/75 - 136/73)  BP(mean): --  RR: 18 (05 Dec 2017 04:29) (18 - 18)  SpO2: 94% (05 Dec 2017 04:29) (92% - 96%)    Physical exam  -MENTAL STATUS- Alert, attends, fluent, non-dysarthric, follows commands, oriented to place month not yr, poor memory and good affect.  CRANIAL NERVES-II Optic - Bilateral -  Visual Fields.-inconsistent, III-IV-VI EOMI & Pupils- irreg s/p sg - Bilateral - Normal Bilaterally. V Trigeminal - Bilateral - Normal bilateral facial sensation and jaw movement. VII Facial - Normal bilateral facial movement. VIII Acoustic - Bilateral - Hearing normal to voice bilaterally. IX Glossopharyngeal / X Vagus - Normal palate elevates symmetrically. XI Accessory - Normal Bilaterally. XII Hypoglossal - Tongue protrudes midline and moves symmetrically.  MOTOR-Bulk and Contour - Normal. Tone - Normal tone, no abnormal movements. Strength - 5/5 normal muscle strength xc legs limited by fx and r shoulder - Strength full throughout.  SENSORY-inconsistent  REFLEXES- DTR's 0-1+ throughout.  COORDINATION-Normal  , FNF - bilaterally.  GAIT-NA          CBC Full  -  ( 05 Dec 2017 07:28 )  WBC Count : 8.38 K/uL  Hemoglobin : 10.8 g/dL  Hematocrit : 34.9 %  Platelet Count - Automated : 360 K/uL  Mean Cell Volume : 95.6 fl  Mean Cell Hemoglobin : 29.6 pg  Mean Cell Hemoglobin Concentration : 30.9 gm/dL  Auto Neutrophil # : x  Auto Lymphocyte # : x  Auto Monocyte # : x  Auto Eosinophil # : x  Auto Basophil # : x  Auto Neutrophil % : x  Auto Lymphocyte % : x  Auto Monocyte % : x  Auto Eosinophil % : x  Auto Basophil % : x      12-05    142  |  102  |  29<H>  ----------------------------<  130<H>  3.9   |  27  |  0.69    Ca    9.1      05 Dec 2017 07:16            < from: MR Angio Head No Cont (12.03.17 @ 15:48) >  Neck MRA: No evidence for hemodynamically significance stenosis within   the anterior circulation. 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 Further evaluation with contrast study is   recommended.    Brain MRA: Lack of flow within the proximal intradural segment of the   left vertebral artery with filling distally. Further evaluation with   contrast-enhanced MRA or CTA is recommended for further evaluation.   Intact anterior circulation.    < end of copied text >

## 2017-12-05 NOTE — PROGRESS NOTE ADULT - SUBJECTIVE AND OBJECTIVE BOX
HPI:  Patient is a 89 yo female HTN, DM2 hx cerebellar infarct s/p mechanical fall and fracture R hip.  Pt is s/p ORIF R hip. MRI brain multiple new CVA's.  RICKY declined by family. Pt is DNR  PAST MEDICAL & SURGICAL HISTORY:  Stroke  HLD (hyperlipidemia)  HTN (hypertension)  Hypothyroid  History of hip replacement, total, left      Allergies    No Known Allergies    Intolerances          MEDICATIONS  (STANDING):  ALBUTerol/ipratropium for Nebulization 3 milliLiter(s) Nebulizer every 6 hours  amLODIPine   Tablet 5 milliGRAM(s) Oral two times a day  ascorbic acid 500 milliGRAM(s) Oral two times a day  aspirin enteric coated 81 milliGRAM(s) Oral daily  atorvastatin 40 milliGRAM(s) Oral at bedtime  carvedilol 25 milliGRAM(s) Oral every 12 hours  cloNIDine 0.2 milliGRAM(s) Oral daily  clopidogrel Tablet 75 milliGRAM(s) Oral daily  dextrose 5%. 1000 milliLiter(s) (50 mL/Hr) IV Continuous <Continuous>  dextrose 50% Injectable 12.5 Gram(s) IV Push once  dextrose 50% Injectable 25 Gram(s) IV Push once  dextrose 50% Injectable 25 Gram(s) IV Push once  docusate sodium 100 milliGRAM(s) Oral three times a day  enoxaparin Injectable 30 milliGRAM(s) SubCutaneous daily  folic acid 1 milliGRAM(s) Oral daily  insulin glargine Injectable (LANTUS) 10 Unit(s) SubCutaneous at bedtime  insulin lispro (HumaLOG) corrective regimen sliding scale   SubCutaneous at bedtime  insulin lispro (HumaLOG) corrective regimen sliding scale   SubCutaneous three times a day before meals  insulin lispro Injectable (HumaLOG) 4 Unit(s) SubCutaneous three times a day before meals  levothyroxine 50 MICROGram(s) Oral daily  LORazepam     Tablet 1 milliGRAM(s) Oral once  multivitamin 1 Tablet(s) Oral daily  pantoprazole    Tablet 40 milliGRAM(s) Oral before breakfast  polyethylene glycol 3350 17 Gram(s) Oral daily  QUEtiapine 25 milliGRAM(s) Oral at bedtime    MEDICATIONS  (PRN):  acetaminophen   Tablet 650 milliGRAM(s) Oral every 6 hours PRN For Temp greater than 38 C (100.4 F)  acetaminophen   Tablet. 325 milliGRAM(s) Oral every 4 hours PRN Mild Pain (1 - 3)  benzocaine 15 mG/menthol 3.6 mG Lozenge 1 Lozenge Oral every 2 hours PRN Sore Throat  calcium carbonate 500 mG (Tums) Chewable 3 Tablet(s) Chew every 6 hours PRN Dyspepsia  dextrose Gel 1 Dose(s) Oral once PRN Blood Glucose LESS THAN 70 milliGRAM(s)/deciliter  glucagon  Injectable 1 milliGRAM(s) IntraMuscular once PRN Glucose LESS THAN 70 milligrams/deciliter  ondansetron Injectable 4 milliGRAM(s) IV Push every 6 hours PRN Nausea and/or Vomiting  senna 2 Tablet(s) Oral at bedtime PRN Constipation            Vital Signs Last 24 Hrs  T(C): 36.8 (05 Dec 2017 04:29), Max: 36.8 (05 Dec 2017 04:29)  T(F): 98.3 (05 Dec 2017 04:29), Max: 98.3 (05 Dec 2017 04:29)  HR: 68 (05 Dec 2017 05:01) (63 - 72)  BP: 128/64 (05 Dec 2017 05:01) (120/75 - 136/73)  BP(mean): --  RR: 18 (05 Dec 2017 04:29) (18 - 18)  SpO2: 94% (05 Dec 2017 04:29) (92% - 96%)  Daily     Daily   I&O's Detail    04 Dec 2017 07:01  -  05 Dec 2017 07:00  --------------------------------------------------------  IN:    Oral Fluid: 1020 mL  Total IN: 1020 mL    OUT:  Total OUT: 0 mL    Total NET: 1020 mL          Physical Exam  Pt resting comfortable  Neck without JVD  Lungs clear  Cor s1s2 2/6 donny rusb  Abd soft   Ext 1+ edema  LABS          CBC Full  -  ( 05 Dec 2017 07:28 )  WBC Count : 8.38 K/uL  Hemoglobin : 10.8 g/dL  Hematocrit : 34.9 %  Platelet Count - Automated : 360 K/uL  Mean Cell Volume : 95.6 fl  Mean Cell Hemoglobin : 29.6 pg  Mean Cell Hemoglobin Concentration : 30.9 gm/dL  Auto Neutrophil # : x  Auto Lymphocyte # : x  Auto Monocyte # : x  Auto Eosinophil # : x  Auto Basophil # : x  Auto Neutrophil % : x  Auto Lymphocyte % : x  Auto Monocyte % : x  Auto Eosinophil % : x  Auto Basophil % : x    12-05    142  |  102  |  29<H>  ----------------------------<  130<H>  3.9   |  27  |  0.69    Ca    9.1      05 Dec 2017 07:16      Assessment and Plan:  Patient is a 89 yo female HTN, DM2 hx cerebellar infarct s/p mechanical fall and fracture R hip.  Pt is s/p ORIF R hip. MRI brain multiple new CVA's.  RICKY declined by family. Pt is DNR  Agree with current rx

## 2017-12-05 NOTE — PROGRESS NOTE ADULT - ASSESSMENT
89y Female with a history of HT,HLD, strokes WITH WORSENED  confusion  -likely due to new strokes on MRI- likely due due previous intracranial stenosis, anemia hypotension  rec high normotensive BP   maintain nl intravascular volume and H/H  dual antiplts  high dose statin  Rec holter/ tele(so far -),echo,    neuro chks-

## 2017-12-05 NOTE — PROGRESS NOTE ADULT - SUBJECTIVE AND OBJECTIVE BOX
assisted living [place] with a fall. Per patient, pants were wet, so she walking to her room and she tripped and hit face on the floor. No LOC. Normally walks with a walker but was not using it today. Reports L sided face pain, R knee pain, L foot and ankle pain, and chipped front tooth. Was unable to get off floor by herself - aid had to help - on floor for 5 minutes. Denies any Chest pain, SOB, N/V/D, confusion. A+O x 3. Been following with Derm for R arm rash - will find out biopsy results on Monday. S/P ORIF Right Hip 11/23/2017 with no significant medical complications to date. discussed with Patient nephew. Patient is not usually confused at home)    MEDICATIONS  (STANDING):  ALBUTerol/ipratropium for Nebulization 3 milliLiter(s) Nebulizer every 6 hours  amLODIPine   Tablet 5 milliGRAM(s) Oral two times a day  ascorbic acid 500 milliGRAM(s) Oral two times a day  aspirin enteric coated 81 milliGRAM(s) Oral daily  atorvastatin 40 milliGRAM(s) Oral at bedtime  carvedilol 25 milliGRAM(s) Oral every 12 hours  cloNIDine 0.2 milliGRAM(s) Oral daily  clopidogrel Tablet 75 milliGRAM(s) Oral daily  dextrose 5%. 1000 milliLiter(s) (50 mL/Hr) IV Continuous <Continuous>  dextrose 50% Injectable 12.5 Gram(s) IV Push once  dextrose 50% Injectable 25 Gram(s) IV Push once  dextrose 50% Injectable 25 Gram(s) IV Push once  docusate sodium 100 milliGRAM(s) Oral three times a day  enoxaparin Injectable 30 milliGRAM(s) SubCutaneous daily  folic acid 1 milliGRAM(s) Oral daily  insulin glargine Injectable (LANTUS) 10 Unit(s) SubCutaneous at bedtime  insulin lispro (HumaLOG) corrective regimen sliding scale   SubCutaneous at bedtime  insulin lispro (HumaLOG) corrective regimen sliding scale   SubCutaneous three times a day before meals  insulin lispro Injectable (HumaLOG) 4 Unit(s) SubCutaneous three times a day before meals  levothyroxine 50 MICROGram(s) Oral daily  LORazepam     Tablet 1 milliGRAM(s) Oral once  multivitamin 1 Tablet(s) Oral daily  pantoprazole    Tablet 40 milliGRAM(s) Oral before breakfast  polyethylene glycol 3350 17 Gram(s) Oral daily  QUEtiapine 25 milliGRAM(s) Oral at bedtime    MEDICATIONS  (PRN):  acetaminophen   Tablet 650 milliGRAM(s) Oral every 6 hours PRN For Temp greater than 38 C (100.4 F)  acetaminophen   Tablet. 325 milliGRAM(s) Oral every 4 hours PRN Mild Pain (1 - 3)  benzocaine 15 mG/menthol 3.6 mG Lozenge 1 Lozenge Oral every 2 hours PRN Sore Throat  calcium carbonate 500 mG (Tums) Chewable 3 Tablet(s) Chew every 6 hours PRN Dyspepsia  dextrose Gel 1 Dose(s) Oral once PRN Blood Glucose LESS THAN 70 milliGRAM(s)/deciliter  glucagon  Injectable 1 milliGRAM(s) IntraMuscular once PRN Glucose LESS THAN 70 milligrams/deciliter  ondansetron Injectable 4 milliGRAM(s) IV Push every 6 hours PRN Nausea and/or Vomiting  senna 2 Tablet(s) Oral at bedtime PRN Constipation        REVIEW OF SYSTEM:  CONSTITUTIONAL: No fever, No change in weight, No fatigue  HEAD: No headache, No dizziness, No recent trauma  EYES: BLIND  ENT:  No difficulty hearing, No tinnitus, No vertigo, No sinus pain, No throat pain  NECK: No pain, No stiffness  RESPIRATORY: No cough, No wheezing, No chills, No hemoptysis, No shortness of breath at rest or exertional shortness of breath  CARDIOVASCULAR: No chest pain, No palpitations, No dizziness, No CHF, No arrhythmia, No cardiomegaly, No leg swelling  GASTROINTESTINAL: No abdominal, No epigastric pain. No nausea, No vomiting, No hematemesis, No diarrhea, No constipation. No melena, No hematochezia. No GERD  GENITOURINARY: No dysuria, No frequency, No hematuria, No incontinence, No nocturia, No hesitancy,  SKIN: No itching, No burning, No rashes, No lesions   LYMPH NODES: No history of enlarged glands  ENDOCRINE: No heat or cold intolerance, No hair loss. No osteoporosis, No thyroid disease  MUSCULOSKELETAL:     (+)  RIGHT HIP PAIN  PSYCHIATRIC: No depression, No anxiety, No mood swings, No difficulty sleeping  HEME/LYMPH: No easy bruising, No anticoagulants, No bleeding disorder, No bleeding gums  ALLERGY AND IMMUNOLOGIC: No hives, No eczema  NEUROLOGICAL: No memory loss, No loss of strength, No numbness, No tremors    Vital Signs Last 24 Hrs  T(C): 36.8 (05 Dec 2017 04:29), Max: 36.8 (05 Dec 2017 04:29)  T(F): 98.3 (05 Dec 2017 04:29), Max: 98.3 (05 Dec 2017 04:29)  HR: 68 (05 Dec 2017 05:01) (68 - 72)  BP: 132/69 (05 Dec 2017 11:03) (120/75 - 136/73)  BP(mean): --  RR: 18 (05 Dec 2017 04:29) (18 - 18)  SpO2: 94% (05 Dec 2017 04:29) (92% - 94%)  PHYSICAL EXAM:  GENERAL: NAD, well nourished and conversant  HEAD:  Atraumatic  EYES:  PATIENT IS BLIND  ENT: No tonsillar erythema, exudates, or enlargement, moist mucous membranes, good dentition, no lesions  NECK: Supple, No JVD, normal thyroid, carotids with normal upstrokes and no bruits  CHEST/LUNG: Clear to auscultation bilaterally, No rales, rhonchi, wheezing, or rubs  HEART: Regular rate and rhythm, No murmurs, rubs, or gallops  ABDOMEN: Soft, nondistended, no masses, guarding, tenderness or rebound, bowel sounds present  EXTREMITIES:  2+ Peripheral Pulses, No clubbing, cyanosis, or edema.   (+) RIGHT HIP PAIN C/W HIP FRACTURE  LYMPH: No lymphadenopathy noted  SKIN: No rashes or lesions  NERVOUS SYSTEM:  Alert & Oriented X3, normal cognitive function. Motor Strength 5/5 right upper and right lower.  5/5 left upper and left lower extremities, DTRs 2+ intact and symmetric    LABS:    CBC Full  -  ( 05 Dec 2017 07:28 )  WBC Count : 8.38 K/uL  Hemoglobin : 10.8 g/dL  Hematocrit : 34.9 %  Platelet Count - Automated : 360 K/uL  Mean Cell Volume : 95.6 fl  Mean Cell Hemoglobin : 29.6 pg  Mean Cell Hemoglobin Concentration : 30.9 gm/dL  Auto Neutrophil # : x  Auto Lymphocyte # : x  Auto Monocyte # : x  Auto Eosinophil # : x  Auto Basophil # : x  Auto Neutrophil % : x  Auto Lymphocyte % : x  Auto Monocyte % : x  Auto Eosinophil % : x  Auto Basophil % : x    12-05    142  |  102  |  29<H>  ----------------------------<  130<H>  3.9   |  27  |  0.69    Ca    9.1      05 Dec 2017 07:16                          POCT Blood Glucose.: 127 mg/dL (04 Dec 2017 15:55)  POCT Blood Glucose.: 166 mg/dL (04 Dec 2017 11:49)  POCT Blood Glucose.: 175 mg/dL (04 Dec 2017 07:26)  POCT Blood Glucose.: 173 mg/dL (03 Dec 2017 21:09)      RADIOLOGY & ADDITIONAL TESTS: The patient was in her assisted living [place], with a fall. Per patient, pants were wet, so she walking to her room and she tripped and hit face on the floor. No LOC. Normally walks with a walker but was not using it today. Reports L sided face pain, R knee pain, L foot and ankle pain, and chipped front tooth. Was unable to get off floor by herself - aid had to help - on floor for 5 minutes. Denies any Chest pain, SOB, N/V/D, confusion. A+O x 3. Been following with Derm for R arm rash - will find out biopsy results on Monday. S/P ORIF Right Hip 11/23/2017 with no significant medical complications to date. Discussed with Patient nephew. Patient is not usually confused at home. MRI of the brain with new multifocal infarcts consistent with embolic disease,  that explains the change of mental capacity. Awaiting echocardiogram for source of emboli.    MEDICATIONS  (STANDING):  ALBUTerol/ipratropium for Nebulization 3 milliLiter(s) Nebulizer every 6 hours  amLODIPine   Tablet 5 milliGRAM(s) Oral two times a day  ascorbic acid 500 milliGRAM(s) Oral two times a day  aspirin enteric coated 81 milliGRAM(s) Oral daily  atorvastatin 40 milliGRAM(s) Oral at bedtime  carvedilol 25 milliGRAM(s) Oral every 12 hours  cloNIDine 0.2 milliGRAM(s) Oral daily  clopidogrel Tablet 75 milliGRAM(s) Oral daily  dextrose 5%. 1000 milliLiter(s) (50 mL/Hr) IV Continuous <Continuous>  dextrose 50% Injectable 12.5 Gram(s) IV Push once  dextrose 50% Injectable 25 Gram(s) IV Push once  dextrose 50% Injectable 25 Gram(s) IV Push once  docusate sodium 100 milliGRAM(s) Oral three times a day  enoxaparin Injectable 30 milliGRAM(s) SubCutaneous daily  folic acid 1 milliGRAM(s) Oral daily  insulin glargine Injectable (LANTUS) 10 Unit(s) SubCutaneous at bedtime  insulin lispro (HumaLOG) corrective regimen sliding scale   SubCutaneous at bedtime  insulin lispro (HumaLOG) corrective regimen sliding scale   SubCutaneous three times a day before meals  insulin lispro Injectable (HumaLOG) 4 Unit(s) SubCutaneous three times a day before meals  levothyroxine 50 MICROGram(s) Oral daily  LORazepam     Tablet 1 milliGRAM(s) Oral once  multivitamin 1 Tablet(s) Oral daily  pantoprazole    Tablet 40 milliGRAM(s) Oral before breakfast  polyethylene glycol 3350 17 Gram(s) Oral daily  QUEtiapine 25 milliGRAM(s) Oral at bedtime    MEDICATIONS  (PRN):  acetaminophen   Tablet 650 milliGRAM(s) Oral every 6 hours PRN For Temp greater than 38 C (100.4 F)  acetaminophen   Tablet. 325 milliGRAM(s) Oral every 4 hours PRN Mild Pain (1 - 3)  benzocaine 15 mG/menthol 3.6 mG Lozenge 1 Lozenge Oral every 2 hours PRN Sore Throat  calcium carbonate 500 mG (Tums) Chewable 3 Tablet(s) Chew every 6 hours PRN Dyspepsia  dextrose Gel 1 Dose(s) Oral once PRN Blood Glucose LESS THAN 70 milliGRAM(s)/deciliter  glucagon  Injectable 1 milliGRAM(s) IntraMuscular once PRN Glucose LESS THAN 70 milligrams/deciliter  ondansetron Injectable 4 milliGRAM(s) IV Push every 6 hours PRN Nausea and/or Vomiting  senna 2 Tablet(s) Oral at bedtime PRN Constipation        REVIEW OF SYSTEM:  CONSTITUTIONAL: No fever, No change in weight, No fatigue  HEAD: No headache, No dizziness, No recent trauma  EYES: BLIND  ENT:  No difficulty hearing, No tinnitus, No vertigo, No sinus pain, No throat pain  NECK: No pain, No stiffness  RESPIRATORY: No cough, No wheezing, No chills, No hemoptysis, No shortness of breath at rest or exertional shortness of breath  CARDIOVASCULAR: No chest pain, No palpitations, No dizziness, No CHF, No arrhythmia, No cardiomegaly, No leg swelling  GASTROINTESTINAL: No abdominal, No epigastric pain. No nausea, No vomiting, No hematemesis, No diarrhea, No constipation. No melena, No hematochezia. No GERD  GENITOURINARY: No dysuria, No frequency, No hematuria, No incontinence, No nocturia, No hesitancy,  SKIN: No itching, No burning, No rashes, No lesions   LYMPH NODES: No history of enlarged glands  ENDOCRINE: No heat or cold intolerance, No hair loss. No osteoporosis, No thyroid disease  MUSCULOSKELETAL:     (+)  RIGHT HIP PAIN  PSYCHIATRIC: No depression, No anxiety, No mood swings, No difficulty sleeping  HEME/LYMPH: No easy bruising, No anticoagulants, No bleeding disorder, No bleeding gums  ALLERGY AND IMMUNOLOGIC: No hives, No eczema  NEUROLOGICAL: No memory loss, No loss of strength, No numbness, No tremors    Vital Signs Last 24 Hrs  T(C): 36.8 (05 Dec 2017 04:29), Max: 36.8 (05 Dec 2017 04:29)  T(F): 98.3 (05 Dec 2017 04:29), Max: 98.3 (05 Dec 2017 04:29)  HR: 68 (05 Dec 2017 05:01) (68 - 72)  BP: 132/69 (05 Dec 2017 11:03) (120/75 - 136/73)  BP(mean): --  RR: 18 (05 Dec 2017 04:29) (18 - 18)  SpO2: 94% (05 Dec 2017 04:29) (92% - 94%)  PHYSICAL EXAM:  GENERAL: NAD, well nourished and conversant  HEAD:  Atraumatic  EYES:  PATIENT IS BLIND  ENT: No tonsillar erythema, exudates, or enlargement, moist mucous membranes, good dentition, no lesions  NECK: Supple, No JVD, normal thyroid, carotids with normal upstrokes and no bruits  CHEST/LUNG: Clear to auscultation bilaterally, No rales, rhonchi, wheezing, or rubs  HEART: Regular rate and rhythm, No murmurs, rubs, or gallops  ABDOMEN: Soft, nondistended, no masses, guarding, tenderness or rebound, bowel sounds present  EXTREMITIES:  2+ Peripheral Pulses, No clubbing, cyanosis, or edema.   (+) RIGHT HIP PAIN C/W HIP FRACTURE  LYMPH: No lymphadenopathy noted  SKIN: No rashes or lesions  NERVOUS SYSTEM:  Alert & Oriented X3, normal cognitive function. Motor Strength 5/5 right upper and right lower.  5/5 left upper and left lower extremities, DTRs 2+ intact and symmetric    LABS:    CBC Full  -  ( 05 Dec 2017 07:28 )  WBC Count : 8.38 K/uL  Hemoglobin : 10.8 g/dL  Hematocrit : 34.9 %  Platelet Count - Automated : 360 K/uL  Mean Cell Volume : 95.6 fl  Mean Cell Hemoglobin : 29.6 pg  Mean Cell Hemoglobin Concentration : 30.9 gm/dL  Auto Neutrophil # : x  Auto Lymphocyte # : x  Auto Monocyte # : x  Auto Eosinophil # : x  Auto Basophil # : x  Auto Neutrophil % : x  Auto Lymphocyte % : x  Auto Monocyte % : x  Auto Eosinophil % : x  Auto Basophil % : x    12-05    142  |  102  |  29<H>  ----------------------------<  130<H>  3.9   |  27  |  0.69    Ca    9.1      05 Dec 2017 07:16                          POCT Blood Glucose.: 127 mg/dL (04 Dec 2017 15:55)  POCT Blood Glucose.: 166 mg/dL (04 Dec 2017 11:49)  POCT Blood Glucose.: 175 mg/dL (04 Dec 2017 07:26)  POCT Blood Glucose.: 173 mg/dL (03 Dec 2017 21:09)      RADIOLOGY & ADDITIONAL TESTS:

## 2017-12-05 NOTE — PROGRESS NOTE ADULT - ASSESSMENT
89 YO female sustained a right hip fracture requiring surgical repair in the  OR.  Patient was seen and  examined,  There was no cardiac   or pulmonary  problems tat would preclude surgery in the am. Recommend Norvasc  5 mg po qd for BP control. 89 YO female sustained a right hip fracture requiring surgical repair in the  OR.  Patient was seen and  examined,  There was no cardiac  or pulmonary  problems tat would preclude surgery in the am. Recommend Norvasc  5 mg po qd for BP control.  Patient is not usually confused at home. MRI of the brain with new multifocal infarcts consistent with embolic disease,  that explains the change of mental capacity. Awaiting echocardiogram for source of emboli.

## 2017-12-05 NOTE — PROGRESS NOTE ADULT - ATTENDING COMMENTS
DC planning to rehab Beginning to ambulate slowly and doing well. Ambulation is medrano No medical complications post-op to date and to proceed with physical therapy, as tolerated. Continues pulmonary toilet to lessen atelectasis, leg exercises as taught to lessen the risk of DVT and supervised pain medications for post-op pain control. I anticipate transfer to rehabilitation to follow when cleared by orthopedics.

## 2017-12-06 DIAGNOSIS — M25.512 PAIN IN LEFT SHOULDER: ICD-10-CM

## 2017-12-06 DIAGNOSIS — M25.511 PAIN IN RIGHT SHOULDER: ICD-10-CM

## 2017-12-06 LAB
ANION GAP SERPL CALC-SCNC: 14 MMOL/L — SIGNIFICANT CHANGE UP (ref 5–17)
BUN SERPL-MCNC: 29 MG/DL — HIGH (ref 7–23)
CALCIUM SERPL-MCNC: 8.9 MG/DL — SIGNIFICANT CHANGE UP (ref 8.4–10.5)
CHLORIDE SERPL-SCNC: 104 MMOL/L — SIGNIFICANT CHANGE UP (ref 96–108)
CO2 SERPL-SCNC: 24 MMOL/L — SIGNIFICANT CHANGE UP (ref 22–31)
CREAT SERPL-MCNC: 0.73 MG/DL — SIGNIFICANT CHANGE UP (ref 0.5–1.3)
GLUCOSE BLDC GLUCOMTR-MCNC: 171 MG/DL — HIGH (ref 70–99)
GLUCOSE BLDC GLUCOMTR-MCNC: 177 MG/DL — HIGH (ref 70–99)
GLUCOSE BLDC GLUCOMTR-MCNC: 244 MG/DL — HIGH (ref 70–99)
GLUCOSE BLDC GLUCOMTR-MCNC: 280 MG/DL — HIGH (ref 70–99)
GLUCOSE SERPL-MCNC: 164 MG/DL — HIGH (ref 70–99)
HCT VFR BLD CALC: 32.2 % — LOW (ref 34.5–45)
HGB BLD-MCNC: 9.9 G/DL — LOW (ref 11.5–15.5)
MCHC RBC-ENTMCNC: 29.3 PG — SIGNIFICANT CHANGE UP (ref 27–34)
MCHC RBC-ENTMCNC: 30.7 GM/DL — LOW (ref 32–36)
MCV RBC AUTO: 95.3 FL — SIGNIFICANT CHANGE UP (ref 80–100)
PLATELET # BLD AUTO: 372 K/UL — SIGNIFICANT CHANGE UP (ref 150–400)
POTASSIUM SERPL-MCNC: 4.1 MMOL/L — SIGNIFICANT CHANGE UP (ref 3.5–5.3)
POTASSIUM SERPL-SCNC: 4.1 MMOL/L — SIGNIFICANT CHANGE UP (ref 3.5–5.3)
RBC # BLD: 3.38 M/UL — LOW (ref 3.8–5.2)
RBC # FLD: 15.3 % — HIGH (ref 10.3–14.5)
SODIUM SERPL-SCNC: 142 MMOL/L — SIGNIFICANT CHANGE UP (ref 135–145)
WBC # BLD: 8.47 K/UL — SIGNIFICANT CHANGE UP (ref 3.8–10.5)
WBC # FLD AUTO: 8.47 K/UL — SIGNIFICANT CHANGE UP (ref 3.8–10.5)

## 2017-12-06 PROCEDURE — 73030 X-RAY EXAM OF SHOULDER: CPT | Mod: 26,RT

## 2017-12-06 PROCEDURE — 93306 TTE W/DOPPLER COMPLETE: CPT | Mod: 26

## 2017-12-06 RX ADMIN — PANTOPRAZOLE SODIUM 40 MILLIGRAM(S): 20 TABLET, DELAYED RELEASE ORAL at 05:30

## 2017-12-06 RX ADMIN — Medication 81 MILLIGRAM(S): at 13:11

## 2017-12-06 RX ADMIN — Medication 100 MILLIGRAM(S): at 21:34

## 2017-12-06 RX ADMIN — CARVEDILOL PHOSPHATE 25 MILLIGRAM(S): 80 CAPSULE, EXTENDED RELEASE ORAL at 17:06

## 2017-12-06 RX ADMIN — Medication 4 UNIT(S): at 17:05

## 2017-12-06 RX ADMIN — Medication 4 UNIT(S): at 09:10

## 2017-12-06 RX ADMIN — QUETIAPINE FUMARATE 25 MILLIGRAM(S): 200 TABLET, FILM COATED ORAL at 21:34

## 2017-12-06 RX ADMIN — Medication 100 MILLIGRAM(S): at 05:31

## 2017-12-06 RX ADMIN — Medication 500 MILLIGRAM(S): at 17:05

## 2017-12-06 RX ADMIN — CLOPIDOGREL BISULFATE 75 MILLIGRAM(S): 75 TABLET, FILM COATED ORAL at 13:12

## 2017-12-06 RX ADMIN — POLYETHYLENE GLYCOL 3350 17 GRAM(S): 17 POWDER, FOR SOLUTION ORAL at 13:13

## 2017-12-06 RX ADMIN — Medication 1: at 13:09

## 2017-12-06 RX ADMIN — Medication 3 MILLILITER(S): at 05:29

## 2017-12-06 RX ADMIN — AMLODIPINE BESYLATE 5 MILLIGRAM(S): 2.5 TABLET ORAL at 05:30

## 2017-12-06 RX ADMIN — Medication 4 UNIT(S): at 13:10

## 2017-12-06 RX ADMIN — CARVEDILOL PHOSPHATE 25 MILLIGRAM(S): 80 CAPSULE, EXTENDED RELEASE ORAL at 05:31

## 2017-12-06 RX ADMIN — Medication 0.2 MILLIGRAM(S): at 05:30

## 2017-12-06 RX ADMIN — Medication 3 MILLILITER(S): at 17:05

## 2017-12-06 RX ADMIN — Medication 1 MILLIGRAM(S): at 13:12

## 2017-12-06 RX ADMIN — Medication 1 TABLET(S): at 13:13

## 2017-12-06 RX ADMIN — Medication 3 MILLILITER(S): at 13:11

## 2017-12-06 RX ADMIN — Medication 100 MILLIGRAM(S): at 13:12

## 2017-12-06 RX ADMIN — Medication 50 MICROGRAM(S): at 05:31

## 2017-12-06 RX ADMIN — ENOXAPARIN SODIUM 30 MILLIGRAM(S): 100 INJECTION SUBCUTANEOUS at 13:10

## 2017-12-06 RX ADMIN — ATORVASTATIN CALCIUM 40 MILLIGRAM(S): 80 TABLET, FILM COATED ORAL at 21:34

## 2017-12-06 RX ADMIN — Medication 500 MILLIGRAM(S): at 05:31

## 2017-12-06 RX ADMIN — INSULIN GLARGINE 10 UNIT(S): 100 INJECTION, SOLUTION SUBCUTANEOUS at 21:34

## 2017-12-06 RX ADMIN — Medication 1: at 21:37

## 2017-12-06 NOTE — PROGRESS NOTE ADULT - SUBJECTIVE AND OBJECTIVE BOX
Patient is a 89y old  Female who presents with a chief complaint of Right Hip Fracture - Intramedullary Nail of the Right Hip (2017 10:37)    Pt feels well today.  No complaints  No CP or SOB    CURRENT CARDIAC WORKUP:       Echo:  < from: Transthoracic Echocardiogram (17 @ 09:57) >  Patient name: SHARAD PINZON  YOB: 1925   Age: 89 (F)   MR#: [id number]  Study Date: 2017  Location: [address] [place], [place] [id number]: [facility]  Study quality: Technically difficult  Referring Physician: Clive Rios MD  Blood Pressure: 121/84 mmHg  Height: 152 cm  Weight: 64 kg  BSA: 1.6 m2  ------------------------------------------------------------------------  PROCEDURE: Transthoracic echocardiogram with 2-D, M-Mode  and complete spectral and color flow Doppler. Patientwas  injected with 10 cc's of aerosolized saline. Intravenous  catheter inserted. Patient was injected with 10 cc's of  aerosolized saline.  INDICATION: Transient cerebral ischemic attack, unspecified  (G45.9)  ------------------------------------------------------------------------  Dimensions:    Normal Values:  LA:     3.8    2.0 - 4.0 cm  Ao:     3.8    2.0 - 3.8 cm  SEPTUM: 1.0    0.6 - 1.2 cm  PWT:    0.8    0.6 - 1.1 cm  LVIDd:  3.8    3.0 - 5.6 cm  LVIDs:  2.9    1.8 - 4.0 cm  Derived variables:  LVMI: 63 g/m2  RWT: 0.42  Fractional short: 24 %  EF (Visual Estimate): 65 %  ------------------------------------------------------------------------  Observations:  Mitral Valve: Mitral annular calcification, otherwise  normal mitral valve.Mild mitral regurgitation.  Aortic Valve/Aorta: Calcified trileaflet aortic valve with  normal opening. Mean transaortic valve gradient equals 6 mm  Hg, aortic valve velocity time integral equals 35 cm. No  aortic valve regurgitation seen. Mean gradient is equal to  3 mm Hg, LVOT velocity time integral equals 24 cm.  Aortic Root: 3.8 cm.  Left Atrium: Mildly dilated left atrium.  LA volume index =  36 cc/m2.  Left Ventricle: Normal left ventricular systolic function.  No segmental wall motion abnormalities. Normal left  ventricular internal dimensions and wall thicknesses. Basal  septal hypertrophy to 1.4cm. Mild diastolic dysfunction  (Stage I).  Right Heart: Normal right atrium. Normal right ventricular  size and function. Normal tricuspid valve. Mild tricuspid  regurgitation. Pulmonic valve not well visualized.  Pericardium/Pleura: Normal pericardium with no pericardial  effusion.  Hemodynamic: Estimated right atrial pressure is 8 mm Hg.  Estimated right ventricular systolic pressure equals 32 mm  Hg, assuming right atrial pressure equals 8 mm Hg,  consistent with normal pulmonary pressures. Agitated saline  injection demonstrates no evidence of a patent foramen  ovale.  ------------------------------------------------------------------------  Conclusions:  1. Normal left ventricular internal dimensions and wall  thicknesses. Basal septal hypertrophy to 1.4cm.  2. Normal left ventricular systolic function. No segmental  wall motion abnormalities.  3. Mild diastolic dysfunction (Stage I).  4. Normal right ventricular size and function.  5. Agitated saline injection demonstrates no evidence of a  patent foramen ovale.  *** Compared with echocardiogram of 2014, no  significant changes noted.  ------------------------------------------------------------------------  Confirmed on  2017 - 12:02:41 by Ajay Agarwal M.D.  ------------------------------------------------------------------------    < end of copied text >      Allergies:   No Known Allergies      MEDICATIONS  (STANDING):  ALBUTerol/ipratropium for Nebulization 3 milliLiter(s) Nebulizer every 6 hours  amLODIPine   Tablet 5 milliGRAM(s) Oral daily  ascorbic acid 500 milliGRAM(s) Oral two times a day  aspirin enteric coated 81 milliGRAM(s) Oral daily  atorvastatin 40 milliGRAM(s) Oral at bedtime  carvedilol 25 milliGRAM(s) Oral every 12 hours  cloNIDine 0.2 milliGRAM(s) Oral daily  clopidogrel Tablet 75 milliGRAM(s) Oral daily  dextrose 5%. 1000 milliLiter(s) (50 mL/Hr) IV Continuous <Continuous>  dextrose 50% Injectable 12.5 Gram(s) IV Push once  dextrose 50% Injectable 25 Gram(s) IV Push once  dextrose 50% Injectable 25 Gram(s) IV Push once  docusate sodium 100 milliGRAM(s) Oral three times a day  enoxaparin Injectable 30 milliGRAM(s) SubCutaneous daily  folic acid 1 milliGRAM(s) Oral daily  insulin glargine Injectable (LANTUS) 10 Unit(s) SubCutaneous at bedtime  insulin lispro (HumaLOG) corrective regimen sliding scale   SubCutaneous at bedtime  insulin lispro (HumaLOG) corrective regimen sliding scale   SubCutaneous three times a day before meals  insulin lispro Injectable (HumaLOG) 4 Unit(s) SubCutaneous three times a day before meals  levothyroxine 50 MICROGram(s) Oral daily  LORazepam     Tablet 1 milliGRAM(s) Oral once  multivitamin 1 Tablet(s) Oral daily  pantoprazole    Tablet 40 milliGRAM(s) Oral before breakfast  polyethylene glycol 3350 17 Gram(s) Oral daily  QUEtiapine 25 milliGRAM(s) Oral at bedtime    MEDICATIONS  (PRN):  acetaminophen   Tablet 650 milliGRAM(s) Oral every 6 hours PRN For Temp greater than 38 C (100.4 F)  acetaminophen   Tablet. 325 milliGRAM(s) Oral every 4 hours PRN Mild Pain (1 - 3)  benzocaine 15 mG/menthol 3.6 mG Lozenge 1 Lozenge Oral every 2 hours PRN Sore Throat  calcium carbonate 500 mG (Tums) Chewable 3 Tablet(s) Chew every 6 hours PRN Dyspepsia  dextrose Gel 1 Dose(s) Oral once PRN Blood Glucose LESS THAN 70 milliGRAM(s)/deciliter  glucagon  Injectable 1 milliGRAM(s) IntraMuscular once PRN Glucose LESS THAN 70 milligrams/deciliter  ondansetron Injectable 4 milliGRAM(s) IV Push every 6 hours PRN Nausea and/or Vomiting  senna 2 Tablet(s) Oral at bedtime PRN Constipation      ROS:     A comprehensive review of systems was performed and pertinent items are noted in the history above. A detailed ROS is as follows:    non-contributory    Daily     Daily Weight in k.4 (06 Dec 2017 08:27)  Drug Dosing Weight  Height (cm): 152.4 (2017 00:33)  Weight (kg): 63.503 (2017 00:33)  BMI (kg/m2): 27.3 (2017 00:33)  BSA (m2): 1.6 (2017 00:33)  Vital Signs Last 24 Hrs  T(C): 36.7 (06 Dec 2017 13:28), Max: 36.9 (05 Dec 2017 20:13)  T(F): 98 (06 Dec 2017 13:28), Max: 98.4 (05 Dec 2017 20:13)  HR: 74 (06 Dec 2017 13:28) (70 - 80)  BP: 133/77 (06 Dec 2017 13:28) (121/84 - 142/78)  BP(mean): --  RR: 18 (06 Dec 2017 13:28) (18 - 19)  SpO2: 95% (06 Dec 2017 13:28) (94% - 95%)    I&O's Summary    05 Dec 2017 07:01  -  06 Dec 2017 07:00  --------------------------------------------------------  IN: 690 mL / OUT: 300 mL / NET: 390 mL    06 Dec 2017 07:01  -  06 Dec 2017 16:33  --------------------------------------------------------  IN: 480 mL / OUT: 700 mL / NET: -220 mL        PHYSICAL EXAM:    General Appearance:    Comfortable, AAO X 3, in no distress.   HEENT:                       Atraumatic, PERRLA, EOMI, conjunctiva clear.   Neck:                          Supple, no adenopathy, no thyromegaly, no JVD, no carotid bruit  Back:                          Symmetric, no CV angle fullness or tenderness, no soft tissue tenderness.  Chest:                         Clear to auscultation, no wheezes, rales or rhonchi, symmetric air entry, no tachypnea, retractions or cyanosis  Heart:                          S1, S2 normal, no gallop, no murmur.  Abdomen:                    Soft, non-tender, bowel sounds active. No palpable masses.   Extremities:                 no cyanosis, no edema, no joint swelling. Peripheral pulses normal  Skin:                           Skin color, texture normal. No rashes   Neurologic:                  Alert and oriented X2,       INTERPRETATION OF TELEMETRY:    ECG:  < from: 12 Lead ECG (.17 @ 20:14) >  Ventricular Rate 70 BPM    Atrial Rate 70 BPM    P-R Interval 172 ms    QRS Duration 72 ms     ms    QTc 453 ms    P Axis 54 degrees    R Axis 43 degrees    T Axis 49 degrees    Diagnosis Line NORMAL SINUS RHYTHM  POSSIBLE LEFT ATRIAL ENLARGEMENT  BORDERLINE ECG    < end of copied text >      LABS:                        9.9    8.47  )-----------( 372      ( 06 Dec 2017 07:19 )             32.2     12-06    142  |  104  |  29<H>  ----------------------------<  164<H>  4.1   |  24  |  0.73    Ca    8.9      06 Dec 2017 07:24        Lipid Panel  Chl 150  HDL 32  LDL 92  Trg 129                    RADIOLOGY & ADDITIONAL STUDIES:    HEALTH ISSUES - PROBLEM Dx:  Shoulder pain, right: Shoulder pain, right  Change in mental status: Change in mental status  Atelectasis: Atelectasis  Hypoxia: Hypoxia  Anemia: Anemia  Closed nondisplaced intertrochanteric fracture of right femur, initial encounter: Closed nondisplaced intertrochanteric fracture of right femur, initial encounter  Other hyperlipidemia: Other hyperlipidemia  Hypertension, unspecified type: Hypertension, unspecified type  Type 2 diabetes mellitus with hyperglycemia, unspecified long term insulin use status: Type 2 diabetes mellitus with hyperglycemia, unspecified long term insulin use status  HLD (hyperlipidemia): HLD (hyperlipidemia)  HTN (hypertension): HTN (hypertension)  Stroke: Stroke  Femur fracture, right: Femur fracture, right

## 2017-12-06 NOTE — PROGRESS NOTE ADULT - SUBJECTIVE AND OBJECTIVE BOX
Follow-up Pulm Progress Note    No new respiratory events overnight.  Denies SOB/CP.   90% on RA at rest, improves to 96% with deep breathing  c/o R arm pain    Medications:  MEDICATIONS  (STANDING):  ALBUTerol/ipratropium for Nebulization 3 milliLiter(s) Nebulizer every 6 hours  amLODIPine   Tablet 5 milliGRAM(s) Oral daily  ascorbic acid 500 milliGRAM(s) Oral two times a day  aspirin enteric coated 81 milliGRAM(s) Oral daily  atorvastatin 40 milliGRAM(s) Oral at bedtime  carvedilol 25 milliGRAM(s) Oral every 12 hours  cloNIDine 0.2 milliGRAM(s) Oral daily  clopidogrel Tablet 75 milliGRAM(s) Oral daily  dextrose 5%. 1000 milliLiter(s) (50 mL/Hr) IV Continuous <Continuous>  dextrose 50% Injectable 12.5 Gram(s) IV Push once  dextrose 50% Injectable 25 Gram(s) IV Push once  dextrose 50% Injectable 25 Gram(s) IV Push once  docusate sodium 100 milliGRAM(s) Oral three times a day  enoxaparin Injectable 30 milliGRAM(s) SubCutaneous daily  folic acid 1 milliGRAM(s) Oral daily  insulin glargine Injectable (LANTUS) 10 Unit(s) SubCutaneous at bedtime  insulin lispro (HumaLOG) corrective regimen sliding scale   SubCutaneous at bedtime  insulin lispro (HumaLOG) corrective regimen sliding scale   SubCutaneous three times a day before meals  insulin lispro Injectable (HumaLOG) 4 Unit(s) SubCutaneous three times a day before meals  levothyroxine 50 MICROGram(s) Oral daily  LORazepam     Tablet 1 milliGRAM(s) Oral once  multivitamin 1 Tablet(s) Oral daily  pantoprazole    Tablet 40 milliGRAM(s) Oral before breakfast  polyethylene glycol 3350 17 Gram(s) Oral daily  QUEtiapine 25 milliGRAM(s) Oral at bedtime    MEDICATIONS  (PRN):  acetaminophen   Tablet 650 milliGRAM(s) Oral every 6 hours PRN For Temp greater than 38 C (100.4 F)  acetaminophen   Tablet. 325 milliGRAM(s) Oral every 4 hours PRN Mild Pain (1 - 3)  benzocaine 15 mG/menthol 3.6 mG Lozenge 1 Lozenge Oral every 2 hours PRN Sore Throat  calcium carbonate 500 mG (Tums) Chewable 3 Tablet(s) Chew every 6 hours PRN Dyspepsia  dextrose Gel 1 Dose(s) Oral once PRN Blood Glucose LESS THAN 70 milliGRAM(s)/deciliter  glucagon  Injectable 1 milliGRAM(s) IntraMuscular once PRN Glucose LESS THAN 70 milligrams/deciliter  ondansetron Injectable 4 milliGRAM(s) IV Push every 6 hours PRN Nausea and/or Vomiting  senna 2 Tablet(s) Oral at bedtime PRN Constipation          Vital Signs Last 24 Hrs  T(C): 36.7 (06 Dec 2017 13:28), Max: 36.9 (05 Dec 2017 20:13)  T(F): 98 (06 Dec 2017 13:28), Max: 98.4 (05 Dec 2017 20:13)  HR: 74 (06 Dec 2017 13:28) (70 - 80)  BP: 133/77 (06 Dec 2017 13:28) (121/84 - 142/78)  BP(mean): --  RR: 18 (06 Dec 2017 13:28) (18 - 19)  SpO2: 95% (06 Dec 2017 13:28) (94% - 95%) on RA          12-05 @ 07:01  -  12-06 @ 07:00  --------------------------------------------------------  IN: 690 mL / OUT: 300 mL / NET: 390 mL          LABS:                        9.9    8.47  )-----------( 372      ( 06 Dec 2017 07:19 )             32.2     12-06    142  |  104  |  29<H>  ----------------------------<  164<H>  4.1   |  24  |  0.73    Ca    8.9      06 Dec 2017 07:24            CAPILLARY BLOOD GLUCOSE      POCT Blood Glucose.: 244 mg/dL (06 Dec 2017 12:19)          Physical Examination:  PULM: Clear to auscultation bilaterally, no significant sputum production  CVS: S1, S2 RRR    RADIOLOGY REVIEWED Follow-up Pulm Progress Note    No new respiratory events overnight.  Denies SOB/CP.   90% on RA at rest, improves to 96% with deep breathing  c/o R arm pain    Medications:  MEDICATIONS  (STANDING):  ALBUTerol/ipratropium for Nebulization 3 milliLiter(s) Nebulizer every 6 hours  amLODIPine   Tablet 5 milliGRAM(s) Oral daily  ascorbic acid 500 milliGRAM(s) Oral two times a day  aspirin enteric coated 81 milliGRAM(s) Oral daily  atorvastatin 40 milliGRAM(s) Oral at bedtime  carvedilol 25 milliGRAM(s) Oral every 12 hours  cloNIDine 0.2 milliGRAM(s) Oral daily  clopidogrel Tablet 75 milliGRAM(s) Oral daily  dextrose 5%. 1000 milliLiter(s) (50 mL/Hr) IV Continuous <Continuous>  dextrose 50% Injectable 12.5 Gram(s) IV Push once  dextrose 50% Injectable 25 Gram(s) IV Push once  dextrose 50% Injectable 25 Gram(s) IV Push once  docusate sodium 100 milliGRAM(s) Oral three times a day  enoxaparin Injectable 30 milliGRAM(s) SubCutaneous daily  folic acid 1 milliGRAM(s) Oral daily  insulin glargine Injectable (LANTUS) 10 Unit(s) SubCutaneous at bedtime  insulin lispro (HumaLOG) corrective regimen sliding scale   SubCutaneous at bedtime  insulin lispro (HumaLOG) corrective regimen sliding scale   SubCutaneous three times a day before meals  insulin lispro Injectable (HumaLOG) 4 Unit(s) SubCutaneous three times a day before meals  levothyroxine 50 MICROGram(s) Oral daily  LORazepam     Tablet 1 milliGRAM(s) Oral once  multivitamin 1 Tablet(s) Oral daily  pantoprazole    Tablet 40 milliGRAM(s) Oral before breakfast  polyethylene glycol 3350 17 Gram(s) Oral daily  QUEtiapine 25 milliGRAM(s) Oral at bedtime    MEDICATIONS  (PRN):  acetaminophen   Tablet 650 milliGRAM(s) Oral every 6 hours PRN For Temp greater than 38 C (100.4 F)  acetaminophen   Tablet. 325 milliGRAM(s) Oral every 4 hours PRN Mild Pain (1 - 3)  benzocaine 15 mG/menthol 3.6 mG Lozenge 1 Lozenge Oral every 2 hours PRN Sore Throat  calcium carbonate 500 mG (Tums) Chewable 3 Tablet(s) Chew every 6 hours PRN Dyspepsia  dextrose Gel 1 Dose(s) Oral once PRN Blood Glucose LESS THAN 70 milliGRAM(s)/deciliter  glucagon  Injectable 1 milliGRAM(s) IntraMuscular once PRN Glucose LESS THAN 70 milligrams/deciliter  ondansetron Injectable 4 milliGRAM(s) IV Push every 6 hours PRN Nausea and/or Vomiting  senna 2 Tablet(s) Oral at bedtime PRN Constipation          Vital Signs Last 24 Hrs  T(C): 36.7 (06 Dec 2017 13:28), Max: 36.9 (05 Dec 2017 20:13)  T(F): 98 (06 Dec 2017 13:28), Max: 98.4 (05 Dec 2017 20:13)  HR: 74 (06 Dec 2017 13:28) (70 - 80)  BP: 133/77 (06 Dec 2017 13:28) (121/84 - 142/78)  BP(mean): --  RR: 18 (06 Dec 2017 13:28) (18 - 19)  SpO2: 95% (06 Dec 2017 13:28) (94% - 95%) on RA          12-05 @ 07:01  -  12-06 @ 07:00  --------------------------------------------------------  IN: 690 mL / OUT: 300 mL / NET: 390 mL          LABS:                        9.9    8.47  )-----------( 372      ( 06 Dec 2017 07:19 )             32.2     12-06    142  |  104  |  29<H>  ----------------------------<  164<H>  4.1   |  24  |  0.73    Ca    8.9      06 Dec 2017 07:24            CAPILLARY BLOOD GLUCOSE      POCT Blood Glucose.: 244 mg/dL (06 Dec 2017 12:19)          Physical Examination:  PULM: Clear to auscultation bilaterally, no significant sputum production  CVS: S1, S2 RRR    RADIOLOGY REVIEWED  TTE:   < from: Transthoracic Echocardiogram (12.06.17 @ 09:57) >  Conclusions:  1. Normal left ventricular internal dimensions and wall  thicknesses. Basal septal hypertrophy to 1.4cm.  2. Normal left ventricular systolic function. No segmental  wall motion abnormalities.  3. Mild diastolic dysfunction (Stage I).  4. Normal right ventricular size and function.  5. Agitated saline injection demonstrates no evidence of a  patent foramen ovale.    < end of copied text >

## 2017-12-06 NOTE — PROGRESS NOTE ADULT - ASSESSMENT
89 y/o F with PMH of CVA, HTN, HLD, hypothyroid presents 11/22 s/p fall with R hip fracture s/p IM Nail 11/23. Called to eval for borderline hypoxia (91-93% on RA) 2nd atelectasis. Hospital course c/b worsened confusion 2nd new CVA with ?embolic source.

## 2017-12-06 NOTE — PROGRESS NOTE ADULT - ATTENDING COMMENTS
Beginning to ambulate slowly and doing well. Ambulation is medrano No medical complications post-op to date and to proceed with physical therapy, as tolerated. Continues pulmonary toilet to lessen atelectasis, leg exercises as taught to lessen the risk of DVT and supervised pain medications for post-op pain control. I anticipate transfer to rehabilitation to follow when cleared by orthopedics.

## 2017-12-06 NOTE — PROGRESS NOTE ADULT - ASSESSMENT
89 YO female sustained a right hip fracture requiring surgical repair in the  OR.  Patient was seen and  examined,  There was no cardiac  or pulmonary  problems tat would preclude surgery in the am. Recommend Norvasc  5 mg po qd for BP control.  Patient is not usually confused at home. MRI of the brain with new multifocal infarcts consistent with embolic disease,  that explains the change of mental capacity. Awaiting echocardiogram for source of emboli.

## 2017-12-06 NOTE — PROGRESS NOTE ADULT - SUBJECTIVE AND OBJECTIVE BOX
Patient is a 89y old  Female who presents with a chief complaint of Right Hip Fracture - Intramedullary Nail of the Right Hip (24 Nov 2017 10:37)      HPI: Patient resting comfortably NO sob chest pin O2 sats improved with deep breathign to 98% sat.  C/o arm pain  from shoulder    MEDICATIONS  (STANDING):  ALBUTerol/ipratropium for Nebulization 3 milliLiter(s) Nebulizer every 6 hours  amLODIPine   Tablet 5 milliGRAM(s) Oral daily  ascorbic acid 500 milliGRAM(s) Oral two times a day  aspirin enteric coated 81 milliGRAM(s) Oral daily  atorvastatin 40 milliGRAM(s) Oral at bedtime  carvedilol 25 milliGRAM(s) Oral every 12 hours  cloNIDine 0.2 milliGRAM(s) Oral daily  clopidogrel Tablet 75 milliGRAM(s) Oral daily  dextrose 5%. 1000 milliLiter(s) (50 mL/Hr) IV Continuous <Continuous>  dextrose 50% Injectable 12.5 Gram(s) IV Push once  dextrose 50% Injectable 25 Gram(s) IV Push once  dextrose 50% Injectable 25 Gram(s) IV Push once  docusate sodium 100 milliGRAM(s) Oral three times a day  enoxaparin Injectable 30 milliGRAM(s) SubCutaneous daily  folic acid 1 milliGRAM(s) Oral daily  insulin glargine Injectable (LANTUS) 10 Unit(s) SubCutaneous at bedtime  insulin lispro (HumaLOG) corrective regimen sliding scale   SubCutaneous at bedtime  insulin lispro (HumaLOG) corrective regimen sliding scale   SubCutaneous three times a day before meals  insulin lispro Injectable (HumaLOG) 4 Unit(s) SubCutaneous three times a day before meals  levothyroxine 50 MICROGram(s) Oral daily  LORazepam     Tablet 1 milliGRAM(s) Oral once  multivitamin 1 Tablet(s) Oral daily  pantoprazole    Tablet 40 milliGRAM(s) Oral before breakfast  polyethylene glycol 3350 17 Gram(s) Oral daily  QUEtiapine 25 milliGRAM(s) Oral at bedtime    MEDICATIONS  (PRN):  acetaminophen   Tablet 650 milliGRAM(s) Oral every 6 hours PRN For Temp greater than 38 C (100.4 F)  acetaminophen   Tablet. 325 milliGRAM(s) Oral every 4 hours PRN Mild Pain (1 - 3)  benzocaine 15 mG/menthol 3.6 mG Lozenge 1 Lozenge Oral every 2 hours PRN Sore Throat  calcium carbonate 500 mG (Tums) Chewable 3 Tablet(s) Chew every 6 hours PRN Dyspepsia  dextrose Gel 1 Dose(s) Oral once PRN Blood Glucose LESS THAN 70 milliGRAM(s)/deciliter  glucagon  Injectable 1 milliGRAM(s) IntraMuscular once PRN Glucose LESS THAN 70 milligrams/deciliter  ondansetron Injectable 4 milliGRAM(s) IV Push every 6 hours PRN Nausea and/or Vomiting  senna 2 Tablet(s) Oral at bedtime PRN Constipation      Allergies    No Known Allergies    Intolerances        REVIEW OF SYSTEM:  CONSTITUTIONAL: No fever, No change in weight, No fatigue  HEAD: No headache, No dizziness, No recent trauma  EYES: No eye pain, No visual disturbances, No discharge  ENT:  No difficulty hearing, No tinnitus, No vertigo, No sinus pain, No throat pain  NECK: No pain, No stiffness  BREASTS: No pain, No masses, No nipple discharge  RESPIRATORY: No cough, No wheezing, No chills, No hemoptysis, No shortness of breath at rest or exertional shortness of breath  CARDIOVASCULAR: No chest pain, No palpitations, No dizziness, No CHF, No arrhythmia, No cardiomegaly, No leg swelling  GASTROINTESTINAL: No abdominal, No epigastric pain. No nausea, No vomiting, No hematemesis, No diarrhea, No constipation. No melena, No hematochezia. No GERD  GENITOURINARY: No dysuria, No frequency, No hematuria, No incontinence, No nocturia, No hesitancy,  SKIN: No itching, No burning, No rashes, No lesions   LYMPH NODES: No history of enlarged glands  ENDOCRINE: No heat or cold intolerance, No hair loss. No osteoporosis, No thyroid disease  MUSCULOSKELETAL: No joint pain or swelling, No muscle, back, or extremity pain  PSYCHIATRIC: No depression, No anxiety, No mood swings, No difficulty sleeping  HEME/LYMPH: No easy bruising, No anticoagulants, No bleeding disorder, No bleeding gums  ALLERGY AND IMMUNOLOGIC: No hives, No eczema  NEUROLOGICAL: No memory loss, No loss of strength, No numbness, No tremors        VITALS:   T(C): 36.7 (12-06-17 @ 13:28), Max: 36.8 (12-06-17 @ 04:08)  HR: 71 (12-06-17 @ 17:00) (70 - 80)  BP: 119/71 (12-06-17 @ 17:00) (119/71 - 138/74)  RR: 18 (12-06-17 @ 13:28) (18 - 19)  SpO2: 95% (12-06-17 @ 13:28) (94% - 95%)  Wt(kg): --    12-05 @ 07:01  -  12-06 @ 07:00  --------------------------------------------------------  IN: 690 mL / OUT: 300 mL / NET: 390 mL    12-06 @ 07:01  -  12-06 @ 20:38  --------------------------------------------------------  IN: 680 mL / OUT: 700 mL / NET: -20 mL        PHYSICAL EXAM:  GENERAL: NAD, well nourished and conversant  HEAD:  Atraumatic  EYES: EOM, PERRLA, conjunctiva pink and sclera white  ENT: No tonsillar erythema, exudates, or enlargement, moist mucous membranes, good dentition, no lesions  NECK: Supple, No JVD, normal thyroid, carotids with normal upstrokes and no bruits  CHEST/LUNG: Clear to auscultation bilaterally, No rales, rhonchi, wheezing, or rubs  HEART: Regular rate and rhythm, No murmurs, rubs, or gallops  ABDOMEN: Soft, nondistended, no masses, guarding, tenderness or rebound, bowel sounds present  EXTREMITIES:  2+ Peripheral Pulses, No clubbing, cyanosis, or edema. No arthritis of shoulders, elbows, hands, hips, knees, ankles, or feet. No DJD C spine, T spine, or L/S spine  LYMPH: No lymphadenopathy noted  SKIN: No rashes or lesions  NERVOUS SYSTEM:  Alert & Oriented X3, normal cognitive function. Motor Strength 5/5 right upper and right lower.  5/5 left upper and left lower extremities, DTRs 2+ intact and symmetric    LABS:                          9.9    8.47  )-----------( 372      ( 06 Dec 2017 07:19 )             32.2     12-06    142  |  104  |  29<H>  ----------------------------<  164<H>  4.1   |  24  |  0.73  12-05    142  |  102  |  29<H>  ----------------------------<  130<H>  3.9   |  27  |  0.69  12-04    141  |  101  |  32<H>  ----------------------------<  157<H>  4.2   |  23  |  0.80    Ca    8.9      06 Dec 2017 07:24  Ca    9.1      05 Dec 2017 07:16  Ca    9.0      04 Dec 2017 08:17      CAPILLARY BLOOD GLUCOSE      POCT Blood Glucose.: 177 mg/dL (06 Dec 2017 16:02)  POCT Blood Glucose.: 244 mg/dL (06 Dec 2017 12:19)  POCT Blood Glucose.: 171 mg/dL (06 Dec 2017 07:07)  POCT Blood Glucose.: 210 mg/dL (05 Dec 2017 21:10)      RADIOLOGY & ADDITIONAL TESTS:      Consultant(s):    Care Discussed with Consultants/Other Providers [ ] YES  [ ] NO

## 2017-12-07 VITALS
TEMPERATURE: 97 F | RESPIRATION RATE: 18 BRPM | HEART RATE: 71 BPM | DIASTOLIC BLOOD PRESSURE: 68 MMHG | OXYGEN SATURATION: 94 % | SYSTOLIC BLOOD PRESSURE: 122 MMHG

## 2017-12-07 LAB
ANION GAP SERPL CALC-SCNC: 12 MMOL/L — SIGNIFICANT CHANGE UP (ref 5–17)
BUN SERPL-MCNC: 30 MG/DL — HIGH (ref 7–23)
CALCIUM SERPL-MCNC: 8.8 MG/DL — SIGNIFICANT CHANGE UP (ref 8.4–10.5)
CHLORIDE SERPL-SCNC: 107 MMOL/L — SIGNIFICANT CHANGE UP (ref 96–108)
CO2 SERPL-SCNC: 25 MMOL/L — SIGNIFICANT CHANGE UP (ref 22–31)
CREAT SERPL-MCNC: 0.75 MG/DL — SIGNIFICANT CHANGE UP (ref 0.5–1.3)
GLUCOSE BLDC GLUCOMTR-MCNC: 111 MG/DL — HIGH (ref 70–99)
GLUCOSE BLDC GLUCOMTR-MCNC: 157 MG/DL — HIGH (ref 70–99)
GLUCOSE BLDC GLUCOMTR-MCNC: 175 MG/DL — HIGH (ref 70–99)
GLUCOSE SERPL-MCNC: 171 MG/DL — HIGH (ref 70–99)
HCT VFR BLD CALC: 30.1 % — LOW (ref 34.5–45)
HGB BLD-MCNC: 10 G/DL — LOW (ref 11.5–15.5)
MCHC RBC-ENTMCNC: 32.1 PG — SIGNIFICANT CHANGE UP (ref 27–34)
MCHC RBC-ENTMCNC: 33.1 GM/DL — SIGNIFICANT CHANGE UP (ref 32–36)
MCV RBC AUTO: 97.2 FL — SIGNIFICANT CHANGE UP (ref 80–100)
PLATELET # BLD AUTO: 317 K/UL — SIGNIFICANT CHANGE UP (ref 150–400)
POTASSIUM SERPL-MCNC: 3.8 MMOL/L — SIGNIFICANT CHANGE UP (ref 3.5–5.3)
POTASSIUM SERPL-SCNC: 3.8 MMOL/L — SIGNIFICANT CHANGE UP (ref 3.5–5.3)
RBC # BLD: 3.1 M/UL — LOW (ref 3.8–5.2)
RBC # FLD: 13.9 % — SIGNIFICANT CHANGE UP (ref 10.3–14.5)
SODIUM SERPL-SCNC: 144 MMOL/L — SIGNIFICANT CHANGE UP (ref 135–145)
WBC # BLD: 8.3 K/UL — SIGNIFICANT CHANGE UP (ref 3.8–10.5)
WBC # FLD AUTO: 8.3 K/UL — SIGNIFICANT CHANGE UP (ref 3.8–10.5)

## 2017-12-07 RX ADMIN — CLOPIDOGREL BISULFATE 75 MILLIGRAM(S): 75 TABLET, FILM COATED ORAL at 12:06

## 2017-12-07 RX ADMIN — Medication 500 MILLIGRAM(S): at 05:21

## 2017-12-07 RX ADMIN — Medication 50 MICROGRAM(S): at 05:21

## 2017-12-07 RX ADMIN — ENOXAPARIN SODIUM 30 MILLIGRAM(S): 100 INJECTION SUBCUTANEOUS at 12:06

## 2017-12-07 RX ADMIN — Medication 81 MILLIGRAM(S): at 12:06

## 2017-12-07 RX ADMIN — Medication 1 TABLET(S): at 12:06

## 2017-12-07 RX ADMIN — Medication 3 MILLILITER(S): at 05:20

## 2017-12-07 RX ADMIN — POLYETHYLENE GLYCOL 3350 17 GRAM(S): 17 POWDER, FOR SOLUTION ORAL at 12:06

## 2017-12-07 RX ADMIN — Medication 4 UNIT(S): at 08:07

## 2017-12-07 RX ADMIN — Medication 3 MILLILITER(S): at 16:47

## 2017-12-07 RX ADMIN — Medication 4 UNIT(S): at 12:07

## 2017-12-07 RX ADMIN — Medication 3 MILLILITER(S): at 12:07

## 2017-12-07 RX ADMIN — Medication 100 MILLIGRAM(S): at 12:06

## 2017-12-07 RX ADMIN — Medication 1 MILLIGRAM(S): at 12:06

## 2017-12-07 RX ADMIN — Medication 500 MILLIGRAM(S): at 16:44

## 2017-12-07 RX ADMIN — AMLODIPINE BESYLATE 5 MILLIGRAM(S): 2.5 TABLET ORAL at 05:21

## 2017-12-07 RX ADMIN — Medication 0.2 MILLIGRAM(S): at 05:21

## 2017-12-07 RX ADMIN — CARVEDILOL PHOSPHATE 25 MILLIGRAM(S): 80 CAPSULE, EXTENDED RELEASE ORAL at 16:44

## 2017-12-07 RX ADMIN — Medication 100 MILLIGRAM(S): at 05:21

## 2017-12-07 RX ADMIN — CARVEDILOL PHOSPHATE 25 MILLIGRAM(S): 80 CAPSULE, EXTENDED RELEASE ORAL at 05:21

## 2017-12-07 RX ADMIN — Medication 4 UNIT(S): at 16:43

## 2017-12-07 RX ADMIN — Medication 3 MILLILITER(S): at 00:30

## 2017-12-07 RX ADMIN — PANTOPRAZOLE SODIUM 40 MILLIGRAM(S): 20 TABLET, DELAYED RELEASE ORAL at 05:21

## 2017-12-07 NOTE — PROGRESS NOTE ADULT - SUBJECTIVE AND OBJECTIVE BOX
The patient was in her assisted living [place], with a fall. Per patient, pants were wet, so she walking to her room and she tripped and hit face on the floor. No LOC. Normally walks with a walker but was not using it today. Reports L sided face pain, R knee pain, L foot and ankle pain, and chipped front tooth. Was unable to get off floor by herself - aid had to help - on floor for 5 minutes. Denies any Chest pain, SOB, N/V/D, confusion. A+O x 3. Been following with Derm for R arm rash - will find out biopsy results on Monday. S/P ORIF Right Hip 11/23/2017 with no significant medical complications to date. Discussed with Patient nephew. Patient is not usually confused at home. MRI of the brain with new multifocal infarcts consistent with embolic disease,  that explains the change of mental capacity. Awaiting echocardiogram for source of emboli.      MEDICATIONS  (STANDING):  ALBUTerol/ipratropium for Nebulization 3 milliLiter(s) Nebulizer every 6 hours  amLODIPine   Tablet 5 milliGRAM(s) Oral daily  ascorbic acid 500 milliGRAM(s) Oral two times a day  aspirin enteric coated 81 milliGRAM(s) Oral daily  atorvastatin 40 milliGRAM(s) Oral at bedtime  carvedilol 25 milliGRAM(s) Oral every 12 hours  cloNIDine 0.2 milliGRAM(s) Oral daily  clopidogrel Tablet 75 milliGRAM(s) Oral daily  dextrose 5%. 1000 milliLiter(s) (50 mL/Hr) IV Continuous <Continuous>  dextrose 50% Injectable 12.5 Gram(s) IV Push once  dextrose 50% Injectable 25 Gram(s) IV Push once  dextrose 50% Injectable 25 Gram(s) IV Push once  docusate sodium 100 milliGRAM(s) Oral three times a day  enoxaparin Injectable 30 milliGRAM(s) SubCutaneous daily  folic acid 1 milliGRAM(s) Oral daily  insulin glargine Injectable (LANTUS) 10 Unit(s) SubCutaneous at bedtime  insulin lispro (HumaLOG) corrective regimen sliding scale   SubCutaneous at bedtime  insulin lispro (HumaLOG) corrective regimen sliding scale   SubCutaneous three times a day before meals  insulin lispro Injectable (HumaLOG) 4 Unit(s) SubCutaneous three times a day before meals  levothyroxine 50 MICROGram(s) Oral daily  LORazepam     Tablet 1 milliGRAM(s) Oral once  multivitamin 1 Tablet(s) Oral daily  pantoprazole    Tablet 40 milliGRAM(s) Oral before breakfast  polyethylene glycol 3350 17 Gram(s) Oral daily  QUEtiapine 25 milliGRAM(s) Oral at bedtime    MEDICATIONS  (PRN):  acetaminophen   Tablet 650 milliGRAM(s) Oral every 6 hours PRN For Temp greater than 38 C (100.4 F)  acetaminophen   Tablet. 325 milliGRAM(s) Oral every 4 hours PRN Mild Pain (1 - 3)  benzocaine 15 mG/menthol 3.6 mG Lozenge 1 Lozenge Oral every 2 hours PRN Sore Throat  calcium carbonate 500 mG (Tums) Chewable 3 Tablet(s) Chew every 6 hours PRN Dyspepsia  dextrose Gel 1 Dose(s) Oral once PRN Blood Glucose LESS THAN 70 milliGRAM(s)/deciliter  glucagon  Injectable 1 milliGRAM(s) IntraMuscular once PRN Glucose LESS THAN 70 milligrams/deciliter  ondansetron Injectable 4 milliGRAM(s) IV Push every 6 hours PRN Nausea and/or Vomiting  senna 2 Tablet(s) Oral at bedtime PRN Constipation    REVIEW OF SYSTEM:  CONSTITUTIONAL: No fever, No change in weight, No fatigue  HEAD: No headache, No dizziness, No recent trauma  EYES: BLIND  ENT:  No difficulty hearing, No tinnitus, No vertigo, No sinus pain, No throat pain  NECK: No pain, No stiffness  RESPIRATORY: No cough, No wheezing, No chills, No hemoptysis, No shortness of breath at rest or exertional shortness of breath  CARDIOVASCULAR: No chest pain, No palpitations, No dizziness, No CHF, No arrhythmia, No cardiomegaly, No leg swelling  GASTROINTESTINAL: No abdominal, No epigastric pain. No nausea, No vomiting, No hematemesis, No diarrhea, No constipation. No melena, No hematochezia. No GERD  GENITOURINARY: No dysuria, No frequency, No hematuria, No incontinence, No nocturia, No hesitancy,  SKIN: No itching, No burning, No rashes, No lesions   LYMPH NODES: No history of enlarged glands  ENDOCRINE: No heat or cold intolerance, No hair loss. No osteoporosis, No thyroid disease  MUSCULOSKELETAL:     (+)  RIGHT HIP PAIN  PSYCHIATRIC: No depression, No anxiety, No mood swings, No difficulty sleeping  HEME/LYMPH: No easy bruising, No anticoagulants, No bleeding disorder, No bleeding gums  ALLERGY AND IMMUNOLOGIC: No hives, No eczema  NEUROLOGICAL: No memory loss, No loss of strength, No numbness, No tremors        VITALS:   T(C): 36.6 (12-07-17 @ 04:08), Max: 36.7 (12-06-17 @ 13:28)  HR: 73 (12-07-17 @ 04:08) (71 - 75)  BP: 135/71 (12-07-17 @ 04:08) (119/71 - 135/71)  RR: 18 (12-07-17 @ 04:08) (18 - 18)  SpO2: 93% (12-07-17 @ 04:08) (93% - 95%)  Wt(kg): --      PHYSICAL EXAM:  GENERAL: NAD, well nourished and conversant  HEAD:  Atraumatic  EYES:  PATIENT IS BLIND  ENT: No tonsillar erythema, exudates, or enlargement, moist mucous membranes, good dentition, no lesions  NECK: Supple, No JVD, normal thyroid, carotids with normal upstrokes and no bruits  CHEST/LUNG: Clear to auscultation bilaterally, No rales, rhonchi, wheezing, or rubs  HEART: Regular rate and rhythm, No murmurs, rubs, or gallops  ABDOMEN: Soft, nondistended, no masses, guarding, tenderness or rebound, bowel sounds present  EXTREMITIES:  2+ Peripheral Pulses, No clubbing, cyanosis, or edema.   (+) RIGHT HIP PAIN C/W HIP FRACTURE  LYMPH: No lymphadenopathy noted  SKIN: No rashes or lesions  NERVOUS SYSTEM:  Alert & Oriented X3, normal cognitive function. Motor Strength 5/5 right upper and right lower.  5/5 left upper and left lower extremities, DTRs 2+ intact and symmetric  LABS:        CBC Full  -  ( 07 Dec 2017 05:24 )  WBC Count : 8.3 K/uL  Hemoglobin : 10.0 g/dL  Hematocrit : 30.1 %  Platelet Count - Automated : 317 K/uL  Mean Cell Volume : 97.2 fl  Mean Cell Hemoglobin : 32.1 pg  Mean Cell Hemoglobin Concentration : 33.1 gm/dL  Auto Neutrophil # : x  Auto Lymphocyte # : x  Auto Monocyte # : x  Auto Eosinophil # : x  Auto Basophil # : x  Auto Neutrophil % : x  Auto Lymphocyte % : x  Auto Monocyte % : x  Auto Eosinophil % : x  Auto Basophil % : x    12-07    144  |  107  |  30<H>  ----------------------------<  171<H>  3.8   |  25  |  0.75    Ca    8.8      07 Dec 2017 05:24            CAPILLARY BLOOD GLUCOSE      POCT Blood Glucose.: 157 mg/dL (07 Dec 2017 11:45)  POCT Blood Glucose.: 175 mg/dL (07 Dec 2017 07:40)  POCT Blood Glucose.: 280 mg/dL (06 Dec 2017 21:00)  POCT Blood Glucose.: 177 mg/dL (06 Dec 2017 16:02)      RADIOLOGY & ADDITIONAL TESTS:

## 2017-12-07 NOTE — PROGRESS NOTE ADULT - PROBLEM SELECTOR PLAN 2
diabetes under better control
diabetes under better control  continue to adjust insulin as needed
follow glucose  continue current regimen
Incentive spirometry   -Duoneb q6  -OOB with PT
continue BP management
diabetes under better control  continue to adjust insulin as needed
diabetes under better control  continue to adjust insulin as needed  continue diabetic diet
follow glucose  continue current regimen  glucose controlled
follow glucose  continue current regimen  glucsoe copntrolled
diabetes under better control  continue to adjust insulin as needed  continue diabetic diet

## 2017-12-07 NOTE — PROGRESS NOTE ADULT - PROBLEM SELECTOR PLAN 4
BP under control
follow H&H and transfuse if symptomatic
follow H&H and transfuse if symptomatic
BP under control  will continue to monitor and adjust meds as needed
Patient unable to raise her arm over her head on R side  -Given her recent fall on her R side would obtain shoulder XRay to r/o fracture
Patient unable to raise her arm over her head on R side  -Large subacromial spur of R shoulder on XRay  -Ortho f/u
continue current meds
follow H&H and transfuse if symptomatic

## 2017-12-07 NOTE — PROGRESS NOTE ADULT - PROVIDER SPECIALTY LIST ADULT
Cardiology
Endocrinology
Internal Medicine
Neurology
Orthopedics
Pulmonology
Internal Medicine
Neurology
Neurology
Endocrinology
Internal Medicine

## 2017-12-07 NOTE — PROGRESS NOTE ADULT - PROBLEM SELECTOR PROBLEM 5
Stroke
Change in mental status
Stroke
Change in mental status

## 2017-12-07 NOTE — PROGRESS NOTE ADULT - SUBJECTIVE AND OBJECTIVE BOX
Follow-up Pulm Progress Note    No new respiratory events overnight.  Denies SOB/CP.   93% on RA  c/o R shoulder pain     Medications:  MEDICATIONS  (STANDING):  ALBUTerol/ipratropium for Nebulization 3 milliLiter(s) Nebulizer every 6 hours  amLODIPine   Tablet 5 milliGRAM(s) Oral daily  ascorbic acid 500 milliGRAM(s) Oral two times a day  aspirin enteric coated 81 milliGRAM(s) Oral daily  atorvastatin 40 milliGRAM(s) Oral at bedtime  carvedilol 25 milliGRAM(s) Oral every 12 hours  cloNIDine 0.2 milliGRAM(s) Oral daily  clopidogrel Tablet 75 milliGRAM(s) Oral daily  dextrose 5%. 1000 milliLiter(s) (50 mL/Hr) IV Continuous <Continuous>  dextrose 50% Injectable 12.5 Gram(s) IV Push once  dextrose 50% Injectable 25 Gram(s) IV Push once  dextrose 50% Injectable 25 Gram(s) IV Push once  docusate sodium 100 milliGRAM(s) Oral three times a day  enoxaparin Injectable 30 milliGRAM(s) SubCutaneous daily  folic acid 1 milliGRAM(s) Oral daily  insulin glargine Injectable (LANTUS) 10 Unit(s) SubCutaneous at bedtime  insulin lispro (HumaLOG) corrective regimen sliding scale   SubCutaneous at bedtime  insulin lispro (HumaLOG) corrective regimen sliding scale   SubCutaneous three times a day before meals  insulin lispro Injectable (HumaLOG) 4 Unit(s) SubCutaneous three times a day before meals  levothyroxine 50 MICROGram(s) Oral daily  LORazepam     Tablet 1 milliGRAM(s) Oral once  multivitamin 1 Tablet(s) Oral daily  pantoprazole    Tablet 40 milliGRAM(s) Oral before breakfast  polyethylene glycol 3350 17 Gram(s) Oral daily  QUEtiapine 25 milliGRAM(s) Oral at bedtime    MEDICATIONS  (PRN):  acetaminophen   Tablet 650 milliGRAM(s) Oral every 6 hours PRN For Temp greater than 38 C (100.4 F)  acetaminophen   Tablet. 325 milliGRAM(s) Oral every 4 hours PRN Mild Pain (1 - 3)  benzocaine 15 mG/menthol 3.6 mG Lozenge 1 Lozenge Oral every 2 hours PRN Sore Throat  calcium carbonate 500 mG (Tums) Chewable 3 Tablet(s) Chew every 6 hours PRN Dyspepsia  dextrose Gel 1 Dose(s) Oral once PRN Blood Glucose LESS THAN 70 milliGRAM(s)/deciliter  glucagon  Injectable 1 milliGRAM(s) IntraMuscular once PRN Glucose LESS THAN 70 milligrams/deciliter  ondansetron Injectable 4 milliGRAM(s) IV Push every 6 hours PRN Nausea and/or Vomiting  senna 2 Tablet(s) Oral at bedtime PRN Constipation          Vital Signs Last 24 Hrs  T(C): 36.2 (07 Dec 2017 13:07), Max: 36.6 (07 Dec 2017 04:08)  T(F): 97.2 (07 Dec 2017 13:07), Max: 97.9 (07 Dec 2017 04:08)  HR: 71 (07 Dec 2017 13:07) (71 - 75)  BP: 122/68 (07 Dec 2017 13:07) (119/71 - 135/71)  BP(mean): --  RR: 18 (07 Dec 2017 13:07) (18 - 18)  SpO2: 94% (07 Dec 2017 13:07) (93% - 94%) on RA          12-06 @ 07:01  -  12-07 @ 07:00  --------------------------------------------------------  IN: 800 mL / OUT: 700 mL / NET: 100 mL          LABS:                        10.0   8.3   )-----------( 317      ( 07 Dec 2017 05:24 )             30.1     12-07    144  |  107  |  30<H>  ----------------------------<  171<H>  3.8   |  25  |  0.75    Ca    8.8      07 Dec 2017 05:24            CAPILLARY BLOOD GLUCOSE      POCT Blood Glucose.: 157 mg/dL (07 Dec 2017 11:45)      Physical Examination:  PULM: Clear to auscultation bilaterally, no significant sputum production  CVS: S1, S2 heard    RADIOLOGY REVIEWED

## 2017-12-07 NOTE — PROGRESS NOTE ADULT - SUBJECTIVE AND OBJECTIVE BOX
HPI:  Patient is a 89 yo female HTN, DM2 hx cerebellar infarct s/p mechanical fall and fracture R hip.  Pt is s/p ORIF R hip. MRI brain multiple new CVA's.  RICKY declined by family. Pt is DNR. Echo nl lv fx no PFO. Pt resting comfortably without complaint  PAST MEDICAL & SURGICAL HISTORY:  Stroke  HLD (hyperlipidemia)  HTN (hypertension)  Hypothyroid  History of hip replacement, total, left      Allergies    No Known Allergies    Intolerances          MEDICATIONS  (STANDING):  ALBUTerol/ipratropium for Nebulization 3 milliLiter(s) Nebulizer every 6 hours  amLODIPine   Tablet 5 milliGRAM(s) Oral daily  ascorbic acid 500 milliGRAM(s) Oral two times a day  aspirin enteric coated 81 milliGRAM(s) Oral daily  atorvastatin 40 milliGRAM(s) Oral at bedtime  carvedilol 25 milliGRAM(s) Oral every 12 hours  cloNIDine 0.2 milliGRAM(s) Oral daily  clopidogrel Tablet 75 milliGRAM(s) Oral daily  dextrose 5%. 1000 milliLiter(s) (50 mL/Hr) IV Continuous <Continuous>  dextrose 50% Injectable 12.5 Gram(s) IV Push once  dextrose 50% Injectable 25 Gram(s) IV Push once  dextrose 50% Injectable 25 Gram(s) IV Push once  docusate sodium 100 milliGRAM(s) Oral three times a day  enoxaparin Injectable 30 milliGRAM(s) SubCutaneous daily  folic acid 1 milliGRAM(s) Oral daily  insulin glargine Injectable (LANTUS) 10 Unit(s) SubCutaneous at bedtime  insulin lispro (HumaLOG) corrective regimen sliding scale   SubCutaneous at bedtime  insulin lispro (HumaLOG) corrective regimen sliding scale   SubCutaneous three times a day before meals  insulin lispro Injectable (HumaLOG) 4 Unit(s) SubCutaneous three times a day before meals  levothyroxine 50 MICROGram(s) Oral daily  LORazepam     Tablet 1 milliGRAM(s) Oral once  multivitamin 1 Tablet(s) Oral daily  pantoprazole    Tablet 40 milliGRAM(s) Oral before breakfast  polyethylene glycol 3350 17 Gram(s) Oral daily  QUEtiapine 25 milliGRAM(s) Oral at bedtime    MEDICATIONS  (PRN):  acetaminophen   Tablet 650 milliGRAM(s) Oral every 6 hours PRN For Temp greater than 38 C (100.4 F)  acetaminophen   Tablet. 325 milliGRAM(s) Oral every 4 hours PRN Mild Pain (1 - 3)  benzocaine 15 mG/menthol 3.6 mG Lozenge 1 Lozenge Oral every 2 hours PRN Sore Throat  calcium carbonate 500 mG (Tums) Chewable 3 Tablet(s) Chew every 6 hours PRN Dyspepsia  dextrose Gel 1 Dose(s) Oral once PRN Blood Glucose LESS THAN 70 milliGRAM(s)/deciliter  glucagon  Injectable 1 milliGRAM(s) IntraMuscular once PRN Glucose LESS THAN 70 milligrams/deciliter  ondansetron Injectable 4 milliGRAM(s) IV Push every 6 hours PRN Nausea and/or Vomiting  senna 2 Tablet(s) Oral at bedtime PRN Constipation            Vital Signs Last 24 Hrs  T(C): 36.6 (07 Dec 2017 04:08), Max: 36.7 (06 Dec 2017 13:28)  T(F): 97.9 (07 Dec 2017 04:08), Max: 98 (06 Dec 2017 13:28)  HR: 73 (07 Dec 2017 04:08) (71 - 75)  BP: 135/71 (07 Dec 2017 04:08) (119/71 - 135/71)  BP(mean): --  RR: 18 (07 Dec 2017 04:08) (18 - 18)  SpO2: 93% (07 Dec 2017 04:08) (93% - 95%)  Daily     Daily   I&O's Detail    06 Dec 2017 07:01  -  07 Dec 2017 07:00  --------------------------------------------------------  IN:    Oral Fluid: 800 mL  Total IN: 800 mL    OUT:    Voided: 700 mL  Total OUT: 700 mL    Total NET: 100 mL          Physical Exam  Neck without JVD without carotid bruit  Lungs clear  Cor s1s2 2/6 donny rusb  Abd soft  ext without edema  Pulses +2 DP  LABS          CBC Full  -  ( 07 Dec 2017 05:24 )  WBC Count : 8.3 K/uL  Hemoglobin : 10.0 g/dL  Hematocrit : 30.1 %  Platelet Count - Automated : 317 K/uL  Mean Cell Volume : 97.2 fl  Mean Cell Hemoglobin : 32.1 pg  Mean Cell Hemoglobin Concentration : 33.1 gm/dL  Auto Neutrophil # : x  Auto Lymphocyte # : x  Auto Monocyte # : x  Auto Eosinophil # : x  Auto Basophil # : x  Auto Neutrophil % : x  Auto Lymphocyte % : x  Auto Monocyte % : x  Auto Eosinophil % : x  Auto Basophil % : x    12-07    144  |  107  |  30<H>  ----------------------------<  171<H>  3.8   |  25  |  0.75    Ca    8.8      07 Dec 2017 05:24    Echo  < from: Transthoracic Echocardiogram (12.06.17 @ 09:57) >  Patient name: SHARAD PINZON  YOB: 1925   Age: 89 (F)   MR#: [id number]  Study Date: 12/6/2017  Location: [address] [place], [place] [id number]: Marguerite Cazares [facility]  Study quality: Technically difficult  Referring Physician: Clive Rios MD  Blood Pressure: 121/84 mmHg  Height: 152 cm  Weight: 64 kg  BSA: 1.6 m2  ------------------------------------------------------------------------  PROCEDURE: Transthoracic echocardiogram with 2-D, M-Mode  and complete spectral and color flow Doppler. Patientwas  injected with 10 cc's of aerosolized saline. Intravenous  catheter inserted. Patient was injected with 10 cc's of  aerosolized saline.  INDICATION: Transient cerebral ischemic attack, unspecified  (G45.9)  ------------------------------------------------------------------------  Dimensions:    Normal Values:  LA:     3.8    2.0 - 4.0 cm  Ao:     3.8    2.0 - 3.8 cm  SEPTUM: 1.0    0.6 - 1.2 cm  PWT:    0.8    0.6 - 1.1 cm  LVIDd:  3.8    3.0 - 5.6 cm  LVIDs:  2.9    1.8 - 4.0 cm  Derived variables:  LVMI: 63 g/m2  RWT: 0.42  Fractional short: 24 %  EF (Visual Estimate): 65 %  ------------------------------------------------------------------------  Observations:  Mitral Valve: Mitral annular calcification, otherwise  normal mitral valve.Mild mitral regurgitation.  Aortic Valve/Aorta: Calcified trileaflet aortic valve with  normal opening. Mean transaortic valve gradient equals 6 mm  Hg, aortic valve velocity time integral equals 35 cm. No  aortic valve regurgitation seen. Mean gradient is equal to  3 mm Hg, LVOT velocity time integral equals 24 cm.  Aortic Root: 3.8 cm.  Left Atrium: Mildly dilated left atrium.  LA volume index =  36 cc/m2.  Left Ventricle: Normal left ventricular systolic function.  No segmental wall motion abnormalities. Normal left  ventricular internal dimensions and wall thicknesses. Basal  septal hypertrophy to 1.4cm. Mild diastolic dysfunction  (Stage I).  Right Heart: Normal right atrium. Normal right ventricular  size and function. Normal tricuspid valve. Mild tricuspid  regurgitation. Pulmonic valve not well visualized.  Pericardium/Pleura: Normal pericardium with no pericardial  effusion.  Hemodynamic: Estimated right atrial pressure is 8 mm Hg.  Estimated right ventricular systolic pressure equals 32 mm  Hg, assuming right atrial pressure equals 8 mm Hg,  consistent with normal pulmonary pressures. Agitated saline  injection demonstrates no evidence of a patent foramen  ovale.  ------------------------------------------------------------------------  Conclusions:  1. Normal left ventricular internal dimensions and wall  thicknesses. Basal septal hypertrophy to 1.4cm.  2. Normal left ventricular systolic function. No segmental  wall motion abnormalities.  3. Mild diastolic dysfunction (Stage I).  4. Normal right ventricular size and function.  5. Agitated saline injection demonstrates no evidence of a  patent foramen ovale.  *** Compared with echocardiogram of 4/30/2014, no  significant changes noted.  ------------------------------------------------------------------------  Confirmed on  12/6/2017 - 12:02:41 by Ajay Agarwal M.D.  ------------------------------------------------------------------------    < end of copied text >    Assessment and Plan:  Patient is a 89 yo female HTN, DM2 hx cerebellar infarct s/p mechanical fall and fracture R hip.  Pt is s/p ORIF R hip. MRI brain multiple new CVA's.  RICKY declined by family. Pt is DNR. Echo nl lv fx no PFO.  Agree with current rx. No need for anticoagulation at this time.  Neuro does not think CVA's are embolic but cant r/o.  Long term monitor if family agress

## 2017-12-07 NOTE — PROGRESS NOTE ADULT - PROBLEM SELECTOR PROBLEM 3
HLD (hyperlipidemia)
HTN (hypertension)
HTN (hypertension)
HLD (hyperlipidemia)
HTN (hypertension)
R/O DVT (deep venous thrombosis)
HTN (hypertension)

## 2017-12-07 NOTE — PROGRESS NOTE ADULT - PROBLEM SELECTOR PLAN 5
will continue to reorient Patient   awaiting MRI  seem less agitated tody  will continue to work on placement to rehab
Stable post CVA  Patient sundowns and needs Haldol.
MRI with new strokes  Patient moved to tele to r/o PAF
MRI with new strokes  no arrythmia on telemetry
MRI with new strokes  no arrythmia on telemetry  Echo shows  no clots or Patent foramen ovale
MRI with new strokes  no arrythmia on telemetry  Echo shows  no clots or Patent foramen ovale
Stable post CVA    Patient with agitation at times   would consider using haldol or Seroquel at night
will continue to reorient Patient   awaiting MRI may need to get the study under anaesthesia  Lethargic but arousable  will continue to work on placement to rehab

## 2017-12-07 NOTE — PROGRESS NOTE ADULT - PROBLEM SELECTOR PROBLEM 1
Type 2 diabetes mellitus with hyperglycemia, unspecified long term insulin use status
Closed nondisplaced intertrochanteric fracture of right femur, initial encounter
Femur fracture, right
Hypoxia
Type 2 diabetes mellitus with hyperglycemia, unspecified long term insulin use status
Closed nondisplaced intertrochanteric fracture of right femur, initial encounter

## 2017-12-07 NOTE — PROGRESS NOTE ADULT - ASSESSMENT
89y Female with a history of HT,HLD, strokes WITH WORSENED  confusion  -likely due to new strokes on MRI- likely due due previous intracranial stenosis, anemia hypotension  rec high normotensive BP   maintain nl intravascular volume and H/H  dual antiplts  high dose statin  Rec holter-extended monitor/ tele(so far -),    neuro chks-

## 2017-12-07 NOTE — PROGRESS NOTE ADULT - ASSESSMENT
89 y/o F with PMH of CVA, HTN, HLD, hypothyroid presents 11/22 s/p fall with R hip fracture s/p IM Nail 11/23. Called to eval for borderline hypoxia (91-93% on RA) 2nd atelectasis. Hospital course c/b worsened confusion 2nd new CVA with ?embolic source.

## 2017-12-07 NOTE — PROGRESS NOTE ADULT - PROBLEM SELECTOR PLAN 3
Low cholesterol plus medication and diet
BP under control  will continue to monitor   adjust meds as needed
Low cholesterol plus medication and diet
VA duplex negative for DVT
will follow BP and adjust med as needed
BP under control  will continue to monitor   adjust meds as needed

## 2017-12-07 NOTE — PROGRESS NOTE ADULT - SUBJECTIVE AND OBJECTIVE BOX
Neurology Progress Note      the patient's symptoms  --- are  unchanged    MEDICATIONS  (STANDING):  ALBUTerol/ipratropium for Nebulization 3 milliLiter(s) Nebulizer every 6 hours  amLODIPine   Tablet 5 milliGRAM(s) Oral daily  ascorbic acid 500 milliGRAM(s) Oral two times a day  aspirin enteric coated 81 milliGRAM(s) Oral daily  atorvastatin 40 milliGRAM(s) Oral at bedtime  carvedilol 25 milliGRAM(s) Oral every 12 hours  cloNIDine 0.2 milliGRAM(s) Oral daily  clopidogrel Tablet 75 milliGRAM(s) Oral daily  dextrose 5%. 1000 milliLiter(s) (50 mL/Hr) IV Continuous <Continuous>  dextrose 50% Injectable 12.5 Gram(s) IV Push once  dextrose 50% Injectable 25 Gram(s) IV Push once  dextrose 50% Injectable 25 Gram(s) IV Push once  docusate sodium 100 milliGRAM(s) Oral three times a day  enoxaparin Injectable 30 milliGRAM(s) SubCutaneous daily  folic acid 1 milliGRAM(s) Oral daily  insulin glargine Injectable (LANTUS) 10 Unit(s) SubCutaneous at bedtime  insulin lispro (HumaLOG) corrective regimen sliding scale   SubCutaneous at bedtime  insulin lispro (HumaLOG) corrective regimen sliding scale   SubCutaneous three times a day before meals  insulin lispro Injectable (HumaLOG) 4 Unit(s) SubCutaneous three times a day before meals  levothyroxine 50 MICROGram(s) Oral daily  LORazepam     Tablet 1 milliGRAM(s) Oral once  multivitamin 1 Tablet(s) Oral daily  pantoprazole    Tablet 40 milliGRAM(s) Oral before breakfast  polyethylene glycol 3350 17 Gram(s) Oral daily  QUEtiapine 25 milliGRAM(s) Oral at bedtime    MEDICATIONS  (PRN):  acetaminophen   Tablet 650 milliGRAM(s) Oral every 6 hours PRN For Temp greater than 38 C (100.4 F)  acetaminophen   Tablet. 325 milliGRAM(s) Oral every 4 hours PRN Mild Pain (1 - 3)  benzocaine 15 mG/menthol 3.6 mG Lozenge 1 Lozenge Oral every 2 hours PRN Sore Throat  calcium carbonate 500 mG (Tums) Chewable 3 Tablet(s) Chew every 6 hours PRN Dyspepsia  dextrose Gel 1 Dose(s) Oral once PRN Blood Glucose LESS THAN 70 milliGRAM(s)/deciliter  glucagon  Injectable 1 milliGRAM(s) IntraMuscular once PRN Glucose LESS THAN 70 milligrams/deciliter  ondansetron Injectable 4 milliGRAM(s) IV Push every 6 hours PRN Nausea and/or Vomiting  senna 2 Tablet(s) Oral at bedtime PRN Constipation              Vital Signs Last 24 Hrs  T(C): 36.6 (07 Dec 2017 04:08), Max: 36.7 (06 Dec 2017 13:28)  T(F): 97.9 (07 Dec 2017 04:08), Max: 98 (06 Dec 2017 13:28)  HR: 73 (07 Dec 2017 04:08) (71 - 75)  BP: 135/71 (07 Dec 2017 04:08) (119/71 - 135/71)  BP(mean): --  RR: 18 (07 Dec 2017 04:08) (18 - 18)  SpO2: 93% (07 Dec 2017 04:08) (93% - 95%)    Physical exam  -MENTAL STATUS- Alert, attends, fluent, non-dysarthric, follows commands, oriented to place month not yr, poor memory and good affect.  CRANIAL NERVES-II Optic - Bilateral -  Visual Fields.-inconsistent, III-IV-VI EOMI & Pupils- irreg s/p sg - Bilateral - Normal Bilaterally. V Trigeminal - Bilateral - Normal bilateral facial sensation and jaw movement. VII Facial - Normal bilateral facial movement. VIII Acoustic - Bilateral - Hearing normal to voice bilaterally. IX Glossopharyngeal / X Vagus - Normal palate elevates symmetrically. XI Accessory - Normal Bilaterally. XII Hypoglossal - Tongue protrudes midline and moves symmetrically.  MOTOR-Bulk and Contour - Normal. Tone - Normal tone, no abnormal movements. Strength - 5/5 normal muscle strength xc legs limited by fx and r shoulder - Strength full throughout.  SENSORY-inconsistent  REFLEXES- DTR's 0-1+ throughout.  COORDINATION-Normal  , FNF - bilaterally.  GAIT-NA      CBC Full  -  ( 07 Dec 2017 05:24 )  WBC Count : 8.3 K/uL  Hemoglobin : 10.0 g/dL  Hematocrit : 30.1 %  Platelet Count - Automated : 317 K/uL  Mean Cell Volume : 97.2 fl  Mean Cell Hemoglobin : 32.1 pg  Mean Cell Hemoglobin Concentration : 33.1 gm/dL  Auto Neutrophil # : x  Auto Lymphocyte # : x  Auto Monocyte # : x  Auto Eosinophil # : x  Auto Basophil # : x  Auto Neutrophil % : x  Auto Lymphocyte % : x  Auto Monocyte % : x  Auto Eosinophil % : x  Auto Basophil % : x      12-07    144  |  107  |  30<H>  ----------------------------<  171<H>  3.8   |  25  |  0.75    Ca    8.8      07 Dec 2017 05:24            echo -no CSOE

## 2017-12-07 NOTE — PROGRESS NOTE ADULT - PROBLEM SELECTOR PROBLEM 4
HTN (hypertension)
Anemia
HLD (hyperlipidemia)
HTN (hypertension)
Shoulder pain, right
Shoulder pain, right
Anemia

## 2017-12-19 PROCEDURE — 85730 THROMBOPLASTIN TIME PARTIAL: CPT

## 2017-12-19 PROCEDURE — 73552 X-RAY EXAM OF FEMUR 2/>: CPT

## 2017-12-19 PROCEDURE — 99285 EMERGENCY DEPT VISIT HI MDM: CPT | Mod: 25

## 2017-12-19 PROCEDURE — 94640 AIRWAY INHALATION TREATMENT: CPT

## 2017-12-19 PROCEDURE — P9016: CPT

## 2017-12-19 PROCEDURE — 82330 ASSAY OF CALCIUM: CPT

## 2017-12-19 PROCEDURE — 81001 URINALYSIS AUTO W/SCOPE: CPT

## 2017-12-19 PROCEDURE — 83880 ASSAY OF NATRIURETIC PEPTIDE: CPT

## 2017-12-19 PROCEDURE — 84295 ASSAY OF SERUM SODIUM: CPT

## 2017-12-19 PROCEDURE — C1713: CPT

## 2017-12-19 PROCEDURE — 97530 THERAPEUTIC ACTIVITIES: CPT

## 2017-12-19 PROCEDURE — 97110 THERAPEUTIC EXERCISES: CPT

## 2017-12-19 PROCEDURE — 86900 BLOOD TYPING SEROLOGIC ABO: CPT

## 2017-12-19 PROCEDURE — 70551 MRI BRAIN STEM W/O DYE: CPT

## 2017-12-19 PROCEDURE — 70544 MR ANGIOGRAPHY HEAD W/O DYE: CPT

## 2017-12-19 PROCEDURE — 82435 ASSAY OF BLOOD CHLORIDE: CPT

## 2017-12-19 PROCEDURE — 84480 ASSAY TRIIODOTHYRONINE (T3): CPT

## 2017-12-19 PROCEDURE — 71045 X-RAY EXAM CHEST 1 VIEW: CPT

## 2017-12-19 PROCEDURE — 97116 GAIT TRAINING THERAPY: CPT

## 2017-12-19 PROCEDURE — 82947 ASSAY GLUCOSE BLOOD QUANT: CPT

## 2017-12-19 PROCEDURE — 80053 COMPREHEN METABOLIC PANEL: CPT

## 2017-12-19 PROCEDURE — 86901 BLOOD TYPING SEROLOGIC RH(D): CPT

## 2017-12-19 PROCEDURE — 85610 PROTHROMBIN TIME: CPT

## 2017-12-19 PROCEDURE — 80061 LIPID PANEL: CPT

## 2017-12-19 PROCEDURE — 76000 FLUOROSCOPY <1 HR PHYS/QHP: CPT

## 2017-12-19 PROCEDURE — 93306 TTE W/DOPPLER COMPLETE: CPT

## 2017-12-19 PROCEDURE — 83605 ASSAY OF LACTIC ACID: CPT

## 2017-12-19 PROCEDURE — 86923 COMPATIBILITY TEST ELECTRIC: CPT

## 2017-12-19 PROCEDURE — 72125 CT NECK SPINE W/O DYE: CPT

## 2017-12-19 PROCEDURE — 73551 X-RAY EXAM OF FEMUR 1: CPT

## 2017-12-19 PROCEDURE — 36600 WITHDRAWAL OF ARTERIAL BLOOD: CPT

## 2017-12-19 PROCEDURE — 73030 X-RAY EXAM OF SHOULDER: CPT

## 2017-12-19 PROCEDURE — 84132 ASSAY OF SERUM POTASSIUM: CPT

## 2017-12-19 PROCEDURE — 84436 ASSAY OF TOTAL THYROXINE: CPT

## 2017-12-19 PROCEDURE — 93005 ELECTROCARDIOGRAM TRACING: CPT

## 2017-12-19 PROCEDURE — 82803 BLOOD GASES ANY COMBINATION: CPT

## 2017-12-19 PROCEDURE — 82607 VITAMIN B-12: CPT

## 2017-12-19 PROCEDURE — 85014 HEMATOCRIT: CPT

## 2017-12-19 PROCEDURE — 70450 CT HEAD/BRAIN W/O DYE: CPT

## 2017-12-19 PROCEDURE — 36430 TRANSFUSION BLD/BLD COMPNT: CPT

## 2017-12-19 PROCEDURE — 82962 GLUCOSE BLOOD TEST: CPT

## 2017-12-19 PROCEDURE — 80048 BASIC METABOLIC PNL TOTAL CA: CPT

## 2017-12-19 PROCEDURE — 83036 HEMOGLOBIN GLYCOSYLATED A1C: CPT

## 2017-12-19 PROCEDURE — 97162 PT EVAL MOD COMPLEX 30 MIN: CPT

## 2017-12-19 PROCEDURE — 97165 OT EVAL LOW COMPLEX 30 MIN: CPT

## 2017-12-19 PROCEDURE — P9011: CPT

## 2017-12-19 PROCEDURE — 93970 EXTREMITY STUDY: CPT

## 2017-12-19 PROCEDURE — 85027 COMPLETE CBC AUTOMATED: CPT

## 2017-12-19 PROCEDURE — 73502 X-RAY EXAM HIP UNI 2-3 VIEWS: CPT

## 2017-12-19 PROCEDURE — C1889: CPT

## 2017-12-19 PROCEDURE — 96374 THER/PROPH/DIAG INJ IV PUSH: CPT

## 2017-12-19 PROCEDURE — 84443 ASSAY THYROID STIM HORMONE: CPT

## 2017-12-19 PROCEDURE — 86850 RBC ANTIBODY SCREEN: CPT

## 2017-12-19 PROCEDURE — 87086 URINE CULTURE/COLONY COUNT: CPT

## 2017-12-19 PROCEDURE — 70547 MR ANGIOGRAPHY NECK W/O DYE: CPT

## 2019-04-01 NOTE — H&P ADULT - NSHPPHYSICALEXAM_GEN_ALL_CORE
Physical exam  VS: Afebrile, vital signs stable  Gen: NAD  right LE: Skin intact. No erythema/ecchymosis/warmth. No TTP bony prominences at knee/ankle/foot.+EHL/FHL/TA/GS. SILT L3-S1, +DP pulse, capillary refill brisk. Compartments soft and nontender.  Secondary survey: No TTP bony prominences with full painless AROM at baseline per patient. SILT. Capillary refill brisk. Able to SLR with contralateral leg. No TTP axial spine.
177.8

## 2019-11-20 ENCOUNTER — INPATIENT (INPATIENT)
Facility: HOSPITAL | Age: 84
LOS: 4 days | Discharge: NURSING, REHAB OR LONG-TERM CARE | DRG: 65 | End: 2019-11-25
Attending: SPECIALIST | Admitting: INTERNAL MEDICINE
Payer: MEDICARE

## 2019-11-20 VITALS
RESPIRATION RATE: 18 BRPM | HEIGHT: 60 IN | OXYGEN SATURATION: 98 % | HEART RATE: 89 BPM | DIASTOLIC BLOOD PRESSURE: 83 MMHG | SYSTOLIC BLOOD PRESSURE: 158 MMHG | TEMPERATURE: 98 F

## 2019-11-20 DIAGNOSIS — I10 ESSENTIAL (PRIMARY) HYPERTENSION: ICD-10-CM

## 2019-11-20 DIAGNOSIS — Z96.642 PRESENCE OF LEFT ARTIFICIAL HIP JOINT: Chronic | ICD-10-CM

## 2019-11-20 DIAGNOSIS — I63.9 CEREBRAL INFARCTION, UNSPECIFIED: ICD-10-CM

## 2019-11-20 DIAGNOSIS — E78.5 HYPERLIPIDEMIA, UNSPECIFIED: ICD-10-CM

## 2019-11-20 DIAGNOSIS — E03.9 HYPOTHYROIDISM, UNSPECIFIED: ICD-10-CM

## 2019-11-20 LAB
ALBUMIN SERPL ELPH-MCNC: 3.8 G/DL — SIGNIFICANT CHANGE UP (ref 3.3–5)
ALP SERPL-CCNC: 83 U/L — SIGNIFICANT CHANGE UP (ref 40–120)
ALT FLD-CCNC: 12 U/L — SIGNIFICANT CHANGE UP (ref 10–45)
ANION GAP SERPL CALC-SCNC: 17 MMOL/L — SIGNIFICANT CHANGE UP (ref 5–17)
APPEARANCE UR: CLEAR — SIGNIFICANT CHANGE UP
APTT BLD: 24.2 SEC — LOW (ref 27.5–36.3)
AST SERPL-CCNC: 13 U/L — SIGNIFICANT CHANGE UP (ref 10–40)
BACTERIA # UR AUTO: NEGATIVE — SIGNIFICANT CHANGE UP
BASOPHILS # BLD AUTO: 0.02 K/UL — SIGNIFICANT CHANGE UP (ref 0–0.2)
BASOPHILS NFR BLD AUTO: 0.2 % — SIGNIFICANT CHANGE UP (ref 0–2)
BILIRUB SERPL-MCNC: 0.2 MG/DL — SIGNIFICANT CHANGE UP (ref 0.2–1.2)
BILIRUB UR-MCNC: NEGATIVE — SIGNIFICANT CHANGE UP
BUN SERPL-MCNC: 24 MG/DL — HIGH (ref 7–23)
CALCIUM SERPL-MCNC: 9.1 MG/DL — SIGNIFICANT CHANGE UP (ref 8.4–10.5)
CHLORIDE SERPL-SCNC: 104 MMOL/L — SIGNIFICANT CHANGE UP (ref 96–108)
CO2 SERPL-SCNC: 23 MMOL/L — SIGNIFICANT CHANGE UP (ref 22–31)
COLOR SPEC: SIGNIFICANT CHANGE UP
CREAT SERPL-MCNC: 0.81 MG/DL — SIGNIFICANT CHANGE UP (ref 0.5–1.3)
DIFF PNL FLD: NEGATIVE — SIGNIFICANT CHANGE UP
EOSINOPHIL # BLD AUTO: 0.17 K/UL — SIGNIFICANT CHANGE UP (ref 0–0.5)
EOSINOPHIL NFR BLD AUTO: 1.8 % — SIGNIFICANT CHANGE UP (ref 0–6)
EPI CELLS # UR: 3 /HPF — SIGNIFICANT CHANGE UP
GLUCOSE SERPL-MCNC: 187 MG/DL — HIGH (ref 70–99)
GLUCOSE UR QL: NEGATIVE — SIGNIFICANT CHANGE UP
HCT VFR BLD CALC: 36.4 % — SIGNIFICANT CHANGE UP (ref 34.5–45)
HGB BLD-MCNC: 11.8 G/DL — SIGNIFICANT CHANGE UP (ref 11.5–15.5)
HYALINE CASTS # UR AUTO: 1 /LPF — SIGNIFICANT CHANGE UP (ref 0–2)
IMM GRANULOCYTES NFR BLD AUTO: 0.4 % — SIGNIFICANT CHANGE UP (ref 0–1.5)
INR BLD: 0.98 RATIO — SIGNIFICANT CHANGE UP (ref 0.88–1.16)
KETONES UR-MCNC: NEGATIVE — SIGNIFICANT CHANGE UP
LEUKOCYTE ESTERASE UR-ACNC: NEGATIVE — SIGNIFICANT CHANGE UP
LYMPHOCYTES # BLD AUTO: 0.9 K/UL — LOW (ref 1–3.3)
LYMPHOCYTES # BLD AUTO: 9.5 % — LOW (ref 13–44)
MCHC RBC-ENTMCNC: 31.1 PG — SIGNIFICANT CHANGE UP (ref 27–34)
MCHC RBC-ENTMCNC: 32.4 GM/DL — SIGNIFICANT CHANGE UP (ref 32–36)
MCV RBC AUTO: 95.8 FL — SIGNIFICANT CHANGE UP (ref 80–100)
MONOCYTES # BLD AUTO: 0.6 K/UL — SIGNIFICANT CHANGE UP (ref 0–0.9)
MONOCYTES NFR BLD AUTO: 6.4 % — SIGNIFICANT CHANGE UP (ref 2–14)
NEUTROPHILS # BLD AUTO: 7.7 K/UL — HIGH (ref 1.8–7.4)
NEUTROPHILS NFR BLD AUTO: 81.7 % — HIGH (ref 43–77)
NITRITE UR-MCNC: NEGATIVE — SIGNIFICANT CHANGE UP
NRBC # BLD: 0 /100 WBCS — SIGNIFICANT CHANGE UP (ref 0–0)
PH UR: 5.5 — SIGNIFICANT CHANGE UP (ref 5–8)
PLATELET # BLD AUTO: 190 K/UL — SIGNIFICANT CHANGE UP (ref 150–400)
POTASSIUM SERPL-MCNC: 4 MMOL/L — SIGNIFICANT CHANGE UP (ref 3.5–5.3)
POTASSIUM SERPL-SCNC: 4 MMOL/L — SIGNIFICANT CHANGE UP (ref 3.5–5.3)
PROT SERPL-MCNC: 6.5 G/DL — SIGNIFICANT CHANGE UP (ref 6–8.3)
PROT UR-MCNC: ABNORMAL
PROTHROM AB SERPL-ACNC: 11.3 SEC — SIGNIFICANT CHANGE UP (ref 10–12.9)
RBC # BLD: 3.8 M/UL — SIGNIFICANT CHANGE UP (ref 3.8–5.2)
RBC # FLD: 13.7 % — SIGNIFICANT CHANGE UP (ref 10.3–14.5)
RBC CASTS # UR COMP ASSIST: 1 /HPF — SIGNIFICANT CHANGE UP (ref 0–4)
SODIUM SERPL-SCNC: 144 MMOL/L — SIGNIFICANT CHANGE UP (ref 135–145)
SP GR SPEC: 1.02 — SIGNIFICANT CHANGE UP (ref 1.01–1.02)
UROBILINOGEN FLD QL: NEGATIVE — SIGNIFICANT CHANGE UP
WBC # BLD: 9.43 K/UL — SIGNIFICANT CHANGE UP (ref 3.8–10.5)
WBC # FLD AUTO: 9.43 K/UL — SIGNIFICANT CHANGE UP (ref 3.8–10.5)
WBC UR QL: 1 /HPF — SIGNIFICANT CHANGE UP (ref 0–5)

## 2019-11-20 PROCEDURE — 70496 CT ANGIOGRAPHY HEAD: CPT | Mod: 26

## 2019-11-20 PROCEDURE — 99285 EMERGENCY DEPT VISIT HI MDM: CPT

## 2019-11-20 PROCEDURE — 71045 X-RAY EXAM CHEST 1 VIEW: CPT | Mod: 26

## 2019-11-20 PROCEDURE — 93010 ELECTROCARDIOGRAM REPORT: CPT

## 2019-11-20 PROCEDURE — 73522 X-RAY EXAM HIPS BI 3-4 VIEWS: CPT | Mod: 26

## 2019-11-20 PROCEDURE — 70498 CT ANGIOGRAPHY NECK: CPT | Mod: 26

## 2019-11-20 PROCEDURE — 72125 CT NECK SPINE W/O DYE: CPT | Mod: 26

## 2019-11-20 PROCEDURE — 70450 CT HEAD/BRAIN W/O DYE: CPT | Mod: 26,59

## 2019-11-20 RX ORDER — ALPRAZOLAM 0.25 MG
0.25 TABLET ORAL DAILY
Refills: 0 | Status: DISCONTINUED | OUTPATIENT
Start: 2019-11-20 | End: 2019-11-25

## 2019-11-20 RX ORDER — ATORVASTATIN CALCIUM 80 MG/1
40 TABLET, FILM COATED ORAL AT BEDTIME
Refills: 0 | Status: DISCONTINUED | OUTPATIENT
Start: 2019-11-20 | End: 2019-11-25

## 2019-11-20 RX ORDER — SODIUM CHLORIDE 9 MG/ML
1000 INJECTION INTRAMUSCULAR; INTRAVENOUS; SUBCUTANEOUS ONCE
Refills: 0 | Status: COMPLETED | OUTPATIENT
Start: 2019-11-20 | End: 2019-11-20

## 2019-11-20 RX ORDER — CLOPIDOGREL BISULFATE 75 MG/1
75 TABLET, FILM COATED ORAL DAILY
Refills: 0 | Status: DISCONTINUED | OUTPATIENT
Start: 2019-11-20 | End: 2019-11-25

## 2019-11-20 RX ORDER — SITAGLIPTIN 50 MG/1
1 TABLET, FILM COATED ORAL
Qty: 0 | Refills: 0 | DISCHARGE

## 2019-11-20 RX ORDER — PANTOPRAZOLE SODIUM 20 MG/1
40 TABLET, DELAYED RELEASE ORAL
Refills: 0 | Status: DISCONTINUED | OUTPATIENT
Start: 2019-11-20 | End: 2019-11-25

## 2019-11-20 RX ORDER — ASPIRIN/CALCIUM CARB/MAGNESIUM 324 MG
81 TABLET ORAL DAILY
Refills: 0 | Status: DISCONTINUED | OUTPATIENT
Start: 2019-11-20 | End: 2019-11-25

## 2019-11-20 RX ORDER — LEVOTHYROXINE SODIUM 125 MCG
50 TABLET ORAL DAILY
Refills: 0 | Status: DISCONTINUED | OUTPATIENT
Start: 2019-11-20 | End: 2019-11-25

## 2019-11-20 RX ORDER — ACETAMINOPHEN 500 MG
975 TABLET ORAL ONCE
Refills: 0 | Status: COMPLETED | OUTPATIENT
Start: 2019-11-20 | End: 2019-11-20

## 2019-11-20 RX ORDER — QUETIAPINE FUMARATE 200 MG/1
1 TABLET, FILM COATED ORAL
Qty: 30 | Refills: 0

## 2019-11-20 RX ORDER — CARVEDILOL PHOSPHATE 80 MG/1
25 CAPSULE, EXTENDED RELEASE ORAL EVERY 12 HOURS
Refills: 0 | Status: DISCONTINUED | OUTPATIENT
Start: 2019-11-20 | End: 2019-11-25

## 2019-11-20 RX ORDER — AMLODIPINE BESYLATE 2.5 MG/1
5 TABLET ORAL DAILY
Refills: 0 | Status: DISCONTINUED | OUTPATIENT
Start: 2019-11-20 | End: 2019-11-25

## 2019-11-20 RX ORDER — QUETIAPINE FUMARATE 200 MG/1
25 TABLET, FILM COATED ORAL AT BEDTIME
Refills: 0 | Status: DISCONTINUED | OUTPATIENT
Start: 2019-11-20 | End: 2019-11-25

## 2019-11-20 RX ADMIN — ATORVASTATIN CALCIUM 40 MILLIGRAM(S): 80 TABLET, FILM COATED ORAL at 22:34

## 2019-11-20 RX ADMIN — Medication 0.2 MILLIGRAM(S): at 15:58

## 2019-11-20 RX ADMIN — SODIUM CHLORIDE 1000 MILLILITER(S): 9 INJECTION INTRAMUSCULAR; INTRAVENOUS; SUBCUTANEOUS at 06:10

## 2019-11-20 RX ADMIN — AMLODIPINE BESYLATE 5 MILLIGRAM(S): 2.5 TABLET ORAL at 15:58

## 2019-11-20 RX ADMIN — Medication 81 MILLIGRAM(S): at 15:58

## 2019-11-20 RX ADMIN — CARVEDILOL PHOSPHATE 25 MILLIGRAM(S): 80 CAPSULE, EXTENDED RELEASE ORAL at 18:10

## 2019-11-20 RX ADMIN — PANTOPRAZOLE SODIUM 40 MILLIGRAM(S): 20 TABLET, DELAYED RELEASE ORAL at 15:58

## 2019-11-20 RX ADMIN — CLOPIDOGREL BISULFATE 75 MILLIGRAM(S): 75 TABLET, FILM COATED ORAL at 15:58

## 2019-11-20 RX ADMIN — Medication 975 MILLIGRAM(S): at 06:10

## 2019-11-20 NOTE — ED PROVIDER NOTE - CLINICAL SUMMARY MEDICAL DECISION MAKING FREE TEXT BOX
Fall with dysarthria. CTH r/o trauma and stroke (outside tPA window). r/o dehydration and UTI. Cheek-To-Nose Interpolation Flap Text: A decision was made to reconstruct the defect utilizing an interpolation axial flap and a staged reconstruction.  A telfa template was made of the defect.  This telfa template was then used to outline the Cheek-To-Nose Interpolation flap.  The donor area for the pedicle flap was then injected with anesthesia.  The flap was excised through the skin and subcutaneous tissue down to the layer of the underlying musculature.  The interpolation flap was carefully excised within this deep plane to maintain its blood supply.  The edges of the donor site were undermined.   The donor site was closed in a primary fashion.  The pedicle was then rotated into position and sutured.  Once the tube was sutured into place, adequate blood supply was confirmed with blanching and refill.  The pedicle was then wrapped with xeroform gauze and dressed appropriately with a telfa and gauze bandage to ensure continued blood supply and protect the attached pedicle.

## 2019-11-20 NOTE — ED ADULT NURSE REASSESSMENT NOTE - NS ED NURSE REASSESS COMMENT FT1
Straight catheterization performed as Per MD orders.  2 RNs present at bedside.  Sterile technique maintained.  pt had 200cc output of clear yellow urine.  Safety and comfort maintained. Will continue to monitor.

## 2019-11-20 NOTE — ED PROVIDER NOTE - OBJECTIVE STATEMENT
89F BIBEMS from [facility]. Pt oriented to person and place but not time. Pt has no complaints. Can state she "fell". No HA, CP, SOB, Abd pain.

## 2019-11-20 NOTE — ED PROVIDER NOTE - PROGRESS NOTE DETAILS
[place] pt notes last known normal 7PM. Aides noted facial droop this AM prior to sending pt to [facility]. cta pending, dw neuro unlikely to be acute stroke given isolated facial droop they will see the pt Attending note (Diego): CT and CTA reviewed.  New likely infarct in the left lentiform nucleus, raising concern for CVA; R facial droop is present, sparing the forehead, and is thus most likely from central process, such as CVA.  Last known normal per d/w RN at [place] was 7pm last night.  concern for acute v subacute stroke, despite initial impression of the neurology resident over the phone (in-person evaluation by a neurologist is still pending).

## 2019-11-20 NOTE — CONSULT NOTE ADULT - SUBJECTIVE AND OBJECTIVE BOX
Neurology Consult Note    Patient is a 89y old  Female who presents with a chief complaint of     The patient is a 89y Female with a history of HT, HLD, strokes and multiple known intracranial stenosis obstruction- found on floor 3 am w new facial weakness and brought to er, denies HA or other chge vzn, str , sensation    MEDICATIONS  (STANDING):  amLODIPine   Tablet 5 milliGRAM(s) Oral daily  aspirin enteric coated 81 milliGRAM(s) Oral daily  atorvastatin 40 milliGRAM(s) Oral at bedtime  carvedilol 25 milliGRAM(s) Oral every 12 hours  cloNIDine 0.2 milliGRAM(s) Oral daily  clopidogrel Tablet 75 milliGRAM(s) Oral daily  levothyroxine 50 MICROGram(s) Oral daily  pantoprazole    Tablet 40 milliGRAM(s) Oral before breakfast    MEDICATIONS  (PRN):  ALPRAZolam 0.25 milliGRAM(s) Oral daily PRN anxiety  QUEtiapine 25 milliGRAM(s) Oral at bedtime PRN agitation      PAST MEDICAL & SURGICAL HISTORY:  Stroke  HLD (hyperlipidemia)  HTN (hypertension)  Hypothyroid  History of hip replacement, total, left      FAMILY HISTORY:  No pertinent family history in first degree relatives      Allergies    No Known Allergies    Intolerances        SOCIAL HISTORY:  type ~  then ?H&P    REVIEW OF SYSTEMS  General:(-),Skin/Breast:(-),Ophthalmologic:(-),ENMT:(-),Respiratory and Thorax:(-),Cardiovascular:(-),Gastrointestinal:(-),Genitourinary:(-),Musculoskeletal:(-),Psychiatric:(-),Hematology/Lymphatics:(-),Endocrine:(-),Allergic/Immunologic:(-)    Vital Signs Last 24 Hrs  T(C): 36.3 (20 Nov 2019 17:39), Max: 37.2 (20 Nov 2019 15:37)  T(F): 97.3 (20 Nov 2019 17:39), Max: 98.9 (20 Nov 2019 15:37)  HR: 84 (20 Nov 2019 17:39) (77 - 89)  BP: 118/72 (20 Nov 2019 17:39) (118/72 - 180/73)  BP(mean): --  RR: 18 (20 Nov 2019 17:39) (17 - 19)  SpO2: 98% (20 Nov 2019 17:39) (94% - 99%)    Physical exam  GENERAL-WDWN  CARDIOVASCULAR- Cor- RRR s MGR, carotid- 2+, No bruits  EYES- non-icteric disks obscured  MENTAL STATUS- Alert, attends, fluent,dysarthric, follows commands, oriented, good memory and affect.  CRANIAL NERVES-II inconsistent Visual Fields. III-IV-VI EOMI & Pupils - Bilateral - Normal Bilaterally. V Trigeminal - Bilateral - Normal bilateral facial sensation and jaw movement. VII Facial weakness lower R,  VIII Acoustic - Bilateral - Hearing normal to voice bilaterally. IX Glossopharyngeal / X Vagus - Normal palate elevates symmetrically. XI Accessory - Normal Bilaterally. XII Hypoglossal - Tongue protrudes midline and moves symmetrically.  MOTOR-Bulk and Contour - Normal. Tone - Normal tone, no abnormal movements. Strength - 5/5 normal muscle strength - Strength full throughout.  SENSORY-Normal - LT, DSS,    REFLEXES- DTR's 1+ throughout.  COORDINATION-Normal   FNF - bilaterally.  GAIT-NA    CBC Full  -  ( 20 Nov 2019 06:21 )  WBC Count : 9.43 K/uL  RBC Count : 3.80 M/uL  Hemoglobin : 11.8 g/dL  Hematocrit : 36.4 %  Platelet Count - Automated : 190 K/uL  Mean Cell Volume : 95.8 fl  Mean Cell Hemoglobin : 31.1 pg  Mean Cell Hemoglobin Concentration : 32.4 gm/dL  Auto Neutrophil # : 7.70 K/uL  Auto Lymphocyte # : 0.90 K/uL  Auto Monocyte # : 0.60 K/uL  Auto Eosinophil # : 0.17 K/uL  Auto Basophil # : 0.02 K/uL  Auto Neutrophil % : 81.7 %  Auto Lymphocyte % : 9.5 %  Auto Monocyte % : 6.4 %  Auto Eosinophil % : 1.8 %  Auto Basophil % : 0.2 %      11-20    144  |  104  |  24<H>  ----------------------------<  187<H>  4.0   |  23  |  0.81    Ca    9.1      20 Nov 2019 06:21    TPro  6.5  /  Alb  3.8  /  TBili  0.2  /  DBili  x   /  AST  13  /  ALT  12  /  AlkPhos  83  11-20    LIVER FUNCTIONS - ( 20 Nov 2019 06:21 )  Alb: 3.8 g/dL / Pro: 6.5 g/dL / ALK PHOS: 83 U/L / ALT: 12 U/L / AST: 13 U/L / GGT: x               CT ? new c/w CT L lentiform nuc lucency (but similar to to report of MRI-'17)  CTA's- multiple   intracranial stenosis obstruction

## 2019-11-20 NOTE — CONSULT NOTE ADULT - SUBJECTIVE AND OBJECTIVE BOX
Neurology Consult Note    Name  SHARAD PINZON    HPI:    89F BIBEMS from [place] assisted [facility]. Pt oriented to person and place but not time. Pt has no complaints. Can state she "fell". No HA, CP, SOB.  Patient noted to have R facial droop.      Subjective:    Review of Systems:  Constitutional:        Eyes, Ears, Mouth, Throat:   Respiratory:                            Cardiovascular:   Gastrointestinal:                                     Genitourinary:   Musculoskeletal:                                    Dermatologic:   Neurological: as per above                                                                 Psychiatric:   Endocrine:              Hematologic/Lymphatic:     MEDICATIONS  (STANDING):    MEDICATIONS  (PRN):      Allergies    No Known Allergies    Intolerances      PAST MEDICAL & SURGICAL HISTORY:  Stroke  HLD (hyperlipidemia)  HTN (hypertension)  Hypothyroid  History of hip replacement, total, left    FH:  SH:    Objective:   Vital Signs Last 24 Hrs  T(C): 36.7 (20 Nov 2019 07:35), Max: 36.7 (20 Nov 2019 04:15)  T(F): 98 (20 Nov 2019 07:35), Max: 98 (20 Nov 2019 04:15)  HR: 79 (20 Nov 2019 07:35) (79 - 89)  BP: 166/82 (20 Nov 2019 07:35) (138/54 - 180/73)  BP(mean): --  RR: 19 (20 Nov 2019 07:35) (18 - 19)  SpO2: 94% (20 Nov 2019 07:35) (94% - 99%)    General Exam:   General appearance: No acute distress                 Neurological Exam:  Mental Status: not oriented to place/time.      Cranial Nerves:  PERRL, EOMI, R nasolabial flattening    Motor:   Moving all extremities spontaneously and equally    Pronator drift: none                   Sensation: grossly intact to light touch        Other:                        11.8   9.43  )-----------( 190      ( 20 Nov 2019 06:21 )             36.4     11-20    144  |  104  |  24<H>  ----------------------------<  187<H>  4.0   |  23  |  0.81    Ca    9.1      20 Nov 2019 06:21    TPro  6.5  /  Alb  3.8  /  TBili  0.2  /  DBili  x   /  AST  13  /  ALT  12  /  AlkPhos  83  11-20    LIVER FUNCTIONS - ( 20 Nov 2019 06:21 )  Alb: 3.8 g/dL / Pro: 6.5 g/dL / ALK PHOS: 83 U/L / ALT: 12 U/L / AST: 13 U/L / GGT: x               Radiology    EKG:  tele:  TTE:  EEG:    < from: CT Head No Cont (11.20.19 @ 06:38) >  IMPRESSION:   CT Head:    1.  No CT evidence of acute intracranial hemorrhage, extra-axial   collection, mass effect or calvarial fracture.  2.  Age-indeterminate infarct in the posterior aspect of the left   lentiform nucleus is new from 12/03/2017.  Acute infarct is not excluded.    MRI is recommended for further evaluation.    < end of copied text >

## 2019-11-20 NOTE — CONSULT NOTE ADULT - ATTENDING COMMENTS
CARDIOLOGY ATTENDING    Agree with above. Admitted with fall r/o syncope and stroke type symptoms. Baseline EKG unremarkable, and echo in 2017 unremarkable.     Recommend  -admit to some type of telemetry unit r/o afib  -get repeat echo r/o structural heart disease  -f/u neurology  -if inpatient tele and echo unremarkable, then recommend outpatient event monitoring
VASCULAR NEUROLOGY ATTENDING  The patient is seen and examined the history and imaging are reviewed. I agree with the resident note unless otherwise noted. 89 year old woman resident of assisted living facility at baseline conversive ambulating with rolling walker. 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 On 11/20/19 developed R sided facial weakness on exam today worsening to R sided weakness. Her mental status is fluctuating with disorientation to year. Impression: Presumed small vessel infarct vs related to intracranial atherosclerotic vs cardioembolism. Unable to obtain MRI brain. Repeat CT head today. Agree with ASA Plavix Statin for now. Tolerating diet. Supportive care. Goals of care discussion with patients proxy at bedside (Dr. Parker) explained that patient would likely not benefit from aggressive inpatient neurologic work-up. Will arrange for transfer to [address] [place] [address] when bed available. Appreciate Dr. Tomlinson follow-up.

## 2019-11-20 NOTE — ED ADULT NURSE REASSESSMENT NOTE - NS ED NURSE REASSESS COMMENT FT1
vss; pt appears very confused; pt changed from street clothes to hospital gown; diaper changed; made pt comfortable; attempted to replace nasal cannulla pt will not tolerate; pt waiting for repeat ct scan

## 2019-11-20 NOTE — PATIENT PROFILE ADULT - STATED REASON FOR ADMISSION
[facility], unwitnessed. noted facial droop upon admission

## 2019-11-20 NOTE — H&P ADULT - HISTORY OF PRESENT ILLNESS
89F BIBEMS from [facility]. Pt oriented to person and place but not time. Pt has no complaints. Can state she "fell". No HA, CP, SOB, Abd pain. Pt noted to have right FACIAL DROP

## 2019-11-20 NOTE — CONSULT NOTE ADULT - SUBJECTIVE AND OBJECTIVE BOX
HPI:  89 y/o female with PMHx of HTN, HLD, hx CVA, hypothyroidism presented to ED s/p fall and with right facial droop being evaluated for stroke. Patient is a poor historian and history obtained thru chart mostly. Patient reports tripping and falling. Does not report LOC. Patient reports right hip pain otherwise has no complaints. Denies chest pain, SOB, SOLIS, dizziness, palpitations, syncope, headache, abd pain, n/v, back pain, fever/chills.    PAST MEDICAL & SURGICAL HISTORY:  Stroke  HLD (hyperlipidemia)  HTN (hypertension)  Hypothyroid  History of hip replacement, total, left        MEDICATIONS  (STANDING):  amLODIPine   Tablet 5 milliGRAM(s) Oral daily  aspirin enteric coated 81 milliGRAM(s) Oral daily  atorvastatin 40 milliGRAM(s) Oral at bedtime  carvedilol 25 milliGRAM(s) Oral every 12 hours  cloNIDine 0.2 milliGRAM(s) Oral daily  clopidogrel Tablet 75 milliGRAM(s) Oral daily  levothyroxine 50 MICROGram(s) Oral daily  pantoprazole    Tablet 40 milliGRAM(s) Oral before breakfast      Allergies    No Known Allergies    Intolerances        FAMILY HISTORY:  No pertinent family history in first degree relatives    Noncontributory for premature coronary disease or sudden cardiac death    SOCIAL HISTORY:    [x ] Non-smoker  [ ] Smoker  [ ] Alcohol      REVIEW OF SYSTEMS:  [ ]chest pain  [  ]shortness of breath  [  ]palpitations  [  ]syncope  [ ]near syncope [ ]upper extremity weakness   [ ] lower extremity weakness  [  ]diplopia  [  ]altered mental status   [  ]fevers  [ ]chills [ ]nausea  [ ]vomitting  [  ]dysphagia    [ ]abdominal pain  [ ]melena  [ ]BRBPR    [  ]epistaxis  [  ]rash  [ ]lower extremity edema    +right hip pain    [x ] All others negative	  [ ] Unable to obtain    PHYSICAL EXAM:  T(C): 36.9 (11-20-19 @ 12:00), Max: 36.9 (11-20-19 @ 12:00)  HR: 82 (11-20-19 @ 14:08) (77 - 89)  BP: 180/66 (11-20-19 @ 14:08) (138/54 - 180/73)  RR: 17 (11-20-19 @ 14:08) (17 - 19)  SpO2: 97% (11-20-19 @ 14:08) (94% - 99%)  Wt(kg): --    Appearance: Normal	  HEENT:   Normal oral mucosa, PERRL, EOMI	  Lymphatic: No lymphadenopathy , trace LE edema  Cardiovascular: Normal S1 S2, No JVD, No murmurs , Peripheral pulses palpable 2+ bilaterally  Respiratory: Lungs clear to auscultation, normal effort 	  Gastrointestinal:  Soft, Non-tender, + BS	  Skin: No rashes, No ecchymoses, No cyanosis, warm to touch  Musculoskeletal: Normal range of motion, normal strength  Psychiatry:  Mood & affect appropriate    DIAGNOSTIC DATA:    TELEMETRY: NSR 70s	      ECG: Sinus Rhythm with 1st degree AV block 	    Echo: Pending    < from: Transthoracic Echocardiogram (12.06.17 @ 09:57) >  Conclusions:  1. Normal left ventricular internal dimensions and wall  thicknesses. Basal septal hypertrophy to 1.4cm.  2. Normal left ventricular systolic function. No segmental  wall motion abnormalities.  3. Mild diastolic dysfunction (Stage I).  4. Normal right ventricular size and function.  5. Agitated saline injection demonstrates no evidence of a  patent foramen ovale.  *** Compared with echocardiogram of 4/30/2014, no  significant changes noted.    < end of copied text >      NST:     Cath:    	  LABS:	 	                            11.8   9.43  )-----------( 190      ( 20 Nov 2019 06:21 )             36.4     11-20    144  |  104  |  24<H>  ----------------------------<  187<H>  4.0   |  23  |  0.81    Ca    9.1      20 Nov 2019 06:21    TPro  6.5  /  Alb  3.8  /  TBili  0.2  /  DBili  x   /  AST  13  /  ALT  12  /  AlkPhos  83  11-20    proBNP:   Lipid Profile:   HgA1c:   TSH:     ASSESSMENT/PLAN: 89 y/o female with PMHx of HTN, HLD, hx CVA, hypothyroidism presented to ED s/p fall and with right facial droop being evaluated for stroke.     - S/p fall - no reported LOC  - No evidence of clinical HF or anginal symptoms  - Monitor telemetry  - Check TTE to r/o structural abnormalities  - Neurology f/u    Tommy Green PA-C  [facility]  Pager: [phone]

## 2019-11-20 NOTE — H&P ADULT - NSHPPHYSICALEXAM_GEN_ALL_CORE
General: WN/WD NAD  PERRLA  Neurology: A&Ox3, nonfocal, ESTRELLA x 4  Respiratory: CTA B/L  CV: RRR, S1S2, no murmurs, rubs or gallops  Abdominal: Soft, NT, ND +BS, Last BM  Extremities: No edema, + peripheral pulses  Skin Normal

## 2019-11-20 NOTE — ED PROVIDER NOTE - PHYSICAL EXAMINATION
CONSTITUTIONAL: Well appearing, well nourished, awake, alert, oriented to person, place, time/situation and in no apparent distress  ENMT: Airway patent, head atraumatic  EYES: Clear bilaterally  CARDIAC: Normal rate, regular rhythm.  RESPIRATORY: Breath sounds clear and equal bilaterally.  ABDOMEN: Abdomen soft, non-tender, no guarding  MUSCULOSKELETAL: Spine appears normal (no mid-line TTP) no deformities, equal active FROM bilaterally, b/l hip TTP  NEUROLOGIC: CN II-XII intact, moves all extremities without lateralization  SKIN: Exposed skin normal color for race, warm, dry and intact CONSTITUTIONAL: Well appearing, well nourished, awake, alert, oriented to person, place, time/situation and in no apparent distress  ENMT: Airway patent, head atraumatic, very dry mucous membranes  EYES: Clear bilaterally  CARDIAC: Normal rate, regular rhythm.  RESPIRATORY: Breath sounds clear and equal bilaterally.  ABDOMEN: Abdomen soft, non-tender, no guarding  MUSCULOSKELETAL: Spine appears normal (no mid-line TTP) no deformities, equal active FROM bilaterally, b/l hip TTP  NEUROLOGIC: CN II-XII intact (with exception of dysarthria and resting lax right nasolabial fold but symmetric when engaging facial muscles), moves all extremities without lateralization  SKIN: Exposed skin normal color for race, warm, dry and intact

## 2019-11-20 NOTE — CONSULT NOTE ADULT - ASSESSMENT
The patient is a 89y Female with a history of HT, HLD, strokes and multiple known intracranial stenosis obstruction- found on floor 3 am w new facial weakness   d/w Dr kamara and HS to be admittd to stroke unit  further w/u and tx as per team-but likely as below  DAPT  high dose statin  freq neuro chks  ?NPO/SS eval  IV hydration optimize intravascular volume  ?w/u Cardiac source

## 2019-11-20 NOTE — CONSULT NOTE ADULT - ASSESSMENT
89F BIBEMS from [facility] assisted [facility] found with new R facial droop.  Neurological exam non-focal.  CTH showing age-indeterminate infarct in L lentiform nucleus.    Plan:  - f/u CTA head/neck  - If CTA negative, patient may followup with outpatient vascular neurology Dr Mora [phone] for further management

## 2019-11-20 NOTE — H&P ADULT - NSHPLABSRESULTS_GEN_ALL_CORE
Lab Results:  CBC  CBC Full  -  ( 2019 06:21 )  WBC Count : 9.43 K/uL  RBC Count : 3.80 M/uL  Hemoglobin : 11.8 g/dL  Hematocrit : 36.4 %  Platelet Count - Automated : 190 K/uL  Mean Cell Volume : 95.8 fl  Mean Cell Hemoglobin : 31.1 pg  Mean Cell Hemoglobin Concentration : 32.4 gm/dL  Auto Neutrophil # : 7.70 K/uL  Auto Lymphocyte # : 0.90 K/uL  Auto Monocyte # : 0.60 K/uL  Auto Eosinophil # : 0.17 K/uL  Auto Basophil # : 0.02 K/uL  Auto Neutrophil % : 81.7 %  Auto Lymphocyte % : 9.5 %  Auto Monocyte % : 6.4 %  Auto Eosinophil % : 1.8 %  Auto Basophil % : 0.2 %    .		Differential:	[] Automated		[] Manual  Chemistry                        11.8   9.43  )-----------( 190      ( 2019 06:21 )             36.4         144  |  104  |  24<H>  ----------------------------<  187<H>  4.0   |  23  |  0.81    Ca    9.1      2019 06:21    TPro  6.5  /  Alb  3.8  /  TBili  0.2  /  DBili  x   /  AST  13  /  ALT  12  /  AlkPhos  83  20    LIVER FUNCTIONS - ( 2019 06:21 )  Alb: 3.8 g/dL / Pro: 6.5 g/dL / ALK PHOS: 83 U/L / ALT: 12 U/L / AST: 13 U/L / GGT: x           PT/INR - ( 2019 09:11 )   PT: 11.3 sec;   INR: 0.98 ratio         PTT - ( 2019 09:11 )  PTT:24.2 sec  Urinalysis Basic - ( 2019 06:21 )    Color: Light Yellow / Appearance: Clear / S.021 / pH: x  Gluc: x / Ketone: Negative  / Bili: Negative / Urobili: Negative   Blood: x / Protein: Trace / Nitrite: Negative   Leuk Esterase: Negative / RBC: 1 /hpf / WBC 1 /HPF   Sq Epi: x / Non Sq Epi: 3 /hpf / Bacteria: Negative            MICROBIOLOGY/CULTURES:      RADIOLOGY RESULTS:

## 2019-11-20 NOTE — H&P ADULT - ASSESSMENT
89F BIBEMS from [facility]. Pt oriented to person and place but not time. Pt has no complaints. Can state she "fell". No HA, CP, SOB, Abd pain. Pt noted to have right FACIAL DROP

## 2019-11-20 NOTE — ED ADULT NURSE NOTE - OBJECTIVE STATEMENT
89 YO female PMHx HTN BIBEMS s/p fall. Patient reports that she got up to use the bathroom, was using her walker and tripped over her walker. Patient reports landing on her right elbow and R-hip, currently endorsing pain at impact sites. Denies LOC or head trauma. Patient is A&OX3, airway patent, breathing spontaneous, equal and symmetric chest rise and fall. skin warm, dry and pink. pelvis stable upon exam, full ROM in all extremities. denies chest pain, SOB, HA, N/V/D, abdominal pain, fever/chills, urinary symptoms, hematuria, weakness, dizziness, numbness, tinging. Peripheral pulses present b/l. Skin warm, dry and pink. Pt placed in position of comfort. Pt educated on call bell system and provided call bell. Bed in lowest position, wheels locked, appropriate side rails raised. Pt denies needs at this time.

## 2019-11-21 LAB
ANION GAP SERPL CALC-SCNC: 18 MMOL/L — HIGH (ref 5–17)
BUN SERPL-MCNC: 13 MG/DL — SIGNIFICANT CHANGE UP (ref 7–23)
CALCIUM SERPL-MCNC: 8.7 MG/DL — SIGNIFICANT CHANGE UP (ref 8.4–10.5)
CHLORIDE SERPL-SCNC: 104 MMOL/L — SIGNIFICANT CHANGE UP (ref 96–108)
CHOLEST SERPL-MCNC: 131 MG/DL — SIGNIFICANT CHANGE UP (ref 10–199)
CO2 SERPL-SCNC: 21 MMOL/L — LOW (ref 22–31)
CREAT SERPL-MCNC: 0.8 MG/DL — SIGNIFICANT CHANGE UP (ref 0.5–1.3)
CULTURE RESULTS: SIGNIFICANT CHANGE UP
GLUCOSE SERPL-MCNC: 154 MG/DL — HIGH (ref 70–99)
HBA1C BLD-MCNC: 8 % — HIGH (ref 4–5.6)
HCT VFR BLD CALC: 33.1 % — LOW (ref 34.5–45)
HDLC SERPL-MCNC: 44 MG/DL — LOW
HGB BLD-MCNC: 10.7 G/DL — LOW (ref 11.5–15.5)
LIPID PNL WITH DIRECT LDL SERPL: 60 MG/DL — SIGNIFICANT CHANGE UP
MCHC RBC-ENTMCNC: 31.8 PG — SIGNIFICANT CHANGE UP (ref 27–34)
MCHC RBC-ENTMCNC: 32.3 GM/DL — SIGNIFICANT CHANGE UP (ref 32–36)
MCV RBC AUTO: 98.2 FL — SIGNIFICANT CHANGE UP (ref 80–100)
NRBC # BLD: 0 /100 WBCS — SIGNIFICANT CHANGE UP (ref 0–0)
PLATELET # BLD AUTO: 162 K/UL — SIGNIFICANT CHANGE UP (ref 150–400)
POTASSIUM SERPL-MCNC: 3.4 MMOL/L — LOW (ref 3.5–5.3)
POTASSIUM SERPL-SCNC: 3.4 MMOL/L — LOW (ref 3.5–5.3)
RBC # BLD: 3.37 M/UL — LOW (ref 3.8–5.2)
RBC # FLD: 14.1 % — SIGNIFICANT CHANGE UP (ref 10.3–14.5)
SODIUM SERPL-SCNC: 143 MMOL/L — SIGNIFICANT CHANGE UP (ref 135–145)
SPECIMEN SOURCE: SIGNIFICANT CHANGE UP
TOTAL CHOLESTEROL/HDL RATIO MEASUREMENT: 3 RATIO — LOW (ref 3.3–7.1)
TRIGL SERPL-MCNC: 134 MG/DL — SIGNIFICANT CHANGE UP (ref 10–149)
WBC # BLD: 6.98 K/UL — SIGNIFICANT CHANGE UP (ref 3.8–10.5)
WBC # FLD AUTO: 6.98 K/UL — SIGNIFICANT CHANGE UP (ref 3.8–10.5)

## 2019-11-21 PROCEDURE — 70450 CT HEAD/BRAIN W/O DYE: CPT | Mod: 26

## 2019-11-21 RX ORDER — CLOPIDOGREL BISULFATE 75 MG/1
1 TABLET, FILM COATED ORAL
Qty: 0 | Refills: 0 | DISCHARGE
Start: 2019-11-21 | End: 2020-02-21

## 2019-11-21 RX ORDER — ENOXAPARIN SODIUM 100 MG/ML
40 INJECTION SUBCUTANEOUS DAILY
Refills: 0 | Status: DISCONTINUED | OUTPATIENT
Start: 2019-11-21 | End: 2019-11-25

## 2019-11-21 RX ADMIN — CARVEDILOL PHOSPHATE 25 MILLIGRAM(S): 80 CAPSULE, EXTENDED RELEASE ORAL at 17:36

## 2019-11-21 RX ADMIN — Medication 81 MILLIGRAM(S): at 11:37

## 2019-11-21 RX ADMIN — CARVEDILOL PHOSPHATE 25 MILLIGRAM(S): 80 CAPSULE, EXTENDED RELEASE ORAL at 05:44

## 2019-11-21 RX ADMIN — ENOXAPARIN SODIUM 40 MILLIGRAM(S): 100 INJECTION SUBCUTANEOUS at 11:36

## 2019-11-21 RX ADMIN — Medication 0.2 MILLIGRAM(S): at 05:48

## 2019-11-21 RX ADMIN — Medication 50 MICROGRAM(S): at 05:48

## 2019-11-21 RX ADMIN — CLOPIDOGREL BISULFATE 75 MILLIGRAM(S): 75 TABLET, FILM COATED ORAL at 11:37

## 2019-11-21 RX ADMIN — AMLODIPINE BESYLATE 5 MILLIGRAM(S): 2.5 TABLET ORAL at 05:44

## 2019-11-21 RX ADMIN — PANTOPRAZOLE SODIUM 40 MILLIGRAM(S): 20 TABLET, DELAYED RELEASE ORAL at 05:48

## 2019-11-21 RX ADMIN — ATORVASTATIN CALCIUM 40 MILLIGRAM(S): 80 TABLET, FILM COATED ORAL at 21:29

## 2019-11-21 NOTE — SWALLOW BEDSIDE ASSESSMENT ADULT - SWALLOW EVAL: DIAGNOSIS
Consult received and chart reviewed. As per d/w team, Pt tolerating puree diet. Bedside swallow consult deferred at this time. Consult recieved and chart reviewed. As per SLP d/w neuro team, Pt currently recieving puree diet. Bedside swallow consult no longer indicated at this time. Request MD re-consult if swallow evaluation is needed at a later time.

## 2019-11-21 NOTE — SWALLOW BEDSIDE ASSESSMENT ADULT - COMMENTS
Hx continued:  11/20/19 CXR IMPRESSION: Right basilar basilar reflecting atelectasis versus pneumonia. CTH   IMPRESSION: 1.  No CT evidence of acute intracranial hemorrhage, extra-axial collection, mass effect or calvarial fracture. 2.  Age-indeterminate infarct in the posterior aspect of the left lentiform nucleus is new from 12/03/2017.  Acute infarct is not excluded. MRI is recommended for further evaluation.

## 2019-11-21 NOTE — SWALLOW BEDSIDE ASSESSMENT ADULT - SLP PERTINENT HISTORY OF CURRENT PROBLEM
89 y/o female with PMHx of HTN, HLD, hx CVA, hypothyroidism, and multiple known intracranial stenosis obstruction presented to ED s/p fall and with right facial droop, evaluated for stroke. History obtained thru chart. Pt can state she "fell".

## 2019-11-21 NOTE — PROGRESS NOTE ADULT - ASSESSMENT
The patient is a 89y Female with a history of HT, HLD, strokes and multiple known intracranial stenosis obstruction- found on floor 3 am w new facial weakness now worsened w arm weakness  d/w Dr kamara and HS to be admittd to stroke unit  further w/u and tx as per team-but likely as below  DAPT  high dose statin  freq neuro chks  ?NPO/SS eval  IV hydration optimize intravascular volume  ?w/u Cardiac source

## 2019-11-21 NOTE — PROGRESS NOTE ADULT - SUBJECTIVE AND OBJECTIVE BOX
S: +R arm weakness. Denies chest pain or shortness of breath.   Review of systems otherwise (-)  	  MEDICATIONS  (STANDING):  amLODIPine   Tablet 5 milliGRAM(s) Oral daily  aspirin enteric coated 81 milliGRAM(s) Oral daily  atorvastatin 40 milliGRAM(s) Oral at bedtime  carvedilol 25 milliGRAM(s) Oral every 12 hours  cloNIDine 0.2 milliGRAM(s) Oral daily  clopidogrel Tablet 75 milliGRAM(s) Oral daily  enoxaparin Injectable 40 milliGRAM(s) SubCutaneous daily  levothyroxine 50 MICROGram(s) Oral daily  pantoprazole    Tablet 40 milliGRAM(s) Oral before breakfast    MEDICATIONS  (PRN):  ALPRAZolam 0.25 milliGRAM(s) Oral daily PRN anxiety  QUEtiapine 25 milliGRAM(s) Oral at bedtime PRN agitation      LABS:	 	                          10.7   6.98  )-----------( 162      ( 21 Nov 2019 06:32 )             33.1     Hemoglobin: 10.7 g/dL (11-21 @ 06:32)  Hemoglobin: 11.8 g/dL (11-20 @ 06:21)    11-21    143  |  104  |  13  ----------------------------<  154<H>  3.4<L>   |  21<L>  |  0.80    Ca    8.7      21 Nov 2019 06:32    TPro  6.5  /  Alb  3.8  /  TBili  0.2  /  DBili  x   /  AST  13  /  ALT  12  /  AlkPhos  83  11-20    Creatinine Trend: 0.80<--, 0.81<--  COAGS:       proBNP:   Lipid Profile:   HgA1c: Hemoglobin A1C, Whole Blood: 8.0 % (11-21 @ 10:45)    TSH:     PHYSICAL EXAM:  T(C): 36.4 (11-21-19 @ 12:21), Max: 37.2 (11-20-19 @ 15:37)  HR: 66 (11-21-19 @ 12:21) (66 - 84)  BP: 137/71 (11-21-19 @ 12:21) (118/72 - 180/66)  RR: 18 (11-21-19 @ 12:21) (17 - 18)  SpO2: 96% (11-21-19 @ 12:21) (94% - 100%)  Wt(kg): --  I&O's Summary        Gen: Appears well in NAD  HEENT:  (-)icterus (-)pallor  CV: N S1 S2 1/6 JYOTSNA (+)2 Pulses B/l  Resp:  Clear to auscultation B/L, normal effort  GI: (+) BS Soft, NT, ND  Lymph:  (-)Edema, (-)obvious lymphadenopathy  Skin: Warm to touch, Normal turgor  Psych: Appropriate mood and affect      TELEMETRY: SB/SR 55-60s	      ECG: Sinus Rhythm with 1st degree AV block 	    Echo: Pending    < from: Transthoracic Echocardiogram (12.06.17 @ 09:57) >  Conclusions:  1. Normal left ventricular internal dimensions and wall  thicknesses. Basal septal hypertrophy to 1.4cm.  2. Normal left ventricular systolic function. No segmental  wall motion abnormalities.  3. Mild diastolic dysfunction (Stage I).  4. Normal right ventricular size and function.  5. Agitated saline injection demonstrates no evidence of a  patent foramen ovale.  *** Compared with echocardiogram of 4/30/2014, no  significant changes noted.    < end of copied text >    ASSESSMENT/PLAN: 89 y/o female with PMHx of HTN, HLD, hx CVA, hypothyroidism presented to ED s/p fall and with right facial droop being evaluated for stroke.     - No evidence of clinical HF or anginal symptoms  - Monitor telemetry - remains NSR  - Check TTE to r/o structural abnormalities  - Neurology f/u  - If inpatient tele and echo unremarkable, then recommend outpatient event monitoring     Tommy Green PA-C  [facility]  Pager: [phone]

## 2019-11-21 NOTE — PROGRESS NOTE ADULT - SUBJECTIVE AND OBJECTIVE BOX
Neurology Progress Note      the patient's symptoms  --- are  worsened    MEDICATIONS  (STANDING):  amLODIPine   Tablet 5 milliGRAM(s) Oral daily  aspirin enteric coated 81 milliGRAM(s) Oral daily  atorvastatin 40 milliGRAM(s) Oral at bedtime  carvedilol 25 milliGRAM(s) Oral every 12 hours  cloNIDine 0.2 milliGRAM(s) Oral daily  clopidogrel Tablet 75 milliGRAM(s) Oral daily  levothyroxine 50 MICROGram(s) Oral daily  pantoprazole    Tablet 40 milliGRAM(s) Oral before breakfast    MEDICATIONS  (PRN):  ALPRAZolam 0.25 milliGRAM(s) Oral daily PRN anxiety  QUEtiapine 25 milliGRAM(s) Oral at bedtime PRN agitation              Vital Signs Last 24 Hrs  T(C): 37.2 (21 Nov 2019 04:40), Max: 37.2 (20 Nov 2019 15:37)  T(F): 98.9 (21 Nov 2019 04:40), Max: 98.9 (20 Nov 2019 15:37)  HR: 78 (21 Nov 2019 04:40) (71 - 84)  BP: 151/75 (21 Nov 2019 04:40) (118/72 - 180/66)  BP(mean): --  RR: 18 (21 Nov 2019 04:40) (17 - 18)  SpO2: 94% (21 Nov 2019 04:40) (94% - 100%)    Physical exam  MENTAL STATUS- awake attends fair, fluent,  dysarthric, follows commands,   CRANIAL NERVES-II inconsistent Visual Fields. III-IV-VI EOMI & Pupils - Bilateral - Normal Bilaterally. V Trigeminal - Bilateral - Normal bilateral facial sensation and jaw movement. VII R UMN pattern weakfacial movement. VIII Acoustic - Bilateral - Hearing normal to voice bilaterally. IX Glossopharyngeal / X Vagus - Normal palate elevates symmetrically. XI Accessory - Normal Bilaterally. XII Hypoglossal - Tongue protrudes midline and moves symmetrically.  MOTOR-Bulk and Contour - Normal. Tone R arm 3-4/5 other grossly full  SENSORY-Normal - LT, DSS   REFLEXES- DTR's 0-1+ throughout.  COORDINATION-Normal  FNF -L  GAIT-NA    CBC Full  -  ( 21 Nov 2019 06:32 )  WBC Count : 6.98 K/uL  RBC Count : 3.37 M/uL  Hemoglobin : 10.7 g/dL  Hematocrit : 33.1 %  Platelet Count - Automated : 162 K/uL  Mean Cell Volume : 98.2 fl  Mean Cell Hemoglobin : 31.8 pg  Mean Cell Hemoglobin Concentration : 32.3 gm/dL  Auto Neutrophil # : x  Auto Lymphocyte # : x  Auto Monocyte # : x  Auto Eosinophil # : x  Auto Basophil # : x  Auto Neutrophil % : x  Auto Lymphocyte % : x  Auto Monocyte % : x  Auto Eosinophil % : x  Auto Basophil % : x      11-21    143  |  104  |  13  ----------------------------<  154<H>  3.4<L>   |  21<L>  |  0.80    Ca    8.7      21 Nov 2019 06:32    TPro  6.5  /  Alb  3.8  /  TBili  0.2  /  DBili  x   /  AST  13  /  ALT  12  /  AlkPhos  83  11-20    LIVER FUNCTIONS - ( 20 Nov 2019 06:21 )  Alb: 3.8 g/dL / Pro: 6.5 g/dL / ALK PHOS: 83 U/L / ALT: 12 U/L / AST: 13 U/L / GGT: x

## 2019-11-22 ENCOUNTER — TRANSCRIPTION ENCOUNTER (OUTPATIENT)
Age: 84
End: 2019-11-22

## 2019-11-22 LAB
ANION GAP SERPL CALC-SCNC: 15 MMOL/L — SIGNIFICANT CHANGE UP (ref 5–17)
BUN SERPL-MCNC: 13 MG/DL — SIGNIFICANT CHANGE UP (ref 7–23)
CALCIUM SERPL-MCNC: 9.1 MG/DL — SIGNIFICANT CHANGE UP (ref 8.4–10.5)
CHLORIDE SERPL-SCNC: 101 MMOL/L — SIGNIFICANT CHANGE UP (ref 96–108)
CO2 SERPL-SCNC: 27 MMOL/L — SIGNIFICANT CHANGE UP (ref 22–31)
CREAT SERPL-MCNC: 0.77 MG/DL — SIGNIFICANT CHANGE UP (ref 0.5–1.3)
GLUCOSE SERPL-MCNC: 156 MG/DL — HIGH (ref 70–99)
HCT VFR BLD CALC: 35 % — SIGNIFICANT CHANGE UP (ref 34.5–45)
HGB BLD-MCNC: 11.3 G/DL — LOW (ref 11.5–15.5)
MCHC RBC-ENTMCNC: 31.3 PG — SIGNIFICANT CHANGE UP (ref 27–34)
MCHC RBC-ENTMCNC: 32.3 GM/DL — SIGNIFICANT CHANGE UP (ref 32–36)
MCV RBC AUTO: 97 FL — SIGNIFICANT CHANGE UP (ref 80–100)
NRBC # BLD: 0 /100 WBCS — SIGNIFICANT CHANGE UP (ref 0–0)
PLATELET # BLD AUTO: 174 K/UL — SIGNIFICANT CHANGE UP (ref 150–400)
POTASSIUM SERPL-MCNC: 3.6 MMOL/L — SIGNIFICANT CHANGE UP (ref 3.5–5.3)
POTASSIUM SERPL-SCNC: 3.6 MMOL/L — SIGNIFICANT CHANGE UP (ref 3.5–5.3)
RBC # BLD: 3.61 M/UL — LOW (ref 3.8–5.2)
RBC # FLD: 13.8 % — SIGNIFICANT CHANGE UP (ref 10.3–14.5)
SODIUM SERPL-SCNC: 143 MMOL/L — SIGNIFICANT CHANGE UP (ref 135–145)
WBC # BLD: 7.13 K/UL — SIGNIFICANT CHANGE UP (ref 3.8–10.5)
WBC # FLD AUTO: 7.13 K/UL — SIGNIFICANT CHANGE UP (ref 3.8–10.5)

## 2019-11-22 PROCEDURE — 70551 MRI BRAIN STEM W/O DYE: CPT | Mod: 26

## 2019-11-22 PROCEDURE — 93306 TTE W/DOPPLER COMPLETE: CPT | Mod: 26

## 2019-11-22 RX ORDER — SODIUM CHLORIDE 9 MG/ML
1000 INJECTION INTRAMUSCULAR; INTRAVENOUS; SUBCUTANEOUS
Refills: 0 | Status: DISCONTINUED | OUTPATIENT
Start: 2019-11-22 | End: 2019-11-24

## 2019-11-22 RX ADMIN — CARVEDILOL PHOSPHATE 25 MILLIGRAM(S): 80 CAPSULE, EXTENDED RELEASE ORAL at 08:14

## 2019-11-22 RX ADMIN — Medication 0.25 MILLIGRAM(S): at 08:13

## 2019-11-22 RX ADMIN — Medication 0.25 MILLIGRAM(S): at 04:48

## 2019-11-22 RX ADMIN — CARVEDILOL PHOSPHATE 25 MILLIGRAM(S): 80 CAPSULE, EXTENDED RELEASE ORAL at 22:01

## 2019-11-22 RX ADMIN — Medication 81 MILLIGRAM(S): at 12:50

## 2019-11-22 RX ADMIN — AMLODIPINE BESYLATE 5 MILLIGRAM(S): 2.5 TABLET ORAL at 08:15

## 2019-11-22 RX ADMIN — Medication 50 MICROGRAM(S): at 08:15

## 2019-11-22 RX ADMIN — SODIUM CHLORIDE 100 MILLILITER(S): 9 INJECTION INTRAMUSCULAR; INTRAVENOUS; SUBCUTANEOUS at 09:19

## 2019-11-22 RX ADMIN — ATORVASTATIN CALCIUM 40 MILLIGRAM(S): 80 TABLET, FILM COATED ORAL at 22:02

## 2019-11-22 RX ADMIN — PANTOPRAZOLE SODIUM 40 MILLIGRAM(S): 20 TABLET, DELAYED RELEASE ORAL at 08:16

## 2019-11-22 RX ADMIN — QUETIAPINE FUMARATE 25 MILLIGRAM(S): 200 TABLET, FILM COATED ORAL at 00:17

## 2019-11-22 RX ADMIN — Medication 0.2 MILLIGRAM(S): at 08:14

## 2019-11-22 RX ADMIN — CLOPIDOGREL BISULFATE 75 MILLIGRAM(S): 75 TABLET, FILM COATED ORAL at 12:50

## 2019-11-22 RX ADMIN — ENOXAPARIN SODIUM 40 MILLIGRAM(S): 100 INJECTION SUBCUTANEOUS at 12:50

## 2019-11-22 NOTE — PHYSICAL THERAPY INITIAL EVALUATION ADULT - ADDITIONAL COMMENTS
(continuation of H&P) CT Cervical Spine: No CT evidence of acute cervical spine fracture or traumatic malalignment.

## 2019-11-22 NOTE — PROGRESS NOTE ADULT - ASSESSMENT
The patient is a 89y Female with a history of HT, HLD, strokes and multiple known intracranial stenosis obstruction- found on floor 3 am w new facial weakness now worsened w arm/leg weakness  d/w Dr kamara and HS issues below  further w/u and tx as per team-but likely as below  DAPT  high dose statin  freq neuro chks  ?NPO/SS eval  IV hydration optimize intravascular volume  ?w/u Cardiac source  mobilize  PT/OT

## 2019-11-22 NOTE — PROGRESS NOTE ADULT - SUBJECTIVE AND OBJECTIVE BOX
Neurology Progress Note      the patient's symptoms    are  worse?    MEDICATIONS  (STANDING):  amLODIPine   Tablet 5 milliGRAM(s) Oral daily  aspirin enteric coated 81 milliGRAM(s) Oral daily  atorvastatin 40 milliGRAM(s) Oral at bedtime  carvedilol 25 milliGRAM(s) Oral every 12 hours  cloNIDine 0.2 milliGRAM(s) Oral daily  clopidogrel Tablet 75 milliGRAM(s) Oral daily  enoxaparin Injectable 40 milliGRAM(s) SubCutaneous daily  levothyroxine 50 MICROGram(s) Oral daily  pantoprazole    Tablet 40 milliGRAM(s) Oral before breakfast  sodium chloride 0.9%. 1000 milliLiter(s) (100 mL/Hr) IV Continuous <Continuous>    MEDICATIONS  (PRN):  ALPRAZolam 0.25 milliGRAM(s) Oral daily PRN anxiety  QUEtiapine 25 milliGRAM(s) Oral at bedtime PRN agitation              Vital Signs Last 24 Hrs  T(C): 36.4 (22 Nov 2019 08:16), Max: 36.6 (21 Nov 2019 20:47)  T(F): 97.5 (22 Nov 2019 08:16), Max: 97.9 (21 Nov 2019 20:47)  HR: 73 (22 Nov 2019 08:16) (66 - 82)  BP: 187/73 (22 Nov 2019 08:16) (124/69 - 187/73)  BP(mean): --  RR: 18 (22 Nov 2019 08:16) (18 - 18)  SpO2: 91% (22 Nov 2019 08:16) (91% - 97%)    Physical exam  MENTAL STATUS- awake attends fair, fluent,  dysarthric, follows commands,   CRANIAL NERVES-II inconsistent Visual Fields. III-IV-VI EOMI & Pupils - Bilateral - Normal Bilaterally. V Trigeminal - Bilateral - Normal bilateral facial sensation and jaw movement. VII R UMN pattern weakfacial movement. VIII Acoustic - Bilateral - Hearing normal to voice bilaterally. IX Glossopharyngeal / X Vagus - Normal palate elevates symmetrically. XI Accessory - Normal Bilaterally. XII Hypoglossal - Tongue protrudes midline and moves symmetrically.  MOTOR-Bulk and Contour - Normal. Tone R arm 3-4/5, R leg prox 2-3/5  other grossly full  SENSORY-Normal - LT, DSS   REFLEXES- DTR's 0-1+ throughout.  COORDINATION-Normal  FNF -L  GAIT-NA    CBC Full  -  ( 22 Nov 2019 07:05 )  WBC Count : 7.13 K/uL  RBC Count : 3.61 M/uL  Hemoglobin : 11.3 g/dL  Hematocrit : 35.0 %  Platelet Count - Automated : 174 K/uL  Mean Cell Volume : 97.0 fl  Mean Cell Hemoglobin : 31.3 pg  Mean Cell Hemoglobin Concentration : 32.3 gm/dL  Auto Neutrophil # : x  Auto Lymphocyte # : x  Auto Monocyte # : x  Auto Eosinophil # : x  Auto Basophil # : x  Auto Neutrophil % : x  Auto Lymphocyte % : x  Auto Monocyte % : x  Auto Eosinophil % : x  Auto Basophil % : x      11-22    143  |  101  |  13  ----------------------------<  156<H>  3.6   |  27  |  0.77    Ca    9.1      22 Nov 2019 07:09            CT -f/u- no change

## 2019-11-22 NOTE — OCCUPATIONAL THERAPY INITIAL EVALUATION ADULT - ADL RETRAINING, OT EVAL
GOAL: Patient will require MOD A with UB dressing within 4 weeks GOAL: Patient will perform grooming while seated with set-up assistance in 4 weeks

## 2019-11-22 NOTE — OCCUPATIONAL THERAPY INITIAL EVALUATION ADULT - PHYSICAL ASSIST/NONPHYSICAL ASSIST: SIT/STAND, REHAB EVAL
Detail Level: Detailed
Quality 111:Pneumonia Vaccination Status For Older Adults: Pneumococcal Vaccination Previously Received
verbal cues/1 person assist

## 2019-11-22 NOTE — DISCHARGE NOTE PROVIDER - PROVIDER TOKENS
PROVIDER:[TOKEN:[[id number]]],PROVIDER:[TOKEN:[[id number]]] PROVIDER:[TOKEN:[[id number]]],PROVIDER:[TOKEN:[[id number]]],PROVIDER:[TOKEN:[[id number]],FOLLOWUP:[2 weeks]]

## 2019-11-22 NOTE — OCCUPATIONAL THERAPY INITIAL EVALUATION ADULT - IMPAIRED TRANSFERS: BED/CHAIR, REHAB EVAL
impaired coordination/decreased strength/impaired balance/cognition/impaired motor control/impaired postural control

## 2019-11-22 NOTE — OCCUPATIONAL THERAPY INITIAL EVALUATION ADULT - BED MOBILITY TRAINING, PT EVAL
GOAL: Patient will perform bed mobility with MOD A in 4 weeks GOAL: Patient will perform bed mobility with supervision in 4 weeks

## 2019-11-22 NOTE — DISCHARGE NOTE PROVIDER - CARE PROVIDERS DIRECT ADDRESSES
,[email or web address]_Unknown ,[email or web address],DirectAddress_Unknown,[email or web address]

## 2019-11-22 NOTE — PROGRESS NOTE ADULT - SUBJECTIVE AND OBJECTIVE BOX
S: No new complaints, Denies chest pain or shortness of breath.   Review of systems otherwise (-)      MEDICATIONS  (STANDING):  amLODIPine   Tablet 5 milliGRAM(s) Oral daily  aspirin enteric coated 81 milliGRAM(s) Oral daily  atorvastatin 40 milliGRAM(s) Oral at bedtime  carvedilol 25 milliGRAM(s) Oral every 12 hours  cloNIDine 0.2 milliGRAM(s) Oral daily  clopidogrel Tablet 75 milliGRAM(s) Oral daily  enoxaparin Injectable 40 milliGRAM(s) SubCutaneous daily  levothyroxine 50 MICROGram(s) Oral daily  pantoprazole    Tablet 40 milliGRAM(s) Oral before breakfast  sodium chloride 0.9%. 1000 milliLiter(s) (100 mL/Hr) IV Continuous <Continuous>    MEDICATIONS  (PRN):  ALPRAZolam 0.25 milliGRAM(s) Oral daily PRN anxiety  QUEtiapine 25 milliGRAM(s) Oral at bedtime PRN agitation      LABS:                            11.3   7.13  )-----------( 174      ( 22 Nov 2019 07:05 )             35.0     Hemoglobin: 11.3 g/dL (11-22 @ 07:05)  Hemoglobin: 10.7 g/dL (11-21 @ 06:32)  Hemoglobin: 11.8 g/dL (11-20 @ 06:21)    11-22    143  |  101  |  13  ----------------------------<  156<H>  3.6   |  27  |  0.77    Ca    9.1      22 Nov 2019 07:09      Creatinine Trend: 0.77<--, 0.80<--, 0.81<--             11-21-19 @ 07:01  -  11-22-19 @ 07:00  --------------------------------------------------------  IN: 295 mL / OUT: 200 mL / NET: 95 mL    11-22-19 @ 07:01  -  11-22-19 @ 13:29  --------------------------------------------------------  IN: 120 mL / OUT: 0 mL / NET: 120 mL        PHYSICAL EXAM  Vital Signs Last 24 Hrs  T(C): 36.4 (22 Nov 2019 08:16), Max: 36.6 (21 Nov 2019 20:47)  T(F): 97.5 (22 Nov 2019 08:16), Max: 97.9 (21 Nov 2019 20:47)  HR: 58 (22 Nov 2019 10:36) (58 - 82)  BP: 101/55 (22 Nov 2019 10:36) (101/55 - 187/73)  BP(mean): --  RR: 18 (22 Nov 2019 08:16) (18 - 18)  SpO2: 96% (22 Nov 2019 10:36) (91% - 97%)      Gen: NAD  HEENT:  (-)icterus (-)pallor  CV: N S1 S2 1/6 JYOTSNA (+)2 Pulses B/l  Resp:  Clear to auscultation B/L, normal effort  GI: (+) BS Soft, NT, ND  Lymph:  (-)Edema, (-)obvious lymphadenopathy  Skin: Warm to touch, Normal turgor  Psych: Appropriate mood and affect      TELEMETRY: SR 60s      ECG: Sinus Rhythm with 1st degree AV block 	    Echo: Pending    < from: Transthoracic Echocardiogram (12.06.17 @ 09:57) >  Conclusions:  1. Normal left ventricular internal dimensions and wall  thicknesses. Basal septal hypertrophy to 1.4cm.  2. Normal left ventricular systolic function. No segmental  wall motion abnormalities.  3. Mild diastolic dysfunction (Stage I).  4. Normal right ventricular size and function.  5. Agitated saline injection demonstrates no evidence of a  patent foramen ovale.  *** Compared with echocardiogram of 4/30/2014, no  significant changes noted.    < end of copied text >    ASSESSMENT/PLAN: 89 y/o female with PMHx of HTN, HLD, hx CVA, hypothyroidism presented to ED s/p fall and with right facial droop being evaluated for stroke.     - No evidence of clinical HF or anginal symptoms  - Monitor telemetry - remains in NSR  - Check TTE to r/o structural abnormalities  - Neurology follow up  - If inpatient tele and echo unremarkable, then recommend outpatient event monitoring     Tommy Green PA-C  [facility]  Pager: [phone]

## 2019-11-22 NOTE — PHYSICAL THERAPY INITIAL EVALUATION ADULT - PERTINENT HX OF CURRENT PROBLEM, REHAB EVAL
89F BIBEMS from [facility]. Pt oriented to person and place but not time. Pt has no complaints. Can state she "fell". No HA, CP, SOB, Abd pain. Pt noted to have right FACIAL DROP 89F BIBEMS from [facility]. Pt oriented to person and place but not time. Pt has no complaints. Can state she "fell". No HA, CP, SOB, Abd pain. Pt noted to have R FACIAL DROP. CT Head:  No CT evidence of acute ICH, extra-axial collection, mass effect or calvarial fracture. Age-indeterminate infarct in the posterior aspect of the L lentiform nucleus is new from 12/03/2017.

## 2019-11-22 NOTE — DISCHARGE NOTE PROVIDER - NSDCCPGOAL_GEN_ALL_CORE_FT
To get better and follow your care plan as instructed. Continue aspirin 81 mg daily. Take plavix 75 mg daily x 3 weeks then stop. Follow-up outpatient with Dr. Tomlinson and with endocrinology. Continue aspirin 81 mg daily as well as plavix 75 mg. Follow-up outpatient with Dr. Tomlinson and with endocrinology.

## 2019-11-22 NOTE — OCCUPATIONAL THERAPY INITIAL EVALUATION ADULT - TRANSFER TRAINING, PT EVAL
GOAL: Patient will require MOD A with functional transfers in 4 weeks GOAL: Patient will require CGA with functional transfers using RW in 4 weeks

## 2019-11-22 NOTE — OCCUPATIONAL THERAPY INITIAL EVALUATION ADULT - BALANCE TRAINING, PT EVAL
GOAL: Pt will increase static sitting balance by 1/2 grade to facilitate ability to perform ADLs and functional mobility within 4 weeks GOAL: Pt will increase dynamic standing balance by 1/2 grade to facilitate ability to perform ADLs and functional mobility within 4 weeks

## 2019-11-22 NOTE — PROGRESS NOTE ADULT - ASSESSMENT
89 year old woman resident of assisted living facility at baseline conversive ambulating with rolling walker. 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 On 11/20/19 developed R sided facial weakness on exam today worsening to R sided weakness.    Impression: Presumed small vessel infarct vs related to intracranial atherosclerotic vs cardioembolism.     NEURO: Neurologically without acute change, Continue monitoring for neurologic deterioration, permissive HTN, LDL 60- continue on home statin, MRI deferred as would not . Physical therapy/OT pending.    ANTITHROMBOTIC THERAPY: Aspirin and Plavix in setting of intracranial stenosis    PULMONARY: CXR Right basilar basilar reflecting atelectasis versus pneumonia, protecting airway, saturating well     CARDIOVASCULAR: check TTE, cardiac monitoring without reported events                              SBP goal: Permissive HTN to gradual normotension     GASTROINTESTINAL:  dysphagia screen passed     Diet: Pureed    RENAL: BUN/Cr ?, good urine output      Na Goal: Greater than 135     Champion: No    HEMATOLOGY: H/H ?, Platelets ?     DVT ppx: LMWH     ID: afebrile, no leukocytosis ?    OTHER: Plan endorsed to patient at bedside. Previously discussed with patients proxy at bedside (Dr. Parker).    DISPOSITION: Rehab or home depending on PT eval once stable and workup is complete    CORE MEASURES:        Admission NIHSS:      TPA: NO      LDL/HDL: 60/44     Depression Screen: p     Statin Therapy: yes     Dysphagia Screen: PASS     Smoking NO      Afib NO     Stroke Education YES    Obtain screening lower extremity venous ultrasound in patients who meet 1 or more of the following criteria as patient is high risk for DVT/PE on admission:   [] History of DVT/PE  []Hypercoagulable states (Factor V Leiden, Cancer, OCP, etc. )  []Prolonged immobility (hemiplegia/hemiparesis/post operative or any other extended immobilization)  [] Transferred from outside facility (Rehab or Long term care)  [] Age </= to 50 89 year old woman resident of assisted living facility at baseline conversive ambulating with rolling walker. 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 On 11/20/19 developed R sided facial weakness on exam today worsening to R sided weakness.    Impression: Presumed small vessel infarct vs related to intracranial atherosclerotic vs cardioembolism.     NEURO: Neurologically without acute change, Continue monitoring for neurologic deterioration, permissive HTN, LDL 60- continue on home statin, MRI pending. Physical therapy/OT pending.    ANTITHROMBOTIC THERAPY: Aspirin and Plavix in setting of intracranial stenosis    PULMONARY: CXR Right basilar basilar reflecting atelectasis versus pneumonia, protecting airway, saturating well     CARDIOVASCULAR: check TTE, cardiac monitoring without reported events                              SBP goal: Permissive HTN to gradual normotension     GASTROINTESTINAL:  dysphagia screen passed     Diet: Pureed    RENAL: BUN/Cr without acute change, good urine output, on IVF hydration     Na Goal: Greater than 135     Champion: No    HEMATOLOGY: H/H without acute change, Platelets 174     DVT ppx: LMWH     ID: afebrile, no leukocytosis    OTHER: Plan endorsed to patient at bedside. Previously discussed with patients proxy at bedside (Dr. Parker) and patients private neurologist, Dr. Tomlinson.     DISPOSITION: Rehab or home depending on PT eval once stable and workup is complete    CORE MEASURES:        Admission NIHSS:      TPA: NO      LDL/HDL: 60/44     Depression Screen: p     Statin Therapy: yes     Dysphagia Screen: PASS     Smoking NO      Afib NO     Stroke Education YES    Obtain screening lower extremity venous ultrasound in patients who meet 1 or more of the following criteria as patient is high risk for DVT/PE on admission:   [] History of DVT/PE  []Hypercoagulable states (Factor V Leiden, Cancer, OCP, etc. )  []Prolonged immobility (hemiplegia/hemiparesis/post operative or any other extended immobilization)  [] Transferred from outside facility (Rehab or Long term care)  [] Age </= to 50 89 year old woman resident of assisted living facility at baseline conversive ambulating with rolling walker. 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 On 11/20/19 developed R sided facial weakness on exam today worsening to R sided weakness.    Impression: Presumed small vessel infarct vs related to intracranial atherosclerotic vs cardioembolism.     NEURO: Neurologically with some worsening - likely related to small vessel infarct worsening. Will resume IV hydration. Continue monitoring for neurologic deterioration, permissive HTN, LDL 60- continue on home statin, MRI pending. Physical therapy/OT pending.    ANTITHROMBOTIC THERAPY: Aspirin and Plavix in setting of intracranial stenosis    PULMONARY: CXR Right basilar basilar reflecting atelectasis versus pneumonia, protecting airway, saturating well     CARDIOVASCULAR: check TTE, cardiac monitoring without reported events                              SBP goal: Permissive HTN to gradual normotension     GASTROINTESTINAL:  dysphagia screen passed     Diet: Pureed    RENAL: BUN/Cr without acute change, good urine output, on IVF hydration     Na Goal: Greater than 135     Champion: No    HEMATOLOGY: H/H without acute change, Platelets 174     DVT ppx: LMWH     ID: afebrile, no leukocytosis    OTHER: Plan endorsed to patient at bedside. Previously discussed with patients proxy at bedside (Dr. Parker) and patients private neurologist, Dr. Tomlinson.     DISPOSITION: Rehab or home depending on PT eval once stable and workup is complete    CORE MEASURES:        Admission NIHSS:      TPA: NO      LDL/HDL: 60/44     Depression Screen: p     Statin Therapy: yes     Dysphagia Screen: PASS     Smoking NO      Afib NO     Stroke Education YES    Obtain screening lower extremity venous ultrasound in patients who meet 1 or more of the following criteria as patient is high risk for DVT/PE on admission:   [] History of DVT/PE  []Hypercoagulable states (Factor V Leiden, Cancer, OCP, etc. )  []Prolonged immobility (hemiplegia/hemiparesis/post operative or any other extended immobilization)  [] Transferred from outside facility (Rehab or Long term care)  [] Age </= to 50

## 2019-11-22 NOTE — OCCUPATIONAL THERAPY INITIAL EVALUATION ADULT - RANGE OF MOTION EXAMINATION, UPPER EXTREMITY
bilateral UE Passive ROM was WFL  (within functional limits) bilateral UE Active ROM was WFL  (within functional limits)

## 2019-11-22 NOTE — OCCUPATIONAL THERAPY INITIAL EVALUATION ADULT - IMPAIRMENTS CONTRIBUTING IMPAIRED BED MOBILITY, REHAB EVAL
impaired motor control/impaired postural control/impaired coordination/impaired balance/decreased strength/cognition

## 2019-11-22 NOTE — PHYSICAL THERAPY INITIAL EVALUATION ADULT - RANGE OF MOTION EXAMINATION, REHAB EVAL
right upper shld flexion 0-10 , left upper shld flexion 0-100 , right lower hipand knee flexion 0-40, left hip andnknee flexion 0-60

## 2019-11-22 NOTE — DISCHARGE NOTE PROVIDER - NSDCMRMEDTOKEN_GEN_ALL_CORE_FT
acetaminophen 325 mg oral tablet: 2 tab(s) orally 2 times a day, As Needed - 3)  amLODIPine 5 mg oral tablet: 1 tab(s) orally once a day  aspirin 81 mg oral delayed release tablet: 1 tab(s) orally once a day  atorvastatin 40 mg oral tablet: 1 tab(s) orally once a day (at bedtime)  carvedilol 25 mg oral tablet: 1 tab(s) orally every 12 hours  clonidine 0.2 mg oral tablet: 1 tab(s) orally once a day  clopidogrel 75 mg oral tablet: 1 tab(s) orally once a day  Colace 100 mg oral capsule: 3 cap(s) orally once a day (at bedtime)  folic acid 1 mg oral tablet: 1 tab(s) orally once a day  Multiple Vitamins oral tablet: 1 tab(s) orally once a day  Protonix 40 mg oral delayed release tablet: 1 tab(s) orally once a day  senna 8.6 mg oral tablet: 2 tab(s) orally once a day (at bedtime)  Synthroid 50 mcg (0.05 mg) oral tablet: 1 tab(s) orally once a day  Vitamin D3 1000 intl units oral tablet: 1 tab(s) orally once a day  Xanax 0.25 mg oral tablet: 1 tab(s) orally once a day, As Needed MDD:1 acetaminophen 325 mg oral tablet: 2 tab(s) orally 2 times a day, As Needed - 3)  amLODIPine 5 mg oral tablet: 1 tab(s) orally once a day  aspirin 81 mg oral delayed release tablet: 1 tab(s) orally once a day  atorvastatin 40 mg oral tablet: 1 tab(s) orally once a day (at bedtime)  carvedilol 25 mg oral tablet: 1 tab(s) orally every 12 hours  clonidine 0.2 mg oral tablet: 1 tab(s) orally once a day  clopidogrel 75 mg oral tablet: 1 tab(s) orally once a day. Stop after Dec 11th  Colace 100 mg oral capsule: 3 cap(s) orally once a day (at bedtime)  folic acid 1 mg oral tablet: 1 tab(s) orally once a day  Multiple Vitamins oral tablet: 1 tab(s) orally once a day  Protonix 40 mg oral delayed release tablet: 1 tab(s) orally once a day  QUEtiapine 25 mg oral tablet: 1 tab(s) orally once a day (at bedtime), As needed, agitation  senna 8.6 mg oral tablet: 2 tab(s) orally once a day (at bedtime)  Synthroid 50 mcg (0.05 mg) oral tablet: 1 tab(s) orally once a day  Vitamin D3 1000 intl units oral tablet: 1 tab(s) orally once a day  Xanax 0.25 mg oral tablet: 1 tab(s) orally once a day, As Needed MDD:1 acetaminophen 325 mg oral tablet: 2 tab(s) orally 2 times a day, As Needed - 3)  amLODIPine 5 mg oral tablet: 1 tab(s) orally once a day  aspirin 81 mg oral delayed release tablet: 1 tab(s) orally once a day  atorvastatin 40 mg oral tablet: 1 tab(s) orally once a day (at bedtime)  carvedilol 25 mg oral tablet: 1 tab(s) orally every 12 hours  clonidine 0.2 mg oral tablet: 1 tab(s) orally once a day  clopidogrel 75 mg oral tablet: 1 tab(s) orally once a day  Colace 100 mg oral capsule: 3 cap(s) orally once a day (at bedtime)  folic acid 1 mg oral tablet: 1 tab(s) orally once a day  Multiple Vitamins oral tablet: 1 tab(s) orally once a day  Protonix 40 mg oral delayed release tablet: 1 tab(s) orally once a day  Pureed diet : Pureed diet   senna 8.6 mg oral tablet: 2 tab(s) orally once a day (at bedtime)  Synthroid 50 mcg (0.05 mg) oral tablet: 1 tab(s) orally once a day  Vitamin D3 1000 intl units oral tablet: 1 tab(s) orally once a day  Xanax 0.25 mg oral tablet: 1 tab(s) orally once a day, As Needed MDD:1

## 2019-11-22 NOTE — OCCUPATIONAL THERAPY INITIAL EVALUATION ADULT - LIVES WITH, PROFILE
Per chart review, pt lives in an assisted living facility. Unable to obtain history from pt & care coordination has not yet spoken to family. Will follow-up for thorough prior level of function history

## 2019-11-22 NOTE — OCCUPATIONAL THERAPY INITIAL EVALUATION ADULT - ADDITIONAL COMMENTS
CONTINUED. CTA NECK 11/20: Short segment moderate stenosis of the L vertebral artery at the C2-C3 level in the foramina transversarium. Poor flow related enhancement of the distal V2 and entire V3 segment of the left vertebral artery. Mild (approximately 50% by NASCET criteria) short segment stenosis of the proximal right internal carotid artery due to partially calcified plaque. No evidence for arterial dissection. CTA BRAIN 11/20: Lack of flow related enhancement of the proximal intradural left vertebral artery. Normal flow-related enhancement of the intradural left vertebral artery distal to the origin of the left PICA.Moderate to severe short segment stenosis of the right vertebral artery just proximal to the foramen magnum. Mild short segment stenosis of the proximal right intradural vertebral artery. No AVM. No vascular aneurysm.

## 2019-11-22 NOTE — PROGRESS NOTE ADULT - SUBJECTIVE AND OBJECTIVE BOX
THE PATIENT WAS SEEN AND EXAMINED BY ME WITH THE HOUSESTAFF AND STROKE TEAM DURING MORNING ROUNDS.   HPI:    89 year old woman resident of assisted living facility at baseline conversive ambulating with rolling walker. 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 On 11/20/19 developed R sided facial weakness on exam today worsening to R sided weakness.    SUBJECTIVE: No events overnight.  No new neurologic complaints.      ALPRAZolam 0.25 milliGRAM(s) Oral daily PRN  amLODIPine   Tablet 5 milliGRAM(s) Oral daily  aspirin enteric coated 81 milliGRAM(s) Oral daily  atorvastatin 40 milliGRAM(s) Oral at bedtime  carvedilol 25 milliGRAM(s) Oral every 12 hours  cloNIDine 0.2 milliGRAM(s) Oral daily  clopidogrel Tablet 75 milliGRAM(s) Oral daily  enoxaparin Injectable 40 milliGRAM(s) SubCutaneous daily  levothyroxine 50 MICROGram(s) Oral daily  pantoprazole    Tablet 40 milliGRAM(s) Oral before breakfast  QUEtiapine 25 milliGRAM(s) Oral at bedtime PRN      PHYSICAL EXAM:   Vital Signs Last 24 Hrs  T(C): 36.4 (22 Nov 2019 05:26), Max: 36.6 (21 Nov 2019 20:47)  T(F): 97.5 (22 Nov 2019 05:26), Max: 97.9 (21 Nov 2019 20:47)  HR: 82 (22 Nov 2019 05:26) (66 - 82)  BP: 180/63 (22 Nov 2019 05:26) (124/69 - 181/70)  RR: 18 (22 Nov 2019 05:26) (18 - 18)  SpO2: 91% (22 Nov 2019 05:26) (91% - 97%)    General: No acute distress  HEENT: EOM intact, visual fields full  Abdomen: Soft, nontender, nondistended   Extremities: No edema    NEUROLOGICAL EXAM:  Mental status: Awake, alert, oriented x3, no neglect, normal memory, follows commands, speech fluent  Cranial Nerves: right facial droop, no nystagmus, no dysarthria,  tongue midline  Motor exam: right sided hemiparesis, left side moves well against gravity  Sensation: Intact to light touch   Coordination/ Gait: No dysmetria, gait deferred    LABS:                        10.7   6.98  )-----------( 162      ( 21 Nov 2019 06:32 )             33.1    11-21    143  |  104  |  13  ----------------------------<  154<H>  3.4<L>   |  21<L>  |  0.80    Ca    8.7      21 Nov 2019 06:32    PT/INR - ( 20 Nov 2019 09:11 )   PT: 11.3 sec;   INR: 0.98 ratio       PTT - ( 20 Nov 2019 09:11 )  PTT:24.2 sec  Hemoglobin A1C, Whole Blood: 8.0 % (11-21 @ 10:45)    IMAGING: Reviewed by me.     CT Head No Cont (11.21.19)  No interval change when compared to the prior CT study from   11/20/2019.    CTA neck w/ IV Cont (11.20.19)  Short segment moderate stenosis of the left vertebral artery at the C2-C3   level in the foramina transversarium. Poor flow related enhancement of   the distal V2 and entire V3 segment of the left vertebral artery.  Mild (approximately 50% by NASCET criteria) short segment stenosis of the   proximal right internal carotid artery due to partially calcified plaque.  No evidence for arterial dissection.    CTA brain w/ IV Cont (11.20.19)  Lack of flow related enhancement of the proximal intradural left   vertebral artery. Normal flow-related enhancement of the intradural left   vertebral artery distal to the origin of the left PICA.  Moderate to severe short segment stenosis of the right vertebral artery   just proximal to the foramen magnum. Mild short segment stenosis of the   proximal right intradural vertebral artery.  No AVM. No vascular aneurysm.    CT Head No Cont (11.20.19)  1.  No CT evidence of acute intracranial hemorrhage, extra-axial   collection, mass effect or calvarial fracture.  2.  Age-indeterminate infarct in the posterior aspect of the left   lentiform nucleus is new from 12/03/2017.  Acute infarct is not excluded.    MRI is recommended for further evaluation.    CT Cervical Spine (11.20.19)  No CT evidence of acute cervical spine fracture or   traumatic malalignment. THE PATIENT WAS SEEN AND EXAMINED BY ME WITH THE HOUSESTAFF AND STROKE TEAM DURING MORNING ROUNDS.   HPI:    89 year old woman resident of assisted living facility at baseline conversive ambulates with rolling walker. Patient with prior history of remote infarcts likely related to intracranial atherosclerotic disease. On 11/20/19 developed R sided facial weakness on exam today worsening to R sided weakness. Transferred to stroke service for further management.     SUBJECTIVE: No events overnight.  No new neurologic complaints.      ALPRAZolam 0.25 milliGRAM(s) Oral daily PRN  amLODIPine   Tablet 5 milliGRAM(s) Oral daily  aspirin enteric coated 81 milliGRAM(s) Oral daily  atorvastatin 40 milliGRAM(s) Oral at bedtime  carvedilol 25 milliGRAM(s) Oral every 12 hours  cloNIDine 0.2 milliGRAM(s) Oral daily  clopidogrel Tablet 75 milliGRAM(s) Oral daily  enoxaparin Injectable 40 milliGRAM(s) SubCutaneous daily  levothyroxine 50 MICROGram(s) Oral daily  pantoprazole    Tablet 40 milliGRAM(s) Oral before breakfast  QUEtiapine 25 milliGRAM(s) Oral at bedtime PRN      PHYSICAL EXAM:   Vital Signs Last 24 Hrs  T(C): 36.4 (22 Nov 2019 05:26), Max: 36.6 (21 Nov 2019 20:47)  T(F): 97.5 (22 Nov 2019 05:26), Max: 97.9 (21 Nov 2019 20:47)  HR: 82 (22 Nov 2019 05:26) (66 - 82)  BP: 180/63 (22 Nov 2019 05:26) (124/69 - 181/70)  RR: 18 (22 Nov 2019 05:26) (18 - 18)  SpO2: 91% (22 Nov 2019 05:26) (91% - 97%)    General: No acute distress  HEENT: EOM intact, visual fields full  Abdomen: Soft, nontender, nondistended   Extremities: No edema    NEUROLOGICAL EXAM:  Mental status: Awake, alert, follows commands, speaks 1-2 words  Cranial Nerves: right facial droop, no nystagmus, no dysarthria,  tongue midline  Motor exam: right sided hemiparesis, left side moves well against gravity without any noticeable weakness  Sensation: Intact to light touch   Coordination/ Gait: No dysmetria, gait deferred    LABS:                        10.7   6.98  )-----------( 162      ( 21 Nov 2019 06:32 )             33.1    11-21    143  |  104  |  13  ----------------------------<  154<H>  3.4<L>   |  21<L>  |  0.80    Ca    8.7      21 Nov 2019 06:32    PT/INR - ( 20 Nov 2019 09:11 )   PT: 11.3 sec;   INR: 0.98 ratio       PTT - ( 20 Nov 2019 09:11 )  PTT:24.2 sec  Hemoglobin A1C, Whole Blood: 8.0 % (11-21 @ 10:45)    IMAGING: Reviewed by me.     CT Head No Cont (11.21.19)  No interval change when compared to the prior CT study from   11/20/2019.    CTA neck w/ IV Cont (11.20.19)  Short segment moderate stenosis of the left vertebral artery at the C2-C3   level in the foramina transversarium. Poor flow related enhancement of   the distal V2 and entire V3 segment of the left vertebral artery.  Mild (approximately 50% by NASCET criteria) short segment stenosis of the   proximal right internal carotid artery due to partially calcified plaque.  No evidence for arterial dissection.    CTA brain w/ IV Cont (11.20.19)  Lack of flow related enhancement of the proximal intradural left   vertebral artery. Normal flow-related enhancement of the intradural left   vertebral artery distal to the origin of the left PICA.  Moderate to severe short segment stenosis of the right vertebral artery   just proximal to the foramen magnum. Mild short segment stenosis of the   proximal right intradural vertebral artery.  No AVM. No vascular aneurysm.    CT Head No Cont (11.20.19)  1.  No CT evidence of acute intracranial hemorrhage, extra-axial   collection, mass effect or calvarial fracture.  2.  Age-indeterminate infarct in the posterior aspect of the left   lentiform nucleus is new from 12/03/2017.  Acute infarct is not excluded.    MRI is recommended for further evaluation.    CT Cervical Spine (11.20.19)  No CT evidence of acute cervical spine fracture or   traumatic malalignment. THE PATIENT WAS SEEN AND EXAMINED BY ME WITH THE HOUSESTAFF AND STROKE TEAM DURING MORNING ROUNDS.   HPI:    89 year old woman resident of assisted living facility at baseline conversive ambulates with rolling walker. Patient with prior history of remote infarcts likely related to intracranial atherosclerotic disease. On 11/20/19 developed R sided facial weakness on exam today worsening to R sided weakness. Transferred to stroke service for further management.     SUBJECTIVE: No events overnight.  No new neurologic complaints.      ALPRAZolam 0.25 milliGRAM(s) Oral daily PRN  amLODIPine   Tablet 5 milliGRAM(s) Oral daily  aspirin enteric coated 81 milliGRAM(s) Oral daily  atorvastatin 40 milliGRAM(s) Oral at bedtime  carvedilol 25 milliGRAM(s) Oral every 12 hours  cloNIDine 0.2 milliGRAM(s) Oral daily  clopidogrel Tablet 75 milliGRAM(s) Oral daily  enoxaparin Injectable 40 milliGRAM(s) SubCutaneous daily  levothyroxine 50 MICROGram(s) Oral daily  pantoprazole    Tablet 40 milliGRAM(s) Oral before breakfast  QUEtiapine 25 milliGRAM(s) Oral at bedtime PRN      PHYSICAL EXAM:   Vital Signs Last 24 Hrs  T(C): 36.4 (22 Nov 2019 05:26), Max: 36.6 (21 Nov 2019 20:47)  T(F): 97.5 (22 Nov 2019 05:26), Max: 97.9 (21 Nov 2019 20:47)  HR: 82 (22 Nov 2019 05:26) (66 - 82)  BP: 180/63 (22 Nov 2019 05:26) (124/69 - 181/70)  RR: 18 (22 Nov 2019 05:26) (18 - 18)  SpO2: 91% (22 Nov 2019 05:26) (91% - 97%)    General: No acute distress  HEENT: EOM intact, visual fields full  Abdomen: Soft, nontender, nondistended   Extremities: No edema    NEUROLOGICAL EXAM:  Mental status: Awake, alert, follows commands, speaks 1-2 words  Cranial Nerves: right facial droop, no nystagmus, no dysarthria,  tongue midline  Motor exam: right side antigravity, left side moves well against gravity without any noticeable weakness  Sensation: Intact to light touch   Coordination/ Gait: No dysmetria, gait deferred    LABS:                        10.7   6.98  )-----------( 162      ( 21 Nov 2019 06:32 )             33.1    11-21    143  |  104  |  13  ----------------------------<  154<H>  3.4<L>   |  21<L>  |  0.80    Ca    8.7      21 Nov 2019 06:32    PT/INR - ( 20 Nov 2019 09:11 )   PT: 11.3 sec;   INR: 0.98 ratio       PTT - ( 20 Nov 2019 09:11 )  PTT:24.2 sec  Hemoglobin A1C, Whole Blood: 8.0 % (11-21 @ 10:45)    IMAGING: Reviewed by me.     CT Head No Cont (11.21.19)  No interval change when compared to the prior CT study from   11/20/2019.    CTA neck w/ IV Cont (11.20.19)  Short segment moderate stenosis of the left vertebral artery at the C2-C3   level in the foramina transversarium. Poor flow related enhancement of   the distal V2 and entire V3 segment of the left vertebral artery.  Mild (approximately 50% by NASCET criteria) short segment stenosis of the   proximal right internal carotid artery due to partially calcified plaque.  No evidence for arterial dissection.    CTA brain w/ IV Cont (11.20.19)  Lack of flow related enhancement of the proximal intradural left   vertebral artery. Normal flow-related enhancement of the intradural left   vertebral artery distal to the origin of the left PICA.  Moderate to severe short segment stenosis of the right vertebral artery   just proximal to the foramen magnum. Mild short segment stenosis of the   proximal right intradural vertebral artery.  No AVM. No vascular aneurysm.    CT Head No Cont (11.20.19)  1.  No CT evidence of acute intracranial hemorrhage, extra-axial   collection, mass effect or calvarial fracture.  2.  Age-indeterminate infarct in the posterior aspect of the left   lentiform nucleus is new from 12/03/2017.  Acute infarct is not excluded.    MRI is recommended for further evaluation.    CT Cervical Spine (11.20.19)  No CT evidence of acute cervical spine fracture or   traumatic malalignment. THE PATIENT WAS SEEN AND EXAMINED BY ME WITH THE HOUSESTAFF AND STROKE TEAM DURING MORNING ROUNDS.   HPI:    89 year old woman resident of assisted living facility at baseline conversive ambulates with rolling walker. Patient with prior history of remote infarcts likely related to intracranial atherosclerotic disease. On 11/20/19 developed R sided facial weakness on exam today worsening to R sided weakness. Transferred to stroke service for further management.     SUBJECTIVE: No events overnight.  No new neurologic complaints.      ALPRAZolam 0.25 milliGRAM(s) Oral daily PRN  amLODIPine   Tablet 5 milliGRAM(s) Oral daily  aspirin enteric coated 81 milliGRAM(s) Oral daily  atorvastatin 40 milliGRAM(s) Oral at bedtime  carvedilol 25 milliGRAM(s) Oral every 12 hours  cloNIDine 0.2 milliGRAM(s) Oral daily  clopidogrel Tablet 75 milliGRAM(s) Oral daily  enoxaparin Injectable 40 milliGRAM(s) SubCutaneous daily  levothyroxine 50 MICROGram(s) Oral daily  pantoprazole    Tablet 40 milliGRAM(s) Oral before breakfast  QUEtiapine 25 milliGRAM(s) Oral at bedtime PRN      PHYSICAL EXAM:   Vital Signs Last 24 Hrs  T(C): 36.4 (22 Nov 2019 05:26), Max: 36.6 (21 Nov 2019 20:47)  T(F): 97.5 (22 Nov 2019 05:26), Max: 97.9 (21 Nov 2019 20:47)  HR: 82 (22 Nov 2019 05:26) (66 - 82)  BP: 180/63 (22 Nov 2019 05:26) (124/69 - 181/70)  RR: 18 (22 Nov 2019 05:26) (18 - 18)  SpO2: 91% (22 Nov 2019 05:26) (91% - 97%)    General: No acute distress  HEENT: EOM intact, visual fields full  Abdomen: Soft, nontender, nondistended   Extremities: No edema    NEUROLOGICAL EXAM:  Mental status: Awake, alert, follows commands, speaks 1-2 words approprite. hypophonic.  Cranial Nerves: right facial droop, no nystagmus, no dysarthria,  tongue midline  Motor exam: right side antigravity 2-3/5, left side moves well against gravity without any noticeable weakness  Sensation: Intact to light touch   Coordination/ Gait: No dysmetria, gait deferred    LABS:                        10.7   6.98  )-----------( 162      ( 21 Nov 2019 06:32 )             33.1    11-21    143  |  104  |  13  ----------------------------<  154<H>  3.4<L>   |  21<L>  |  0.80    Ca    8.7      21 Nov 2019 06:32    PT/INR - ( 20 Nov 2019 09:11 )   PT: 11.3 sec;   INR: 0.98 ratio       PTT - ( 20 Nov 2019 09:11 )  PTT:24.2 sec  Hemoglobin A1C, Whole Blood: 8.0 % (11-21 @ 10:45)    IMAGING: Reviewed by me.     CT Head No Cont (11.21.19)  No interval change when compared to the prior CT study from   11/20/2019.    CTA neck w/ IV Cont (11.20.19)  Short segment moderate stenosis of the left vertebral artery at the C2-C3   level in the foramina transversarium. Poor flow related enhancement of   the distal V2 and entire V3 segment of the left vertebral artery.  Mild (approximately 50% by NASCET criteria) short segment stenosis of the   proximal right internal carotid artery due to partially calcified plaque.  No evidence for arterial dissection.    CTA brain w/ IV Cont (11.20.19)  Lack of flow related enhancement of the proximal intradural left   vertebral artery. Normal flow-related enhancement of the intradural left   vertebral artery distal to the origin of the left PICA.  Moderate to severe short segment stenosis of the right vertebral artery   just proximal to the foramen magnum. Mild short segment stenosis of the   proximal right intradural vertebral artery.  No AVM. No vascular aneurysm.    CT Head No Cont (11.20.19)  1.  No CT evidence of acute intracranial hemorrhage, extra-axial   collection, mass effect or calvarial fracture.  2.  Age-indeterminate infarct in the posterior aspect of the left   lentiform nucleus is new from 12/03/2017.  Acute infarct is not excluded.    MRI is recommended for further evaluation.    CT Cervical Spine (11.20.19)  No CT evidence of acute cervical spine fracture or   traumatic malalignment.

## 2019-11-22 NOTE — DISCHARGE NOTE PROVIDER - CARE PROVIDER_API CALL
Laquita Tomlinson)  Neurology  [address] [facility], [facility] [address]  [place] [address]  Phone: [phone]  Fax: [phone]  Follow Up Time:     Mason Godwin)  Cardiovascular Disease  [address] [facility] [facility] [address]  [place] [address]  Phone: [phone]  Fax: [phone]  Follow Up Time: Laquita Tomlinson)  Neurology  [address] [facility] [address]  [place] [address]  Phone: [phone]  Fax: [phone]  Follow Up Time:     Mason Gowdin)  Cardiovascular Disease  [address] [facility], [facility] [address]  [place] [address]  Phone: [phone]  Fax: [phone]  Follow Up Time:     Kirstie Segundo)  EndocrinologyMetabDiabetes; Internal Medicine  [address] [place] [address], [facility] [address]  [place] [address]  Phone: [phone]  Fax: [phone]  Follow Up Time: 2 weeks

## 2019-11-22 NOTE — PHYSICAL THERAPY INITIAL EVALUATION ADULT - CRITERIA FOR SKILLED THERAPEUTIC INTERVENTIONS
therapy frequency/anticipated discharge recommendation/impairments found/predicted duration of therapy intervention/rehab potential/functional limitations in following categories/risk reduction/prevention

## 2019-11-22 NOTE — PHYSICAL THERAPY INITIAL EVALUATION ADULT - TRANSFER SAFETY CONCERNS NOTED: SIT/STAND, REHAB EVAL
decreased weight-shifting ability/stepping too close to front of assistive device/decreased step length

## 2019-11-22 NOTE — DISCHARGE NOTE PROVIDER - HOSPITAL COURSE
89 y/o RHF with HTN, HLD, hypothyroidism and prior stroke who presented from [facility] who p/w unwitnessed fall and R. facial droop on 11/20/19.  Subsequently, developed R. hemiparesis and fluctuating AMS.  At baseline, she is conversive and ambulates with rolling walker.  She was found to have an age indetermiante L. lentiform nucleus.  She was noted to be volume depleted and optimized with IV hydration and her mental status was not presumed to be a complication of stroke.          Admission NIHSS:2    Discharge NIHSS: 3    Baseline MRS: 3        OBJECTIVE    Vital Signs Last 24 Hrs    T(C): 36.4 (22 Nov 2019 08:16), Max: 36.6 (21 Nov 2019 20:47)    T(F): 97.5 (22 Nov 2019 08:16), Max: 97.9 (21 Nov 2019 20:47)    HR: 63 (22 Nov 2019 09:38) (63 - 82)    BP: 125/66 (22 Nov 2019 09:38) (124/69 - 187/73)    BP(mean): --    RR: 18 (22 Nov 2019 08:16) (18 - 18)    SpO2: 91% (22 Nov 2019 08:16) (91% - 97%)    General: No acute distress    HEENT: EOM intact, visual fields full    Abdomen: Soft, nontender, nondistended     Extremities: No edema    NEUROLOGICAL EXAM:    Mental status: Awake, 1-2 word spontaneous output, appears to comprehend and follow simple commands.     Cranial Nerves: Mild R. nasolabial fold droop, no nystagmus, no dysarthria,  tongue midline    Motor exam: Right sided hemiparesis (RUE 4-/5), otherwise moving extremities spontaneously against gravity.     Sensation: Intact to light touch     Reflexes: 1+ throughout    Coordination/ Gait: No dysmetria, gait deferred                            11.3     7.13  )-----------( 174      ( 22 Nov 2019 07:05 )               35.0         11-22        143  |  101  |  13    ----------------------------<  156<H>    3.6   |  27  |  0.77        Ca    9.1      22 Nov 2019 07:09    11.21.19 @ 10:32) Total Cholesterol/HDL Ratio Measurement: 3.0 RATIO, Cholesterol, Serum: 131 mg/dL, LDL: 60    Hemoglobin A1C 11/21/19 : 8.0        CT Head No Cont (11.21.19)    No interval change when compared to the prior CT study from 11/20/2019.        CTA neck w/ IV Cont (11.20.19)    Short segment moderate stenosis of the left vertebral artery at the C2-C3 level in the foramina transversarium. Poor flow related enhancement of the distal V2 and entire V3 segment of the left vertebral artery.  Mild (approximately 50% by NASCET criteria) short segment stenosis of the proximal right internal carotid artery due to partially calcified plaque.  No evidence for arterial dissection.        CTA brain w/ IV Cont (11.20.19)    Lack of flow related enhancement of the proximal intradural left vertebral artery. Normal flow-related enhancement of the intradural left vertebral artery distal to the origin of the left PICA.  Moderate to severe short segment stenosis of the right vertebral artery just proximal to the foramen magnum. Mild short segment stenosis of the proximal right intradural vertebral artery.  No AVM. No vascular aneurysm.        CT Head No Cont (11.20.19)    1.  No CT evidence of acute intracranial hemorrhage, extra-axial collection, mass effect or calvarial fracture.    2.  Age-indeterminate infarct in the posterior aspect of the left lentiform nucleus is new from 12/03/2017.  Acute infarct is not excluded.     CT Cervical Spine (11.20.19)    No CT evidence of acute cervical spine fracture or traumatic malalignment.        Assessment & Plan     Impression: Acute R. facial droop and mild R. hemiparesis due to acute L. lentiform nucleus infarct likely due to small vessel infarct vs. intracranial atherosclerosis vs. cardioembolism. Plan    Meds:     -Aspirin 81 daily + Plavix 75 daily for 3 months, then Aspirin 81 daily monotherapy therafter.     -Continue home statin (LDL 60).     Tests:    Pending MRI brain w/o contrast.    TTE pending.     Misc/Dispo:    PT recs:    OT recs:    Diet: Pureed    Dispo: Rehab/home depending PT eval.     F/U with Dr. Tomlinson as needed in clinic.     F/U outpatient event monitoring with cardiology if inpatient tele and echo unremarkable. 89 y/o RHF with HTN, HLD, hypothyroidism and prior stroke with multifocal cranial atherosclerosis who presented from [facility] who p/w unwitnessed fall and R. facial droop on 11/20/19.  Subsequently, developed R. hemiparesis and fluctuating AMS.  At baseline, she is conversive and ambulates with rolling walker.  She was found to have an age indeterminate L. lentiform nucleus infarct.  She was noted to be volume depleted and optimized with IV hydration and her mental status was not presumed to be a complication of stroke.          Admission NIHSS:2    Discharge NIHSS: 3    Baseline MRS: 3        OBJECTIVE    Vital Signs Last 24 Hrs    T(C): 36.4 (22 Nov 2019 08:16), Max: 36.6 (21 Nov 2019 20:47)    T(F): 97.5 (22 Nov 2019 08:16), Max: 97.9 (21 Nov 2019 20:47)    HR: 63 (22 Nov 2019 09:38) (63 - 82)    BP: 125/66 (22 Nov 2019 09:38) (124/69 - 187/73)    BP(mean): --    RR: 18 (22 Nov 2019 08:16) (18 - 18)    SpO2: 91% (22 Nov 2019 08:16) (91% - 97%)    General: No acute distress    HEENT: EOM intact, visual fields full    Abdomen: Soft, nontender, nondistended     Extremities: No edema    NEUROLOGICAL EXAM:    Mental status: Awake, 1-2 word spontaneous output, appears to comprehend and follow simple commands.     Cranial Nerves: Mild R. nasolabial fold droop, no nystagmus, no dysarthria,  tongue midline    Motor exam: Right sided hemiparesis (RUE 4-/5), otherwise moving extremities spontaneously against gravity.     Sensation: Intact to light touch     Reflexes: 1+ throughout    Coordination/ Gait: No dysmetria, gait deferred                            11.3     7.13  )-----------( 174      ( 22 Nov 2019 07:05 )               35.0         11-22        143  |  101  |  13    ----------------------------<  156<H>    3.6   |  27  |  0.77        Ca    9.1      22 Nov 2019 07:09    11.21.19 @ 10:32) Total Cholesterol/HDL Ratio Measurement: 3.0 RATIO, Cholesterol, Serum: 131 mg/dL, LDL: 60    Hemoglobin A1C 11/21/19 : 8.0        CT Head No Cont (11.21.19)    No interval change when compared to the prior CT study from 11/20/2019.        CTA neck w/ IV Cont (11.20.19)    Short segment moderate stenosis of the left vertebral artery at the C2-C3 level in the foramina transversarium. Poor flow related enhancement of the distal V2 and entire V3 segment of the left vertebral artery.  Mild (approximately 50% by NASCET criteria) short segment stenosis of the proximal right internal carotid artery due to partially calcified plaque.  No evidence for arterial dissection.        CTA brain w/ IV Cont (11.20.19)    Lack of flow related enhancement of the proximal intradural left vertebral artery. Normal flow-related enhancement of the intradural left vertebral artery distal to the origin of the left PICA.  Moderate to severe short segment stenosis of the right vertebral artery just proximal to the foramen magnum. Mild short segment stenosis of the proximal right intradural vertebral artery.  No AVM. No vascular aneurysm.        CT Head No Cont (11.20.19)    1.  No CT evidence of acute intracranial hemorrhage, extra-axial collection, mass effect or calvarial fracture.    2.  Age-indeterminate infarct in the posterior aspect of the left lentiform nucleus is new from 12/03/2017.  Acute infarct is not excluded.     CT Cervical Spine (11.20.19)    No CT evidence of acute cervical spine fracture or traumatic malalignment.        Assessment & Plan     Impression: Acute R. facial droop and mild R. hemiparesis due to acute L. lentiform nucleus infarct likely due to small vessel infarct vs. intracranial atherosclerosis vs. cardioembolism. Plan    Meds:     -Aspirin 81 daily + Plavix 75 daily for 3 months, then Aspirin 81 daily monotherapy therafter.     -Continue home statin (LDL 60).     Tests:    Pending MRI brain w/o contrast.    TTE pending.     Misc/Dispo:    PT recs:    OT recs:    Diet: Pureed    Dispo: Rehab/home depending PT eval.     F/U with Dr. Tomlinson as needed in clinic.     F/U outpatient event monitoring with cardiology if inpatient tele and echo unremarkable.

## 2019-11-22 NOTE — OCCUPATIONAL THERAPY INITIAL EVALUATION ADULT - RANGE OF MOTION EXAMINATION, LOWER EXTREMITY
bilateral LE Passive ROM was WFL  (within functional limits) bilateral LE Active ROM was WFL  (within functional limits)

## 2019-11-22 NOTE — OCCUPATIONAL THERAPY INITIAL EVALUATION ADULT - DIAGNOSIS, OT EVAL
Pt presents with decreased command following, impaired cognition, decreased ROM, strength, endurance, balance, and coordination, all impacting ability to perform ADLs and functional mobility. Pt presents with impaired cognition, decreased ROM, strength, endurance, balance, and coordination, all impacting ability to perform ADLs and functional mobility.

## 2019-11-22 NOTE — PHYSICAL THERAPY INITIAL EVALUATION ADULT - ACTIVE RANGE OF MOTION EXAMINATION, REHAB EVAL
Left UE Active ROM was WFL (within functional limits)/Left LE Active ROM was WFL (within functional limits)/active assist given on LUE/LE

## 2019-11-22 NOTE — OCCUPATIONAL THERAPY INITIAL EVALUATION ADULT - PERTINENT HX OF CURRENT PROBLEM, REHAB EVAL
89F BIBEMS from [facility] living. Pt oriented to person and place but not time. Pt has no complaints. Can state she "fell". No HA, CP, SOB, Abd pain. Pt noted to have right FACIAL DROP.  CT BRAIN 11/20: Age-indeterminate infarct in the posterior aspect of the L lentiform nucleus is new from 12/03/2017. Acute infarct is not excluded. MRI is recommended for further evaluation. SEE BELOW.

## 2019-11-22 NOTE — DISCHARGE NOTE PROVIDER - NSDCCPCAREPLAN_GEN_ALL_CORE_FT
PRINCIPAL DISCHARGE DIAGNOSIS  Diagnosis: Cerebrovascular accident (CVA), unspecified mechanism  Assessment and Plan of Treatment: Please follow up with neurologist in 1 week. Continue taking medications as prescribed. If you were prescribed a statin for your cholesterol please make sure you have your liver enzymes checked with your primary care physician. Monitor your blood pressure. Reduce fat, cholesterol and salt in your diet. Increase intake of fruits and vegetables. Limit alcohol to minimum and do not smoke. You may be at risk for falling, make changes to your home to help you walk easier. Keep up to date on vaccinations.  If you experience any symptoms of facial drooping, slurred speech, arm or leg weakness, severe headache, vision changes or any worsening symptoms, notify provider immediately and return to ER.

## 2019-11-23 LAB
HCT VFR BLD CALC: 38 % — SIGNIFICANT CHANGE UP (ref 34.5–45)
HGB BLD-MCNC: 12.1 G/DL — SIGNIFICANT CHANGE UP (ref 11.5–15.5)
MCHC RBC-ENTMCNC: 31.4 PG — SIGNIFICANT CHANGE UP (ref 27–34)
MCHC RBC-ENTMCNC: 31.8 GM/DL — LOW (ref 32–36)
MCV RBC AUTO: 98.7 FL — SIGNIFICANT CHANGE UP (ref 80–100)
NRBC # BLD: 0 /100 WBCS — SIGNIFICANT CHANGE UP (ref 0–0)
PLATELET # BLD AUTO: 188 K/UL — SIGNIFICANT CHANGE UP (ref 150–400)
RBC # BLD: 3.85 M/UL — SIGNIFICANT CHANGE UP (ref 3.8–5.2)
RBC # FLD: 13.3 % — SIGNIFICANT CHANGE UP (ref 10.3–14.5)
WBC # BLD: 10.91 K/UL — HIGH (ref 3.8–10.5)
WBC # FLD AUTO: 10.91 K/UL — HIGH (ref 3.8–10.5)

## 2019-11-23 RX ADMIN — CARVEDILOL PHOSPHATE 25 MILLIGRAM(S): 80 CAPSULE, EXTENDED RELEASE ORAL at 22:29

## 2019-11-23 RX ADMIN — PANTOPRAZOLE SODIUM 40 MILLIGRAM(S): 20 TABLET, DELAYED RELEASE ORAL at 05:38

## 2019-11-23 RX ADMIN — CLOPIDOGREL BISULFATE 75 MILLIGRAM(S): 75 TABLET, FILM COATED ORAL at 09:00

## 2019-11-23 RX ADMIN — SODIUM CHLORIDE 100 MILLILITER(S): 9 INJECTION INTRAMUSCULAR; INTRAVENOUS; SUBCUTANEOUS at 05:43

## 2019-11-23 RX ADMIN — Medication 50 MICROGRAM(S): at 05:42

## 2019-11-23 RX ADMIN — Medication 81 MILLIGRAM(S): at 09:00

## 2019-11-23 RX ADMIN — CARVEDILOL PHOSPHATE 25 MILLIGRAM(S): 80 CAPSULE, EXTENDED RELEASE ORAL at 09:00

## 2019-11-23 RX ADMIN — AMLODIPINE BESYLATE 5 MILLIGRAM(S): 2.5 TABLET ORAL at 05:42

## 2019-11-23 RX ADMIN — ATORVASTATIN CALCIUM 40 MILLIGRAM(S): 80 TABLET, FILM COATED ORAL at 22:29

## 2019-11-23 RX ADMIN — SODIUM CHLORIDE 100 MILLILITER(S): 9 INJECTION INTRAMUSCULAR; INTRAVENOUS; SUBCUTANEOUS at 22:33

## 2019-11-23 RX ADMIN — Medication 0.2 MILLIGRAM(S): at 05:42

## 2019-11-23 RX ADMIN — ENOXAPARIN SODIUM 40 MILLIGRAM(S): 100 INJECTION SUBCUTANEOUS at 08:59

## 2019-11-23 NOTE — PROGRESS NOTE ADULT - ASSESSMENT
This is a 89 year old female with intracranial stenosis and stroke who presents with increase weakness and facial droop    hydrate    advance feeding as tolerated      PT today This is a 89 year old female with intracranial stenosis and strokes who presents with increase weakness and facial droop    hydrate    advance feeding as tolerated      PT today    statin increased   cardiology work up

## 2019-11-23 NOTE — PROGRESS NOTE ADULT - SUBJECTIVE AND OBJECTIVE BOX
pt seen and examined, no complaints, ROS - .          ALPRAZolam 0.25 milliGRAM(s) Oral daily PRN  amLODIPine   Tablet 5 milliGRAM(s) Oral daily  aspirin enteric coated 81 milliGRAM(s) Oral daily  atorvastatin 40 milliGRAM(s) Oral at bedtime  carvedilol 25 milliGRAM(s) Oral every 12 hours  cloNIDine 0.2 milliGRAM(s) Oral daily  clopidogrel Tablet 75 milliGRAM(s) Oral daily  enoxaparin Injectable 40 milliGRAM(s) SubCutaneous daily  levothyroxine 50 MICROGram(s) Oral daily  pantoprazole    Tablet 40 milliGRAM(s) Oral before breakfast  QUEtiapine 25 milliGRAM(s) Oral at bedtime PRN  sodium chloride 0.9%. 1000 milliLiter(s) IV Continuous <Continuous>                            11.3   7.13  )-----------( 174      ( 22 Nov 2019 07:05 )             35.0       Hemoglobin: 11.3 g/dL (11-22 @ 07:05)  Hemoglobin: 10.7 g/dL (11-21 @ 06:32)  Hemoglobin: 11.8 g/dL (11-20 @ 06:21)      11-22    143  |  101  |  13  ----------------------------<  156<H>  3.6   |  27  |  0.77    Ca    9.1      22 Nov 2019 07:09      Creatinine Trend: 0.77<--, 0.80<--, 0.81<--    COAGS:           T(C): 36.8 (11-23-19 @ 04:37), Max: 36.8 (11-23-19 @ 04:37)  HR: 55 (11-23-19 @ 04:37) (55 - 73)  BP: 117/72 (11-23-19 @ 04:37) (101/55 - 134/63)  RR: 18 (11-23-19 @ 04:37) (18 - 18)  SpO2: 94% (11-23-19 @ 04:37) (94% - 97%)  Wt(kg): --    I&O's Summary    22 Nov 2019 07:01  -  23 Nov 2019 07:00  --------------------------------------------------------  IN: 1440 mL / OUT: 850 mL / NET: 590 mL        Gen: NAD  HEENT:  (-)icterus (-)pallor  CV: N S1 S2 1/6 JYOTSNA (+)2 Pulses B/l  Resp:  Clear to auscultation B/L, normal effort  GI: (+) BS Soft, NT, ND  Lymph:  (-)Edema, (-)obvious lymphadenopathy  Skin: Warm to touch, Normal turgor  Psych: Appropriate mood and affect      TELEMETRY: SR 60s      ECG: Sinus Rhythm with 1st degree AV block 	    Echo: Pending    < from: Transthoracic Echocardiogram (12.06.17 @ 09:57) >  Conclusions:  1. Normal left ventricular internal dimensions and wall  thicknesses. Basal septal hypertrophy to 1.4cm.  2. Normal left ventricular systolic function. No segmental  wall motion abnormalities.  3. Mild diastolic dysfunction (Stage I).  4. Normal right ventricular size and function.  5. Agitated saline injection demonstrates no evidence of a  patent foramen ovale.  *** Compared with echocardiogram of 4/30/2014, no  significant changes noted.    < end of copied text >    ECHO : < from: Transthoracic Echocardiogram (11.22.19 @ 16:04) >  ------------------------------------------------------------------------  Conclusions:  1. Calcified trileaflet aortic valve with mildly decreased  opening. Peak transaortic valve gradient equals 12 mm Hg,  mean transaortic valve gradient equals 6 mm Hg, estimated  aortic valve area equals 1.7 sqcm (by continuity equation),  aortic valve velocity time integral equals 44 cm,  consistent with mild aortic stenosis.  2. Increased relative wall thickness with normal left  ventricular mass index, consistent with concentric left  ventricular remodeling.  3. Endocardium not well visualized; grossly normal left  ventricular systolic function.  4. Mild diastolic dysfunction (Stage I).  5. Normal right ventricular size and function.  ------------------------------------------------------------------------  Confirmed on  11/22/2019 - 17:51:33 by Jess Multani M.D.  ------------------------------------------------------------------------    < end of copied text >      ASSESSMENT/PLAN: 89 y/o female with PMHx of HTN, HLD, hx CVA, hypothyroidism presented to ED s/p fall and with right facial droop being evaluated for stroke.     - Tele stable   - No evidence of clinical HF or anginal symptoms  - tolerating DAPT  , statin   - BP tolerating clonidine , coreg   - TTE  noted   - Neurology follow up  - we recommend outpatient event monitoring  D/W Dr Gilbert

## 2019-11-23 NOTE — PROGRESS NOTE ADULT - ATTENDING COMMENTS
CARDIOLOGY ATTENDING    Agree with above. Admitted with acute stroke. Tele/ekgs/echo unremarkable. Recommend outpatient event monitoring r/o afib. No further inpatient cardiac workup needed.

## 2019-11-23 NOTE — PROGRESS NOTE ADULT - ASSESSMENT
89 year old woman resident of assisted living facility at baseline conversive ambulating with rolling walker. 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 On 11/20/19 developed R sided facial weakness on exam today worsening to R sided weakness.    Impression: Presumed small vessel infarct vs related to intracranial atherosclerotic vs cardioembolism.     NEURO: Neurologically with some worsening - likely related to small vessel infarct worsening. Will resume IV hydration. Continue monitoring for neurologic deterioration, permissive HTN, LDL 60- continue on home statin, MRI pending. Physical therapy/OT pending.    ANTITHROMBOTIC THERAPY: Aspirin and Plavix in setting of intracranial stenosis    PULMONARY: CXR Right basilar basilar reflecting atelectasis versus pneumonia, protecting airway, saturating well     CARDIOVASCULAR: TTE demonstrates EF 64%, Calcified trileaflet aortic valve with mildly decreased opening. Peak transaortic valve gradient equals 12 mm Hg, mean transaortic valve gradient equals 6 mm Hg, estimated  aortic valve area equals 1.7 sqcm (by continuity equation),aortic valve velocity time integral equals 44 cm, consistent with mild aortic stenosis. Increased relative wall thickness with normal left ventricular mass index, consistent with concentric left ventricular remodeling, cardiac monitoring without reported events                              SBP goal: Permissive HTN to gradual normotension     GASTROINTESTINAL:  dysphagia screen passed     Diet: Pureed    RENAL: BUN/Cr without acute change, good urine output, on IVF hydration     Na Goal: Greater than 135     Champion: No    HEMATOLOGY: H/H without acute change, Platelets 174     DVT ppx: LMWH     ID: afebrile, no leukocytosis    OTHER: Plan endorsed to patient at bedside. Previously discussed with patients proxy at bedside (Dr. Parker) and patients private neurologist, Dr. Tomlinson.     DISPOSITION: Rehab or home depending on PT eval once stable and workup is complete    CORE MEASURES:        Admission NIHSS:      TPA: NO      LDL/HDL: 60/44     Depression Screen: p     Statin Therapy: yes     Dysphagia Screen: PASS     Smoking NO      Afib NO     Stroke Education YES    Obtain screening lower extremity venous ultrasound in patients who meet 1 or more of the following criteria as patient is high risk for DVT/PE on admission:   [] History of DVT/PE  []Hypercoagulable states (Factor V Leiden, Cancer, OCP, etc. )  []Prolonged immobility (hemiplegia/hemiparesis/post operative or any other extended immobilization)  [] Transferred from outside facility (Rehab or Long term care)  [] Age </= to 50 89 year old woman resident of assisted living facility at baseline conversive ambulating with rolling walker. 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 On 11/20/19 developed R sided facial weakness on exam today worsening to R sided weakness.    Impression: Presumed small vessel infarct vs related to intracranial atherosclerotic vs cardioembolism.     NEURO: Neurologically with some worsening - likely related to small vessel infarct worsening. Will resume IV hydration. Continue monitoring for neurologic deterioration, permissive HTN, LDL 60- continue on home statin, MRI pending. Physical therapy/OT pending.    ANTITHROMBOTIC THERAPY: Aspirin and Plavix in setting of intracranial stenosis    PULMONARY: CXR Right basilar reflecting atelectasis versus pneumonia, protecting airway, saturating well     CARDIOVASCULAR: TTE demonstrates EF 64%, Calcified trileaflet aortic valve with mildly decreased opening. Peak transaortic valve gradient equals 12 mm Hg, mean transaortic valve gradient equals 6 mm Hg, estimated  aortic valve area equals 1.7 sqcm (by continuity equation),aortic valve velocity time integral equals 44 cm, consistent with mild aortic stenosis. Increased relative wall thickness with normal left ventricular mass index, consistent with concentric left ventricular remodeling, cardiac monitoring without reported events                              SBP goal: Permissive HTN to gradual normotension     GASTROINTESTINAL:  dysphagia screen passed     Diet: Pureed    RENAL: BUN/Cr without acute change, good urine output, on IVF hydration     Na Goal: Greater than 135     Champion: No    HEMATOLOGY: H/H without acute change, Platelets 174     DVT ppx: LMWH     ID: afebrile, no leukocytosis    OTHER: Plan endorsed to patient at bedside. All questions and concerns were addressed.     DISPOSITION: Rehab or home depending on PT eval once stable and workup is complete    CORE MEASURES:        Admission NIHSS:      TPA: NO      LDL/HDL: 60/44     Depression Screen: p     Statin Therapy: yes     Dysphagia Screen: PASS     Smoking NO      Afib NO     Stroke Education YES    Obtain screening lower extremity venous ultrasound in patients who meet 1 or more of the following criteria as patient is high risk for DVT/PE on admission:   [] History of DVT/PE  []Hypercoagulable states (Factor V Leiden, Cancer, OCP, etc. )  []Prolonged immobility (hemiplegia/hemiparesis/post operative or any other extended immobilization)  [] Transferred from outside facility (Rehab or Long term care)  [] Age </= to 50

## 2019-11-23 NOTE — PROGRESS NOTE ADULT - SUBJECTIVE AND OBJECTIVE BOX
THE PATIENT WAS SEEN AND EXAMINED BY ME WITH THE HOUSESTAFF AND STROKE TEAM DURING MORNING ROUNDS.   HPI:    89 year old woman resident of assisted living facility at baseline conversive ambulates with rolling walker. Patient with prior history of remote infarcts likely related to intracranial atherosclerotic disease. On 11/20/19 developed R sided facial weakness on exam today worsening to R sided weakness. Transferred to stroke service for further management.     SUBJECTIVE: No events overnight.  No new neurologic complaints.      MEDICATIONS  (STANDING):  amLODIPine   Tablet 5 milliGRAM(s) Oral daily  aspirin enteric coated 81 milliGRAM(s) Oral daily  atorvastatin 40 milliGRAM(s) Oral at bedtime  carvedilol 25 milliGRAM(s) Oral every 12 hours  cloNIDine 0.2 milliGRAM(s) Oral daily  clopidogrel Tablet 75 milliGRAM(s) Oral daily  enoxaparin Injectable 40 milliGRAM(s) SubCutaneous daily  levothyroxine 50 MICROGram(s) Oral daily  pantoprazole    Tablet 40 milliGRAM(s) Oral before breakfast  sodium chloride 0.9%. 1000 milliLiter(s) (100 mL/Hr) IV Continuous <Continuous>    PHYSICAL EXAM:   Vital Signs Last 24 Hrs  T(C): 36.8 (23 Nov 2019 04:37), Max: 36.8 (23 Nov 2019 04:37)  T(F): 98.3 (23 Nov 2019 04:37), Max: 98.3 (23 Nov 2019 04:37)  HR: 55 (23 Nov 2019 04:37) (55 - 73)  BP: 117/72 (23 Nov 2019 04:37) (101/55 - 187/73)  RR: 18 (23 Nov 2019 04:37) (18 - 18)  SpO2: 94% (23 Nov 2019 04:37) (91% - 97%)    General: No acute distress  HEENT: EOM intact, visual fields full  Abdomen: Soft, nontender, nondistended   Extremities: No edema    NEUROLOGICAL EXAM:  Mental status: Awake, alert, follows commands, speaks 1-2 words appropriate hypophonic.  Cranial Nerves: right facial droop, no nystagmus, no dysarthria,  tongue midline  Motor exam: right side antigravity 2-3/5, left side moves well against gravity without any noticeable weakness  Sensation: Intact to light touch   Coordination/ Gait: No dysmetria, gait deferred    LABS:                    11.3   7.13  )-----------( 174      ( 22 Nov 2019 07:05 )             35.0     11-22    143  |  101  |  13  ----------------------------<  156<H>  3.6   |  27  |  0.77    Ca    9.1      22 Nov 2019 07:09    IMAGING: Reviewed by me.   MR Head No Cont (11.22.19)  Acute infarct involving the left corona radiata and posterior limb of the   left internal capsule. No hemorrhagic transformation.  Chronic right temporal occipital, left occipital, and bilateral   cerebellar hemisphere infarcts. Mild to moderate white matter   microvascular ischemic disease.    CT Head No Cont (11.21.19)  No interval change when compared to the prior CT study from   11/20/2019.    CTA neck w/ IV Cont (11.20.19)  Short segment moderate stenosis of the left vertebral artery at the C2-C3   level in the foramina transversarium. Poor flow related enhancement of   the distal V2 and entire V3 segment of the left vertebral artery.  Mild (approximately 50% by NASCET criteria) short segment stenosis of the   proximal right internal carotid artery due to partially calcified plaque.  No evidence for arterial dissection.    CTA brain w/ IV Cont (11.20.19)  Lack of flow related enhancement of the proximal intradural left   vertebral artery. Normal flow-related enhancement of the intradural left   vertebral artery distal to the origin of the left PICA.  Moderate to severe short segment stenosis of the right vertebral artery   just proximal to the foramen magnum. Mild short segment stenosis of the   proximal right intradural vertebral artery.  No AVM. No vascular aneurysm.    CT Head No Cont (11.20.19)  1.  No CT evidence of acute intracranial hemorrhage, extra-axial   collection, mass effect or calvarial fracture.  2.  Age-indeterminate infarct in the posterior aspect of the left   lentiform nucleus is new from 12/03/2017.  Acute infarct is not excluded.    MRI is recommended for further evaluation.    CT Cervical Spine (11.20.19)  No CT evidence of acute cervical spine fracture or   traumatic malalignment. THE PATIENT WAS SEEN AND EXAMINED BY ME WITH THE HOUSESTAFF AND STROKE TEAM DURING MORNING ROUNDS.   HPI:    89 year old woman resident of assisted living facility at baseline conversive ambulates with rolling walker. Patient with prior history of remote infarcts likely related to intracranial atherosclerotic disease. On 11/20/19 developed R sided facial weakness on exam today worsening to R sided weakness. Transferred to stroke service for further management.     SUBJECTIVE: No events overnight.  No new neurologic complaints.      MEDICATIONS  (STANDING):  amLODIPine   Tablet 5 milliGRAM(s) Oral daily  aspirin enteric coated 81 milliGRAM(s) Oral daily  atorvastatin 40 milliGRAM(s) Oral at bedtime  carvedilol 25 milliGRAM(s) Oral every 12 hours  cloNIDine 0.2 milliGRAM(s) Oral daily  clopidogrel Tablet 75 milliGRAM(s) Oral daily  enoxaparin Injectable 40 milliGRAM(s) SubCutaneous daily  levothyroxine 50 MICROGram(s) Oral daily  pantoprazole    Tablet 40 milliGRAM(s) Oral before breakfast  sodium chloride 0.9%. 1000 milliLiter(s) (100 mL/Hr) IV Continuous <Continuous>    PHYSICAL EXAM:   Vital Signs Last 24 Hrs  T(C): 36.8 (23 Nov 2019 04:37), Max: 36.8 (23 Nov 2019 04:37)  T(F): 98.3 (23 Nov 2019 04:37), Max: 98.3 (23 Nov 2019 04:37)  HR: 55 (23 Nov 2019 04:37) (55 - 73)  BP: 117/72 (23 Nov 2019 04:37) (101/55 - 187/73)  RR: 18 (23 Nov 2019 04:37) (18 - 18)  SpO2: 94% (23 Nov 2019 04:37) (91% - 97%)    General: No acute distress  HEENT: EOM intact, visual fields full  Abdomen: Soft, nontender, nondistended   Extremities: No edema    NEUROLOGICAL EXAM:  Mental status: Awake, alert, follows commands, speaks 1-2 words appropriate hypophonic.  Cranial Nerves: right facial droop, no nystagmus, no dysarthria,  tongue midline  Motor exam: right side antigravity 3-4/5, left side moves well against gravity without any noticeable weakness  Sensation: Intact to light touch   Coordination/ Gait: No dysmetria, gait not assessed     LABS:                               11.3   7.13  )-----------( 174      ( 22 Nov 2019 07:05 )             35.0     11-22    143  |  101  |  13  ----------------------------<  156<H>  3.6   |  27  |  0.77    Ca    9.1      22 Nov 2019 07:09      IMAGING: Reviewed by me.   MR Head No Cont (11.22.19)  Acute infarct involving the left corona radiata and posterior limb of the   left internal capsule. No hemorrhagic transformation.  Chronic right temporal occipital, left occipital, and bilateral   cerebellar hemisphere infarcts. Mild to moderate white matter   microvascular ischemic disease.    CT Head No Cont (11.21.19)  No interval change when compared to the prior CT study from   11/20/2019.    CTA neck w/ IV Cont (11.20.19)  Short segment moderate stenosis of the left vertebral artery at the C2-C3   level in the foramina transversarium. Poor flow related enhancement of   the distal V2 and entire V3 segment of the left vertebral artery.  Mild (approximately 50% by NASCET criteria) short segment stenosis of the   proximal right internal carotid artery due to partially calcified plaque.  No evidence for arterial dissection.    CTA brain w/ IV Cont (11.20.19)  Lack of flow related enhancement of the proximal intradural left   vertebral artery. Normal flow-related enhancement of the intradural left   vertebral artery distal to the origin of the left PICA.  Moderate to severe short segment stenosis of the right vertebral artery   just proximal to the foramen magnum. Mild short segment stenosis of the   proximal right intradural vertebral artery.  No AVM. No vascular aneurysm.    CT Head No Cont (11.20.19)  1.  No CT evidence of acute intracranial hemorrhage, extra-axial   collection, mass effect or calvarial fracture.  2.  Age-indeterminate infarct in the posterior aspect of the left   lentiform nucleus is new from 12/03/2017.  Acute infarct is not excluded.    MRI is recommended for further evaluation.    CT Cervical Spine (11.20.19)  No CT evidence of acute cervical spine fracture or   traumatic malalignment.

## 2019-11-23 NOTE — PROGRESS NOTE ADULT - SUBJECTIVE AND OBJECTIVE BOX
Neurology Progress    SHARAD [id number]    HPI:  89F BIBMARGOT from [facility] living. Pt oriented to person and place but not time. Pt has no complaints. Can state she "fell". No HA, CP, SOB, Abd pain. Pt noted to have right FACIAL DROP (20 Nov 2019 14:03)      Past Medical History  Stroke  HLD (hyperlipidemia)  HTN (hypertension)  Hypothyroid      Past Surgical History  History of hip replacement, total, left      MEDICATIONS    ALPRAZolam 0.25 milliGRAM(s) Oral daily PRN  amLODIPine   Tablet 5 milliGRAM(s) Oral daily  aspirin enteric coated 81 milliGRAM(s) Oral daily  atorvastatin 40 milliGRAM(s) Oral at bedtime  carvedilol 25 milliGRAM(s) Oral every 12 hours  cloNIDine 0.2 milliGRAM(s) Oral daily  clopidogrel Tablet 75 milliGRAM(s) Oral daily  enoxaparin Injectable 40 milliGRAM(s) SubCutaneous daily  levothyroxine 50 MICROGram(s) Oral daily  pantoprazole    Tablet 40 milliGRAM(s) Oral before breakfast  QUEtiapine 25 milliGRAM(s) Oral at bedtime PRN  sodium chloride 0.9%. 1000 milliLiter(s) IV Continuous <Continuous>         Family history: No history of dementia, strokes, or seizures   FAMILY HISTORY:  No pertinent family history in first degree relatives    SOCIAL HISTORY -- No history of tobacco or alcohol use     Allergies    No Known Allergies    Intolerances            Vital Signs Last 24 Hrs  T(C): 36.8 (23 Nov 2019 04:37), Max: 36.8 (23 Nov 2019 04:37)  T(F): 98.3 (23 Nov 2019 04:37), Max: 98.3 (23 Nov 2019 04:37)  HR: 55 (23 Nov 2019 04:37) (55 - 73)  BP: 117/72 (23 Nov 2019 04:37) (101/55 - 187/73)  BP(mean): --  RR: 18 (23 Nov 2019 04:37) (18 - 18)  SpO2: 94% (23 Nov 2019 04:37) (91% - 97%)        On Neurological Examination:    Mental Status - Patient is alert, awake, oriented X3. .   Follows commands well and able to answer questions appropriately. Mood and affect  normal  Follow simple commands able to repeat  able to name.  Speech - Fluent no Dysarthria  no  Aphasia                              Cranial Nerves - Extraocular muscle intact  PIERO RNLF Tongue midline, CnV1to V3 intact gross hearing intact  PIERO    Motor Exam -   Right upper  distal 3/5 prox 4/5  Left upper 5/5 throughout  Right lower- 4/5 throughout  Left lower 5/5 throughout  Coordination -finger to nose intact  Muscle tone - is normal all over. No asymmetry is seen.      Sensory    Bilateral intact to light touch    GENERAL Exam:     Nontoxic , No Acute Distress   	  HEENT:  normocephalic, atraumatic  		  LUNGS:	Clear bilaterally  No Wheeze      VASCULAR: no carotid brui  	  HEART:	 Normal S1S2   No murmur RRR        	  MUSCULOSKELETAL: Normal Range of Motion  	   SKIN:      Normal   No Ecchymosis               LABS:  CBC Full  -  ( 22 Nov 2019 07:05 )  WBC Count : 7.13 K/uL  RBC Count : 3.61 M/uL  Hemoglobin : 11.3 g/dL  Hematocrit : 35.0 %  Platelet Count - Automated : 174 K/uL  Mean Cell Volume : 97.0 fl  Mean Cell Hemoglobin : 31.3 pg  Mean Cell Hemoglobin Concentration : 32.3 gm/dL  Auto Neutrophil # : x  Auto Lymphocyte # : x  Auto Monocyte # : x  Auto Eosinophil # : x  Auto Basophil # : x  Auto Neutrophil % : x  Auto Lymphocyte % : x  Auto Monocyte % : x  Auto Eosinophil % : x  Auto Basophil % : x      11-22    143  |  101  |  13  ----------------------------<  156<H>  3.6   |  27  |  0.77    Ca    9.1      22 Nov 2019 07:09      Hemoglobin A1C:       Vitamin B12         RADIOLOGY    EKG               [facility]

## 2019-11-24 LAB
ANION GAP SERPL CALC-SCNC: 13 MMOL/L — SIGNIFICANT CHANGE UP (ref 5–17)
BUN SERPL-MCNC: 14 MG/DL — SIGNIFICANT CHANGE UP (ref 7–23)
CALCIUM SERPL-MCNC: 8.4 MG/DL — SIGNIFICANT CHANGE UP (ref 8.4–10.5)
CHLORIDE SERPL-SCNC: 109 MMOL/L — HIGH (ref 96–108)
CO2 SERPL-SCNC: 24 MMOL/L — SIGNIFICANT CHANGE UP (ref 22–31)
CREAT SERPL-MCNC: 0.75 MG/DL — SIGNIFICANT CHANGE UP (ref 0.5–1.3)
GLUCOSE BLDC GLUCOMTR-MCNC: 137 MG/DL — HIGH (ref 70–99)
GLUCOSE BLDC GLUCOMTR-MCNC: 146 MG/DL — HIGH (ref 70–99)
GLUCOSE BLDC GLUCOMTR-MCNC: 177 MG/DL — HIGH (ref 70–99)
GLUCOSE SERPL-MCNC: 142 MG/DL — HIGH (ref 70–99)
HCT VFR BLD CALC: 32.3 % — LOW (ref 34.5–45)
HGB BLD-MCNC: 10.2 G/DL — LOW (ref 11.5–15.5)
MCHC RBC-ENTMCNC: 30.7 PG — SIGNIFICANT CHANGE UP (ref 27–34)
MCHC RBC-ENTMCNC: 31.6 GM/DL — LOW (ref 32–36)
MCV RBC AUTO: 97.3 FL — SIGNIFICANT CHANGE UP (ref 80–100)
NRBC # BLD: 0 /100 WBCS — SIGNIFICANT CHANGE UP (ref 0–0)
PLATELET # BLD AUTO: 177 K/UL — SIGNIFICANT CHANGE UP (ref 150–400)
POTASSIUM SERPL-MCNC: 3.7 MMOL/L — SIGNIFICANT CHANGE UP (ref 3.5–5.3)
POTASSIUM SERPL-SCNC: 3.7 MMOL/L — SIGNIFICANT CHANGE UP (ref 3.5–5.3)
RBC # BLD: 3.32 M/UL — LOW (ref 3.8–5.2)
RBC # FLD: 13.4 % — SIGNIFICANT CHANGE UP (ref 10.3–14.5)
SODIUM SERPL-SCNC: 146 MMOL/L — HIGH (ref 135–145)
WBC # BLD: 5.82 K/UL — SIGNIFICANT CHANGE UP (ref 3.8–10.5)
WBC # FLD AUTO: 5.82 K/UL — SIGNIFICANT CHANGE UP (ref 3.8–10.5)

## 2019-11-24 RX ORDER — POLYETHYLENE GLYCOL 3350 17 G/17G
17 POWDER, FOR SOLUTION ORAL DAILY
Refills: 0 | Status: DISCONTINUED | OUTPATIENT
Start: 2019-11-24 | End: 2019-11-25

## 2019-11-24 RX ORDER — GLUCAGON INJECTION, SOLUTION 0.5 MG/.1ML
1 INJECTION, SOLUTION SUBCUTANEOUS ONCE
Refills: 0 | Status: DISCONTINUED | OUTPATIENT
Start: 2019-11-24 | End: 2019-11-25

## 2019-11-24 RX ORDER — HALOPERIDOL DECANOATE 100 MG/ML
1 INJECTION INTRAMUSCULAR ONCE
Refills: 0 | Status: DISCONTINUED | OUTPATIENT
Start: 2019-11-24 | End: 2019-11-24

## 2019-11-24 RX ORDER — HALOPERIDOL DECANOATE 100 MG/ML
1 INJECTION INTRAMUSCULAR ONCE
Refills: 0 | Status: DISCONTINUED | OUTPATIENT
Start: 2019-11-24 | End: 2019-11-25

## 2019-11-24 RX ORDER — DEXTROSE 50 % IN WATER 50 %
12.5 SYRINGE (ML) INTRAVENOUS ONCE
Refills: 0 | Status: DISCONTINUED | OUTPATIENT
Start: 2019-11-24 | End: 2019-11-25

## 2019-11-24 RX ORDER — SODIUM CHLORIDE 9 MG/ML
1000 INJECTION, SOLUTION INTRAVENOUS
Refills: 0 | Status: DISCONTINUED | OUTPATIENT
Start: 2019-11-24 | End: 2019-11-25

## 2019-11-24 RX ORDER — DEXTROSE 50 % IN WATER 50 %
15 SYRINGE (ML) INTRAVENOUS ONCE
Refills: 0 | Status: DISCONTINUED | OUTPATIENT
Start: 2019-11-24 | End: 2019-11-25

## 2019-11-24 RX ORDER — INSULIN LISPRO 100/ML
VIAL (ML) SUBCUTANEOUS AT BEDTIME
Refills: 0 | Status: DISCONTINUED | OUTPATIENT
Start: 2019-11-24 | End: 2019-11-25

## 2019-11-24 RX ORDER — DEXTROSE 50 % IN WATER 50 %
25 SYRINGE (ML) INTRAVENOUS ONCE
Refills: 0 | Status: DISCONTINUED | OUTPATIENT
Start: 2019-11-24 | End: 2019-11-25

## 2019-11-24 RX ORDER — DIPHENHYDRAMINE HCL 50 MG
12.5 CAPSULE ORAL ONCE
Refills: 0 | Status: COMPLETED | OUTPATIENT
Start: 2019-11-24 | End: 2019-11-24

## 2019-11-24 RX ORDER — INSULIN LISPRO 100/ML
VIAL (ML) SUBCUTANEOUS
Refills: 0 | Status: DISCONTINUED | OUTPATIENT
Start: 2019-11-24 | End: 2019-11-25

## 2019-11-24 RX ORDER — QUETIAPINE FUMARATE 200 MG/1
25 TABLET, FILM COATED ORAL ONCE
Refills: 0 | Status: COMPLETED | OUTPATIENT
Start: 2019-11-24 | End: 2019-11-24

## 2019-11-24 RX ADMIN — PANTOPRAZOLE SODIUM 40 MILLIGRAM(S): 20 TABLET, DELAYED RELEASE ORAL at 06:01

## 2019-11-24 RX ADMIN — QUETIAPINE FUMARATE 25 MILLIGRAM(S): 200 TABLET, FILM COATED ORAL at 21:58

## 2019-11-24 RX ADMIN — ENOXAPARIN SODIUM 40 MILLIGRAM(S): 100 INJECTION SUBCUTANEOUS at 12:32

## 2019-11-24 RX ADMIN — Medication 0.2 MILLIGRAM(S): at 06:01

## 2019-11-24 RX ADMIN — Medication 50 MICROGRAM(S): at 06:01

## 2019-11-24 RX ADMIN — Medication 12.5 MILLIGRAM(S): at 19:20

## 2019-11-24 RX ADMIN — CLOPIDOGREL BISULFATE 75 MILLIGRAM(S): 75 TABLET, FILM COATED ORAL at 12:32

## 2019-11-24 RX ADMIN — QUETIAPINE FUMARATE 25 MILLIGRAM(S): 200 TABLET, FILM COATED ORAL at 13:00

## 2019-11-24 RX ADMIN — CARVEDILOL PHOSPHATE 25 MILLIGRAM(S): 80 CAPSULE, EXTENDED RELEASE ORAL at 09:57

## 2019-11-24 RX ADMIN — CARVEDILOL PHOSPHATE 25 MILLIGRAM(S): 80 CAPSULE, EXTENDED RELEASE ORAL at 21:58

## 2019-11-24 RX ADMIN — ATORVASTATIN CALCIUM 40 MILLIGRAM(S): 80 TABLET, FILM COATED ORAL at 21:58

## 2019-11-24 RX ADMIN — Medication 81 MILLIGRAM(S): at 12:32

## 2019-11-24 RX ADMIN — Medication 1: at 12:32

## 2019-11-24 RX ADMIN — POLYETHYLENE GLYCOL 3350 17 GRAM(S): 17 POWDER, FOR SOLUTION ORAL at 12:32

## 2019-11-24 RX ADMIN — Medication 0.25 MILLIGRAM(S): at 09:57

## 2019-11-24 RX ADMIN — AMLODIPINE BESYLATE 5 MILLIGRAM(S): 2.5 TABLET ORAL at 06:01

## 2019-11-24 NOTE — PROGRESS NOTE ADULT - ASSESSMENT
89 year old woman resident of assisted living facility at baseline conversive ambulating with rolling walker. 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 On 11/20/19 developed R sided facial weakness on exam today worsening to R sided weakness.    Impression: Presumed small vessel infarct vs related to intracranial atherosclerotic vs cardioembolism.     NEURO: Neurologically with some worsening - likely related to small vessel infarct worsening. Will resume IV hydration. Continue monitoring for neurologic deterioration, permissive HTN, LDL 60- continue on home statin, MRI pending. Physical therapy/OT pending.    ANTITHROMBOTIC THERAPY: Aspirin and Plavix in setting of intracranial stenosis    PULMONARY: CXR Right basilar reflecting atelectasis versus pneumonia, protecting airway, saturating well     CARDIOVASCULAR: TTE demonstrates EF 64%, Calcified trileaflet aortic valve with mildly decreased opening. Peak transaortic valve gradient equals 12 mm Hg, mean transaortic valve gradient equals 6 mm Hg, estimated  aortic valve area equals 1.7 sqcm (by continuity equation),aortic valve velocity time integral equals 44 cm, consistent with mild aortic stenosis. Increased relative wall thickness with normal left ventricular mass index, consistent with concentric left ventricular remodeling, cardiac monitoring without reported events                              SBP goal: Permissive HTN to gradual normotension     GASTROINTESTINAL:  dysphagia screen passed     Diet: Pureed    RENAL: BUN/Cr without acute change, good urine output, on IVF hydration     Na Goal: Greater than 135     Champion: No    HEMATOLOGY: H/H without acute change, Platelets 188     DVT ppx: LMWH     ID: afebrile, no leukocytosis    OTHER: Plan endorsed to patient at bedside. All questions and concerns were addressed.     DISPOSITION: Rehab or home depending on PT eval once stable and workup is complete    CORE MEASURES:        Admission NIHSS:      TPA: NO      LDL/HDL: 60/44     Depression Screen: p     Statin Therapy: yes     Dysphagia Screen: PASS     Smoking NO      Afib NO     Stroke Education YES    Obtain screening lower extremity venous ultrasound in patients who meet 1 or more of the following criteria as patient is high risk for DVT/PE on admission:   [] History of DVT/PE  []Hypercoagulable states (Factor V Leiden, Cancer, OCP, etc. )  []Prolonged immobility (hemiplegia/hemiparesis/post operative or any other extended immobilization)  [] Transferred from outside facility (Rehab or Long term care)  [] Age </= to 50 89 year old woman resident of assisted living facility at baseline conversive ambulating with rolling walker. 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 On 11/20/19 developed R sided facial weakness on exam today worsening to R sided weakness.    Impression: Presumed small vessel infarct vs related to intracranial atherosclerotic vs cardioembolism.     NEURO: Neurologically with some worsening - likely related to small vessel infarct worsening. Will continue IV hydration. Continue monitoring for neurologic deterioration, permissive HTN, LDL 60- continue on home statin, MRI noted above. Physical therapy/OT pending.    ANTITHROMBOTIC THERAPY: Aspirin and Plavix in setting of intracranial stenosis    PULMONARY: CXR Right basilar reflecting atelectasis versus pneumonia, protecting airway, saturating well     CARDIOVASCULAR: TTE demonstrates EF 64%, Calcified trileaflet aortic valve with mildly decreased opening. Peak transaortic valve gradient equals 12 mm Hg, mean transaortic valve gradient equals 6 mm Hg, estimated aortic valve area equals 1.7 sqcm (by continuity equation),aortic valve velocity time integral equals 44 cm, consistent with mild aortic stenosis. Increased relative wall thickness with normal left ventricular mass index, consistent with concentric left ventricular remodeling, cardiac monitoring without reported events                              SBP goal: Permissive HTN to gradual normotension     GASTROINTESTINAL:  dysphagia screen passed, will advance as tolerated.      Diet: Pureed    RENAL: BUN/Cr without acute change, good urine output, on IVF hydration     Na Goal: Greater than 135     Champion: No    HEMATOLOGY: H/H without acute change, Platelets 177     DVT ppx: LMWH     ID: afebrile, no leukocytosis    OTHER: Plan endorsed to patient at bedside. All questions and concerns were addressed.     DISPOSITION: Rehab or home depending on PT eval once stable and workup is complete    CORE MEASURES:        Admission NIHSS:      TPA: NO      LDL/HDL: 60/44     Depression Screen: p     Statin Therapy: yes     Dysphagia Screen: PASS     Smoking NO      Afib NO     Stroke Education YES    Obtain screening lower extremity venous ultrasound in patients who meet 1 or more of the following criteria as patient is high risk for DVT/PE on admission:   [] History of DVT/PE  []Hypercoagulable states (Factor V Leiden, Cancer, OCP, etc. )  []Prolonged immobility (hemiplegia/hemiparesis/post operative or any other extended immobilization)  [] Transferred from outside facility (Rehab or Long term care)  [] Age </= to 50 89 year old woman resident of assisted living facility at baseline conversive ambulating with rolling walker. 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 On 11/20/19 developed R sided facial weakness on exam today worsening to R sided weakness.    Impression: Presumed small vessel infarct vs related to intracranial atherosclerotic vs cardioembolism.     NEURO: Neurologically with some worsening - likely related to small vessel infarct worsening. Will continue IV hydration. Continue monitoring for neurologic deterioration, permissive HTN, LDL 60- continue on home statin, MRI noted above. Physical therapy/OT pending.    ANTITHROMBOTIC THERAPY: Aspirin and Plavix in setting of intracranial stenosis    PULMONARY: CXR Right basilar reflecting atelectasis versus pneumonia, protecting airway, saturating well     CARDIOVASCULAR: TTE demonstrates EF 64%, Calcified trileaflet aortic valve with mildly decreased opening. Peak transaortic valve gradient equals 12 mm Hg, mean transaortic valve gradient equals 6 mm Hg, estimated aortic valve area equals 1.7 sqcm (by continuity equation),aortic valve velocity time integral equals 44 cm, consistent with mild aortic stenosis. Increased relative wall thickness with normal left ventricular mass index, consistent with concentric left ventricular remodeling, cardiac monitoring without reported events                              SBP goal: Permissive HTN to gradual normotension. Outpatient screening for AF with long term telemetry monitoring.     GASTROINTESTINAL:  dysphagia screen passed, will advance as tolerated.      Diet: Pureed    RENAL: BUN/Cr without acute change, good urine output, on IVF hydration     Na Goal: Greater than 135     Chapmion: No    HEMATOLOGY: H/H without acute change, Platelets 177     DVT ppx: LMWH     ID: afebrile, no leukocytosis    OTHER: Plan endorsed to patient at bedside. All questions and concerns were addressed.     DISPOSITION: Rehab or home depending on PT eval once stable and workup is complete    CORE MEASURES:        Admission NIHSS:      TPA: NO      LDL/HDL: 60/44     Depression Screen: p     Statin Therapy: yes     Dysphagia Screen: PASS     Smoking NO      Afib NO     Stroke Education YES    Obtain screening lower extremity venous ultrasound in patients who meet 1 or more of the following criteria as patient is high risk for DVT/PE on admission:   [] History of DVT/PE  []Hypercoagulable states (Factor V Leiden, Cancer, OCP, etc. )  []Prolonged immobility (hemiplegia/hemiparesis/post operative or any other extended immobilization)  [] Transferred from outside facility (Rehab or Long term care)  [] Age </= to 50

## 2019-11-24 NOTE — PROGRESS NOTE ADULT - SUBJECTIVE AND OBJECTIVE BOX
· Subjective and Objective: 	  Neurology Progress    SHARAD [id number]    HPI:  89F LENIN from [facility] assisted living. Pt oriented to person and place but not time. Pt has no complaints. Can state she "fell". No HA, CP, SOB, Abd pain. Pt noted to have right FACIAL DROP (20 Nov 2019 14:03)      Past Medical History  Stroke  HLD (hyperlipidemia)  HTN (hypertension)  Hypothyroid      Past Surgical History  History of hip replacement, total, left      MEDICATIONS    ALPRAZolam 0.25 milliGRAM(s) Oral daily PRN  amLODIPine   Tablet 5 milliGRAM(s) Oral daily  aspirin enteric coated 81 milliGRAM(s) Oral daily  atorvastatin 40 milliGRAM(s) Oral at bedtime  carvedilol 25 milliGRAM(s) Oral every 12 hours  cloNIDine 0.2 milliGRAM(s) Oral daily  clopidogrel Tablet 75 milliGRAM(s) Oral daily  enoxaparin Injectable 40 milliGRAM(s) SubCutaneous daily  levothyroxine 50 MICROGram(s) Oral daily  pantoprazole    Tablet 40 milliGRAM(s) Oral before breakfast  QUEtiapine 25 milliGRAM(s) Oral at bedtime PRN  sodium chloride 0.9%. 1000 milliLiter(s) IV Continuous <Continuous>         Family history: No history of dementia, strokes, or seizures   FAMILY HISTORY:  No pertinent family history in first degree relatives    SOCIAL HISTORY -- No history of tobacco or alcohol use     Allergies    No Known Allergies    Intolerances            Vital Signs Last 24 Hrs  T(C): 36.8  HR: 60  BP: 118/72-  RR: 18    On Neurological Examination:    Mental Status - Patient is alert, awake, oriented X3. .   Follows commands well and able to answer questions appropriately. Mood and affect  normal  Follow simple commands able to repeat  able to name.  Speech - Fluent no Dysarthria  no  Aphasia                              Cranial Nerves - Extraocular muscle intact  PIERO RNLF Tongue midline, CnV1to V3 intact gross hearing intact  PIERO    Motor Exam -   Right upper  distal 3/5 prox 4/5  Left upper 5/5 throughout  Right lower- 4/5 throughout  Left lower 5/5 throughout  Coordination -finger to nose intact  Muscle tone - is normal all over. No asymmetry is seen.      Sensory    Bilateral intact to light touch    GENERAL Exam:     Nontoxic , No Acute Distress   	  HEENT:  normocephalic, atraumatic  		  LUNGS:	Clear bilaterally  No Wheeze      VASCULAR: no carotid brui  	  HEART:	 Normal S1S2   No murmur RRR        	  MUSCULOSKELETAL: Normal Range of Motion  	   SKIN:      Normal   No Ecchymosis               LABS:  CBC Full  -  ( 22 Nov 2019 07:05 )  WBC Count : 7.13 K/uL  RBC Count : 3.61 M/uL  Hemoglobin : 11.3 g/dL  Hematocrit : 35.0 %  Platelet Count - Automated : 174 K/uL  Mean Cell Volume : 97.0 fl  Mean Cell Hemoglobin : 31.3 pg  Mean Cell Hemoglobin Concentration : 32.3 gm/dL  Auto Neutrophil # : x  Auto Lymphocyte # : x  Auto Monocyte # : x  Auto Eosinophil # : x  Auto Basophil # : x  Auto Neutrophil % : x  Auto Lymphocyte % : x  Auto Monocyte % : x  Auto Eosinophil % : x  Auto Basophil % : x      11-22    143  |  101  |  13  ----------------------------<  156<H>  3.6   |  27  |  0.77    Ca    9.1      22 Nov 2019 07:09      Hemoglobin A1C:       Vitamin B12         RADIOLOGY    EKG               schoenberg     Assessment and Plan:   · Assessment		  This is a 89 year old female with intracranial stenosis and strokes who presents with increase weakness and facial droop    hydrate    advance feeding as tolerated      PT today    statin increased   cardiology input appreciated 	event monitor as outpatient      Electronic Signatures:  Schoenberg, Laura Gray (MD)

## 2019-11-24 NOTE — PROGRESS NOTE ADULT - SUBJECTIVE AND OBJECTIVE BOX
pt seen and examined, no chest pain , ROS neg          ALPRAZolam 0.25 milliGRAM(s) Oral daily PRN  amLODIPine   Tablet 5 milliGRAM(s) Oral daily  aspirin enteric coated 81 milliGRAM(s) Oral daily  atorvastatin 40 milliGRAM(s) Oral at bedtime  carvedilol 25 milliGRAM(s) Oral every 12 hours  cloNIDine 0.2 milliGRAM(s) Oral daily  clopidogrel Tablet 75 milliGRAM(s) Oral daily  enoxaparin Injectable 40 milliGRAM(s) SubCutaneous daily  levothyroxine 50 MICROGram(s) Oral daily  pantoprazole    Tablet 40 milliGRAM(s) Oral before breakfast  polyethylene glycol 3350 17 Gram(s) Oral daily  QUEtiapine 25 milliGRAM(s) Oral at bedtime PRN  sodium chloride 0.9%. 1000 milliLiter(s) IV Continuous <Continuous>                            12.1   10.91 )-----------( 188      ( 23 Nov 2019 17:47 )             38.0       Hemoglobin: 12.1 g/dL (11-23 @ 17:47)  Hemoglobin: 11.3 g/dL (11-22 @ 07:05)  Hemoglobin: 10.7 g/dL (11-21 @ 06:32)  Hemoglobin: 11.8 g/dL (11-20 @ 06:21)      11-22    143  |  101  |  13  ----------------------------<  156<H>  3.6   |  27  |  0.77    Ca    9.1      22 Nov 2019 07:09      Creatinine Trend: 0.77<--, 0.80<--, 0.81<--    COAGS:           T(C): 36.9 (11-24-19 @ 04:44), Max: 36.9 (11-24-19 @ 04:44)  HR: 69 (11-24-19 @ 04:44) (60 - 69)  BP: 155/82 (11-24-19 @ 04:44) (132/55 - 155/82)  RR: 18 (11-24-19 @ 04:44) (18 - 18)  SpO2: 98% (11-24-19 @ 04:44) (93% - 98%)  Wt(kg): --    I&O's Summary    22 Nov 2019 07:01  -  23 Nov 2019 07:00  --------------------------------------------------------  IN: 1440 mL / OUT: 850 mL / NET: 590 mL    23 Nov 2019 07:01  -  24 Nov 2019 06:32  --------------------------------------------------------  IN: 2070 mL / OUT: 0 mL / NET: 2070 mL      Gen: NAD  HEENT:  (-)icterus (-)pallor  CV: N S1 S2 1/6 JYOTSNA (+)2 Pulses B/l  Resp:  Clear to auscultation B/L, normal effort  GI: (+) BS Soft, NT, ND  Lymph:  (-)Edema, (-)obvious lymphadenopathy  Skin: Warm to touch, Normal turgor  Psych: Appropriate mood and affect      TELEMETRY: SR 60s      ECG: Sinus Rhythm with 1st degree AV block 	    Echo: Pending    < from: Transthoracic Echocardiogram (12.06.17 @ 09:57) >  Conclusions:  1. Normal left ventricular internal dimensions and wall  thicknesses. Basal septal hypertrophy to 1.4cm.  2. Normal left ventricular systolic function. No segmental  wall motion abnormalities.  3. Mild diastolic dysfunction (Stage I).  4. Normal right ventricular size and function.  5. Agitated saline injection demonstrates no evidence of a  patent foramen ovale.  *** Compared with echocardiogram of 4/30/2014, no  significant changes noted.    < end of copied text >    ECHO : < from: Transthoracic Echocardiogram (11.22.19 @ 16:04) >  ------------------------------------------------------------------------  Conclusions:  1. Calcified trileaflet aortic valve with mildly decreased  opening. Peak transaortic valve gradient equals 12 mm Hg,  mean transaortic valve gradient equals 6 mm Hg, estimated  aortic valve area equals 1.7 sqcm (by continuity equation),  aortic valve velocity time integral equals 44 cm,  consistent with mild aortic stenosis.  2. Increased relative wall thickness with normal left  ventricular mass index, consistent with concentric left  ventricular remodeling.  3. Endocardium not well visualized; grossly normal left  ventricular systolic function.  4. Mild diastolic dysfunction (Stage I).  5. Normal right ventricular size and function.  ------------------------------------------------------------------------  Confirmed on  11/22/2019 - 17:51:33 by Jess Multani M.D.  ------------------------------------------------------------------------    < end of copied text >      ASSESSMENT/PLAN: 89 y/o female with PMHx of HTN, HLD, hx CVA, hypothyroidism presented to ED s/p fall and with right facial droop being evaluated for stroke.     - Tele stable   - No evidence of clinical HF or anginal symptoms  - tolerating DAPT  , statin   - BP tolerating clonidine , coreg   - TTE  noted   - Neurology follow up  - we recommend outpatient event monitoring  ** no further Inpatient cv work up needed

## 2019-11-24 NOTE — PROGRESS NOTE ADULT - ATTENDING COMMENTS
CARDIOLOGY ATTENDING    Agree with above. Admitted with acute stroke. Tele/ekgs/echo unremarkable. Recommend outpatient event monitoring r/o afib. No further inpatient cardiac workup needed

## 2019-11-24 NOTE — PROVIDER CONTACT NOTE (OTHER) - SITUATION
Patient agitated. Taking off gown. Trying to pull off ID band. Trying to slide legs OOB. Patient yelling at staff.

## 2019-11-24 NOTE — PROGRESS NOTE ADULT - SUBJECTIVE AND OBJECTIVE BOX
THE PATIENT WAS SEEN AND EXAMINED BY ME WITH THE HOUSESTAFF AND STROKE TEAM DURING MORNING ROUNDS.   HPI:    89 year old woman resident of assisted living facility at baseline conversive ambulates with rolling walker. Patient with prior history of remote infarcts likely related to intracranial atherosclerotic disease. On 11/20/19 developed R sided facial weakness on exam today worsening to R sided weakness. Transferred to stroke service for further management.     SUBJECTIVE: No events overnight.  No new neurologic complaints.      MEDICATIONS  (STANDING):  amLODIPine   Tablet 5 milliGRAM(s) Oral daily  aspirin enteric coated 81 milliGRAM(s) Oral daily  atorvastatin 40 milliGRAM(s) Oral at bedtime  carvedilol 25 milliGRAM(s) Oral every 12 hours  cloNIDine 0.2 milliGRAM(s) Oral daily  clopidogrel Tablet 75 milliGRAM(s) Oral daily  enoxaparin Injectable 40 milliGRAM(s) SubCutaneous daily  levothyroxine 50 MICROGram(s) Oral daily  pantoprazole    Tablet 40 milliGRAM(s) Oral before breakfast  polyethylene glycol 3350 17 Gram(s) Oral daily  sodium chloride 0.9%. 1000 milliLiter(s) (100 mL/Hr) IV Continuous <Continuous>      PHYSICAL EXAM:   Vital Signs Last 24 Hrs  T(C): 36.9 (24 Nov 2019 04:44), Max: 36.9 (24 Nov 2019 04:44)  T(F): 98.5 (24 Nov 2019 04:44), Max: 98.5 (24 Nov 2019 04:44)  HR: 69 (24 Nov 2019 04:44) (60 - 69)  BP: 155/82 (24 Nov 2019 04:44) (132/55 - 155/82)  RR: 18 (24 Nov 2019 04:44) (18 - 18)  SpO2: 98% (24 Nov 2019 04:44) (93% - 98%)    General: No acute distress  HEENT: EOM intact, visual fields full  Abdomen: Soft, nontender, nondistended   Extremities: No edema    NEUROLOGICAL EXAM:  Mental status: Awake, alert, follows commands, speaks 1-2 words appropriate hypophonic.  Cranial Nerves: right facial droop, no nystagmus, no dysarthria,  tongue midline  Motor exam: right side antigravity 3-4/5, left side moves well against gravity without any noticeable weakness  Sensation: Intact to light touch   Coordination/ Gait: No dysmetria, gait not assessed     LABS:                               12.1   10.91 )-----------( 188      ( 23 Nov 2019 17:47 )             38.0     11-22    143  |  101  |  13  ----------------------------<  156<H>  3.6   |  27  |  0.77    Ca    9.1      22 Nov 2019 07:09      IMAGING: Reviewed by me.   MR Head No Cont (11.22.19)  Acute infarct involving the left corona radiata and posterior limb of the   left internal capsule. No hemorrhagic transformation.  Chronic right temporal occipital, left occipital, and bilateral   cerebellar hemisphere infarcts. Mild to moderate white matter   microvascular ischemic disease.    CT Head No Cont (11.21.19)  No interval change when compared to the prior CT study from   11/20/2019.    CTA neck w/ IV Cont (11.20.19)  Short segment moderate stenosis of the left vertebral artery at the C2-C3   level in the foramina transversarium. Poor flow related enhancement of   the distal V2 and entire V3 segment of the left vertebral artery.  Mild (approximately 50% by NASCET criteria) short segment stenosis of the   proximal right internal carotid artery due to partially calcified plaque.  No evidence for arterial dissection.    CTA brain w/ IV Cont (11.20.19)  Lack of flow related enhancement of the proximal intradural left   vertebral artery. Normal flow-related enhancement of the intradural left   vertebral artery distal to the origin of the left PICA.  Moderate to severe short segment stenosis of the right vertebral artery   just proximal to the foramen magnum. Mild short segment stenosis of the   proximal right intradural vertebral artery.  No AVM. No vascular aneurysm.    CT Head No Cont (11.20.19)  1.  No CT evidence of acute intracranial hemorrhage, extra-axial   collection, mass effect or calvarial fracture.  2.  Age-indeterminate infarct in the posterior aspect of the left   lentiform nucleus is new from 12/03/2017.  Acute infarct is not excluded.    MRI is recommended for further evaluation.    CT Cervical Spine (11.20.19)  No CT evidence of acute cervical spine fracture or   traumatic malalignment. THE PATIENT WAS SEEN AND EXAMINED BY ME WITH THE HOUSESTAFF AND STROKE TEAM DURING MORNING ROUNDS.   HPI:  89 year old woman resident of assisted living facility at baseline conversive ambulates with rolling walker. Patient with prior history of remote infarcts likely related to intracranial atherosclerotic disease. On 11/20/19 developed R sided facial weakness on exam today worsening to R sided weakness. Transferred to stroke service for further management.     SUBJECTIVE: No events overnight.  No new neurologic complaints.      MEDICATIONS  (STANDING):  amLODIPine   Tablet 5 milliGRAM(s) Oral daily  aspirin enteric coated 81 milliGRAM(s) Oral daily  atorvastatin 40 milliGRAM(s) Oral at bedtime  carvedilol 25 milliGRAM(s) Oral every 12 hours  cloNIDine 0.2 milliGRAM(s) Oral daily  clopidogrel Tablet 75 milliGRAM(s) Oral daily  enoxaparin Injectable 40 milliGRAM(s) SubCutaneous daily  levothyroxine 50 MICROGram(s) Oral daily  pantoprazole    Tablet 40 milliGRAM(s) Oral before breakfast  polyethylene glycol 3350 17 Gram(s) Oral daily  sodium chloride 0.9%. 1000 milliLiter(s) (100 mL/Hr) IV Continuous <Continuous>      PHYSICAL EXAM:   Vital Signs Last 24 Hrs  T(C): 36.9 (24 Nov 2019 04:44), Max: 36.9 (24 Nov 2019 04:44)  T(F): 98.5 (24 Nov 2019 04:44), Max: 98.5 (24 Nov 2019 04:44)  HR: 69 (24 Nov 2019 04:44) (60 - 69)  BP: 155/82 (24 Nov 2019 04:44) (132/55 - 155/82)  RR: 18 (24 Nov 2019 04:44) (18 - 18)  SpO2: 98% (24 Nov 2019 04:44) (93% - 98%)    General: No acute distress  HEENT: EOM intact, visual fields full  Abdomen: Soft, nontender, nondistended   Extremities: No edema    NEUROLOGICAL EXAM:  Mental status: Awake, alert, follows commands, speaks in sentences, words appropriate hypophonic.  Cranial Nerves: right facial droop, no nystagmus, no dysarthria,  tongue midline  Motor exam: right side antigravity 3-4/5, left side moves well against gravity without any noticeable weakness  Sensation: Intact to light touch   Coordination/ Gait: No dysmetria, gait not assessed     LABS:                               10.2   5.82  )-----------( 177      ( 24 Nov 2019 06:37 )             32.3     11-24    146<H>  |  109<H>  |  14  ----------------------------<  142<H>  3.7   |  24  |  0.75    Ca    8.4      24 Nov 2019 06:37    IMAGING: Reviewed by me.   MR Head No Cont (11.22.19)  Acute infarct involving the left corona radiata and posterior limb of the   left internal capsule. No hemorrhagic transformation.  Chronic right temporal occipital, left occipital, and bilateral   cerebellar hemisphere infarcts. Mild to moderate white matter   microvascular ischemic disease.    CT Head No Cont (11.21.19)  No interval change when compared to the prior CT study from   11/20/2019.    CTA neck w/ IV Cont (11.20.19)  Short segment moderate stenosis of the left vertebral artery at the C2-C3   level in the foramina transversarium. Poor flow related enhancement of   the distal V2 and entire V3 segment of the left vertebral artery.  Mild (approximately 50% by NASCET criteria) short segment stenosis of the   proximal right internal carotid artery due to partially calcified plaque.  No evidence for arterial dissection.    CTA brain w/ IV Cont (11.20.19)  Lack of flow related enhancement of the proximal intradural left   vertebral artery. Normal flow-related enhancement of the intradural left   vertebral artery distal to the origin of the left PICA.  Moderate to severe short segment stenosis of the right vertebral artery   just proximal to the foramen magnum. Mild short segment stenosis of the   proximal right intradural vertebral artery.  No AVM. No vascular aneurysm.    CT Head No Cont (11.20.19)  1.  No CT evidence of acute intracranial hemorrhage, extra-axial   collection, mass effect or calvarial fracture.  2.  Age-indeterminate infarct in the posterior aspect of the left   lentiform nucleus is new from 12/03/2017.  Acute infarct is not excluded.    MRI is recommended for further evaluation.    CT Cervical Spine (11.20.19)  No CT evidence of acute cervical spine fracture or   traumatic malalignment.

## 2019-11-25 ENCOUNTER — TRANSCRIPTION ENCOUNTER (OUTPATIENT)
Age: 84
End: 2019-11-25

## 2019-11-25 VITALS
RESPIRATION RATE: 20 BRPM | OXYGEN SATURATION: 90 % | TEMPERATURE: 99 F | HEART RATE: 56 BPM | DIASTOLIC BLOOD PRESSURE: 77 MMHG | SYSTOLIC BLOOD PRESSURE: 145 MMHG

## 2019-11-25 LAB
ANION GAP SERPL CALC-SCNC: 15 MMOL/L — SIGNIFICANT CHANGE UP (ref 5–17)
BUN SERPL-MCNC: 9 MG/DL — SIGNIFICANT CHANGE UP (ref 7–23)
CALCIUM SERPL-MCNC: 9 MG/DL — SIGNIFICANT CHANGE UP (ref 8.4–10.5)
CHLORIDE SERPL-SCNC: 104 MMOL/L — SIGNIFICANT CHANGE UP (ref 96–108)
CO2 SERPL-SCNC: 24 MMOL/L — SIGNIFICANT CHANGE UP (ref 22–31)
CREAT SERPL-MCNC: 0.67 MG/DL — SIGNIFICANT CHANGE UP (ref 0.5–1.3)
GLUCOSE BLDC GLUCOMTR-MCNC: 128 MG/DL — HIGH (ref 70–99)
GLUCOSE BLDC GLUCOMTR-MCNC: 140 MG/DL — HIGH (ref 70–99)
GLUCOSE SERPL-MCNC: 159 MG/DL — HIGH (ref 70–99)
HCT VFR BLD CALC: 38 % — SIGNIFICANT CHANGE UP (ref 34.5–45)
HGB BLD-MCNC: 12.2 G/DL — SIGNIFICANT CHANGE UP (ref 11.5–15.5)
MCHC RBC-ENTMCNC: 31.4 PG — SIGNIFICANT CHANGE UP (ref 27–34)
MCHC RBC-ENTMCNC: 32.1 GM/DL — SIGNIFICANT CHANGE UP (ref 32–36)
MCV RBC AUTO: 97.7 FL — SIGNIFICANT CHANGE UP (ref 80–100)
PLATELET # BLD AUTO: 182 K/UL — SIGNIFICANT CHANGE UP (ref 150–400)
POTASSIUM SERPL-MCNC: 5.3 MMOL/L — SIGNIFICANT CHANGE UP (ref 3.5–5.3)
POTASSIUM SERPL-SCNC: 5.3 MMOL/L — SIGNIFICANT CHANGE UP (ref 3.5–5.3)
RBC # BLD: 3.89 M/UL — SIGNIFICANT CHANGE UP (ref 3.8–5.2)
RBC # FLD: 13.4 % — SIGNIFICANT CHANGE UP (ref 10.3–14.5)
SODIUM SERPL-SCNC: 143 MMOL/L — SIGNIFICANT CHANGE UP (ref 135–145)
WBC # BLD: 6.68 K/UL — SIGNIFICANT CHANGE UP (ref 3.8–10.5)
WBC # FLD AUTO: 6.68 K/UL — SIGNIFICANT CHANGE UP (ref 3.8–10.5)

## 2019-11-25 PROCEDURE — 81001 URINALYSIS AUTO W/SCOPE: CPT

## 2019-11-25 PROCEDURE — 87086 URINE CULTURE/COLONY COUNT: CPT

## 2019-11-25 PROCEDURE — 80048 BASIC METABOLIC PNL TOTAL CA: CPT

## 2019-11-25 PROCEDURE — 97162 PT EVAL MOD COMPLEX 30 MIN: CPT

## 2019-11-25 PROCEDURE — 97110 THERAPEUTIC EXERCISES: CPT

## 2019-11-25 PROCEDURE — 97530 THERAPEUTIC ACTIVITIES: CPT

## 2019-11-25 PROCEDURE — 73521 X-RAY EXAM HIPS BI 2 VIEWS: CPT

## 2019-11-25 PROCEDURE — 51701 INSERT BLADDER CATHETER: CPT

## 2019-11-25 PROCEDURE — 80061 LIPID PANEL: CPT

## 2019-11-25 PROCEDURE — 93005 ELECTROCARDIOGRAM TRACING: CPT | Mod: XU

## 2019-11-25 PROCEDURE — 70496 CT ANGIOGRAPHY HEAD: CPT

## 2019-11-25 PROCEDURE — 93306 TTE W/DOPPLER COMPLETE: CPT

## 2019-11-25 PROCEDURE — 99285 EMERGENCY DEPT VISIT HI MDM: CPT | Mod: 25

## 2019-11-25 PROCEDURE — 71045 X-RAY EXAM CHEST 1 VIEW: CPT

## 2019-11-25 PROCEDURE — 70498 CT ANGIOGRAPHY NECK: CPT

## 2019-11-25 PROCEDURE — 97535 SELF CARE MNGMENT TRAINING: CPT

## 2019-11-25 PROCEDURE — 72170 X-RAY EXAM OF PELVIS: CPT

## 2019-11-25 PROCEDURE — 70450 CT HEAD/BRAIN W/O DYE: CPT

## 2019-11-25 PROCEDURE — 85610 PROTHROMBIN TIME: CPT

## 2019-11-25 PROCEDURE — 85027 COMPLETE CBC AUTOMATED: CPT

## 2019-11-25 PROCEDURE — 82962 GLUCOSE BLOOD TEST: CPT

## 2019-11-25 PROCEDURE — 70551 MRI BRAIN STEM W/O DYE: CPT

## 2019-11-25 PROCEDURE — 99233 SBSQ HOSP IP/OBS HIGH 50: CPT

## 2019-11-25 PROCEDURE — 83036 HEMOGLOBIN GLYCOSYLATED A1C: CPT

## 2019-11-25 PROCEDURE — 85730 THROMBOPLASTIN TIME PARTIAL: CPT

## 2019-11-25 PROCEDURE — 80053 COMPREHEN METABOLIC PANEL: CPT

## 2019-11-25 PROCEDURE — 72125 CT NECK SPINE W/O DYE: CPT

## 2019-11-25 PROCEDURE — 97166 OT EVAL MOD COMPLEX 45 MIN: CPT

## 2019-11-25 RX ORDER — QUETIAPINE FUMARATE 200 MG/1
1 TABLET, FILM COATED ORAL
Qty: 0 | Refills: 0 | DISCHARGE
Start: 2019-11-25

## 2019-11-25 RX ADMIN — PANTOPRAZOLE SODIUM 40 MILLIGRAM(S): 20 TABLET, DELAYED RELEASE ORAL at 04:21

## 2019-11-25 RX ADMIN — Medication 0.2 MILLIGRAM(S): at 04:21

## 2019-11-25 RX ADMIN — Medication 0.25 MILLIGRAM(S): at 09:08

## 2019-11-25 RX ADMIN — ENOXAPARIN SODIUM 40 MILLIGRAM(S): 100 INJECTION SUBCUTANEOUS at 12:36

## 2019-11-25 RX ADMIN — AMLODIPINE BESYLATE 5 MILLIGRAM(S): 2.5 TABLET ORAL at 04:21

## 2019-11-25 RX ADMIN — CLOPIDOGREL BISULFATE 75 MILLIGRAM(S): 75 TABLET, FILM COATED ORAL at 12:36

## 2019-11-25 RX ADMIN — CARVEDILOL PHOSPHATE 25 MILLIGRAM(S): 80 CAPSULE, EXTENDED RELEASE ORAL at 09:08

## 2019-11-25 RX ADMIN — Medication 50 MICROGRAM(S): at 04:20

## 2019-11-25 RX ADMIN — Medication 81 MILLIGRAM(S): at 12:36

## 2019-11-25 RX ADMIN — POLYETHYLENE GLYCOL 3350 17 GRAM(S): 17 POWDER, FOR SOLUTION ORAL at 12:36

## 2019-11-25 NOTE — PROGRESS NOTE ADULT - SUBJECTIVE AND OBJECTIVE BOX
THE PATIENT WAS SEEN AND EXAMINED BY ME WITH THE HOUSESTAFF AND STROKE TEAM DURING MORNING ROUNDS.   HPI:  89 year old woman resident of assisted living facility at baseline conversive ambulates with rolling walker. Patient with prior history of remote infarcts likely related to intracranial atherosclerotic disease. On 11/20/19 developed R sided facial weakness on exam today worsening to R sided weakness. Transferred to stroke service for further management.     SUBJECTIVE: No events overnight.  No new neurologic complaints.      ALPRAZolam 0.25 milliGRAM(s) Oral daily PRN  amLODIPine   Tablet 5 milliGRAM(s) Oral daily  aspirin enteric coated 81 milliGRAM(s) Oral daily  atorvastatin 40 milliGRAM(s) Oral at bedtime  carvedilol 25 milliGRAM(s) Oral every 12 hours  cloNIDine 0.2 milliGRAM(s) Oral daily  clopidogrel Tablet 75 milliGRAM(s) Oral daily  dextrose 40% Gel 15 Gram(s) Oral once PRN  dextrose 5%. 1000 milliLiter(s) IV Continuous <Continuous>  dextrose 50% Injectable 12.5 Gram(s) IV Push once  dextrose 50% Injectable 25 Gram(s) IV Push once  dextrose 50% Injectable 25 Gram(s) IV Push once  enoxaparin Injectable 40 milliGRAM(s) SubCutaneous daily  glucagon  Injectable 1 milliGRAM(s) IntraMuscular once PRN  haloperidol    Injectable 1 milliGRAM(s) IntraMuscular once PRN  insulin lispro (HumaLOG) corrective regimen sliding scale   SubCutaneous three times a day before meals  insulin lispro (HumaLOG) corrective regimen sliding scale   SubCutaneous at bedtime  levothyroxine 50 MICROGram(s) Oral daily  pantoprazole    Tablet 40 milliGRAM(s) Oral before breakfast  polyethylene glycol 3350 17 Gram(s) Oral daily  QUEtiapine 25 milliGRAM(s) Oral at bedtime PRN      PHYSICAL EXAM:   Vital Signs Last 24 Hrs  T(C): 36.5 (25 Nov 2019 08:31), Max: 36.9 (25 Nov 2019 04:04)  T(F): 97.7 (25 Nov 2019 08:31), Max: 98.4 (25 Nov 2019 04:04)  HR: 78 (25 Nov 2019 08:31) (68 - 90)  BP: 166/72 (25 Nov 2019 08:31) (139/72 - 189/72)  BP(mean): --  RR: 20 (25 Nov 2019 08:31) (16 - 20)  SpO2: 93% (25 Nov 2019 08:31) (91% - 98%)    General: No acute distress  HEENT: EOM intact, visual fields full  Abdomen: Soft, nontender, nondistended   Extremities: No edema    NEUROLOGICAL EXAM:  Mental status: Awake, alert, oriented x3, no aphasia, no neglect, normal memory   Cranial Nerves: No facial asymmetry, no nystagmus, no dysarthria,  tongue midline  Motor exam: Normal tone, no drift, 5/5 RUE, 5/5 RLE, 5/5 LUE, 5/5 LLE, normal fine finger movements.  Sensation: Intact to light touch   Coordination/ Gait: No dysmetria, DARCIE intact and symmetric bilaterally    LABS:                        10.2   5.82  )-----------( 177      ( 24 Nov 2019 06:37 )             32.3    11-25    143  |  104  |  9   ----------------------------<  159<H>  5.3   |  24  |  0.67    Ca    9.0      25 Nov 2019 05:55      Hemoglobin A1C, Whole Blood: 8.0 % (11-21 @ 10:45)      IMAGING: Reviewed by me. THE PATIENT WAS SEEN AND EXAMINED BY ME WITH THE HOUSESTAFF AND STROKE TEAM DURING MORNING ROUNDS.   HPI:  89 year old woman resident of assisted living facility at baseline conversive ambulates with rolling walker. Patient with prior history of remote infarcts likely related to intracranial atherosclerotic disease. On 11/20/19 developed R sided facial weakness on exam today worsening to R sided weakness. Transferred to stroke service for further management.     SUBJECTIVE: Noted agitation overnight. S/P Sedation.     ALPRAZolam 0.25 milliGRAM(s) Oral daily PRN  amLODIPine   Tablet 5 milliGRAM(s) Oral daily  aspirin enteric coated 81 milliGRAM(s) Oral daily  atorvastatin 40 milliGRAM(s) Oral at bedtime  carvedilol 25 milliGRAM(s) Oral every 12 hours  cloNIDine 0.2 milliGRAM(s) Oral daily  clopidogrel Tablet 75 milliGRAM(s) Oral daily  dextrose 40% Gel 15 Gram(s) Oral once PRN  dextrose 5%. 1000 milliLiter(s) IV Continuous <Continuous>  dextrose 50% Injectable 12.5 Gram(s) IV Push once  dextrose 50% Injectable 25 Gram(s) IV Push once  dextrose 50% Injectable 25 Gram(s) IV Push once  enoxaparin Injectable 40 milliGRAM(s) SubCutaneous daily  glucagon  Injectable 1 milliGRAM(s) IntraMuscular once PRN  haloperidol    Injectable 1 milliGRAM(s) IntraMuscular once PRN  insulin lispro (HumaLOG) corrective regimen sliding scale   SubCutaneous three times a day before meals  insulin lispro (HumaLOG) corrective regimen sliding scale   SubCutaneous at bedtime  levothyroxine 50 MICROGram(s) Oral daily  pantoprazole    Tablet 40 milliGRAM(s) Oral before breakfast  polyethylene glycol 3350 17 Gram(s) Oral daily  QUEtiapine 25 milliGRAM(s) Oral at bedtime PRN      PHYSICAL EXAM:   Vital Signs Last 24 Hrs  T(C): 36.5 (25 Nov 2019 08:31), Max: 36.9 (25 Nov 2019 04:04)  T(F): 97.7 (25 Nov 2019 08:31), Max: 98.4 (25 Nov 2019 04:04)  HR: 78 (25 Nov 2019 08:31) (68 - 90)  BP: 166/72 (25 Nov 2019 08:31) (139/72 - 189/72)  BP(mean): --  RR: 20 (25 Nov 2019 08:31) (16 - 20)  SpO2: 93% (25 Nov 2019 08:31) (91% - 98%)       General: No acute distress, sleeping   HEENT: unable to assess   Abdomen: Soft, nontender, nondistended   Extremities: No edema    NEUROLOGICAL EXAM:  Mental status: sleeping, eyes closed, not following commands, no verbal output   Cranial Nerves: right facial droop   Motor exam: moves extremities within bed spontaneously   Sensation: trace reaction to stimuli  Coordination/ Gait:   gait not assessed             LABS:                        10.2   5.82  )-----------( 177      ( 24 Nov 2019 06:37 )             32.3    11-25    143  |  104  |  9   ----------------------------<  159<H>  5.3   |  24  |  0.67    Ca    9.0      25 Nov 2019 05:55      Hemoglobin A1C, Whole Blood: 8.0 % (11-21 @ 10:45)      IMAGING: Reviewed by me.     MR Head No Cont (11.22.19)  Acute infarct involving the left corona radiata and posterior limb of the   left internal capsule. No hemorrhagic transformation.  Chronic right temporal occipital, left occipital, and bilateral   cerebellar hemisphere infarcts. Mild to moderate white matter   microvascular ischemic disease.    CT Head No Cont (11.21.19)  No interval change when compared to the prior CT study from   11/20/2019.    CTA neck w/ IV Cont (11.20.19)  Short segment moderate stenosis of the left vertebral artery at the C2-C3   level in the foramina transversarium. Poor flow related enhancement of   the distal V2 and entire V3 segment of the left vertebral artery.  Mild (approximately 50% by NASCET criteria) short segment stenosis of the   proximal right internal carotid artery due to partially calcified plaque.  No evidence for arterial dissection.    CTA brain w/ IV Cont (11.20.19)  Lack of flow related enhancement of the proximal intradural left   vertebral artery. Normal flow-related enhancement of the intradural left   vertebral artery distal to the origin of the left PICA.  Moderate to severe short segment stenosis of the right vertebral artery   just proximal to the foramen magnum. Mild short segment stenosis of the   proximal right intradural vertebral artery.  No AVM. No vascular aneurysm.    CT Head No Cont (11.20.19)  1.  No CT evidence of acute intracranial hemorrhage, extra-axial   collection, mass effect or calvarial fracture.  2.  Age-indeterminate infarct in the posterior aspect of the left   lentiform nucleus is new from 12/03/2017.  Acute infarct is not excluded.    MRI is recommended for further evaluation.    CT Cervical Spine (11.20.19)  No CT evidence of acute cervical spine fracture or   traumatic malalignment.

## 2019-11-25 NOTE — DISCHARGE NOTE NURSING/CASE MANAGEMENT/SOCIAL WORK - NSDCPEPTSTRK_GEN_ALL_CORE
Need for follow up after discharge/Prescribed medications/Risk factors for stroke/Stroke education booklet/Stroke support groups for patients, families, and friends/Signs and symptoms of stroke/Stroke warning signs and symptoms/Call 911 for stroke

## 2019-11-25 NOTE — PROGRESS NOTE ADULT - ASSESSMENT
89 year old woman resident of assisted living facility at baseline conversive ambulating with rolling walker. 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 On 11/20/19 developed R sided facial weakness on exam worsening to total right sided weakness.    Impression: Presumed small vessel infarct affecting the left corona radiata and internal capsule vs cardioembolic.  History of  intracranial atherosclerosis     NEURO: Neurologically with some worsening - likely related to small vessel infarct worsening, now s/p benadryl, Seroquel and xanax for agitation- remains with lethargy will continue to monitor and repeat neuroimaging if it persists and/or screen for metabolic etiology (i.e infection).   Continue monitoring for neurologic deterioration, permissive HTN, LDL 60- continue on home statin, MRI noted above. Physical therapy/OT pending.    ANTITHROMBOTIC THERAPY: Aspirin and Plavix for 3 weeks per [facility] then single antiplatelet alone there after.     PULMONARY: CXR Right basilar reflecting atelectasis versus pneumonia, protecting airway, saturating well, continue to monitor     CARDIOVASCULAR: TTE demonstrates EF 64%, Calcified trileaflet aortic valve with mildly decreased opening. Peak transaortic valve gradient equals 12 mm Hg, mean transaortic valve gradient equals 6 mm Hg, estimated aortic valve area equals 1.7 sqcm (by continuity equation),aortic valve velocity time integral equals 44 cm, consistent with mild aortic stenosis. Increased relative wall thickness with normal left ventricular mass index, consistent with concentric left ventricular remodeling, cardiac monitoring without reported events                              SBP goal: Permissive HTN to gradual normotension. Outpatient screening for AF with long term telemetry monitoring.     GASTROINTESTINAL:  dysphagia screen passed, advance as tolerated.      Diet: Pureed    RENAL: BUN/Cr without acute change, good urine output, on IVF hydration     Na Goal: Greater than 135     Champion: No    HEMATOLOGY: H/H without acute change, Platelets 182, no active bleeding noted      DVT ppx: LMWH     ID: afebrile, no leukocytosis, encourage incentive spirometry and mobility,          DISPOSITION: Rehab or home depending on PT eval once stable and workup is complete    CORE MEASURES:        Admission NIHSS:      TPA: NO      LDL/HDL: 60/44     Depression Screen: nA- unable to participate at this time      Statin Therapy: yes     Dysphagia Screen: PASS     Smoking NO      Afib NO     Stroke Education YES    Obtain screening lower extremity venous ultrasound in patients who meet 1 or more of the following criteria as patient is high risk for DVT/PE on admission:   [] History of DVT/PE  []Hypercoagulable states (Factor V Leiden, Cancer, OCP, etc. )  []Prolonged immobility (hemiplegia/hemiparesis/post operative or any other extended immobilization)  [] Transferred from outside facility (Rehab or Long term care)  [] Age </= to 50

## 2019-11-25 NOTE — PROGRESS NOTE ADULT - SUBJECTIVE AND OBJECTIVE BOX
S: Agitated overnight. No distress. Review of systems otherwise (-)      MEDICATIONS  (STANDING):  amLODIPine   Tablet 5 milliGRAM(s) Oral daily  aspirin enteric coated 81 milliGRAM(s) Oral daily  atorvastatin 40 milliGRAM(s) Oral at bedtime  carvedilol 25 milliGRAM(s) Oral every 12 hours  cloNIDine 0.2 milliGRAM(s) Oral daily  clopidogrel Tablet 75 milliGRAM(s) Oral daily  dextrose 5%. 1000 milliLiter(s) (50 mL/Hr) IV Continuous <Continuous>  dextrose 50% Injectable 12.5 Gram(s) IV Push once  dextrose 50% Injectable 25 Gram(s) IV Push once  dextrose 50% Injectable 25 Gram(s) IV Push once  enoxaparin Injectable 40 milliGRAM(s) SubCutaneous daily  insulin lispro (HumaLOG) corrective regimen sliding scale   SubCutaneous three times a day before meals  insulin lispro (HumaLOG) corrective regimen sliding scale   SubCutaneous at bedtime  levothyroxine 50 MICROGram(s) Oral daily  pantoprazole    Tablet 40 milliGRAM(s) Oral before breakfast  polyethylene glycol 3350 17 Gram(s) Oral daily    MEDICATIONS  (PRN):  ALPRAZolam 0.25 milliGRAM(s) Oral daily PRN anxiety  dextrose 40% Gel 15 Gram(s) Oral once PRN Blood Glucose LESS THAN 70 milliGRAM(s)/deciliter  glucagon  Injectable 1 milliGRAM(s) IntraMuscular once PRN Glucose LESS THAN 70 milligrams/deciliter  haloperidol    Injectable 1 milliGRAM(s) IntraMuscular once PRN Agitation  QUEtiapine 25 milliGRAM(s) Oral at bedtime PRN agitation      LABS:                            12.2   6.68  )-----------( 182      ( 25 Nov 2019 09:15 )             38.0     Hemoglobin: 12.2 g/dL (11-25 @ 09:15)  Hemoglobin: 10.2 g/dL (11-24 @ 06:37)  Hemoglobin: 12.1 g/dL (11-23 @ 17:47)  Hemoglobin: 11.3 g/dL (11-22 @ 07:05)  Hemoglobin: 10.7 g/dL (11-21 @ 06:32)    11-25    143  |  104  |  9   ----------------------------<  159<H>  5.3   |  24  |  0.67    Ca    9.0      25 Nov 2019 05:55      Creatinine Trend: 0.67<--, 0.75<--, 0.77<--, 0.80<--, 0.81<--             11-24-19 @ 07:01  -  11-25-19 @ 07:00  --------------------------------------------------------  IN: 720 mL / OUT: 550 mL / NET: 170 mL    11-25-19 @ 07:01  -  11-25-19 @ 13:06  --------------------------------------------------------  IN: 0 mL / OUT: 0 mL / NET: 0 mL        PHYSICAL EXAM  Vital Signs Last 24 Hrs  T(C): 36.6 (25 Nov 2019 12:43), Max: 36.9 (25 Nov 2019 04:04)  T(F): 97.8 (25 Nov 2019 12:43), Max: 98.4 (25 Nov 2019 04:04)  HR: 66 (25 Nov 2019 12:43) (66 - 90)  BP: 150/77 (25 Nov 2019 12:43) (150/77 - 189/72)  BP(mean): --  RR: 20 (25 Nov 2019 12:43) (16 - 20)  SpO2: 90% (25 Nov 2019 12:43) (90% - 94%)      Gen: NAD  HEENT:  (-)icterus (-)pallor  CV: N S1 S2 1/6 JYOTSNA (+)2 Pulses B/l  Resp:  Clear to auscultation B/L, normal effort  GI: (+) BS Soft, NT, ND  Lymph:  (-)Edema, (-)obvious lymphadenopathy  Skin: Warm to touch, Normal turgor  Psych: Appropriate mood and affect      TELEMETRY: None    ECG: Sinus Rhythm with 1st degree AV block 	    < from: Transthoracic Echocardiogram (12.06.17 @ 09:57) >  Conclusions:  1. Normal left ventricular internal dimensions and wall  thicknesses. Basal septal hypertrophy to 1.4cm.  2. Normal left ventricular systolic function. No segmental  wall motion abnormalities.  3. Mild diastolic dysfunction (Stage I).  4. Normal right ventricular size and function.  5. Agitated saline injection demonstrates no evidence of a  patent foramen ovale.  *** Compared with echocardiogram of 4/30/2014, no  significant changes noted.    < end of copied text >    ECHO : < from: Transthoracic Echocardiogram (11.22.19 @ 16:04) >  ------------------------------------------------------------------------  Conclusions:  1. Calcified trileaflet aortic valve with mildly decreased  opening. Peak transaortic valve gradient equals 12 mm Hg,  mean transaortic valve gradient equals 6 mm Hg, estimated  aortic valve area equals 1.7 sqcm (by continuity equation),  aortic valve velocity time integral equals 44 cm,  consistent with mild aortic stenosis.  2. Increased relative wall thickness with normal left  ventricular mass index, consistent with concentric left  ventricular remodeling.  3. Endocardium not well visualized; grossly normal left  ventricular systolic function.  4. Mild diastolic dysfunction (Stage I).  5. Normal right ventricular size and function.  ------------------------------------------------------------------------  Confirmed on  11/22/2019 - 17:51:33 by Jess Multani M.D.  ------------------------------------------------------------------------    < end of copied text >      ASSESSMENT/PLAN: 89 y/o female with PMHx of HTN, HLD, hx CVA, hypothyroidism presented to ED s/p fall and with right facial droop being evaluated for stroke.     - No evidence of clinical HF or anginal symptoms  - Tele/EKGs unremarkable   - TTE noted above  - Neurology f/u  - Recommend outpatient event monitoring to r/o afib  - No further inpatient cardiac w/u needed at this time    Tommy Green PA-C  [facility]  Pager: [phone]

## 2019-11-25 NOTE — DISCHARGE NOTE NURSING/CASE MANAGEMENT/SOCIAL WORK - PATIENT PORTAL LINK FT
You can access the FollowMyHealth Patient Portal offered by [facility] by registering at the following website: [email or web address]. By joining [employer]’s FollowMyHealth portal, you will also be able to view your health information using other applications (apps) compatible with our system.

## 2019-11-25 NOTE — PROGRESS NOTE ADULT - ATTENDING COMMENTS
Patient seen and examined.  Agree with above.   Recommend outpatient event monitoring to rule out afib  No further inpatient cardiac workup needed at this time.     Cecilia Ladd MD

## 2020-07-13 NOTE — ED PROVIDER NOTE - NSTIMEPROVIDERCAREINITIATE_GEN_ER
Report to Shaye at [facility].      Pilar Krishnan, RN  07/13/20 0814     22-Jul-2017 19:28

## 2020-09-12 ENCOUNTER — INPATIENT (INPATIENT)
Facility: HOSPITAL | Age: 85
LOS: 0 days | Discharge: HOME | DRG: 556 | End: 2020-09-13
Attending: INTERNAL MEDICINE | Admitting: INTERNAL MEDICINE
Payer: COMMERCIAL

## 2020-09-12 VITALS
SYSTOLIC BLOOD PRESSURE: 122 MMHG | HEART RATE: 61 BPM | OXYGEN SATURATION: 100 % | RESPIRATION RATE: 16 BRPM | TEMPERATURE: 96 F | HEIGHT: 61 IN | DIASTOLIC BLOOD PRESSURE: 54 MMHG | WEIGHT: 143.3 LBS

## 2020-09-12 DIAGNOSIS — T68.XXXA HYPOTHERMIA, INITIAL ENCOUNTER: ICD-10-CM

## 2020-09-12 DIAGNOSIS — R26.2 DIFFICULTY IN WALKING, NOT ELSEWHERE CLASSIFIED: ICD-10-CM

## 2020-09-12 DIAGNOSIS — E03.9 HYPOTHYROIDISM, UNSPECIFIED: ICD-10-CM

## 2020-09-12 DIAGNOSIS — I10 ESSENTIAL (PRIMARY) HYPERTENSION: ICD-10-CM

## 2020-09-12 DIAGNOSIS — E78.5 HYPERLIPIDEMIA, UNSPECIFIED: ICD-10-CM
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
## 2020-09-12 DIAGNOSIS — Z96.642 PRESENCE OF LEFT ARTIFICIAL HIP JOINT: Chronic | ICD-10-CM

## 2020-09-12 LAB
ALBUMIN SERPL ELPH-MCNC: 3.4 G/DL — SIGNIFICANT CHANGE UP (ref 3.3–5)
ALP SERPL-CCNC: 82 U/L — SIGNIFICANT CHANGE UP (ref 40–120)
ALT FLD-CCNC: 8 U/L — LOW (ref 10–45)
ANION GAP SERPL CALC-SCNC: 13 MMOL/L — SIGNIFICANT CHANGE UP (ref 5–17)
APTT BLD: 24.8 SEC — LOW (ref 27.5–35.5)
AST SERPL-CCNC: 13 U/L — SIGNIFICANT CHANGE UP (ref 10–40)
BASOPHILS # BLD AUTO: 0.03 K/UL — SIGNIFICANT CHANGE UP (ref 0–0.2)
BASOPHILS NFR BLD AUTO: 0.2 % — SIGNIFICANT CHANGE UP (ref 0–2)
BILIRUB SERPL-MCNC: 0.4 MG/DL — SIGNIFICANT CHANGE UP (ref 0.2–1.2)
BUN SERPL-MCNC: 23 MG/DL — SIGNIFICANT CHANGE UP (ref 7–23)
CALCIUM SERPL-MCNC: 9 MG/DL — SIGNIFICANT CHANGE UP (ref 8.4–10.5)
CHLORIDE SERPL-SCNC: 105 MMOL/L — SIGNIFICANT CHANGE UP (ref 96–108)
CO2 SERPL-SCNC: 24 MMOL/L — SIGNIFICANT CHANGE UP (ref 22–31)
CREAT SERPL-MCNC: 1.05 MG/DL — SIGNIFICANT CHANGE UP (ref 0.5–1.3)
EOSINOPHIL # BLD AUTO: 0.17 K/UL — SIGNIFICANT CHANGE UP (ref 0–0.5)
EOSINOPHIL NFR BLD AUTO: 1.4 % — SIGNIFICANT CHANGE UP (ref 0–6)
GLUCOSE SERPL-MCNC: 145 MG/DL — HIGH (ref 70–99)
HCT VFR BLD CALC: 34 % — LOW (ref 34.5–45)
HGB BLD-MCNC: 10.5 G/DL — LOW (ref 11.5–15.5)
IMM GRANULOCYTES NFR BLD AUTO: 0.5 % — SIGNIFICANT CHANGE UP (ref 0–1.5)
INR BLD: 1.04 RATIO — SIGNIFICANT CHANGE UP (ref 0.88–1.16)
LYMPHOCYTES # BLD AUTO: 0.87 K/UL — LOW (ref 1–3.3)
LYMPHOCYTES # BLD AUTO: 7.1 % — LOW (ref 13–44)
MCHC RBC-ENTMCNC: 30.5 PG — SIGNIFICANT CHANGE UP (ref 27–34)
MCHC RBC-ENTMCNC: 30.9 GM/DL — LOW (ref 32–36)
MCV RBC AUTO: 98.8 FL — SIGNIFICANT CHANGE UP (ref 80–100)
MONOCYTES # BLD AUTO: 0.68 K/UL — SIGNIFICANT CHANGE UP (ref 0–0.9)
MONOCYTES NFR BLD AUTO: 5.6 % — SIGNIFICANT CHANGE UP (ref 2–14)
NEUTROPHILS # BLD AUTO: 10.4 K/UL — HIGH (ref 1.8–7.4)
NEUTROPHILS NFR BLD AUTO: 85.2 % — HIGH (ref 43–77)
NRBC # BLD: 0 /100 WBCS — SIGNIFICANT CHANGE UP (ref 0–0)
PLATELET # BLD AUTO: 206 K/UL — SIGNIFICANT CHANGE UP (ref 150–400)
POTASSIUM SERPL-MCNC: 4.9 MMOL/L — SIGNIFICANT CHANGE UP (ref 3.5–5.3)
POTASSIUM SERPL-SCNC: 4.9 MMOL/L — SIGNIFICANT CHANGE UP (ref 3.5–5.3)
PROT SERPL-MCNC: 6 G/DL — SIGNIFICANT CHANGE UP (ref 6–8.3)
PROTHROM AB SERPL-ACNC: 12.3 SEC — SIGNIFICANT CHANGE UP (ref 10.6–13.6)
RBC # BLD: 3.44 M/UL — LOW (ref 3.8–5.2)
RBC # FLD: 14.2 % — SIGNIFICANT CHANGE UP (ref 10.3–14.5)
SODIUM SERPL-SCNC: 142 MMOL/L — SIGNIFICANT CHANGE UP (ref 135–145)
TROPONIN T, HIGH SENSITIVITY RESULT: 21 NG/L — SIGNIFICANT CHANGE UP (ref 0–51)
TROPONIN T, HIGH SENSITIVITY RESULT: 21 NG/L — SIGNIFICANT CHANGE UP (ref 0–51)
WBC # BLD: 12.21 K/UL — HIGH (ref 3.8–10.5)
WBC # FLD AUTO: 12.21 K/UL — HIGH (ref 3.8–10.5)

## 2020-09-12 PROCEDURE — 70450 CT HEAD/BRAIN W/O DYE: CPT | Mod: 26

## 2020-09-12 PROCEDURE — 73552 X-RAY EXAM OF FEMUR 2/>: CPT | Mod: 26,RT

## 2020-09-12 PROCEDURE — 73090 X-RAY EXAM OF FOREARM: CPT | Mod: 26,LT

## 2020-09-12 PROCEDURE — 73502 X-RAY EXAM HIP UNI 2-3 VIEWS: CPT | Mod: 26,RT

## 2020-09-12 PROCEDURE — 73110 X-RAY EXAM OF WRIST: CPT | Mod: 26,LT

## 2020-09-12 PROCEDURE — 72192 CT PELVIS W/O DYE: CPT | Mod: 26

## 2020-09-12 PROCEDURE — 73130 X-RAY EXAM OF HAND: CPT | Mod: 26,LT

## 2020-09-12 PROCEDURE — 72125 CT NECK SPINE W/O DYE: CPT | Mod: 26

## 2020-09-12 PROCEDURE — 71045 X-RAY EXAM CHEST 1 VIEW: CPT | Mod: 26

## 2020-09-12 PROCEDURE — 99285 EMERGENCY DEPT VISIT HI MDM: CPT | Mod: GC

## 2020-09-12 RX ORDER — ACETAMINOPHEN 500 MG
650 TABLET ORAL EVERY 12 HOURS
Refills: 0 | Status: DISCONTINUED | OUTPATIENT
Start: 2020-09-12 | End: 2020-09-13

## 2020-09-12 RX ORDER — ACETAMINOPHEN 500 MG
650 TABLET ORAL ONCE
Refills: 0 | Status: COMPLETED | OUTPATIENT
Start: 2020-09-12 | End: 2020-09-12

## 2020-09-12 RX ORDER — CLOPIDOGREL BISULFATE 75 MG/1
75 TABLET, FILM COATED ORAL DAILY
Refills: 0 | Status: DISCONTINUED | OUTPATIENT
Start: 2020-09-12 | End: 2020-09-13

## 2020-09-12 RX ORDER — ENOXAPARIN SODIUM 100 MG/ML
30 INJECTION SUBCUTANEOUS DAILY
Refills: 0 | Status: DISCONTINUED | OUTPATIENT
Start: 2020-09-12 | End: 2020-09-13

## 2020-09-12 RX ORDER — PANTOPRAZOLE SODIUM 20 MG/1
1 TABLET, DELAYED RELEASE ORAL
Qty: 30 | Refills: 0

## 2020-09-12 RX ORDER — ALPRAZOLAM 0.25 MG
1 TABLET ORAL
Qty: 3 | Refills: 0

## 2020-09-12 RX ORDER — FOLIC ACID 0.8 MG
1 TABLET ORAL DAILY
Refills: 0 | Status: DISCONTINUED | OUTPATIENT
Start: 2020-09-12 | End: 2020-09-13

## 2020-09-12 RX ORDER — AMLODIPINE BESYLATE 2.5 MG/1
5 TABLET ORAL DAILY
Refills: 0 | Status: DISCONTINUED | OUTPATIENT
Start: 2020-09-12 | End: 2020-09-13

## 2020-09-12 RX ORDER — SENNA PLUS 8.6 MG/1
10 TABLET ORAL AT BEDTIME
Refills: 0 | Status: DISCONTINUED | OUTPATIENT
Start: 2020-09-12 | End: 2020-09-13

## 2020-09-12 RX ORDER — LEVOTHYROXINE SODIUM 125 MCG
50 TABLET ORAL DAILY
Refills: 0 | Status: DISCONTINUED | OUTPATIENT
Start: 2020-09-12 | End: 2020-09-13

## 2020-09-12 RX ORDER — ATORVASTATIN CALCIUM 80 MG/1
40 TABLET, FILM COATED ORAL AT BEDTIME
Refills: 0 | Status: DISCONTINUED | OUTPATIENT
Start: 2020-09-12 | End: 2020-09-13

## 2020-09-12 RX ORDER — CHOLECALCIFEROL (VITAMIN D3) 125 MCG
1000 CAPSULE ORAL DAILY
Refills: 0 | Status: DISCONTINUED | OUTPATIENT
Start: 2020-09-12 | End: 2020-09-13

## 2020-09-12 RX ORDER — CARVEDILOL PHOSPHATE 80 MG/1
25 CAPSULE, EXTENDED RELEASE ORAL EVERY 12 HOURS
Refills: 0 | Status: DISCONTINUED | OUTPATIENT
Start: 2020-09-12 | End: 2020-09-13

## 2020-09-12 RX ORDER — ALPRAZOLAM 0.25 MG
0.25 TABLET ORAL EVERY 6 HOURS
Refills: 0 | Status: DISCONTINUED | OUTPATIENT
Start: 2020-09-12 | End: 2020-09-13

## 2020-09-12 RX ORDER — ASPIRIN/CALCIUM CARB/MAGNESIUM 324 MG
81 TABLET ORAL DAILY
Refills: 0 | Status: DISCONTINUED | OUTPATIENT
Start: 2020-09-12 | End: 2020-09-13

## 2020-09-12 RX ADMIN — Medication 650 MILLIGRAM(S): at 19:21

## 2020-09-12 RX ADMIN — Medication 650 MILLIGRAM(S): at 18:25

## 2020-09-12 NOTE — ED PROVIDER NOTE - OBJECTIVE STATEMENT
89 yoF, PMHX of HTN, ?CVA, on anti coagulation, hypothyroidism presenting by EMS from [facility] for unwitnessed fall at 14:30. Legally blind. Remembers fall and describes losing her balance and having mechanical fall. No dizziness. Denies any fever, cough, NVD, SOB, recent illness. C/o LUE and R hip pain.   Limited hx due to memory issues.

## 2020-09-12 NOTE — H&P ADULT - ASSESSMENT
89 yoF, PMHX of HTN, ?CVA, on anti coagulation, hypothyroidism presenting by EMS from [facility] for unwitnessed fall at 14:30. Legally blind. Remembers fall and describes losing her balance and having mechanical fall. No dizziness. Denies any fever, cough, NVD, SOB, recent illness. C/o LUE and R hip pain.   Limited hx due to memory issues. 89 yoF, PMHX of HTN, ?CVA, hypothyroidism presenting by EMS from [facility] for unwitnessed fall at 14:30. Legally blind. Remembers fall and describes losing her balance and having mechanical fall. No dizziness. Denies any fever, cough, NVD, SOB, recent illness. C/o LUE and R hip pain.

## 2020-09-12 NOTE — ED PROVIDER NOTE - PHYSICAL EXAMINATION
General: alert, conversant, looks well, vitals reassuring  Head: normocephalic, frontal abrasions, hemostatic   Eyes: PERRL, EOMI, no scleral icterus  ENT: no epistaxis, moist mucous membranes, normal phonation, airway patent  Neck: full ROM, no midline ttp  CV: RRR, no murmurs, HDS  Pulm: lungs CTA b/l, no wheezing, no respiratory distress  GI: abd soft, non tender, no guarding/rebound/masses  Back: normal ROM, no midline ttp, no signs of trauma  Extremities: pelvis stable but pain with compression of R hip, pain with ROM, no deformity, distal neurovascular intact, swelling/ecchymosis to LUE, wrist, 2nd digit, neurovascular exam intact, joints stable proximally, RUE/LLE unremarkable, no clavicular tttp   Neuro: alert, oriented to self, place, not date/year, PERRL, 5/5 strength in extremities, no facial droop, PERRL, impaired memory (likely baseline)  Derm: warm, dry, normal color, abrasions to frontal

## 2020-09-12 NOTE — H&P ADULT - RS GEN PE MLT RESP DETAILS PC
normal/breath sounds equal/respirations non-labored/clear to auscultation bilaterally/airway patent/no chest wall tenderness/good air movement

## 2020-09-12 NOTE — ED ADULT NURSE NOTE - NSIMPLEMENTINTERV_GEN_ALL_ED
Implemented All Fall Risk Interventions:  [place] to call system. Call bell, personal items and telephone within reach. Instruct patient to call for assistance. Room bathroom lighting operational. Non-slip footwear when patient is off stretcher. Physically safe environment: no spills, clutter or unnecessary equipment. Stretcher in lowest position, wheels locked, appropriate side rails in place. Provide visual cue, wrist band, yellow gown, etc. Monitor gait and stability. Monitor for mental status changes and reorient to person, place, and time. Review medications for side effects contributing to fall risk. Reinforce activity limits and safety measures with patient and family.

## 2020-09-12 NOTE — ED PROVIDER NOTE - ATTENDING CONTRIBUTION TO CARE
89 y/o f with pmhx HTN, HLD, hypothyroidism and prior stroke with multifocal cranial atherosclerosis who presented from [facility] for fall, unwitnessed. patient does not recall all the events of the fall. no vomiting no chest pain no weakness.   GCS: 15  Primary Survey ABCDE intact  Secondary Survey:  Gen:  No respiratory Distress  /no distress from pain  HEENT: superficial abrasion on forehead, no hematoma. pupils 3 mm reactive to light equally,   EOMI  NO Raccoon Eyes/ Daley Sign/ Neck: C- spine non tender. no step off or deformity. tm clear.   Lungs: breath sounds: b/l  CVS: S1S2,    Distal Pulses:  Abd: soft non tender no distention  Extremities: edema and echymosis of left wrist and first ./second digits. right hip ttp. with limited range of motion. distal neurovasc intact  MSK: strength: 5/5 b/l upper and lower ext, moving all ext spontaneously  Back: no midline tend or step off  Neuro: aaox3 no foal deficits.

## 2020-09-12 NOTE — ED ADULT NURSE NOTE - OBJECTIVE STATEMENT
89 yo F aaox3 c/o of unwitnessed fall occurred this afternoon. pt reports losing her balance and then falling today. PMH Legally blind, CVA  IV line placed labs drawn and sent. Provider at bedside evaluating. Takes daily aspirin. Bruise noted to left wrist, and Abrasion noted to Forehead. No other sign of injuries.

## 2020-09-12 NOTE — ED PROVIDER NOTE - CLINICAL SUMMARY MEDICAL DECISION MAKING FREE TEXT BOX
ATTG: : fall unknown cause, check labs, check ct head, c spine, xray chest, pelvis, hip, hand and wrist. pain medication, re eval for dispo

## 2020-09-13 ENCOUNTER — TRANSCRIPTION ENCOUNTER (OUTPATIENT)
Age: 85
End: 2020-09-13

## 2020-09-13 VITALS — OXYGEN SATURATION: 95 % | SYSTOLIC BLOOD PRESSURE: 121 MMHG | DIASTOLIC BLOOD PRESSURE: 58 MMHG | HEART RATE: 69 BPM

## 2020-09-13 LAB
ANION GAP SERPL CALC-SCNC: 14 MMOL/L — SIGNIFICANT CHANGE UP (ref 5–17)
BUN SERPL-MCNC: 22 MG/DL — SIGNIFICANT CHANGE UP (ref 7–23)
CALCIUM SERPL-MCNC: 9.1 MG/DL — SIGNIFICANT CHANGE UP (ref 8.4–10.5)
CHLORIDE SERPL-SCNC: 104 MMOL/L — SIGNIFICANT CHANGE UP (ref 96–108)
CO2 SERPL-SCNC: 24 MMOL/L — SIGNIFICANT CHANGE UP (ref 22–31)
CREAT SERPL-MCNC: 0.91 MG/DL — SIGNIFICANT CHANGE UP (ref 0.5–1.3)
GLUCOSE SERPL-MCNC: 90 MG/DL — SIGNIFICANT CHANGE UP (ref 70–99)
HCT VFR BLD CALC: 33.9 % — LOW (ref 34.5–45)
HGB BLD-MCNC: 10.6 G/DL — LOW (ref 11.5–15.5)
MCHC RBC-ENTMCNC: 30.5 PG — SIGNIFICANT CHANGE UP (ref 27–34)
MCHC RBC-ENTMCNC: 31.3 GM/DL — LOW (ref 32–36)
MCV RBC AUTO: 97.4 FL — SIGNIFICANT CHANGE UP (ref 80–100)
NRBC # BLD: 0 /100 WBCS — SIGNIFICANT CHANGE UP (ref 0–0)
PLATELET # BLD AUTO: 192 K/UL — SIGNIFICANT CHANGE UP (ref 150–400)
POTASSIUM SERPL-MCNC: 3.9 MMOL/L — SIGNIFICANT CHANGE UP (ref 3.5–5.3)
POTASSIUM SERPL-SCNC: 3.9 MMOL/L — SIGNIFICANT CHANGE UP (ref 3.5–5.3)
RBC # BLD: 3.48 M/UL — LOW (ref 3.8–5.2)
RBC # FLD: 14.2 % — SIGNIFICANT CHANGE UP (ref 10.3–14.5)
SARS-COV-2 RNA SPEC QL NAA+PROBE: SIGNIFICANT CHANGE UP
SODIUM SERPL-SCNC: 142 MMOL/L — SIGNIFICANT CHANGE UP (ref 135–145)
WBC # BLD: 8.95 K/UL — SIGNIFICANT CHANGE UP (ref 3.8–10.5)
WBC # FLD AUTO: 8.95 K/UL — SIGNIFICANT CHANGE UP (ref 3.8–10.5)

## 2020-09-13 PROCEDURE — 87040 BLOOD CULTURE FOR BACTERIA: CPT

## 2020-09-13 PROCEDURE — 73090 X-RAY EXAM OF FOREARM: CPT

## 2020-09-13 PROCEDURE — 84484 ASSAY OF TROPONIN QUANT: CPT

## 2020-09-13 PROCEDURE — 73502 X-RAY EXAM HIP UNI 2-3 VIEWS: CPT

## 2020-09-13 PROCEDURE — 73110 X-RAY EXAM OF WRIST: CPT

## 2020-09-13 PROCEDURE — 73130 X-RAY EXAM OF HAND: CPT

## 2020-09-13 PROCEDURE — U0003: CPT

## 2020-09-13 PROCEDURE — 70450 CT HEAD/BRAIN W/O DYE: CPT

## 2020-09-13 PROCEDURE — 93005 ELECTROCARDIOGRAM TRACING: CPT

## 2020-09-13 PROCEDURE — 76377 3D RENDER W/INTRP POSTPROCES: CPT

## 2020-09-13 PROCEDURE — 73552 X-RAY EXAM OF FEMUR 2/>: CPT

## 2020-09-13 PROCEDURE — 80053 COMPREHEN METABOLIC PANEL: CPT

## 2020-09-13 PROCEDURE — 85610 PROTHROMBIN TIME: CPT

## 2020-09-13 PROCEDURE — 87150 DNA/RNA AMPLIFIED PROBE: CPT

## 2020-09-13 PROCEDURE — 85027 COMPLETE CBC AUTOMATED: CPT

## 2020-09-13 PROCEDURE — 80048 BASIC METABOLIC PNL TOTAL CA: CPT

## 2020-09-13 PROCEDURE — 72192 CT PELVIS W/O DYE: CPT

## 2020-09-13 PROCEDURE — 99285 EMERGENCY DEPT VISIT HI MDM: CPT | Mod: 25

## 2020-09-13 PROCEDURE — 72125 CT NECK SPINE W/O DYE: CPT

## 2020-09-13 PROCEDURE — 97162 PT EVAL MOD COMPLEX 30 MIN: CPT

## 2020-09-13 PROCEDURE — 85025 COMPLETE CBC W/AUTO DIFF WBC: CPT

## 2020-09-13 PROCEDURE — 71045 X-RAY EXAM CHEST 1 VIEW: CPT

## 2020-09-13 PROCEDURE — 85730 THROMBOPLASTIN TIME PARTIAL: CPT

## 2020-09-13 PROCEDURE — 36415 COLL VENOUS BLD VENIPUNCTURE: CPT

## 2020-09-13 RX ORDER — CARVEDILOL PHOSPHATE 80 MG/1
1 CAPSULE, EXTENDED RELEASE ORAL
Qty: 60 | Refills: 0

## 2020-09-13 RX ORDER — AMLODIPINE BESYLATE 2.5 MG/1
1 TABLET ORAL
Qty: 0 | Refills: 0 | DISCHARGE
Start: 2020-09-13

## 2020-09-13 RX ORDER — FOLIC ACID 0.8 MG
1 TABLET ORAL
Qty: 30 | Refills: 0

## 2020-09-13 RX ORDER — AMLODIPINE BESYLATE 2.5 MG/1
1 TABLET ORAL
Qty: 0 | Refills: 0 | DISCHARGE

## 2020-09-13 RX ORDER — CHOLECALCIFEROL (VITAMIN D3) 125 MCG
1 CAPSULE ORAL
Qty: 0 | Refills: 0 | DISCHARGE

## 2020-09-13 RX ORDER — LEVOTHYROXINE SODIUM 125 MCG
1 TABLET ORAL
Qty: 30 | Refills: 0

## 2020-09-13 RX ORDER — CLOPIDOGREL BISULFATE 75 MG/1
1 TABLET, FILM COATED ORAL
Qty: 0 | Refills: 0 | DISCHARGE
Start: 2020-09-13

## 2020-09-13 RX ORDER — SENNA PLUS 8.6 MG/1
10 TABLET ORAL
Qty: 0 | Refills: 0 | DISCHARGE
Start: 2020-09-13

## 2020-09-13 RX ORDER — CLOPIDOGREL BISULFATE 75 MG/1
1 TABLET, FILM COATED ORAL
Qty: 0 | Refills: 0 | DISCHARGE

## 2020-09-13 RX ORDER — ATORVASTATIN CALCIUM 80 MG/1
1 TABLET, FILM COATED ORAL
Qty: 0 | Refills: 0 | DISCHARGE
Start: 2020-09-13

## 2020-09-13 RX ORDER — ATORVASTATIN CALCIUM 80 MG/1
1 TABLET, FILM COATED ORAL
Qty: 30 | Refills: 0

## 2020-09-13 RX ORDER — ASPIRIN/CALCIUM CARB/MAGNESIUM 324 MG
1 TABLET ORAL
Qty: 0 | Refills: 0 | DISCHARGE

## 2020-09-13 RX ORDER — CHOLECALCIFEROL (VITAMIN D3) 125 MCG
1000 CAPSULE ORAL
Qty: 0 | Refills: 0 | DISCHARGE
Start: 2020-09-13

## 2020-09-13 RX ORDER — FOLIC ACID 0.8 MG
1 TABLET ORAL
Qty: 0 | Refills: 0 | DISCHARGE
Start: 2020-09-13

## 2020-09-13 RX ORDER — LEVOTHYROXINE SODIUM 125 MCG
1 TABLET ORAL
Qty: 0 | Refills: 0 | DISCHARGE
Start: 2020-09-13

## 2020-09-13 RX ORDER — ALPRAZOLAM 0.25 MG
1 TABLET ORAL
Qty: 0 | Refills: 0 | DISCHARGE

## 2020-09-13 RX ORDER — ASPIRIN/CALCIUM CARB/MAGNESIUM 324 MG
1 TABLET ORAL
Qty: 0 | Refills: 0 | DISCHARGE
Start: 2020-09-13

## 2020-09-13 RX ORDER — SENNA PLUS 8.6 MG/1
2 TABLET ORAL
Qty: 0 | Refills: 0 | DISCHARGE
Start: 2020-09-13

## 2020-09-13 RX ORDER — ALPRAZOLAM 0.25 MG
1 TABLET ORAL
Qty: 0 | Refills: 0 | DISCHARGE
Start: 2020-09-13

## 2020-09-13 RX ORDER — CARVEDILOL PHOSPHATE 80 MG/1
1 CAPSULE, EXTENDED RELEASE ORAL
Qty: 0 | Refills: 0 | DISCHARGE
Start: 2020-09-13

## 2020-09-13 RX ORDER — SENNA PLUS 8.6 MG/1
2 TABLET ORAL
Qty: 60 | Refills: 0

## 2020-09-13 RX ADMIN — Medication 0.2 MILLIGRAM(S): at 05:10

## 2020-09-13 RX ADMIN — AMLODIPINE BESYLATE 5 MILLIGRAM(S): 2.5 TABLET ORAL at 05:09

## 2020-09-13 RX ADMIN — CLOPIDOGREL BISULFATE 75 MILLIGRAM(S): 75 TABLET, FILM COATED ORAL at 11:08

## 2020-09-13 RX ADMIN — ENOXAPARIN SODIUM 30 MILLIGRAM(S): 100 INJECTION SUBCUTANEOUS at 11:08

## 2020-09-13 RX ADMIN — Medication 1 MILLIGRAM(S): at 11:08

## 2020-09-13 RX ADMIN — Medication 1 TABLET(S): at 11:07

## 2020-09-13 RX ADMIN — CARVEDILOL PHOSPHATE 25 MILLIGRAM(S): 80 CAPSULE, EXTENDED RELEASE ORAL at 05:10

## 2020-09-13 RX ADMIN — Medication 50 MICROGRAM(S): at 05:10

## 2020-09-13 RX ADMIN — Medication 1000 UNIT(S): at 11:08

## 2020-09-13 RX ADMIN — Medication 81 MILLIGRAM(S): at 11:08

## 2020-09-13 NOTE — DISCHARGE NOTE PROVIDER - NSDCCPCAREPLAN_GEN_ALL_CORE_FT
PRINCIPAL DISCHARGE DIAGNOSIS  Diagnosis: Inability to ambulate due to hip  Assessment and Plan of Treatment: fall; PT eval completed 9/13/20  pt. to return to [facility] per Dr. Reyes   RX home PT given to        PRINCIPAL DISCHARGE DIAGNOSIS  Diagnosis: Inability to ambulate due to hip  Assessment and Plan of Treatment: fall; PT eval completed 9/13/20  pt. to return to [facility] per Dr. Reyes   RX home PT given to       SECONDARY DISCHARGE DIAGNOSES  Diagnosis: Cerebrovascular accident (CVA)  Assessment and Plan of Treatment: in past

## 2020-09-13 NOTE — DISCHARGE NOTE PROVIDER - NSDCMRMEDTOKEN_GEN_ALL_CORE_FT
acetaminophen 325 mg oral tablet: 2 tab(s) orally 2 times a day  ALPRAZolam 0.25 mg oral tablet: 1 tab(s) orally every 6 hours, As needed, anxiety  amLODIPine 5 mg oral tablet: 1 tab(s) orally once a day  aspirin 81 mg oral tablet, chewable: 1 tab(s) orally once a day  atorvastatin 40 mg oral tablet: 1 tab(s) orally once a day (at bedtime)  carvedilol 25 mg oral tablet: 1 tab(s) orally every 12 hours  cholecalciferol oral tablet: 1000 unit(s) orally once a day  cloNIDine 0.2 mg oral tablet: 1 tab(s) orally once a day  clopidogrel 75 mg oral tablet: 1 tab(s) orally once a day  Colace 100 mg oral capsule: 3 cap(s) orally once a day (at bedtime)  folic acid 1 mg oral tablet: 1 tab(s) orally once a day  levothyroxine 50 mcg (0.05 mg) oral tablet: 1 tab(s) orally once a day  Multiple Vitamins oral tablet: 1 tab(s) orally once a day  PT: PT 2-3 times /wk at assisted living facility  PT eval done 9/13/20 as inpatient [facility]  senna 8.8 mg/5 mL oral syrup: 10 milliliter(s) orally once a day (at bedtime)

## 2020-09-13 NOTE — DISCHARGE NOTE PROVIDER - HOSPITAL COURSE
89 yoF, PMHX of HTN, ?CVA, hypothyroidism presenting by EMS from [facility] for unwitnessed fall at 14:30. Legally blind. Remembers fall and describes losing her balance and having mechanical fall. No dizziness. Denies any fever, cough, NVD, SOB, recent illness. C/o LUE and R hip pain.      Problem/Plan - 1:  ·  Problem: Fall.  Plan: X Rays and CT scans noted . No fractures.   PT eval done; pt. cleared for d/c back to [facility] by. Din   89 yoF, PMHX of HTN, ?CVA, hypothyroidism presenting by EMS from [facility] for unwitnessed fall at 14:30. Legally blind. Remembers fall and describes losing her balance and having mechanical fall. No dizziness. Denies any fever, cough, NVD, SOB, recent illness. C/o LUE and R hip pain.      Problem/Plan - 1:  ·  Problem: Fall.  Plan: X Rays and CT scans noted . No fractures.   PT eval done; pt. cleared for d/c back to [facility] byDr. Din     Problem/Plan - 2:  ·  Problem: Hypothermia.  Plan: Will check rectal temp . Septic work up pending . TTFT ordered.      Problem/Plan - 3:  ·  Problem: Inability to ambulate due to hip.  Plan: No fracture so pain control and PT consult.      Problem/Plan - 4:  ·  Problem: Hypothyroid.  Plan: Home dose synthroid .      Problem/Plan - 5:  ·  Problem: HTN (hypertension).  Plan: BP meds with hold parameters.      Problem/Plan - 6:  Problem: HLD (hyperlipidemia). Plan: Statin.    D/W Pts family member and physician Keith who would like her back to [place] and f/u cultutre results outpt.

## 2020-09-13 NOTE — DISCHARGE NOTE NURSING/CASE MANAGEMENT/SOCIAL WORK - PATIENT PORTAL LINK FT
You can access the FollowMyHealth Patient Portal offered by [facility] by registering at the following website: [email or web address]. By joining [employer]’s FollowMyHealth portal, you will also be able to view your health information using other applications (apps) compatible with our system.

## 2020-09-13 NOTE — DISCHARGE NOTE PROVIDER - PROVIDER TOKENS
FREE:[LAST:[Hammad],PHONE:[(   )    -],FAX:[(   )    -],ADDRESS:[Dr. ochoa @ [facility]],FOLLOWUP:[1-3 days]] FREE:[LAST:[Hammad],PHONE:[(   )    -],FAX:[(   )    -],ADDRESS:[Dr. ochoa @ [facility]],FOLLOWUP:[1-3 days]],FREE:[LAST:[Kizzy],FIRST:[lodha],PHONE:[(   )    -],FAX:[(   )    -],ADDRESS:[PMD]]

## 2020-09-13 NOTE — DISCHARGE NOTE NURSING/CASE MANAGEMENT/SOCIAL WORK - NSDCPEPTSTRK_GEN_ALL_CORE
Stroke warning signs and symptoms/Signs and symptoms of stroke/Stroke support groups for patients, families, and friends/Stroke education booklet/Need for follow up after discharge/Risk factors for stroke/Prescribed medications/Call 911 for stroke

## 2020-09-13 NOTE — PHYSICAL THERAPY INITIAL EVALUATION ADULT - PERTINENT HX OF CURRENT PROBLEM, REHAB EVAL
89 y/o F, PMHX of HTN, ?CVA, hypothyroidism, legally blind. BIBEMS from [facility] for unwitnessed fall described losing her balance and having mechanical fall.CTH: No acute hemorrhage, hydrocephalus or midline shift. Multiple chronic infarcts. Right thalamic lucency consistent with infarct of indeterminate age. CTCS (-). CTP (-).

## 2020-09-14 LAB
-  COAGULASE NEGATIVE STAPHYLOCOCCUS: SIGNIFICANT CHANGE UP
GRAM STN FLD: SIGNIFICANT CHANGE UP
METHOD TYPE: SIGNIFICANT CHANGE UP

## 2020-09-14 NOTE — CHART NOTE - NSCHARTNOTEFT_GEN_A_CORE
lab called and one bottle growing coag negative staph . I called pts family Ackert on [phone] and left message.

## 2020-09-15 LAB
CULTURE RESULTS: SIGNIFICANT CHANGE UP
ORGANISM # SPEC MICROSCOPIC CNT: SIGNIFICANT CHANGE UP
ORGANISM # SPEC MICROSCOPIC CNT: SIGNIFICANT CHANGE UP
SPECIMEN SOURCE: SIGNIFICANT CHANGE UP

## 2020-09-18 LAB
CULTURE RESULTS: SIGNIFICANT CHANGE UP
SPECIMEN SOURCE: SIGNIFICANT CHANGE UP

## 2020-12-25 NOTE — PROGRESS NOTE ADULT - ASSESSMENT
Patient is refusing to keep BP cuff on.    89 year old female with PMH CVA in 2013 with B/L occipital and RIGHT inferior- cerebellar hemisphere infarcts, Hypothyroidism, s/p mechanical fall at assisted living, with right upper extremity rash: 89 year old female with PMH CVA in 2013 with B/L occipital and RIGHT inferior- cerebellar hemisphere infarcts, Hypothyroidism, s/p mechanical fall at assisted living, with right upper extremity rash, stable for d/c today.       More than 35 minutes spent coordinating discharge and care planning.

## 2021-01-28 NOTE — OCCUPATIONAL THERAPY INITIAL EVALUATION ADULT - LEVEL OF INDEPENDENCE: SIT/SUPINE, REHAB EVAL
Detail Level: Detailed Note Text (......Xxx Chief Complaint.): This diagnosis correlates with the Render Risk Assessment In Note?: no Other (Free Text): One spot LN2 none maximum assist (25% patients effort)
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
## 2021-06-22 NOTE — ED ADULT NURSE NOTE - CHIEF COMPLAINT QUOTE
fall right side pain denies LOC
[TextBox_4] : 70 year old female with pmh pancreatic cancer with mets to the lungs currently on chemotherapy presents with a cough. She states the cough began in May after her last dose of the coronavirus vaccine.  She has the constant need to clear her throat and she also has a productive cough at times.  There is no specific time of day when the cough is better or worse.  She had tried some natural home remedies which said helped a little.

## 2022-01-22 NOTE — ED PROVIDER NOTE - CARDIAC, MLM
IV discontinued, cath removed intact
Normal rate, regular rhythm.  Heart sounds S1, S2.  No murmurs, rubs or gallops.

## 2022-05-07 NOTE — PROGRESS NOTE ADULT - SUBJECTIVE AND OBJECTIVE BOX
"Pt comes into the ER today c/o abdominal cramping/contractions that started about an hour ago. They are in her lower abdomen. 6/10 \"steady\". She is 37 weeks pregnant.      Triage Assessment     Row Name 05/06/22 4554       Triage Assessment (Adult)    Airway WDL WDL       Respiratory WDL    Respiratory WDL WDL       Skin Circulation/Temperature WDL    Skin Circulation/Temperature WDL WDL       Cardiac WDL    Cardiac WDL WDL       Peripheral/Neurovascular WDL    Peripheral Neurovascular WDL WDL       Cognitive/Neuro/Behavioral WDL    Cognitive/Neuro/Behavioral WDL WDL              " 89 y/o female hx of CVA on ASA plavix, w/ bilateral vision loss, HTN, HLD, not on AC presents from assisted living [place] with a fall. Per patient, pants were wet, so she walking to her room and she tripped and hit face on the floor. No LOC. Normally walks with a walker but was not using it today. Reports L sided face pain, R knee pain, L foot and ankle pain, and chipped front tooth. Was unable to get off floor by herself - aid had to help - on floor for 5 minutes. Denies any Chest pain, SOB, N/V/D, confusion. A+O x 3. Been following with Derm for R arm rash - will find out biopsy results on Monday.         MEDICATIONS  (STANDING):  amLODIPine   Tablet 5 milliGRAM(s) Oral two times a day  ascorbic acid 500 milliGRAM(s) Oral two times a day  aspirin enteric coated 81 milliGRAM(s) Oral daily  atorvastatin 40 milliGRAM(s) Oral at bedtime  carvedilol 25 milliGRAM(s) Oral every 12 hours  cloNIDine 0.2 milliGRAM(s) Oral daily  clopidogrel Tablet 75 milliGRAM(s) Oral daily  dextrose 5%. 1000 milliLiter(s) (50 mL/Hr) IV Continuous <Continuous>  dextrose 50% Injectable 12.5 Gram(s) IV Push once  dextrose 50% Injectable 25 Gram(s) IV Push once  dextrose 50% Injectable 25 Gram(s) IV Push once  docusate sodium 100 milliGRAM(s) Oral three times a day  enoxaparin Injectable 30 milliGRAM(s) SubCutaneous daily  folic acid 1 milliGRAM(s) Oral daily  insulin glargine Injectable (LANTUS) 10 Unit(s) SubCutaneous at bedtime  insulin lispro (HumaLOG) corrective regimen sliding scale   SubCutaneous three times a day before meals  insulin lispro (HumaLOG) corrective regimen sliding scale   SubCutaneous at bedtime  insulin lispro Injectable (HumaLOG) 3 Unit(s) SubCutaneous three times a day before meals  levothyroxine 50 MICROGram(s) Oral daily  multivitamin 1 Tablet(s) Oral daily  pantoprazole    Tablet 40 milliGRAM(s) Oral before breakfast  polyethylene glycol 3350 17 Gram(s) Oral daily  QUEtiapine 25 milliGRAM(s) Oral at bedtime    MEDICATIONS  (PRN):  acetaminophen   Tablet 650 milliGRAM(s) Oral every 6 hours PRN For Temp greater than 38 C (100.4 F)  ALPRAZolam 0.25 milliGRAM(s) Oral daily PRN anxiety  benzocaine 15 mG/menthol 3.6 mG Lozenge 1 Lozenge Oral every 2 hours PRN Sore Throat  calcium carbonate 500 mG (Tums) Chewable 3 Tablet(s) Chew every 6 hours PRN Dyspepsia  dextrose Gel 1 Dose(s) Oral once PRN Blood Glucose LESS THAN 70 milliGRAM(s)/deciliter  diphenhydrAMINE   Capsule 50 milliGRAM(s) Oral every 4 hours PRN Rash and/or Itching  glucagon  Injectable 1 milliGRAM(s) IntraMuscular once PRN Glucose LESS THAN 70 milligrams/deciliter  HYDROmorphone  Injectable 0.5 milliGRAM(s) IV Push every 4 hours PRN breakthrough  ondansetron Injectable 4 milliGRAM(s) IV Push every 6 hours PRN Nausea and/or Vomiting  oxyCODONE    IR 5 milliGRAM(s) Oral every 4 hours PRN Severe Pain (7 - 10)  senna 2 Tablet(s) Oral at bedtime PRN Constipation  traMADol 25 milliGRAM(s) Oral every 4 hours PRN Mild Pain (1 - 3)  traMADol 50 milliGRAM(s) Oral every 8 hours PRN Moderate Pain (4 - 6)    REVIEW OF SYSTEM:  CONSTITUTIONAL: No fever, No change in weight, No fatigue  HEAD: No headache, No dizziness, No recent trauma  EYES: BLIND  ENT:  No difficulty hearing, No tinnitus, No vertigo, No sinus pain, No throat pain  NECK: No pain, No stiffness  RESPIRATORY: No cough, No wheezing, No chills, No hemoptysis, No shortness of breath at rest or exertional shortness of breath  CARDIOVASCULAR: No chest pain, No palpitations, No dizziness, No CHF, No arrhythmia, No cardiomegaly, No leg swelling  GASTROINTESTINAL: No abdominal, No epigastric pain. No nausea, No vomiting, No hematemesis, No diarrhea, No constipation. No melena, No hematochezia. No GERD  GENITOURINARY: No dysuria, No frequency, No hematuria, No incontinence, No nocturia, No hesitancy,  SKIN: No itching, No burning, No rashes, No lesions   LYMPH NODES: No history of enlarged glands  ENDOCRINE: No heat or cold intolerance, No hair loss. No osteoporosis, No thyroid disease  MUSCULOSKELETAL:     (+)  RIGHT HIP PAIN  PSYCHIATRIC: No depression, No anxiety, No mood swings, No difficulty sleeping  HEME/LYMPH: No easy bruising, No anticoagulants, No bleeding disorder, No bleeding gums  ALLERGY AND IMMUNOLOGIC: No hives, No eczema  NEUROLOGICAL: No memory loss, No loss of strength, No numbness, No tremors        VITALS:   T(C): 36.5 (11-25-17 @ 17:37), Max: 36.6 (11-25-17 @ 00:11)  HR: 73 (11-25-17 @ 17:37) (66 - 96)  BP: 104/62 (11-25-17 @ 17:37) (100/64 - 144/77)  RR: 18 (11-25-17 @ 17:37) (18 - 18)  SpO2: 96% (11-25-17 @ 17:37) (95% - 100%)  Wt(kg): --      PHYSICAL EXAM:  GENERAL: NAD, well nourished and conversant  HEAD:  Atraumatic  EYES:  PATIENT I S BLIND  ENT: No tonsillar erythema, exudates, or enlargement, moist mucous membranes, good dentition, no lesions  NECK: Supple, No JVD, normal thyroid, carotids with normal upstrokes and no bruits  CHEST/LUNG: Clear to auscultation bilaterally, No rales, rhonchi, wheezing, or rubs  HEART: Regular rate and rhythm, No murmurs, rubs, or gallops  ABDOMEN: Soft, nondistended, no masses, guarding, tenderness or rebound, bowel sounds present  EXTREMITIES:  2+ Peripheral Pulses, No clubbing, cyanosis, or edema.   (+) RIGHT HIP PAIN C/W HIP FRACTURE  LYMPH: No lymphadenopathy noted  SKIN: No rashes or lesions  NERVOUS SYSTEM:  Alert & Oriented X3, normal cognitive function. Motor Strength 5/5 right upper and right lower.  5/5 left upper and left lower extremities, DTRs 2+ intact and symmetric    LABS:  ABG - ( 25 Nov 2017 00:43 )  pH: 7.35  /  pCO2: 48    /  pO2: 83    / HCO3: 26    / Base Excess: .6    /  SaO2: 96                    CBC Full  -  ( 25 Nov 2017 08:42 )  WBC Count : 12.07 K/uL  Hemoglobin : 8.1 g/dL  Hematocrit : 24.6 %  Platelet Count - Automated : 119 K/uL  Mean Cell Volume : 94.3 fl  Mean Cell Hemoglobin : 31.0 pg  Mean Cell Hemoglobin Concentration : 32.9 gm/dL  Auto Neutrophil # : x  Auto Lymphocyte # : x  Auto Monocyte # : x  Auto Eosinophil # : x  Auto Basophil # : x  Auto Neutrophil % : x  Auto Lymphocyte % : x  Auto Monocyte % : x  Auto Eosinophil % : x  Auto Basophil % : x    11-25    142  |  103  |  27<H>  ----------------------------<  219<H>  4.2   |  25  |  0.86    Ca    8.4      25 Nov 2017 08:10            CAPILLARY BLOOD GLUCOSE      POCT Blood Glucose.: 144 mg/dL (25 Nov 2017 17:33)  POCT Blood Glucose.: 178 mg/dL (25 Nov 2017 13:33)  POCT Blood Glucose.: 199 mg/dL (25 Nov 2017 09:04)  POCT Blood Glucose.: 221 mg/dL (25 Nov 2017 07:40)  POCT Blood Glucose.: 241 mg/dL (25 Nov 2017 00:23)  POCT Blood Glucose.: 180 mg/dL (24 Nov 2017 21:11)      RADIOLOGY & ADDITIONAL TESTS:

## 2022-07-03 NOTE — DISCHARGE NOTE PROVIDER - NSDCQMANTICOAG_GEN_A_CORE
7/2/2022  IBlanca MD, saw and evaluated the patient  I have discussed the patient with the resident/non-physician practitioner and agree with the resident's/non-physician practitioner's findings, Plan of Care, and MDM as documented in the resident's/non-physician practitioner's note, except where noted  All available labs and Radiology studies were reviewed  I was present for key portions of any procedure(s) performed by the resident/non-physician practitioner and I was immediately available to provide assistance  At this point I agree with the current assessment done in the Emergency Department  I have conducted an independent evaluation of this patient a history and physical is as follows:    ED Course         Critical Care Time  Procedures       Patient is a 22 yof who presents with abdominal pain  Notes a history of c section 3 months ago  Notes the pain is achi, intermittent  No f/c/s  No vomiting or diarrhea  MDM pleasant 22 yof, abdominal pain post surgery, no acute findings, doubt torsion, will have her followup with obgyn  No, not prescribed...

## 2022-07-06 ENCOUNTER — INPATIENT (INPATIENT)
Facility: HOSPITAL | Age: 87
LOS: 0 days | Discharge: NURSING, REHAB OR LONG-TERM CARE | DRG: 193 | End: 2022-07-07
Attending: INTERNAL MEDICINE | Admitting: INTERNAL MEDICINE
Payer: MEDICARE

## 2022-07-06 VITALS
RESPIRATION RATE: 18 BRPM | SYSTOLIC BLOOD PRESSURE: 136 MMHG | HEART RATE: 71 BPM | DIASTOLIC BLOOD PRESSURE: 63 MMHG | HEIGHT: 61 IN | TEMPERATURE: 100 F | OXYGEN SATURATION: 94 %

## 2022-07-06 DIAGNOSIS — R06.02 SHORTNESS OF BREATH: ICD-10-CM

## 2022-07-06 DIAGNOSIS — R55 SYNCOPE AND COLLAPSE: ICD-10-CM

## 2022-07-06 DIAGNOSIS — J18.9 PNEUMONIA, UNSPECIFIED ORGANISM: ICD-10-CM

## 2022-07-06 DIAGNOSIS — Z96.642 PRESENCE OF LEFT ARTIFICIAL HIP JOINT: Chronic | ICD-10-CM

## 2022-07-06 LAB
ALBUMIN SERPL ELPH-MCNC: 3 G/DL — LOW (ref 3.3–5)
ALP SERPL-CCNC: 78 U/L — SIGNIFICANT CHANGE UP (ref 40–120)
ALT FLD-CCNC: 13 U/L — SIGNIFICANT CHANGE UP (ref 10–45)
ANION GAP SERPL CALC-SCNC: 10 MMOL/L — SIGNIFICANT CHANGE UP (ref 5–17)
APPEARANCE UR: CLEAR — SIGNIFICANT CHANGE UP
AST SERPL-CCNC: 19 U/L — SIGNIFICANT CHANGE UP (ref 10–40)
BACTERIA # UR AUTO: NEGATIVE — SIGNIFICANT CHANGE UP
BASE EXCESS BLDV CALC-SCNC: 5.7 MMOL/L — HIGH (ref -2–2)
BASOPHILS # BLD AUTO: 0.04 K/UL — SIGNIFICANT CHANGE UP (ref 0–0.2)
BASOPHILS NFR BLD AUTO: 0.5 % — SIGNIFICANT CHANGE UP (ref 0–2)
BILIRUB SERPL-MCNC: 0.4 MG/DL — SIGNIFICANT CHANGE UP (ref 0.2–1.2)
BILIRUB UR-MCNC: NEGATIVE — SIGNIFICANT CHANGE UP
BUN SERPL-MCNC: 18 MG/DL — SIGNIFICANT CHANGE UP (ref 7–23)
CA-I SERPL-SCNC: 1.15 MMOL/L — SIGNIFICANT CHANGE UP (ref 1.15–1.33)
CALCIUM SERPL-MCNC: 8.6 MG/DL — SIGNIFICANT CHANGE UP (ref 8.4–10.5)
CHLORIDE BLDV-SCNC: 102 MMOL/L — SIGNIFICANT CHANGE UP (ref 96–108)
CHLORIDE SERPL-SCNC: 102 MMOL/L — SIGNIFICANT CHANGE UP (ref 96–108)
CO2 BLDV-SCNC: 29 MMOL/L — HIGH (ref 22–26)
CO2 SERPL-SCNC: 26 MMOL/L — SIGNIFICANT CHANGE UP (ref 22–31)
COLOR SPEC: COLORLESS — SIGNIFICANT CHANGE UP
CREAT SERPL-MCNC: 0.68 MG/DL — SIGNIFICANT CHANGE UP (ref 0.5–1.3)
DIFF PNL FLD: ABNORMAL
EGFR: 79 ML/MIN/1.73M2 — SIGNIFICANT CHANGE UP
EOSINOPHIL # BLD AUTO: 0.12 K/UL — SIGNIFICANT CHANGE UP (ref 0–0.5)
EOSINOPHIL NFR BLD AUTO: 1.5 % — SIGNIFICANT CHANGE UP (ref 0–6)
EPI CELLS # UR: 3 /HPF — SIGNIFICANT CHANGE UP
GAS PNL BLDV: 133 MMOL/L — LOW (ref 136–145)
GAS PNL BLDV: SIGNIFICANT CHANGE UP
GAS PNL BLDV: SIGNIFICANT CHANGE UP
GLUCOSE BLDV-MCNC: 88 MG/DL — SIGNIFICANT CHANGE UP (ref 70–99)
GLUCOSE SERPL-MCNC: 99 MG/DL — SIGNIFICANT CHANGE UP (ref 70–99)
GLUCOSE UR QL: NEGATIVE — SIGNIFICANT CHANGE UP
HCO3 BLDV-SCNC: 28 MMOL/L — SIGNIFICANT CHANGE UP (ref 22–29)
HCT VFR BLD CALC: 30.3 % — LOW (ref 34.5–45)
HCT VFR BLDA CALC: 32 % — LOW (ref 34.5–46.5)
HGB BLD CALC-MCNC: 10.7 G/DL — LOW (ref 11.7–16.1)
HGB BLD-MCNC: 10 G/DL — LOW (ref 11.5–15.5)
HYALINE CASTS # UR AUTO: 1 /LPF — SIGNIFICANT CHANGE UP (ref 0–2)
IMM GRANULOCYTES NFR BLD AUTO: 0.6 % — SIGNIFICANT CHANGE UP (ref 0–1.5)
KETONES UR-MCNC: NEGATIVE — SIGNIFICANT CHANGE UP
LACTATE BLDV-MCNC: 1.2 MMOL/L — SIGNIFICANT CHANGE UP (ref 0.7–2)
LEUKOCYTE ESTERASE UR-ACNC: NEGATIVE — SIGNIFICANT CHANGE UP
LYMPHOCYTES # BLD AUTO: 0.72 K/UL — LOW (ref 1–3.3)
LYMPHOCYTES # BLD AUTO: 8.8 % — LOW (ref 13–44)
MCHC RBC-ENTMCNC: 30.7 PG — SIGNIFICANT CHANGE UP (ref 27–34)
MCHC RBC-ENTMCNC: 33 GM/DL — SIGNIFICANT CHANGE UP (ref 32–36)
MCV RBC AUTO: 92.9 FL — SIGNIFICANT CHANGE UP (ref 80–100)
MONOCYTES # BLD AUTO: 0.63 K/UL — SIGNIFICANT CHANGE UP (ref 0–0.9)
MONOCYTES NFR BLD AUTO: 7.7 % — SIGNIFICANT CHANGE UP (ref 2–14)
NEUTROPHILS # BLD AUTO: 6.6 K/UL — SIGNIFICANT CHANGE UP (ref 1.8–7.4)
NEUTROPHILS NFR BLD AUTO: 80.9 % — HIGH (ref 43–77)
NITRITE UR-MCNC: NEGATIVE — SIGNIFICANT CHANGE UP
NRBC # BLD: 0 /100 WBCS — SIGNIFICANT CHANGE UP (ref 0–0)
PCO2 BLDV: 31 MMHG — LOW (ref 39–42)
PH BLDV: 7.56 — HIGH (ref 7.32–7.43)
PH UR: 8.5 — HIGH (ref 5–8)
PLATELET # BLD AUTO: 210 K/UL — SIGNIFICANT CHANGE UP (ref 150–400)
PO2 BLDV: 50 MMHG — HIGH (ref 25–45)
POTASSIUM BLDV-SCNC: 4.6 MMOL/L — SIGNIFICANT CHANGE UP (ref 3.5–5.1)
POTASSIUM SERPL-MCNC: 5.1 MMOL/L — SIGNIFICANT CHANGE UP (ref 3.5–5.3)
POTASSIUM SERPL-SCNC: 5.1 MMOL/L — SIGNIFICANT CHANGE UP (ref 3.5–5.3)
PROT SERPL-MCNC: 5.8 G/DL — LOW (ref 6–8.3)
PROT UR-MCNC: NEGATIVE — SIGNIFICANT CHANGE UP
RAPID RVP RESULT: SIGNIFICANT CHANGE UP
RBC # BLD: 3.26 M/UL — LOW (ref 3.8–5.2)
RBC # FLD: 14.5 % — SIGNIFICANT CHANGE UP (ref 10.3–14.5)
RBC CASTS # UR COMP ASSIST: 3 /HPF — SIGNIFICANT CHANGE UP (ref 0–4)
SAO2 % BLDV: 85.5 % — SIGNIFICANT CHANGE UP (ref 67–88)
SARS-COV-2 RNA SPEC QL NAA+PROBE: SIGNIFICANT CHANGE UP
SODIUM SERPL-SCNC: 138 MMOL/L — SIGNIFICANT CHANGE UP (ref 135–145)
SP GR SPEC: 1.03 — HIGH (ref 1.01–1.02)
UROBILINOGEN FLD QL: NEGATIVE — SIGNIFICANT CHANGE UP
WBC # BLD: 8.16 K/UL — SIGNIFICANT CHANGE UP (ref 3.8–10.5)
WBC # FLD AUTO: 8.16 K/UL — SIGNIFICANT CHANGE UP (ref 3.8–10.5)
WBC UR QL: 0 /HPF — SIGNIFICANT CHANGE UP (ref 0–5)

## 2022-07-06 PROCEDURE — 72125 CT NECK SPINE W/O DYE: CPT | Mod: 26,MA

## 2022-07-06 PROCEDURE — 72170 X-RAY EXAM OF PELVIS: CPT | Mod: 26

## 2022-07-06 PROCEDURE — 74177 CT ABD & PELVIS W/CONTRAST: CPT | Mod: 26,MA

## 2022-07-06 PROCEDURE — 99285 EMERGENCY DEPT VISIT HI MDM: CPT | Mod: FS,CS

## 2022-07-06 PROCEDURE — 73562 X-RAY EXAM OF KNEE 3: CPT | Mod: 26,50

## 2022-07-06 PROCEDURE — 71260 CT THORAX DX C+: CPT | Mod: 26,MA

## 2022-07-06 PROCEDURE — 70450 CT HEAD/BRAIN W/O DYE: CPT | Mod: 26,MA

## 2022-07-06 RX ORDER — CEFTRIAXONE 500 MG/1
1000 INJECTION, POWDER, FOR SOLUTION INTRAMUSCULAR; INTRAVENOUS ONCE
Refills: 0 | Status: COMPLETED | OUTPATIENT
Start: 2022-07-06 | End: 2022-07-06

## 2022-07-06 RX ORDER — CEFTRIAXONE 500 MG/1
1000 INJECTION, POWDER, FOR SOLUTION INTRAMUSCULAR; INTRAVENOUS EVERY 24 HOURS
Refills: 0 | Status: DISCONTINUED | OUTPATIENT
Start: 2022-07-06 | End: 2022-07-07

## 2022-07-06 RX ORDER — AZITHROMYCIN 500 MG/1
500 TABLET, FILM COATED ORAL ONCE
Refills: 0 | Status: COMPLETED | OUTPATIENT
Start: 2022-07-06 | End: 2022-07-06

## 2022-07-06 RX ORDER — ACETAMINOPHEN 500 MG
975 TABLET ORAL ONCE
Refills: 0 | Status: DISCONTINUED | OUTPATIENT
Start: 2022-07-06 | End: 2022-07-06

## 2022-07-06 RX ORDER — DOCUSATE SODIUM 100 MG
3 CAPSULE ORAL
Qty: 30 | Refills: 0

## 2022-07-06 RX ORDER — RIVAROXABAN 15 MG-20MG
10 KIT ORAL DAILY
Refills: 0 | Status: DISCONTINUED | OUTPATIENT
Start: 2022-07-06 | End: 2022-07-07

## 2022-07-06 RX ORDER — IPRATROPIUM/ALBUTEROL SULFATE 18-103MCG
3 AEROSOL WITH ADAPTER (GRAM) INHALATION EVERY 6 HOURS
Refills: 0 | Status: DISCONTINUED | OUTPATIENT
Start: 2022-07-06 | End: 2022-07-07

## 2022-07-06 RX ORDER — ACETAMINOPHEN 500 MG
750 TABLET ORAL ONCE
Refills: 0 | Status: COMPLETED | OUTPATIENT
Start: 2022-07-06 | End: 2022-07-06

## 2022-07-06 RX ORDER — SODIUM CHLORIDE 9 MG/ML
500 INJECTION INTRAMUSCULAR; INTRAVENOUS; SUBCUTANEOUS ONCE
Refills: 0 | Status: COMPLETED | OUTPATIENT
Start: 2022-07-06 | End: 2022-07-06

## 2022-07-06 RX ORDER — AZITHROMYCIN 500 MG/1
500 TABLET, FILM COATED ORAL EVERY 24 HOURS
Refills: 0 | Status: DISCONTINUED | OUTPATIENT
Start: 2022-07-07 | End: 2022-07-07

## 2022-07-06 RX ADMIN — AZITHROMYCIN 255 MILLIGRAM(S): 500 TABLET, FILM COATED ORAL at 11:31

## 2022-07-06 RX ADMIN — SODIUM CHLORIDE 500 MILLILITER(S): 9 INJECTION INTRAMUSCULAR; INTRAVENOUS; SUBCUTANEOUS at 08:48

## 2022-07-06 RX ADMIN — RIVAROXABAN 10 MILLIGRAM(S): KIT at 15:11

## 2022-07-06 RX ADMIN — Medication 300 MILLIGRAM(S): at 08:50

## 2022-07-06 RX ADMIN — CEFTRIAXONE 100 MILLIGRAM(S): 500 INJECTION, POWDER, FOR SOLUTION INTRAMUSCULAR; INTRAVENOUS at 10:34

## 2022-07-06 RX ADMIN — Medication 3 MILLILITER(S): at 17:21

## 2022-07-06 NOTE — CONSULT NOTE ADULT - NS ATTEND AMEND GEN_ALL_CORE FT
pt seen and examined and history reviewed and interviewed the pt , who is a poor historian: : she is admitted with fall:  has ecchymosis around left eye:  not sob or apparent resp distress: :  her ct chest reviewed: has old healed rib fractures: non displaced:: she is suspected to have pneumonia: awaiting urinary legionella: :  on ceftriaxone: : her vbg showed significant alkalosis: awaiting ABG:   seen by id: :  needs pt ot:   seen b mora ep also: await echo:  dw team

## 2022-07-06 NOTE — H&P ADULT - NSICDXPASTMEDICALHX_GEN_ALL_CORE_FT
You can access the FollowMyHealth Patient Portal offered by [facility] by registering at the following website: [email or web address]. By joining [facility]’s FollowMyHealth portal, you will also be able to view your health information using other applications (apps) compatible with our system.
PAST MEDICAL HISTORY:  HLD (hyperlipidemia)     HTN (hypertension)     Hypothyroid     Stroke

## 2022-07-06 NOTE — CONSULT NOTE ADULT - PROBLEM SELECTOR RECOMMENDATION 3
CT chest with small nodular, TIB opacities. Areas of bronchiectasis, RLL atelectasis/mucus plugging vs PNA  -Reportedly febrile in ED, no leukocytosis  -RVP/COVID negative  -F/u urine legionella  -F/u MRSA/MSSA nasal swab  -Check procalcitonin   -ABX per ID.

## 2022-07-06 NOTE — ED PROVIDER NOTE - NS ED ROS FT
Limited secondary to clinical condition and age  Gen: AAO x 2, NAD  States fell, but unsure of time, down time or how and states she has no pain anywhere

## 2022-07-06 NOTE — CONSULT NOTE ADULT - ASSESSMENT
Patient is a 89 year old female with PMH of ?CVA on ASA and Plavix, HTN, HLD who was BIB EMS from [place] for unwitnessed fall.    RLL pneumonia ?aspiration  S/p unwitnessed fall    - noted to be febrile in ER, no leukocytosis   - RVP/COVID negative    - CT C/A/P reviewed - b/l subcm nodular and tree-in-bud opacities, bronchiectasis, RLL consolidation/subsegmental atelectasis - possible infection and/or mucoid impaction; has multiple chronic appearing R sided fracture deformities    - pt denies respiratory complaints but not a reliable historian   - UA negative for pyuria (sample obtained via straight cath)   - started on ceftriaxone and azithromycin   - follow up legionella ur ag - in process    -- if negative then d/c azithromycin - low suspicion for atypical pneumonia    - continue on ceftriaxone 1g IV Q24h   - S&S evaluation   - monitor temps/WBC   - aspiration precautions    - rest of management per primary team      Julia Staples M.D.  [facility], Division of Infectious Diseases  [phone]  After 5pm on weekdays and all day on weekends - please call [phone]  
 89 y/o F with PMH of HTN, HLD, hypothyroid, CVA. Presents from [facility] for unwitnessed fall, bruising to L eye. CT head with no acute changes. CT chest with small nodular, TIB opacities. Areas of bronchiectasis, RLL atelectasis/mucus plugging vs PNA. Pulmonary called to consult for r/o PNA

## 2022-07-06 NOTE — ED PROVIDER NOTE - OBJECTIVE STATEMENT
89yof pmhx of HTN ?CVA on ASA and Plavix BIB EMS from [place] for unwitnessed fall. pt reports walking and losing her balance but per EMS pt is bedbound and was found in bed with bruising to the L eye. No nausea or vomiting. pt is denying any complaints.

## 2022-07-06 NOTE — H&P ADULT - PROBLEM SELECTOR PLAN 1
ptn is asymptomatic, not short of breath at present, not hypoxic, family wants to send her back asap  cont empiric po ABx  pulm and ID on board  on 2 L of oxygen

## 2022-07-06 NOTE — CONSULT NOTE ADULT - SUBJECTIVE AND OBJECTIVE BOX
EP Attending    HISTORY OF PRESENT ILLNESS: HPI:  89yof pmhx of HTN ?CVA on ASA and Plavix BIB EMS from [place] for unwitnessed fall. pt reports walking and losing her balance but per EMS pt is bedbound and was found in bed with bruising to the L eye. No nausea or vomiting. pt is denying any complaints. (06 Jul 2022 10:16)    Patient not oriented to date or situation.  Identifies me as "Kashif"- someone in her family.    She is not sure how she wound up with facial bruising.  States "I was up... then I was just crawling along near the wall".  It is not clear if she is supposed to be walking on her own, or if she has limitations placed on her ambulation.  Has back pain and rib pain, as well as some left facial pain. A 10 pt ROS is otherwise negative.    PAST MEDICAL & SURGICAL HISTORY:  Hypothyroid  HTN (hypertension)  HLD (hyperlipidemia)  Stroke  History of hip replacement, total, left      MEDICATIONS  (STANDING):  cefTRIAXone   IVPB 1000 milliGRAM(s) IV Intermittent every 24 hours  rivaroxaban 10 milliGRAM(s) Oral daily    Allergies    No Known Allergies    Intolerances    FAMILY HISTORY:  No pertinent family history in first degree relatives      Non-contributary for premature coronary disease or sudden cardiac death    SOCIAL HISTORY:    [x ] Non-smoker  [ ] Smoker  [ ] Alcohol    PHYSICAL EXAM:  T(C): 37.1 (07-06-22 @ 11:08), Max: 38.2 (07-06-22 @ 07:25)  HR: 64 (07-06-22 @ 11:08) (64 - 73)  BP: 129/50 (07-06-22 @ 11:08) (129/50 - 137/52)  RR: 18 (07-06-22 @ 11:08) (17 - 18)  SpO2: 100% (07-06-22 @ 11:08) (91% - 100%)  Wt(kg): --    Appearance: thin elderly woman, anxious, calling out.  nondiaphoretic.	  HEENT:   Normal oral mucosa but missing many teeth, PERRL, EOMI.  left periorbital ecchymoses.	  Lymphatic: No lymphadenopathy , no edema  Cardiovascular: Normal S1 S2, No JVD, No murmurs , Peripheral pulses palpable 2+ bilaterally  Respiratory: Lungs clear to auscultation, normal effort 	  Gastrointestinal:  Soft, Non-tender, + BS	  Skin: No rashes, No cyanosis, warm to touch  Musculoskeletal: Normal range of motion, normal strength  Psychiatry:  oriented to name only. mood is "scared", affect is congruent, but labile ranging from calm to crying.    TELEMETRY: NSR 	    ECG: NSR 	  	  LABS:	 	                          10.0   8.16  )-----------( 210      ( 06 Jul 2022 07:43 )             30.3     07-06    138  |  102  |  18  ----------------------------<  99  5.1   |  26  |  0.68    Ca    8.6      06 Jul 2022 07:43    TPro  5.8<L>  /  Alb  3.0<L>  /  TBili  0.4  /  DBili  x   /  AST  19  /  ALT  13  /  AlkPhos  78  07-06    ASSESSMENT/PLAN: 	89y Female from assisted living, has dementia, presents after apparent collapse to floor with injury.  No prior known CV history.    Unwitnessed collapse, and patient was able to get back in bed.  No memory of exact mechanism of collapse.    Possible RLL pneumonia per ID.  Treating with antibiotics.  Check echocardiogram to rule out structural heart disease. If normal, consider ambulatory event monitoring.  At this time, no telemetry indication for permanent pacing.  Any invasive workup would have to be with consideration to her goals of care, and would require 3rd party consent, due to underlying dementia.      Tod Thomson M.D.  Cardiac Electrophysiology    office [phone]  pager [phone]

## 2022-07-06 NOTE — CONSULT NOTE ADULT - SUBJECTIVE AND OBJECTIVE BOX
Pulmonary Consult  22 @ 13:56    Patient is a 89y old  Female who presents with a chief complaint of syncope (2022 12:37)    HPI: 89 y/o F with PMH of HTN, HLD, hypothyroid, CVA. Presents from [facility] for unwitnessed fall, bruising to L eye. Afebrile on triage, O2 sats 95% on RA. CT head with no acute changes. CT chest with small nodular, TIB opacities. Areas of bronchiectasis, RLL atelectasis/mucus plugging vs PNA. Pulmonary called to consult for r/o PNA    ?FOLLOWING PRESENT  [ ] Hx of PE/DVT, [ ] Hx COPD, [ ] Hx of Asthma, [ ] Hx of Hospitalization, [ ]  Hx of BiPAP/CPAP use, [ ] Hx of HERO    No Known Allergies    PAST MEDICAL & SURGICAL HISTORY:  Hypothyroid  HTN (hypertension)  HLD (hyperlipidemia)  Stroke  History of hip replacement, total, left    FAMILY HISTORY:  No pertinent family history in first degree relatives    Social History: [  ] TOBACCO                  [  ] ETOH                                 [  ] IVDA/DRUGS    REVIEW OF SYSTEMS    General:	    Skin/Breast:  	  Ophthalmologic:  	  ENMT:	    Respiratory and Thorax:  	  Cardiovascular:	    Gastrointestinal:	    Genitourinary:	    Musculoskeletal:	    Neurological:	    Psychiatric:	    Hematology/Lymphatics:	    Endocrine:	    Allergic/Immunologic:	    MEDICATIONS  (STANDING):  cefTRIAXone   IVPB 1000 milliGRAM(s) IV Intermittent every 24 hours  rivaroxaban 10 milliGRAM(s) Oral daily    Vital Signs Last 24 Hrs  T(C): 37.1 (2022 11:08), Max: 38.2 (2022 07:25)  T(F): 98.7 (2022 11:08), Max: 100.8 (2022 07:25)  HR: 64 (2022 11:08) (64 - 73)  BP: 129/50 (2022 11:08) (129/50 - 137/52)  BP(mean): 73 (2022 11:08) (73 - 73)  RR: 18 (2022 11:08) (17 - 18)  SpO2: 100% (2022 11:08) (91% - 100%)Orthostatic VS  I&O's Summary    Physical Exam:   GENERAL: NAD, well-groomed, well-developed  HEENT: PIERO/   Atraumatic, Normocephalic  ENMT: No tonsillar erythema, exudates, or enlargement; Moist mucous membranes, Good dentition, No lesions  NECK: Supple, No JVD, Normal thyroid  CHEST/LUNG: Clear to auscultation bilaterally; No rales, rhonchi, wheezing, or rubs  CVS: Regular rate and rhythm; No murmurs, rubs, or gallops  GI: : Soft, Nontender, Nondistended; Bowel sounds present  NERVOUS SYSTEM:  Alert & Oriented X3, Good concentration; Motor Strength 5/5 B/L upper and lower extremities; DTRs 2+ intact and symmetric  EXTREMITIES:  2+ Peripheral Pulses, No clubbing, cyanosis, or edema  LYMPH: No lymphadenopathy noted  SKIN: No rashes or lesions  ENDOCRINOLOGY: No Thyromegaly  PSYCH: Appropriate    Labs:  Venous<31<4>>50<<7.565>>Venous<<3><<4><<5<<509>>SARS-CoV-2: NotDetec (2022 09:11)    CARDIAC MARKERS ( 2022 07:43 )  x     / x     / 51 U/L / x     / x                                10.0   8.16  )-----------( 210      ( 2022 07:43 )             30.3     07-06    138  |  102  |  18  ----------------------------<  99  5.1   |  26  |  0.68    Ca    8.6      2022 07:43    TPro  5.8<L>  /  Alb  3.0<L>  /  TBili  0.4  /  DBili  x   /  AST  19  /  ALT  13  /  AlkPhos  78  07-06    CAPILLARY BLOOD GLUCOSE      POCT Blood Glucose.: 90 mg/dL (2022 07:07)    LIVER FUNCTIONS - ( 2022 07:43 )  Alb: 3.0 g/dL / Pro: 5.8 g/dL / ALK PHOS: 78 U/L / ALT: 13 U/L / AST: 19 U/L / GGT: x           Urinalysis Basic - ( 2022 09:11 )    Color: Colorless / Appearance: Clear / S.034 / pH: x  Gluc: x / Ketone: Negative  / Bili: Negative / Urobili: Negative   Blood: x / Protein: Negative / Nitrite: Negative   Leuk Esterase: Negative / RBC: 3 /hpf / WBC 0 /HPF   Sq Epi: x / Non Sq Epi: 3 /hpf / Bacteria: Negative    D DImer    Studies  CT SCAN Chest < from: CT Chest w/ IV Cont (22 @ 08:08) >    FINDINGS:  CHEST:  LUNGS AND LARGE AIRWAYS: Patent central airways. Bilateral subcentimeter   nodular and tree-in-bud opacities. Bronchiectasis predominantly in the   right middle, right lower and left upper lobes.   Consolidation/subsegmental atelectasis in the right lower lobe.  PLEURA: No pleural effusion or pneumothorax.  VESSELS: Atherosclerotic changes of the aorta and coronary arteries.  HEART: Heart size is normal. No pericardial effusion. Aortic valve   calcification.  MEDIASTINUM AND KERA: Small mediastinal lymph nodes measuring up to 1 cm   in short axis.  CHEST WALL AND LOWER NECK: Within normal limits.    ABDOMEN AND PELVIS:  LIVER: Within normal limits.  BILE DUCTS: Normal caliber.  GALLBLADDER: Within normal limits.  SPLEEN: Within normal limits.  PANCREAS: Within normal limits.  ADRENALS: Within normal limits.  KIDNEYS/URETERS: Within normal limits.    BLADDER: Underdistended with circumferential bladder wall thickening.  REPRODUCTIVE ORGANS: Calcified uterine fibroid. No adnexal mass.    BOWEL: Moderate hiatal hernia. No bowel obstruction. Appendix is normal.  PERITONEUM: No ascites.  VESSELS: Atherosclerotic changes.  RETROPERITONEUM/LYMPH NODES: Enlarged para-aortic and left iliac chain   lymph nodes. For reference, left distal para-aortic lymph node measures   1.9 x 1.4 cm (3, 57). Left external iliac lymph node measures 3.8 x 1.9   cm (3, 88).  ABDOMINAL WALL: Within normal limits.  BONES: Multiple chronic appearing right-sided rib fracture deformities   involving the 3-7th ribs. No evidence of acutely displaced rib fracture.   Age indeterminant compression deformities of the T11, T12, and L1   vertebral bodies.    IMPRESSION:  Multiple chronic appearing right-sided rib fracture deformities. No   evidence of acutelydisplaced rib fracture.    Age indeterminant compression deformities of the T11, T12, and L1   vertebral bodies.    Bilateral subcentimeter nodular and tree-in-bud opacities,   bronchiectasis, and right lower lobe consolidation/subsegmental   atelectasis. Findings may be related to infection and/or mucoid   impaction. Follow-up to resolution.    Retroperitoneal and pelvic lymphadenopathy, of uncertain etiology.    < end of copied text >                       Pulmonary Consult  22 @ 13:56    Patient is a 89y old  Female who presents with a chief complaint of syncope (2022 12:37)    HPI: 89 y/o F with PMH of HTN, HLD, hypothyroid, CVA. Presents from [facility] for unwitnessed fall, bruising to L eye. Afebrile on triage, O2 sats 95% on RA. CT head with no acute changes. CT chest with small nodular, TIB opacities. Areas of bronchiectasis, RLL atelectasis/mucus plugging vs PNA. Pulmonary called to consult for r/o PNA. Former smoker. Denies hx of lung disease but appears to be a poor historian. Denies cough, sputum production. O2 sats 98% on RA.     ?FOLLOWING PRESENT  [ no ] Hx of PE/DVT, [ no ] Hx COPD, [ no ] Hx of Asthma, [  no] Hx of Hospitalization, [ no ]  Hx of BiPAP/CPAP use, [ no ] Hx of HERO    No Known Allergies    PAST MEDICAL & SURGICAL HISTORY:  Hypothyroid  HTN (hypertension)  HLD (hyperlipidemia)  Stroke  History of hip replacement, total, left    FAMILY HISTORY:  No pertinent family history in first degree relatives    Social History: [ former smoker ] TOBACCO                  [ x ] ETOH                                 [  x ] IVDA/DRUGS    REVIEW OF SYSTEMS    General:	x     Skin/Breast: x   	  Ophthalmologic: x  	  ENMT:	x    Respiratory and Thorax: x  	  Cardiovascular:	x    Gastrointestinal:	x    Genitourinary:	 x    Musculoskeletal:	 x    Neurological:	x    Psychiatric:	x     Hematology/Lymphatics:	 x    Endocrine:	x    Allergic/Immunologic:	x    MEDICATIONS  (STANDING):  cefTRIAXone   IVPB 1000 milliGRAM(s) IV Intermittent every 24 hours  rivaroxaban 10 milliGRAM(s) Oral daily    Vital Signs Last 24 Hrs  T(C): 37.1 (2022 11:08), Max: 38.2 (2022 07:25)  T(F): 98.7 (2022 11:08), Max: 100.8 (2022 07:25)  HR: 64 (2022 11:08) (64 - 73)  BP: 129/50 (2022 11:08) (129/50 - 137/52)  BP(mean): 73 (2022 11:08) (73 - 73)  RR: 18 (2022 11:08) (17 - 18)  SpO2: 100% (2022 11:08) (91% - 100%)Orthostatic VS  I&O's Summary    Physical Exam:   GENERAL: NAD, well-groomed, well-developed  HEENT: PIERO/   Atraumatic, Normocephalic  ENMT: No tonsillar erythema, exudates, or enlargement; Moist mucous membranes, Good dentition, No lesions  NECK: Supple, No JVD, Normal thyroid  CHEST/LUNG: Clear to auscultation bilaterally; No rales, rhonchi, wheezing, or rubs  CVS: Regular rate and rhythm; No murmurs, rubs, or gallops  GI: : Soft, Nontender, Nondistended; Bowel sounds present  NERVOUS SYSTEM:  Alert & Oriented X3, Good concentration; Motor Strength 5/5 B/L upper and lower extremities; DTRs 2+ intact and symmetric  EXTREMITIES:  2+ Peripheral Pulses, No clubbing, cyanosis, or edema  LYMPH: No lymphadenopathy noted  SKIN: No rashes or lesions  ENDOCRINOLOGY: No Thyromegaly  PSYCH: Appropriate    Labs:  Venous<31<4>>50<<7.565>>Venous<<3><<4><<5<<509>>SARS-CoV-2: NotDetec (2022 09:11)    CARDIAC MARKERS ( 2022 07:43 )  x     / x     / 51 U/L / x     / x                                10.0   8.16  )-----------( 210      ( 2022 07:43 )             30.3     07-06    138  |  102  |  18  ----------------------------<  99  5.1   |  26  |  0.68    Ca    8.6      2022 07:43    TPro  5.8<L>  /  Alb  3.0<L>  /  TBili  0.4  /  DBili  x   /  AST  19  /  ALT  13  /  AlkPhos  78  07-06    CAPILLARY BLOOD GLUCOSE      POCT Blood Glucose.: 90 mg/dL (2022 07:07)    LIVER FUNCTIONS - ( 2022 07:43 )  Alb: 3.0 g/dL / Pro: 5.8 g/dL / ALK PHOS: 78 U/L / ALT: 13 U/L / AST: 19 U/L / GGT: x           Urinalysis Basic - ( 2022 09:11 )    Color: Colorless / Appearance: Clear / S.034 / pH: x  Gluc: x / Ketone: Negative  / Bili: Negative / Urobili: Negative   Blood: x / Protein: Negative / Nitrite: Negative   Leuk Esterase: Negative / RBC: 3 /hpf / WBC 0 /HPF   Sq Epi: x / Non Sq Epi: 3 /hpf / Bacteria: Negative    D DImer    Studies  CT SCAN Chest < from: CT Chest w/ IV Cont (22 @ 08:08) >    FINDINGS:  CHEST:  LUNGS AND LARGE AIRWAYS: Patent central airways. Bilateral subcentimeter   nodular and tree-in-bud opacities. Bronchiectasis predominantly in the   right middle, right lower and left upper lobes.   Consolidation/subsegmental atelectasis in the right lower lobe.  PLEURA: No pleural effusion or pneumothorax.  VESSELS: Atherosclerotic changes of the aorta and coronary arteries.  HEART: Heart size is normal. No pericardial effusion. Aortic valve   calcification.  MEDIASTINUM AND KERA: Small mediastinal lymph nodes measuring up to 1 cm   in short axis.  CHEST WALL AND LOWER NECK: Within normal limits.    ABDOMEN AND PELVIS:  LIVER: Within normal limits.  BILE DUCTS: Normal caliber.  GALLBLADDER: Within normal limits.  SPLEEN: Within normal limits.  PANCREAS: Within normal limits.  ADRENALS: Within normal limits.  KIDNEYS/URETERS: Within normal limits.    BLADDER: Underdistended with circumferential bladder wall thickening.  REPRODUCTIVE ORGANS: Calcified uterine fibroid. No adnexal mass.    BOWEL: Moderate hiatal hernia. No bowel obstruction. Appendix is normal.  PERITONEUM: No ascites.  VESSELS: Atherosclerotic changes.  RETROPERITONEUM/LYMPH NODES: Enlarged para-aortic and left iliac chain   lymph nodes. For reference, left distal para-aortic lymph node measures   1.9 x 1.4 cm (3, 57). Left external iliac lymph node measures 3.8 x 1.9   cm (3, 88).  ABDOMINAL WALL: Within normal limits.  BONES: Multiple chronic appearing right-sided rib fracture deformities   involving the 3-7th ribs. No evidence of acutely displaced rib fracture.   Age indeterminant compression deformities of the T11, T12, and L1   vertebral bodies.    IMPRESSION:  Multiple chronic appearing right-sided rib fracture deformities. No   evidence of acutelydisplaced rib fracture.    Age indeterminant compression deformities of the T11, T12, and L1   vertebral bodies.    Bilateral subcentimeter nodular and tree-in-bud opacities,   bronchiectasis, and right lower lobe consolidation/subsegmental   atelectasis. Findings may be related to infection and/or mucoid   impaction. Follow-up to resolution.    Retroperitoneal and pelvic lymphadenopathy, of uncertain etiology.    < end of copied text >                       Pulmonary Consult  22 @ 13:56    Patient is a 89y old  Female who presents with a chief complaint of syncope (2022 12:37)    HPI: 89 y/o F with PMH of HTN, HLD, hypothyroid, CVA. Presents from [facility] for unwitnessed fall, bruising to L eye. Afebrile on triage, O2 sats 95% on RA. CT head with no acute changes. CT chest with small nodular, TIB opacities. Areas of bronchiectasis, RLL atelectasis/mucus plugging vs PNA. Pulmonary called to consult for r/o PNA. Former smoker. Denies hx of lung disease but appears to be a poor historian. Denies cough, sputum production. O2 sats 98% on RA.     ?FOLLOWING PRESENT  [ no ] Hx of PE/DVT, [ no ] Hx COPD, [ no ] Hx of Asthma, [  no] Hx of Hospitalization, [ no ]  Hx of BiPAP/CPAP use, [ no ] Hx of HERO    No Known Allergies    PAST MEDICAL & SURGICAL HISTORY:  Hypothyroid  HTN (hypertension)  HLD (hyperlipidemia)  Stroke  History of hip replacement, total, left    FAMILY HISTORY:  No pertinent family history in first degree relatives    Social History: [ former smoker ] TOBACCO                  [ x ] ETOH                                 [  x ] IVDA/DRUGS    REVIEW OF SYSTEMS    General:	x     Skin/Breast: x   	  Ophthalmologic: x  	  ENMT:	x    Respiratory and Thorax: x  	  Cardiovascular:	x    Gastrointestinal:	x    Genitourinary:	 x    Musculoskeletal:	 x    Neurological:	x    Psychiatric:	x     Hematology/Lymphatics:	 x    Endocrine:	x    Allergic/Immunologic:	x    MEDICATIONS  (STANDING):  cefTRIAXone   IVPB 1000 milliGRAM(s) IV Intermittent every 24 hours  rivaroxaban 10 milliGRAM(s) Oral daily    Vital Signs Last 24 Hrs  T(C): 37.1 (2022 11:08), Max: 38.2 (2022 07:25)  T(F): 98.7 (2022 11:08), Max: 100.8 (2022 07:25)  HR: 64 (2022 11:08) (64 - 73)  BP: 129/50 (2022 11:08) (129/50 - 137/52)  BP(mean): 73 (2022 11:08) (73 - 73)  RR: 18 (2022 11:08) (17 - 18)  SpO2: 100% (2022 11:08) (91% - 100%)Orthostatic VS  I&O's Summary    Physical Exam:   GENERAL: NAD  HEENT: PIERO  ENMT: No tonsillar erythema, exudates, or enlargement  NECK: Supple, No JVD  CHEST/LUNG: Few scattered rhonchi  CVS: Regular rate and rhythm  GI: : Soft, Nontender, Nondistended  NERVOUS SYSTEM:  Alert & Oriented X2  EXTREMITIES:  2+ Peripheral Pulses, No clubbing, cyanosis, or edema  SKIN: No rashes or lesions  PSYCH: Appropriate    Labs:  Venous<31<4>>50<<7.565>>Venous<<3><<4><<5<<509>>SARS-CoV-2: NotDetec (2022 09:11)    CARDIAC MARKERS ( 2022 07:43 )  x     / x     / 51 U/L / x     / x                                10.0   8.16  )-----------( 210      ( 2022 07:43 )             30.3     07-06    138  |  102  |  18  ----------------------------<  99  5.1   |  26  |  0.68    Ca    8.6      2022 07:43    TPro  5.8<L>  /  Alb  3.0<L>  /  TBili  0.4  /  DBili  x   /  AST  19  /  ALT  13  /  AlkPhos  78  07-06    CAPILLARY BLOOD GLUCOSE      POCT Blood Glucose.: 90 mg/dL (2022 07:07)    LIVER FUNCTIONS - ( 2022 07:43 )  Alb: 3.0 g/dL / Pro: 5.8 g/dL / ALK PHOS: 78 U/L / ALT: 13 U/L / AST: 19 U/L / GGT: x           Urinalysis Basic - ( 2022 09:11 )    Color: Colorless / Appearance: Clear / S.034 / pH: x  Gluc: x / Ketone: Negative  / Bili: Negative / Urobili: Negative   Blood: x / Protein: Negative / Nitrite: Negative   Leuk Esterase: Negative / RBC: 3 /hpf / WBC 0 /HPF   Sq Epi: x / Non Sq Epi: 3 /hpf / Bacteria: Negative    D DImer    Studies  CT SCAN Chest < from: CT Chest w/ IV Cont (22 @ 08:08) >    FINDINGS:  CHEST:  LUNGS AND LARGE AIRWAYS: Patent central airways. Bilateral subcentimeter   nodular and tree-in-bud opacities. Bronchiectasis predominantly in the   right middle, right lower and left upper lobes.   Consolidation/subsegmental atelectasis in the right lower lobe.  PLEURA: No pleural effusion or pneumothorax.  VESSELS: Atherosclerotic changes of the aorta and coronary arteries.  HEART: Heart size is normal. No pericardial effusion. Aortic valve   calcification.  MEDIASTINUM AND KERA: Small mediastinal lymph nodes measuring up to 1 cm   in short axis.  CHEST WALL AND LOWER NECK: Within normal limits.    ABDOMEN AND PELVIS:  LIVER: Within normal limits.  BILE DUCTS: Normal caliber.  GALLBLADDER: Within normal limits.  SPLEEN: Within normal limits.  PANCREAS: Within normal limits.  ADRENALS: Within normal limits.  KIDNEYS/URETERS: Within normal limits.    BLADDER: Underdistended with circumferential bladder wall thickening.  REPRODUCTIVE ORGANS: Calcified uterine fibroid. No adnexal mass.    BOWEL: Moderate hiatal hernia. No bowel obstruction. Appendix is normal.  PERITONEUM: No ascites.  VESSELS: Atherosclerotic changes.  RETROPERITONEUM/LYMPH NODES: Enlarged para-aortic and left iliac chain   lymph nodes. For reference, left distal para-aortic lymph node measures   1.9 x 1.4 cm (3, 57). Left external iliac lymph node measures 3.8 x 1.9   cm (3, 88).  ABDOMINAL WALL: Within normal limits.  BONES: Multiple chronic appearing right-sided rib fracture deformities   involving the 3-7th ribs. No evidence of acutely displaced rib fracture.   Age indeterminant compression deformities of the T11, T12, and L1   vertebral bodies.    IMPRESSION:  Multiple chronic appearing right-sided rib fracture deformities. No   evidence of acutelydisplaced rib fracture.    Age indeterminant compression deformities of the T11, T12, and L1   vertebral bodies.    Bilateral subcentimeter nodular and tree-in-bud opacities,   bronchiectasis, and right lower lobe consolidation/subsegmental   atelectasis. Findings may be related to infection and/or mucoid   impaction. Follow-up to resolution.    Retroperitoneal and pelvic lymphadenopathy, of uncertain etiology.    < end of copied text >

## 2022-07-06 NOTE — H&P ADULT - HISTORY OF PRESENT ILLNESS
89yof pmhx of HTN ?CVA on ASA and Plavix BIB EMS from [facility] for unwitnessed fall. pt reports walking and losing her balance but per EMS pt is bedbound and was found in bed with bruising to the L eye. No nausea or vomiting. pt is denying any complaints.

## 2022-07-06 NOTE — H&P ADULT - ASSESSMENT
89 y/o F with PMH of HTN, HLD, hypothyroid, CVA. Presents from [facility] for unwitnessed fall, bruising to L eye. CT head with no acute changes. CT chest with small nodular, TIB opacities. Areas of bronchiectasis, RLL atelectasis/mucus plugging vs PNA.

## 2022-07-06 NOTE — CONSULT NOTE ADULT - SUBJECTIVE AND OBJECTIVE BOX
[facility], Division of Infectious Diseases  ANU Lentz, HALI Yap, TYLER Baig Davide  [phone]  ([phone] - weekdays after 5pm and weekends)    SHARAD PINZON  89y, Female  [id number]    HPI:  Patient is a 89 year old female with PMH of ?CVA on ASA and Plavix, HTN, HLD who was BIB EMS from [place] for unwitnessed fall. Patient states she did have a fall but nontender sure how, states she must have tripped on somethign and lost her balance but noted per EMS patient is bedbound and was found in bed with bruising to the left eye. Patient sitting up in bed in ER, states she is chewing on her tongue, denies having any pain at this time.   ROS: limited, pertinent positives and negatives as per HPI.    Allergies: No Known Allergies  PMH -- Hypothyroid, HTN (hypertension), HLD (hyperlipidemia), Stroke  PSH -- History of hip replacement, total, left  FH -- No pertinent family history in first degree relatives  Social History -- denies tobacco, alcohol or illicit drug use    Physical Exam--  Vital Signs Last 24 Hrs  T(F): 98.7 (2022 11:08), Max: 100.8 (2022 07:25)  HR: 64 (2022 11:08) (64 - 73)  BP: 129/50 (2022 11:08) (129/50 - 137/52)  RR: 18 (2022 11:08) (17 - 18)  SpO2: 100% (2022 11:08) (91% - 100%)  General: no acute distress  HEENT: NC, EOMI, anicteric, L periorbital ecchymosis, neck supple  Lungs: Clear bilaterally without rales, wheezing or rhonchi  Heart: S1, S2 present, regular rate, no murmur heard   Abdomen: Soft. Nondistended. Nontender. BS present.  Neuro: AAOxself, confused, follows simple commands   Extremities: L knee swelling with no erythema   Skin: Warm. Dry. Good turgor. No visible rash.  Lines: PIV    Laboratory & Imaging Data--  CBC:                       10.0   8.16  )-----------( 210      ( 2022 07:43 )             30.3     WBC Count: 8.16 K/uL (22 @ 07:43)    CMP:     138  |  102  |  18  ----------------------------<  99  5.1   |  26  |  0.68    Ca    8.6      2022 07:43    TPro  5.8<L>  /  Alb  3.0<L>  /  TBili  0.4  /  DBili  x   /  AST  19  /  ALT  13  /  AlkPhos  78  07-06    LIVER FUNCTIONS - ( 2022 07:43 )  Alb: 3.0 g/dL / Pro: 5.8 g/dL / ALK PHOS: 78 U/L / ALT: 13 U/L / AST: 19 U/L / GGT: x           Urinalysis Basic - ( 2022 09:11 )  Color: Colorless / Appearance: Clear / S.034 / pH: x  Gluc: x / Ketone: Negative  / Bili: Negative / Urobili: Negative   Blood: x / Protein: Negative / Nitrite: Negative   Leuk Esterase: Negative / RBC: 3 /hpf / WBC 0 /HPF   Sq Epi: x / Non Sq Epi: 3 /hpf / Bacteria: Negative    Microbiology: reviewed    - RVP/COVID - negative    Radiology--reviewed  CT Head, Cervical Spine No Cont (22 @ 08:37) >Impression: Brain CT: No acute hemorrhage, extra-axial collections or displaced calvarial fracture. Cervical spine CT dated no acute fracture traumatic subluxation.  Multilevel degenerative changes.    CT Chest Abdomen and Pelvis w/ IV Cont (22 @ 08:37) >IMPRESSION: Multiple chronic appearing right-sided rib fracture deformities. No evidence of acutely displaced rib fracture. Age indeterminant compression deformities of the T11, T12, and L1 vertebral bodies.  Bilateral subcentimeter nodular and tree-in-bud opacities, bronchiectasis, and right lower lobe consolidation/subsegmental atelectasis. Findings may be related to infection and/or mucoid impaction. Follow-up to resolution. Retroperitoneal and pelvic lymphadenopathy, of uncertain etiology.    Xray Pelvis AP only (22 @ 08:35) >IMPRESSION: Stable intact partially visualized upper ends of previously seen bilateral femoral IM rods/nails with proximal compression screws. No dislocations or acute appearing fractures.  Intact pelvic and obturator rings. No sacroiliac or pubic symphysis  diastases or malalignment.  Generalized marked osteopenia otherwise no discrete suspicious lytic or  blastic lesions. Vascular calcifications again noted. Bladder partially distended with residual contrast material.    Active Medications--cefTRIAXone   IVPB 1000 milliGRAM(s) IV Intermittent every 24 hours  rivaroxaban 10 milliGRAM(s) Oral daily    Antimicrobials:   cefTRIAXone   IVPB 1000 milliGRAM(s) IV Intermittent every 24 hours   [facility], Division of Infectious Diseases  ANU Lentz, HALI Yap, TYLER Baig Davide  [phone]  ([phone] - weekdays after 5pm and weekends)    SHARAD PINZON  89y, Female  [id number]    HPI:  Patient is a 89 year old female with PMH of ?CVA on ASA and Plavix, HTN, HLD who was BIB EMS from [place] for unwitnessed fall. Patient states she did have a fall but nontender sure how, states she must have tripped on somethign and lost her balance but noted per EMS patient is bedbound and was found in bed with bruising to the left eye. Patient sitting up in bed in ER, states she is chewing on her tongue, denies having any pain at this time.   ROS: limited, pertinent positives and negatives as per HPI.    Allergies: No Known Allergies  PMH -- Hypothyroid, HTN (hypertension), HLD (hyperlipidemia), Stroke  PSH -- History of hip replacement, total, left  FH -- No pertinent family history in first degree relatives  Social History -- denies tobacco, alcohol or illicit drug use    Physical Exam--  Vital Signs Last 24 Hrs  T(F): 98.7 (2022 11:08), Max: 100.8 (2022 07:25)  HR: 64 (2022 11:08) (64 - 73)  BP: 129/50 (2022 11:08) (129/50 - 137/52)  RR: 18 (2022 11:08) (17 - 18)  SpO2: 100% (2022 11:08) (91% - 100%)  General: no acute distress  HEENT: NC, EOMI, anicteric, L periorbital ecchymosis, neck supple  Lungs: decreased breath sounds bilaterally  Heart: S1, S2 present, regular rate, no murmur heard   Abdomen: Soft. Nondistended. Nontender. BS present.  Neuro: AAOxself, confused, follows simple commands   Extremities: L knee swelling with no erythema   Skin: Warm. Dry. Good turgor. No visible rash.  Lines: PIV    Laboratory & Imaging Data--  CBC:                       10.0   8.16  )-----------( 210      ( 2022 07:43 )             30.3     WBC Count: 8.16 K/uL (22 @ 07:43)    CMP:     138  |  102  |  18  ----------------------------<  99  5.1   |  26  |  0.68    Ca    8.6      2022 07:43    TPro  5.8<L>  /  Alb  3.0<L>  /  TBili  0.4  /  DBili  x   /  AST  19  /  ALT  13  /  AlkPhos  78  07-06    LIVER FUNCTIONS - ( 2022 07:43 )  Alb: 3.0 g/dL / Pro: 5.8 g/dL / ALK PHOS: 78 U/L / ALT: 13 U/L / AST: 19 U/L / GGT: x           Urinalysis Basic - ( 2022 09:11 )  Color: Colorless / Appearance: Clear / S.034 / pH: x  Gluc: x / Ketone: Negative  / Bili: Negative / Urobili: Negative   Blood: x / Protein: Negative / Nitrite: Negative   Leuk Esterase: Negative / RBC: 3 /hpf / WBC 0 /HPF   Sq Epi: x / Non Sq Epi: 3 /hpf / Bacteria: Negative    Microbiology: reviewed    - RVP/COVID - negative    Radiology--reviewed  CT Head, Cervical Spine No Cont (22 @ 08:37) >Impression: Brain CT: No acute hemorrhage, extra-axial collections or displaced calvarial fracture. Cervical spine CT dated no acute fracture traumatic subluxation.  Multilevel degenerative changes.    CT Chest Abdomen and Pelvis w/ IV Cont (22 @ 08:37) >IMPRESSION: Multiple chronic appearing right-sided rib fracture deformities. No evidence of acutely displaced rib fracture. Age indeterminant compression deformities of the T11, T12, and L1 vertebral bodies.  Bilateral subcentimeter nodular and tree-in-bud opacities, bronchiectasis, and right lower lobe consolidation/subsegmental atelectasis. Findings may be related to infection and/or mucoid impaction. Follow-up to resolution. Retroperitoneal and pelvic lymphadenopathy, of uncertain etiology.    Xray Pelvis AP only (22 @ 08:35) >IMPRESSION: Stable intact partially visualized upper ends of previously seen bilateral femoral IM rods/nails with proximal compression screws. No dislocations or acute appearing fractures.  Intact pelvic and obturator rings. No sacroiliac or pubic symphysis  diastases or malalignment.  Generalized marked osteopenia otherwise no discrete suspicious lytic or  blastic lesions. Vascular calcifications again noted. Bladder partially distended with residual contrast material.    Active Medications--  cefTRIAXone   IVPB 1000 milliGRAM(s) IV Intermittent every 24 hours  rivaroxaban 10 milliGRAM(s) Oral daily    Antimicrobials:   cefTRIAXone   IVPB 1000 milliGRAM(s) IV Intermittent every 24 hours

## 2022-07-06 NOTE — ED ADULT NURSE NOTE - NSIMPLEMENTINTERV_GEN_ALL_ED
Implemented All Fall with Harm Risk Interventions:  [place] to call system. Call bell, personal items and telephone within reach. Instruct patient to call for assistance. Room bathroom lighting operational. Non-slip footwear when patient is off stretcher. Physically safe environment: no spills, clutter or unnecessary equipment. Stretcher in lowest position, wheels locked, appropriate side rails in place. Provide visual cue, wrist band, yellow gown, etc. Monitor gait and stability. Monitor for mental status changes and reorient to person, place, and time. Review medications for side effects contributing to fall risk. Reinforce activity limits and safety measures with patient and family. Provide visual clues: red socks.

## 2022-07-06 NOTE — ED ADULT NURSE NOTE - OBJECTIVE STATEMENT
89 year old female presents to ED via EMS from [place] complaining of unwitnessed fall. Per EMS patient was found in bed this morning with new bruising to left eye and complaining of pain in left knee. Staff thought that maybe she fell in the middle of the night and got back into bed. Per EMS patient baseline is A&O x0. Upon assessment patient A&O x2, oriented to name, birthday, and place, disoriented to time, and disoriented to situation. Patient states she remembers falling but unable to describe what happened. Patient complaining of pain in left knee. Bruising noted to left eye. Febrile via rectal temperature. VS as documented. On bedside cardiac monitor, EKG completed. IV placed, blood work sent to lab. Bed locked and lowered. Comfort and safety measures maintained. 89 year old female presents to ED via EMS from [place] complaining of unwitnessed fall. Per EMS patient was found in bed this morning with new bruising to left eye and complaining of pain in left knee. Staff thought that maybe she fell in the middle of the night and got back into bed. Patient in on Plavix. Per EMS patient baseline is A&O x0. Upon assessment patient A&O x2, oriented to name, birthday, and place, disoriented to time, and disoriented to situation. Patient states she remembers falling but unable to describe what happened. Patient complaining of pain in left knee. Breathing spontanously on room air, hypoxic to 91%, placed on 2L nasal cannula.  Bruising noted to left eye. Febrile via rectal temperature. VS as documented. On bedside cardiac monitor, EKG completed. IV placed, blood work sent to lab. Bed locked and lowered. Comfort and safety measures maintained.

## 2022-07-06 NOTE — ED PROVIDER NOTE - PHYSICAL EXAMINATION
Gen: AAO x 2 ?time; elderly female  Skin: +ecchymosis to the L periorbital region.   HEENT: NC/AT, PERRLA, EOMI, MMM  Resp: unlabored CTAB; +ecchymosis and abrasion to the L lateral chest wall.   Cardiac: rrr s1s2  GI: ND, +BS, Soft, NT  Ext: no pedal edema, L knee with swelling, pain with any ROM, stable pelvis. no pain with ROM of the hip   Neuro: no focal deficits Gen: AAO x 2 ?time; elderly female  Skin: +ecchymosis to the L periorbital region.   HEENT: left eye ecchymosis with mild swelling, PERRLA, EOMI, MMM  Resp: unlabored CTAB; +ecchymosis and abrasion to the L lateral chest wall.   Cardiac: rrr s1s2  GI: ND, +BS, Soft, NT  Ext: no pedal edema, L knee with swelling, pain with any ROM, stable pelvis. no pain with ROM of the hip   Neuro: no focal deficits

## 2022-07-06 NOTE — CONSULT NOTE ADULT - SUBJECTIVE AND OBJECTIVE BOX
[facility]        Patient is a 89y old  Female who presents with a chief complaint of syncope (2022 13:55)    Excerpt from H&P,"     89yof pmhx of HTN ?CVA on ASA and Plavix BIB EMS from [place] for unwitnessed fall. pt reports walking and losing her balance but per EMS pt is bedbound and was found in bed with bruising to the L eye. No nausea or vomiting. pt is denying any complaints. (2022 10:16)           *****PAST MEDICAL / Surgical  HISTORY:  PAST MEDICAL & SURGICAL HISTORY:  Hypothyroid      HTN (hypertension)      HLD (hyperlipidemia)      Stroke      History of hip replacement, total, left               *****FAMILY HISTORY:  FAMILY HISTORY:  No pertinent family history in first degree relatives             *****SOCIAL HISTORY:  Alcohol: None  Smoking: None         *****ALLERGIES:   Allergies    No Known Allergies    Intolerances             *****MEDICATIONS: current medication reviewed and documented.   MEDICATIONS  (STANDING):  albuterol/ipratropium for Nebulization 3 milliLiter(s) Nebulizer every 6 hours  azithromycin  IVPB 500 milliGRAM(s) IV Intermittent every 24 hours  cefTRIAXone   IVPB 1000 milliGRAM(s) IV Intermittent every 24 hours  rivaroxaban 10 milliGRAM(s) Oral daily    MEDICATIONS  (PRN):           *****REVIEW OF SYSTEM:  GEN: no fever, no chills, no pain  RESP: no SOB, no cough, no sputum  CVS: no chest pain, no palpitations, no edema  GI: no abdominal pain, no nausea, no vomiting, no constipation, no diarrhea  : no dysurea, no frequency, no hematurea  Neuro: no headache, no dizziness  PSYCH: no anxiety, no depression  Derm : no itching, no rash         *****VITAL SIGNS:  T(C): 36.9 (22 @ 20:04), Max: 38.2 (22 @ 07:25)  HR: 72 (22 @ 20:04) (64 - 73)  BP: 105/66 (22 @ 20:04) (105/66 - 138/88)  RR: 18 (22 @ 20:04) (17 - 18)  SpO2: 97% (22 @ 20:04) (91% - 100%)  Wt(kg): --           *****PHYSICAL EXAM:   Alert oriented x 3   Attention comprehension are fair. Able to name, repeat, read without any difficulty.   Able to follow 3 step commands.     EOMI fundi not visualized,  VFF to confrontration  No facial asymmetry   Tongue is midline   Palate elevates symmetrically   Moving all 4 ext symmetrically no pronator drift   Reflexes are symmetric throughout   sensation is grossly symmetric  Gait : not assessed.  B/L down going toes               *****LAB AND IMAGING:                          10.0   8.16  )-----------( 210      ( 2022 07:43 )             30.3               07-06    138  |  102  |  18  ----------------------------<  99  5.1   |  26  |  0.68    Ca    8.6      2022 07:43    TPro  5.8<L>  /  Alb  3.0<L>  /  TBili  0.4  /  DBili  x   /  AST  19  /  ALT  13  /  AlkPhos  78  07-06                CARDIAC MARKERS ( 2022 07:43 )  x     / x     / 51 U/L / x     / x                  Urinalysis Basic - ( 2022 09:11 )    Color: Colorless / Appearance: Clear / S.034 / pH: x  Gluc: x / Ketone: Negative  / Bili: Negative / Urobili: Negative   Blood: x / Protein: Negative / Nitrite: Negative   Leuk Esterase: Negative / RBC: 3 /hpf / WBC 0 /HPF   Sq Epi: x / Non Sq Epi: 3 /hpf / Bacteria: Negative  < from: CT Head No Cont (22 @ 08:36) >  Brain CT: No acute hemorrhage, extra-axial collections or displaced   calvarial fracture.    Cervical spine CT dated no acute fracture traumatic subluxation.   Multilevel degenerative change    < end of copied text >        [All pertinent recent Imaging reports reviewed]         *****A S S E S S M E N T   A N D   P L A N :     Excerpt from H&P,"     89yof pmhx of HTN ?CVA on ASA and Plavix BIB EMS from [place] for unwitnessed fall. pt reports walking and losing her balance but per EMS pt is bedbound and was found in bed with bruising to the L eye. No nausea or vomiting. pt is denying any complaints     Problem/Recommendations 1:unwitnessed fall given eye ecchymosis   ct head no acute fracture   r/o arrythmia orhtostatics   tele monitoring             Problem/Recommendations 2:low albumin   likely poor po intake severe protein caloric malnutrition   continue to encourage po intake      pt at risk for developing delirium, therefore please institute the following preventative measures if possible          - initiating early mobilization          -minimizing "tethers" - IV, oxygen, catheters, etc          -avoiding   sedatives          -maintaining hydration/nutrition          -avoid anticholinergics - diphenhydramine, etc          -pain control          -sleep wake cycle regulation; avoid day time somnolence           -supportive environment    ___________________________  Will follow with you.  Thank you,  Daina Diaz MD  Diplomate of the American Board of Neurology and Psychiatry.  Diplomate of the American Board of Vascular Neurology.   [facility] ([facility]), [facility]   Ph: [phone]    Differential diagnosis and plan of care discussed with patient after the evaluation.   Advanced care planning options discussed.   Pain assessed and judicious use of narcotics when appropriate was discussed.  Importance of Fall prevention discussed.  Counseling on Smoking and Alcohol cessation was offered when appropriate.  Counseling on Diet, exercise, and medication compliance was done.   83 minutes spent on the total encounter;  more than 50 % of the visit was spent on counseling  and or coordinating care by the attending physician.    Thank you for allowing me to participate in the care of this caroline patient. Please do not hesitate to call me if you have any questions.     This and subsequent notes  will  inherently be subject to errors including those of syntax and substitutions which may escape proofreading. In such instances original meaning may be extrapolated by contextual derivation.

## 2022-07-06 NOTE — ED ADULT NURSE REASSESSMENT NOTE - NS ED NURSE REASSESS COMMENT FT1
Patient fed some soup per request. Pending bed assignment. Bed locked and lowered. Comfort and safety measures maintained.

## 2022-07-06 NOTE — ED ADULT NURSE REASSESSMENT NOTE - NS ED NURSE REASSESS COMMENT FT1
Straight cath for clean urine sample. 2 RN at bedside, sterile technique maintained. UA/UC sent to lab. Bed locked and lowered. Comfort and safety measures maintained.

## 2022-07-06 NOTE — ED PROVIDER NOTE - CLINICAL SUMMARY MEDICAL DECISION MAKING FREE TEXT BOX
: 89 y/o F presents with questionable fall of unknown mechanism and unknown time with trauma to the face and lt flank and found to be febrile. Patient ordered for septic and trauma workup.     Exam notable for .. Presentation most concerning for but not limited to .. Obtaining .. Labs WNL with/ notable for .. Imaging with .. EKG .. : 89 y/o F presents with questionable fall of unknown mechanism and unknown time with trauma to the face and lt flank and found to be febrile. Patient ordered for septic and trauma workup. Labs with normal lactate and WBC, UA negative. EKG NSR at 62 BPM without ischemia or ectopy. Imaging concerning for age indeterminate vertebra compression frx and rib frxs, pt current without TTP at those sites and no other acute traumatic injuries. Imaging also remarkable for possible consolidation and pt order for antibiotics. Patient admit to [facility].

## 2022-07-06 NOTE — H&P ADULT - NSHPPHYSICALEXAM_GEN_ALL_CORE
T(F): 100.8 (07-06-22 @ 07:25), Max: 100.8 (07-06-22 @ 07:25)  HR: 73 (07-06-22 @ 07:25) (71 - 73)  BP: 137/52 (07-06-22 @ 07:25) (136/63 - 137/52)  RR: 17 (07-06-22 @ 07:26) (17 - 18)  SpO2: 94% (07-06-22 @ 07:26) (91% - 94%)    PHYSICAL EXAM:  GENERAL: NAD, well-developed  HEAD:  Atraumatic, Normocephalic  EYES: EOMI, PERRLA, conjunctiva and sclera clear  NECK: Supple, No JVD  CHEST/LUNG: Clear to auscultation bilaterally; No wheeze  HEART: Regular rate and rhythm; No murmurs, rubs, or gallops  ABDOMEN: Soft, Nontender, Nondistended; Bowel sounds present  EXTREMITIES:  2+ Peripheral Pulses, No clubbing, cyanosis, or edema  PSYCH: AAOx3  NEUROLOGY: non-focal  SKIN: No rashes or lesions

## 2022-07-06 NOTE — CONSULT NOTE ADULT - PROBLEM SELECTOR RECOMMENDATION 9
Minimal O2 requirements  -VBG with respiratory alkalosis. Check ABG.   -Keep sats >90% with supplemental O2 PRN Minimal O2 requirements  -VBG with respiratory alkalosis. Check ABG.   -Keep sats >90% with supplemental O2 PRN (currently RA-2LNC). Minimal O2 requirements  -VBG with respiratory alkalosis. Check ABG.   -Duoneb q6h  -Keep sats >90% with supplemental O2 PRN (currently RA-2LNC).

## 2022-07-07 ENCOUNTER — TRANSCRIPTION ENCOUNTER (OUTPATIENT)
Age: 87
End: 2022-07-07

## 2022-07-07 ENCOUNTER — INPATIENT (INPATIENT)
Facility: HOSPITAL | Age: 87
LOS: 0 days | Discharge: NURSING, REHAB OR LONG-TERM CARE | DRG: 191 | End: 2022-07-08
Attending: INTERNAL MEDICINE | Admitting: INTERNAL MEDICINE
Payer: COMMERCIAL

## 2022-07-07 VITALS
TEMPERATURE: 99 F | HEART RATE: 74 BPM | DIASTOLIC BLOOD PRESSURE: 71 MMHG | SYSTOLIC BLOOD PRESSURE: 150 MMHG | OXYGEN SATURATION: 97 % | HEIGHT: 61 IN | RESPIRATION RATE: 18 BRPM

## 2022-07-07 VITALS
DIASTOLIC BLOOD PRESSURE: 73 MMHG | HEART RATE: 78 BPM | TEMPERATURE: 98 F | SYSTOLIC BLOOD PRESSURE: 126 MMHG | OXYGEN SATURATION: 97 % | RESPIRATION RATE: 18 BRPM

## 2022-07-07 DIAGNOSIS — J18.9 PNEUMONIA, UNSPECIFIED ORGANISM: ICD-10-CM

## 2022-07-07 DIAGNOSIS — R09.02 HYPOXEMIA: ICD-10-CM

## 2022-07-07 DIAGNOSIS — E03.9 HYPOTHYROIDISM, UNSPECIFIED: ICD-10-CM

## 2022-07-07 DIAGNOSIS — R09.89 OTHER SPECIFIED SYMPTOMS AND SIGNS INVOLVING THE CIRCULATORY AND RESPIRATORY SYSTEMS: ICD-10-CM

## 2022-07-07 DIAGNOSIS — Z96.642 PRESENCE OF LEFT ARTIFICIAL HIP JOINT: Chronic | ICD-10-CM

## 2022-07-07 DIAGNOSIS — I63.9 CEREBRAL INFARCTION, UNSPECIFIED: ICD-10-CM

## 2022-07-07 DIAGNOSIS — R41.3 OTHER AMNESIA: ICD-10-CM

## 2022-07-07 DIAGNOSIS — Z29.9 ENCOUNTER FOR PROPHYLACTIC MEASURES, UNSPECIFIED: ICD-10-CM

## 2022-07-07 DIAGNOSIS — I10 ESSENTIAL (PRIMARY) HYPERTENSION: ICD-10-CM

## 2022-07-07 LAB
A1C WITH ESTIMATED AVERAGE GLUCOSE RESULT: 6.3 % — HIGH (ref 4–5.6)
CULTURE RESULTS: SIGNIFICANT CHANGE UP
ESTIMATED AVERAGE GLUCOSE: 134 MG/DL — HIGH (ref 68–114)
GLUCOSE BLDC GLUCOMTR-MCNC: 107 MG/DL — HIGH (ref 70–99)
GLUCOSE BLDC GLUCOMTR-MCNC: 162 MG/DL — HIGH (ref 70–99)
GLUCOSE BLDC GLUCOMTR-MCNC: 165 MG/DL — HIGH (ref 70–99)
GLUCOSE BLDC GLUCOMTR-MCNC: 174 MG/DL — HIGH (ref 70–99)
LEGIONELLA AG UR QL: NEGATIVE — SIGNIFICANT CHANGE UP
PROCALCITONIN SERPL-MCNC: 0.07 NG/ML — SIGNIFICANT CHANGE UP (ref 0.02–0.1)
SPECIMEN SOURCE: SIGNIFICANT CHANGE UP
T3 SERPL-MCNC: 57 NG/DL — LOW (ref 80–200)
T4 AB SER-ACNC: 6.1 UG/DL — SIGNIFICANT CHANGE UP (ref 4.6–12)
TSH SERPL-MCNC: 1.26 UIU/ML — SIGNIFICANT CHANGE UP (ref 0.27–4.2)

## 2022-07-07 PROCEDURE — 36415 COLL VENOUS BLD VENIPUNCTURE: CPT

## 2022-07-07 PROCEDURE — 85014 HEMATOCRIT: CPT

## 2022-07-07 PROCEDURE — 87086 URINE CULTURE/COLONY COUNT: CPT

## 2022-07-07 PROCEDURE — 85025 COMPLETE CBC W/AUTO DIFF WBC: CPT

## 2022-07-07 PROCEDURE — 96374 THER/PROPH/DIAG INJ IV PUSH: CPT

## 2022-07-07 PROCEDURE — 73562 X-RAY EXAM OF KNEE 3: CPT

## 2022-07-07 PROCEDURE — 82550 ASSAY OF CK (CPK): CPT

## 2022-07-07 PROCEDURE — 84480 ASSAY TRIIODOTHYRONINE (T3): CPT

## 2022-07-07 PROCEDURE — 97161 PT EVAL LOW COMPLEX 20 MIN: CPT

## 2022-07-07 PROCEDURE — 84132 ASSAY OF SERUM POTASSIUM: CPT

## 2022-07-07 PROCEDURE — 99285 EMERGENCY DEPT VISIT HI MDM: CPT | Mod: CS,25

## 2022-07-07 PROCEDURE — 93306 TTE W/DOPPLER COMPLETE: CPT | Mod: 26

## 2022-07-07 PROCEDURE — 72170 X-RAY EXAM OF PELVIS: CPT

## 2022-07-07 PROCEDURE — 85018 HEMOGLOBIN: CPT

## 2022-07-07 PROCEDURE — 84145 PROCALCITONIN (PCT): CPT

## 2022-07-07 PROCEDURE — 82330 ASSAY OF CALCIUM: CPT

## 2022-07-07 PROCEDURE — 84436 ASSAY OF TOTAL THYROXINE: CPT

## 2022-07-07 PROCEDURE — 99223 1ST HOSP IP/OBS HIGH 75: CPT

## 2022-07-07 PROCEDURE — 83036 HEMOGLOBIN GLYCOSYLATED A1C: CPT

## 2022-07-07 PROCEDURE — 81001 URINALYSIS AUTO W/SCOPE: CPT

## 2022-07-07 PROCEDURE — 74177 CT ABD & PELVIS W/CONTRAST: CPT | Mod: MA

## 2022-07-07 PROCEDURE — 71045 X-RAY EXAM CHEST 1 VIEW: CPT | Mod: 26

## 2022-07-07 PROCEDURE — 82435 ASSAY OF BLOOD CHLORIDE: CPT

## 2022-07-07 PROCEDURE — 82947 ASSAY GLUCOSE BLOOD QUANT: CPT

## 2022-07-07 PROCEDURE — 82962 GLUCOSE BLOOD TEST: CPT

## 2022-07-07 PROCEDURE — 72125 CT NECK SPINE W/O DYE: CPT | Mod: MA

## 2022-07-07 PROCEDURE — 0225U NFCT DS DNA&RNA 21 SARSCOV2: CPT

## 2022-07-07 PROCEDURE — 70450 CT HEAD/BRAIN W/O DYE: CPT | Mod: MA

## 2022-07-07 PROCEDURE — 93306 TTE W/DOPPLER COMPLETE: CPT

## 2022-07-07 PROCEDURE — 84295 ASSAY OF SERUM SODIUM: CPT

## 2022-07-07 PROCEDURE — 94640 AIRWAY INHALATION TREATMENT: CPT

## 2022-07-07 PROCEDURE — 82803 BLOOD GASES ANY COMBINATION: CPT

## 2022-07-07 PROCEDURE — 84443 ASSAY THYROID STIM HORMONE: CPT

## 2022-07-07 PROCEDURE — 87040 BLOOD CULTURE FOR BACTERIA: CPT

## 2022-07-07 PROCEDURE — 93010 ELECTROCARDIOGRAM REPORT: CPT

## 2022-07-07 PROCEDURE — 83605 ASSAY OF LACTIC ACID: CPT

## 2022-07-07 PROCEDURE — 87449 NOS EACH ORGANISM AG IA: CPT

## 2022-07-07 PROCEDURE — 80053 COMPREHEN METABOLIC PANEL: CPT

## 2022-07-07 PROCEDURE — 71260 CT THORAX DX C+: CPT | Mod: MA

## 2022-07-07 PROCEDURE — 99285 EMERGENCY DEPT VISIT HI MDM: CPT | Mod: 25

## 2022-07-07 RX ORDER — ALPRAZOLAM 0.25 MG
1 TABLET ORAL
Qty: 0 | Refills: 0 | DISCHARGE

## 2022-07-07 RX ORDER — ACETAMINOPHEN 500 MG
2 TABLET ORAL
Qty: 0 | Refills: 0 | DISCHARGE

## 2022-07-07 RX ORDER — ASPIRIN/CALCIUM CARB/MAGNESIUM 324 MG
1 TABLET ORAL
Qty: 0 | Refills: 0 | DISCHARGE

## 2022-07-07 RX ORDER — RIVAROXABAN 15 MG-20MG
1 KIT ORAL
Qty: 0 | Refills: 0 | DISCHARGE
Start: 2022-07-07

## 2022-07-07 RX ORDER — PANTOPRAZOLE SODIUM 20 MG/1
1 TABLET, DELAYED RELEASE ORAL
Qty: 0 | Refills: 0 | DISCHARGE

## 2022-07-07 RX ADMIN — Medication 3 MILLILITER(S): at 07:30

## 2022-07-07 RX ADMIN — RIVAROXABAN 10 MILLIGRAM(S): KIT at 14:02

## 2022-07-07 RX ADMIN — CEFTRIAXONE 100 MILLIGRAM(S): 500 INJECTION, POWDER, FOR SOLUTION INTRAMUSCULAR; INTRAVENOUS at 09:44

## 2022-07-07 RX ADMIN — AZITHROMYCIN 255 MILLIGRAM(S): 500 TABLET, FILM COATED ORAL at 10:29

## 2022-07-07 RX ADMIN — Medication 3 MILLILITER(S): at 17:04

## 2022-07-07 RX ADMIN — Medication 3 MILLILITER(S): at 14:02

## 2022-07-07 NOTE — ED ADULT NURSE NOTE - OBJECTIVE STATEMENT
Pt is 89 y/o female, presenting to the ED via EMS from [place] for hypoxia. As per EMS, pt was d/c yesterday for s/p fall. EMS states pt brought in for facility not being able to provide oxygen to pt. Upon assessment, pt AxOx2, disoriented to time. Pt breathing spontaneously and unlabored, placed on continuous pulse ox. Pt found to be 88% on RA, placed on 2L NC. Abdomen is soft an non-tender to palpation. EKG done, pt placed on continuous cardiac monitor, NSR. Pt moves all extremities w/ = strength. Bruising noted to L eye, hands, and forearms. Skin is warm and dry, + peripheral pulses noted. Pt denies chest pain, SOB, n/v/d, and fevers. Safety and comfort measures provided- bed in lowest position, locked, and blanket given.

## 2022-07-07 NOTE — PROGRESS NOTE ADULT - PROBLEM SELECTOR PLAN 1
ptn is asymptomatic, not short of breath at present, not hypoxic, family wants to send her back asap  cont empiric po ABx  pulm and ID on board  on 2 L of oxygen
she seems pretty comfortable: on antibiotics:  ct chest noted with old rib fractures as wel las bronchiectasis and consolidation ./ atelectasis:   on 2 L of oxygen

## 2022-07-07 NOTE — ED PROVIDER NOTE - OBJECTIVE STATEMENT
89 y/o female DNR/DNI who arrives BIBEMS from [place] for hypoxia, facility "ran out of oxygen" as per EMS.   Was seen here yesterday for fall, dx with pneumonia and discharged back to facility on oxygen. 89 y/o female PMH of HTN, HLD, hypothyroid, CVA currently [facility] [place] [facility], facility "ran out of oxygen" as per EMS.   Was seen here recently for fall, with evidence for pneumonia of Chest CT and dx with pneumonia and discharged back to facility. Returned to facility and saturation were in the high 80s.

## 2022-07-07 NOTE — PHYSICAL THERAPY INITIAL EVALUATION ADULT - DISCHARGE DISPOSITION, PT EVAL
Subacute Rehab to improve strength and balance in order to safely perform ADLs and functional mobility/rehabilitation facility Subacute Rehab prior to d/c home to improve strength and balance; if home pt would require assist with all ADLs/functional mobility and home PT, DME: wheelchair, hospital bed, mechanical lift device, 3:1 commode/rehabilitation facility

## 2022-07-07 NOTE — DISCHARGE NOTE PROVIDER - NSDCMRMEDTOKEN_GEN_ALL_CORE_FT
ALPRAZolam 0.25 mg oral tablet: 1 tab(s) orally every 6 hours, As needed, anxiety  amLODIPine 5 mg oral tablet: 1 tab(s) orally once a day  atorvastatin 40 mg oral tablet: 1 tab(s) orally once a day (at bedtime)  carvedilol 25 mg oral tablet: 1 tab(s) orally every 12 hours  cholecalciferol oral tablet: 1000 unit(s) orally once a day  clopidogrel 75 mg oral tablet: 1 tab(s) orally once a day  folic acid 1 mg oral tablet: 1 tab(s) orally once a day  levoFLOXacin 250 mg oral tablet: 1 tab(s) orally every 24 hours   levothyroxine 50 mcg (0.05 mg) oral tablet: 1 tab(s) orally once a day  pantoprazole 40 mg oral delayed release tablet: 1 tab(s) orally once a day  rivaroxaban 10 mg oral tablet: 1 tab(s) orally once a day  Senna 8.6 mg oral tablet: 2 tab(s) orally once a day (at bedtime)   Adult Aspirin 81 mg oral tablet, chewable: 1 tab(s) orally once a day  ALPRAZolam 0.25 mg oral tablet: 1 tab(s) orally once a day, As Needed  amLODIPine 5 mg oral tablet: 1 tab(s) orally once a day  atorvastatin 40 mg oral tablet: 1 tab(s) orally once a day (at bedtime)  carvedilol 25 mg oral tablet: 1 tab(s) orally every 12 hours  cholecalciferol oral tablet: 1000 unit(s) orally once a day  folic acid 1 mg oral tablet: 1 tab(s) orally once a day  levoFLOXacin 250 mg oral tablet: 1 tab(s) orally every 24 hours x 3 days   levothyroxine 50 mcg (0.05 mg) oral tablet: 1 tab(s) orally once a day  rivaroxaban 10 mg oral tablet: 1 tab(s) orally once a day  Senna 8.6 mg oral tablet: 2 tab(s) orally once a day (at bedtime)

## 2022-07-07 NOTE — DISCHARGE NOTE PROVIDER - HOSPITAL COURSE
89 y/o F with PMH of HTN, HLD, hypothyroid, CVA. Presents from [facility] for unwitnessed fall, bruising to L eye. CT head with no acute changes. CT chest with small nodular, TIB opacities. Areas of bronchiectasis, RLL atelectasis/mucus plugging vs PNA. Pulmonary called to consult for r/o PNA: Shortness of breath, CT scan ct chest noted with old rib fractures as  bronchiectasis and consolidation ./ atelectasis/ PNA ---started  Zithromax and ceftriaxone; Dr. Engle has medically clear pt for discharge on Levaquin  250mg po x5 days.         89 y/o F with PMH of HTN, HLD, hypothyroid, CVA. Presents from [facility] for unwitnessed fall, bruising to L eye.  ptn has severe protein calorie malnutrition. 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 CT head with no acute changes. CT chest with small nodular, TIB opacities. Areas of bronchiectasis, RLL atelectasis/mucus plugging vs PNA. Pulmonary called to consult for r/o PNA: Shortness of breath, CT scan ct chest noted with old rib fractures as  bronchiectasis and consolidation ./ atelectasis/ PNA ---started  Zithromax and ceftriaxone; Dr. Engle has medically clear pt for discharge on Levaquin  250mg po x5 days.

## 2022-07-07 NOTE — PROGRESS NOTE ADULT - PROBLEM SELECTOR PLAN 2
per primary  team: awaiting echo
not clear if she actually had LOC. family refusing all testing. dc back to SNF

## 2022-07-07 NOTE — H&P ADULT - NSHPPHYSICALEXAM_GEN_ALL_CORE
Vital Signs Last 24 Hrs  T(C): 36.6 (07-07-22 @ 22:00), Max: 37.1 (07-07-22 @ 21:26)  T(F): 97.8 (07-07-22 @ 22:00), Max: 98.8 (07-07-22 @ 21:26)  HR: 78 (07-07-22 @ 22:00) (68 - 82)  BP: 140/65 (07-07-22 @ 22:00) (126/73 - 150/71)  BP(mean): --  RR: 16 (07-07-22 @ 22:00) (16 - 18)  SpO2: 96% (07-07-22 @ 22:00) (88% - 99%)

## 2022-07-07 NOTE — PHYSICAL THERAPY INITIAL EVALUATION ADULT - PRECAUTIONS/LIMITATIONS, REHAB EVAL
CT 7/6 head with no acute changes. Cervical spine CT 7/6: No acute fracture traumatic subluxation. Multilevel degenerative changes.CT chest 7/6: small nodular, TIB opacities. Areas of bronchiectasis, RLL atelectasis/mucus plugging vs PNA. XRay Knee 7/6: No acute fractures or dislocations. Small left knee joint effusion. No right knee joint effusion. XRay Pelvis 7/6: No dislocations or acute appearing fractures. Generalized marked osteopenia otherwise no discrete suspicious lytic or blastic lesions. Bladder partially distended with residual contrast material. CT C/A/P 7/6: b/l subcm nodular and tree-in-bud opacities, bronchiectasis, RLL consolidation/subsegmental atelectasis - possible infection and/or mucoid impaction; has multiple chronic appearing R sided fracture deformities./fall precautions

## 2022-07-07 NOTE — H&P ADULT - ASSESSMENT
89F w/ hx of CVA on asa and Xarelto, HTN, hypothyroid, HLD, mild memory impairment? p/w hypoxia and lack of oxygen

## 2022-07-07 NOTE — PHYSICAL THERAPY INITIAL EVALUATION ADULT - GENERAL OBSERVATIONS, REHAB EVAL
Pt nidia 45 mins PT eval; recd semi supine, NAD, VSS, A/Ox3, confused but following simple commands, +kevin, +IV, +2LNC, agreeable to PT.

## 2022-07-07 NOTE — PHYSICAL THERAPY INITIAL EVALUATION ADULT - PERTINENT HX OF CURRENT PROBLEM, REHAB EVAL
89 y/o F with PMH of HTN, HLD, hypothyroid, CVA. Presents from [facility] for unwitnessed fall. Patient states she did have a fall but not sure how, states she must have tripped and lost her balance. As per EMS, patient is bedbound and was found in bed with bruising to the left eye. Afebrile on triage, O2 sats 95% on RA.

## 2022-07-07 NOTE — ED PROVIDER NOTE - SKIN, MLM
Skin normal color for race, warm, dry and intact. No evidence of rash. +Ecchymosis on extremities and eye healing

## 2022-07-07 NOTE — PROGRESS NOTE ADULT - SUBJECTIVE AND OBJECTIVE BOX
[facility]    ** please note this is a delayed entry note**   Seen earlier today, and examined.  - Today, patient is without complaints.           *****MEDICATIONS: Current medication reviewed and documented.    MEDICATIONS  (STANDING):    MEDICATIONS  (PRN):          ***** VITAL SIGNS:   VSS         *****PHYSICAL EXAM:   alert oriented x2 attention comprehension are  limited     EOmi fundi not visualized  left periorbital ecchymosis noted     no nystagmus VFF to confrontation  Tongue is midline  Palate elevates symmetrically   Moving all 4 ext spontaneously no drift appreciated    Gait not assessed.            *****LAB AND IMAGING:                        10.4   8.43  )-----------( 217      ( 08 Jul 2022 06:16 )             32.5               07-08    139  |  100  |  17  ----------------------------<  126<H>  3.9   |  28  |  0.62    Ca    8.6      08 Jul 2022 06:16  Mg     1.5     07-08    TPro  5.6<L>  /  Alb  3.0<L>  /  TBili  0.4  /  DBili  x   /  AST  9<L>  /  ALT  11  /  AlkPhos  75  07-08                         [All pertinent recent Imaging/Reports reviewed]           *****A S S E S S M E N T   A N D   P L A N :    Excerpt from H&P,"     89yof pmhx of HTN ?CVA on ASA and Plavix BIB EMS from [place] for unwitnessed fall. pt reports walking and losing her balance but per EMS pt is bedbound and was found in bed with bruising to the L eye. No nausea or vomiting. pt is denying any complaints     Problem/Recommendations 1:unwitnessed fall given eye ecchymosis   ct head no acute fracture   cardiac input appreciated   need increased supervision             Problem/Recommendations 2:low albumin   likely poor po intake severe protein caloric malnutrition   continue to encourage po intake       Thank you for allowing me to participate in the care of this patient. Will continue to follow patient periodically. Please do not hesitate to call me if you have any  questions or if there has been a change in patients neurological status     ________________  Daina Diaz MD  [facility]  [phone]      33 minutes spent on total encounter; more than 50 % of the visit was  spent counseling about plan of care, compliance to diet/exercise and medication regimen and or  coordinating care by the attending physician.      It is advised that stroke patients follow up with ADRY Izaguirre @ [phone] in 1- 2 weeks.   Others please follow up with Dr. Michael Nissenbaum [phone]
Date of Service: 22 @ 11:10    Patient is a 89y old  Female who presents with a chief complaint of syncope (2022 15:36)      Any change in ROS: On 2 L of oxygen today : no sign distress:     MEDICATIONS  (STANDING):  albuterol/ipratropium for Nebulization 3 milliLiter(s) Nebulizer every 6 hours  azithromycin  IVPB 500 milliGRAM(s) IV Intermittent every 24 hours  cefTRIAXone   IVPB 1000 milliGRAM(s) IV Intermittent every 24 hours  rivaroxaban 10 milliGRAM(s) Oral daily    MEDICATIONS  (PRN):    Vital Signs Last 24 Hrs  T(C): 36.8 (2022 08:26), Max: 36.9 (2022 20:04)  T(F): 98.3 (2022 08:26), Max: 98.4 (2022 20:04)  HR: 69 (2022 10:16) (64 - 82)  BP: 139/58 (2022 10:16) (105/66 - 148/73)  BP(mean): --  RR: 18 (2022 08:26) (18 - 18)  SpO2: 97% (2022 10:16) (97% - 99%)    I&O's Summary        Physical Exam:   GENERAL: NAD, well-groomed, well-developed  HEENT: PIERO/   Atraumatic, Normocephalic  ENMT: No tonsillar erythema, exudates, or enlargement; Moist mucous membranes, Good dentition, No lesions  NECK: Supple, No JVD, Normal thyroid  CHEST/LUNG: Clear to auscultaion  CVS: Regular rate and rhythm; No murmurs, rubs, or gallops  GI: : Soft, Nontender, Nondistended; Bowel sounds present  NERVOUS SYSTEM:  Alert & Awake  EXTREMITIEs: no edema  LYMPH: No lymphadenopathy noted  SKIN: No rashes or lesions  ENDOCRINOLOGY: No Thyromegaly  PSYCH: calm     Labs:  28  CARDIAC MARKERS ( 2022 07:43 )  x     / x     / 51 U/L / x     / x                                10.0   8.16  )-----------( 210      ( 2022 07:43 )             30.3     07-06    138  |  102  |  18  ----------------------------<  99  5.1   |  26  |  0.68    Ca    8.6      2022 07:43    TPro  5.8<L>  /  Alb  3.0<L>  /  TBili  0.4  /  DBili  x   /  AST  19  /  ALT  13  /  AlkPhos  78  07-06    CAPILLARY BLOOD GLUCOSE      POCT Blood Glucose.: 107 mg/dL (2022 07:55)      LIVER FUNCTIONS - ( 2022 07:43 )  Alb: 3.0 g/dL / Pro: 5.8 g/dL / ALK PHOS: 78 U/L / ALT: 13 U/L / AST: 19 U/L / GGT: x             Urinalysis Basic - ( 2022 09:11 )    Color: Colorless / Appearance: Clear / S.034 / pH: x  Gluc: x / Ketone: Negative  / Bili: Negative / Urobili: Negative   Blood: x / Protein: Negative / Nitrite: Negative   Leuk Esterase: Negative / RBC: 3 /hpf / WBC 0 /HPF   Sq Epi: x / Non Sq Epi: 3 /hpf / Bacteria: Negative      Procalcitonin, Serum: 0.07 ng/mL ( @ 06:58)        RECENT CULTURES:   @ 09:12 Catheterized Catheterized                <10,000 CFU/mL Normal Urogenital Tiffanie     @ 07:27 .Blood Blood         ra< from: CT Cervical Spine No Cont (22 @ 08:37) >  which has progressed since the prior study.  No acute fracture or traumatic subluxation is identified. Multilevel   degenerative changes are present with osteophyte formation, uncovertebral   joint and facet arthropathy contributing to multilevel neural foraminal   stenosis, similar in appearance to the previous examination.    The prevertebral soft tissues are within normal limits. Linear increased   density is again noted within the right lung apex, not simply change   compared with the prior study.    There is increased density layering along the trachea, likely   representing secretions.      Impression:    Brain CT: No acute hemorrhage, extra-axial collections or displaced   calvarial fracture.    Cervical spine CT dated no acute fracture traumatic subluxation.   Multilevel degenerative changes.    --- End of Report ---            SABRINA MCCAULEY MD; Attending Radiologist    < end of copied text >  < from: CT Abdomen and Pelvis w/ IV Cont (22 @ 08:37) >  Age indeterminant compression deformities of the T11, T12, and L1   vertebral bodies.    IMPRESSION:  Multiple chronic appearing right-sided rib fracture deformities. No   evidence of acutelydisplaced rib fracture.    Age indeterminant compression deformities of the T11, T12, and L1   vertebral bodies.    Bilateral subcentimeter nodular and tree-in-bud opacities,   bronchiectasis, and right lower lobe consolidation/subsegmental   atelectasis. Findings may be related to infection and/or mucoid   impaction. Follow-up to resolution.    Retroperitoneal and pelvic lymphadenopathy, of uncertain etiology.    --- End of Report ---            NICOLE MOORE MD; Attending Radiologist    < end of copied text >  < from: CT Chest w/ IV Cont (22 @ 08:08) >  Age indeterminant compression deformities of the T11, T12, and L1   vertebral bodies.    IMPRESSION:  Multiple chronic appearing right-sided rib fracture deformities. No   evidence of acutelydisplaced rib fracture.    Age indeterminant compression deformities of the T11, T12, and L1   vertebral bodies.    Bilateral subcentimeter nodular and tree-in-bud opacities,   bronchiectasis, and right lower lobe consolidation/subsegmental   atelectasis. Findings may be related to infection and/or mucoid   impaction. Follow-up to resolution.    Retroperitoneal and pelvic lymphadenopathy, of uncertain etiology.    --- End of Report ---    < end of copied text >         No growth to date.     @ 07:25 .Blood Blood                No growth to date.          RESPIRATORY CULTURES:          Studies  Chest X-RAY  CT SCAN Chest   Venous Dopplers: LE:   CT Abdomen  Others              
EP Attending    HISTORY OF PRESENT ILLNESS: HPI:  89yof pmhx of HTN ?CVA on ASA and Plavix BIB EMS from [place] for unwitnessed fall. pt reports walking and losing her balance but per EMS pt is bedbound and was found in bed with bruising to the L eye. No nausea or vomiting. pt is denying any complaints. (06 Jul 2022 10:16)    Patient not oriented to date or situation.  Identifies me as "Kashif"- someone in her family.    She is not sure how she wound up with facial bruising.  States "I was up... then I was just crawling along near the wall".  It is not clear if she is supposed to be walking on her own, or if she has limitations placed on her ambulation.  Has back pain and rib pain, as well as some left facial pain. A 10 pt ROS is otherwise negative.  Date of service 7/7- complaining of knee pain.  no other overnight issues.    albuterol/ipratropium for Nebulization 3 milliLiter(s) Nebulizer every 6 hours  cefTRIAXone   IVPB 1000 milliGRAM(s) IV Intermittent every 24 hours  rivaroxaban 10 milliGRAM(s) Oral daily                            10.0   8.16  )-----------( 210      ( 06 Jul 2022 07:43 )             30.3       07-06    138  |  102  |  18  ----------------------------<  99  5.1   |  26  |  0.68    Ca    8.6      06 Jul 2022 07:43    TPro  5.8<L>  /  Alb  3.0<L>  /  TBili  0.4  /  DBili  x   /  AST  19  /  ALT  13  /  AlkPhos  78  07-06      CARDIAC MARKERS ( 06 Jul 2022 07:43 )  x     / x     / 51 U/L / x     / x            T(C): 36.8 (07-07-22 @ 08:26), Max: 36.9 (07-06-22 @ 20:04)  HR: 69 (07-07-22 @ 10:16) (64 - 82)  BP: 139/58 (07-07-22 @ 10:16) (105/66 - 148/73)  RR: 18 (07-07-22 @ 08:26) (18 - 18)  SpO2: 97% (07-07-22 @ 10:16) (97% - 99%)  Wt(kg): --    I&O's Summary      Appearance: thin elderly woman, resting comfortably. nondiaphoretic.	  HEENT:   Normal oral mucosa but missing many teeth, PERRL, EOMI.  left periorbital ecchymoses.	  Lymphatic: No lymphadenopathy , no edema  Cardiovascular: Normal S1 S2, No JVD, No murmurs , Peripheral pulses palpable 2+ bilaterally  Respiratory: Lungs clear to auscultation, normal effort 	  Gastrointestinal:  Soft, Non-tender, + BS	  Skin: No rashes, No cyanosis, warm to touch  Musculoskeletal: Normal range of motion, normal strength  Psychiatry:  oriented to name only. mood is "im OK", affect is congruent, but labile ranging from calm to crying.    TELEMETRY: NSR 	    ECG: NSR 	    ASSESSMENT/PLAN: 	89y Female from assisted living, has dementia, presents after apparent collapse to floor with injury.  No prior known CV history.    Unwitnessed collapse, and patient was able to get back in bed.  No memory of exact mechanism of collapse.    Possible RLL pneumonia per ID.  Treating with antibiotics.  Check echocardiogram to rule out structural heart disease. If normal, consider ambulatory event monitoring.  At this time, no telemetry indication for permanent pacing.  Any invasive workup would have to be with consideration to her goals of care, and would require 3rd party consent, due to underlying dementia.      Tod Thomson M.D.  Cardiac Electrophysiology    office [phone]  pager [phone]
[facility], Division of Infectious Diseases  ANU Lentz Y. Patel, S. Shah, G. Casimir  [phone]  ([phone] - weekdays after 5pm and weekends)    Name: SHARAD PINZON  Age/Gender: 89y Female  MRN: [id number]    Interval History:  Patient seen and examined this morning.   Patient awake, confused, resting comfortably.  Answers with "thank  you" and "I love you"  Notes reviewed. Afebrile   Allergies: No Known Allergies      Objective:  Vitals:   T(F): 98.3 (22 @ 08:26), Max: 98.4 (22 @ 20:04)  HR: 69 (22 @ 10:16) (64 - 82)  BP: 139/58 (22 @ 10:16) (105/66 - 148/73)  RR: 18 (22 @ 08:26) (18 - 18)  SpO2: 97% (22 @ 10:16) (97% - 99%)  Physical Examination:  General: no acute distress  HEENT: NC/AT, anicteric, neck supple  Respiratory: decreased breath sounds b/l  Cardiovascular: S1 and S2 present, normal rate   Gastrointestinal: soft, nontender, nondistended  Extremities: no edema, no cyanosis  Skin: no visible rash    Laboratory Studies:  CBC:                       10.0   8.16  )-----------( 210      ( 2022 07:43 )             30.3     WBC Trend:  8.16 22 @ 07:43    CMP:     138  |  102  |  18  ----------------------------<  99  5.1   |  26  |  0.68    Ca    8.6      2022 07:43    TPro  5.8<L>  /  Alb  3.0<L>  /  TBili  0.4  /  DBili  x   /  AST  19  /  ALT  13  /  AlkPhos  78  07-06    Creatinine, Serum: 0.68 mg/dL (22 @ 07:43)      LIVER FUNCTIONS - ( 2022 07:43 )  Alb: 3.0 g/dL / Pro: 5.8 g/dL / ALK PHOS: 78 U/L / ALT: 13 U/L / AST: 19 U/L / GGT: x           Urinalysis Basic - ( 2022 09:11 )  Color: Colorless / Appearance: Clear / S.034 / pH: x  Gluc: x / Ketone: Negative  / Bili: Negative / Urobili: Negative   Blood: x / Protein: Negative / Nitrite: Negative   Leuk Esterase: Negative / RBC: 3 /hpf / WBC 0 /HPF   Sq Epi: x / Non Sq Epi: 3 /hpf / Bacteria: Negative    Microbiology: reviewed   Culture - Urine (collected 22 @ 09:12)  Source: Catheterized Catheterized  Final Report (22 @ 10:25):    <10,000 CFU/mL Normal Urogenital Tiffanie    Culture - Blood (collected 22 @ 07:27)  Source: .Blood Blood  Preliminary Report (22 @ 10:01):    No growth to date.    Culture - Blood (collected 22 @ 07:25)  Source: .Blood Blood  Preliminary Report (22 @ 10:01):    No growth to date.    Radiology: reviewed     Medications:  albuterol/ipratropium for Nebulization 3 milliLiter(s) Nebulizer every 6 hours  azithromycin  IVPB 500 milliGRAM(s) IV Intermittent every 24 hours  cefTRIAXone   IVPB 1000 milliGRAM(s) IV Intermittent every 24 hours  rivaroxaban 10 milliGRAM(s) Oral daily    Antimicrobials:  azithromycin  IVPB 500 milliGRAM(s) IV Intermittent every 24 hours  cefTRIAXone   IVPB 1000 milliGRAM(s) IV Intermittent every 24 hours  
Patient is a 89y old  Female who presents with a chief complaint of syncope (2022 14:06)      SUBJECTIVE / OVERNIGHT EVENTS: no new events, spoke w family. goal is to DC back to [facility] ASAP. MOLST form previously filled out is on chart    MEDICATIONS  (STANDING):  albuterol/ipratropium for Nebulization 3 milliLiter(s) Nebulizer every 6 hours  cefTRIAXone   IVPB 1000 milliGRAM(s) IV Intermittent every 24 hours  rivaroxaban 10 milliGRAM(s) Oral daily    MEDICATIONS  (PRN):      Vital Signs Last 24 Hrs  T(F): 98.3 (22 @ 08:26), Max: 98.4 (22 @ 20:04)  HR: 69 (22 @ 10:16) (64 - 82)  BP: 139/58 (22 @ 10:16) (105/66 - 148/73)  RR: 18 (22 @ 08:26) (18 - 18)  SpO2: 97% (22 @ 10:16) (97% - 99%)  Telemetry:   CAPILLARY BLOOD GLUCOSE      POCT Blood Glucose.: 174 mg/dL (2022 11:42)  POCT Blood Glucose.: 107 mg/dL (2022 07:55)    I&O's Summary      PHYSICAL EXAM:  GENERAL: NAD, well-developed  HEAD:  Atraumatic, Normocephalic  EYES: EOMI, PERRLA, conjunctiva and sclera clear  NECK: Supple, No JVD  CHEST/LUNG: Clear to auscultation bilaterally; No wheeze  HEART: Regular rate and rhythm; No murmurs, rubs, or gallops  ABDOMEN: Soft, Nontender, Nondistended; Bowel sounds present  EXTREMITIES:  2+ Peripheral Pulses, No clubbing, cyanosis, or edema  PSYCH: AAOx3  NEUROLOGY: non-focal  SKIN: No rashes or lesions    LABS:                        10.0   8.16  )-----------( 210      ( 2022 07:43 )             30.3         138  |  102  |  18  ----------------------------<  99  5.1   |  26  |  0.68    Ca    8.6      2022 07:43    TPro  5.8<L>  /  Alb  3.0<L>  /  TBili  0.4  /  DBili  x   /  AST  19  /  ALT  13  /  AlkPhos  78  07-06      CARDIAC MARKERS ( 2022 07:43 )  x     / x     / 51 U/L / x     / x          Urinalysis Basic - ( 2022 09:11 )    Color: Colorless / Appearance: Clear / S.034 / pH: x  Gluc: x / Ketone: Negative  / Bili: Negative / Urobili: Negative   Blood: x / Protein: Negative / Nitrite: Negative   Leuk Esterase: Negative / RBC: 3 /hpf / WBC 0 /HPF   Sq Epi: x / Non Sq Epi: 3 /hpf / Bacteria: Negative        RADIOLOGY & ADDITIONAL TESTS:    Imaging Personally Reviewed:    Consultant(s) Notes Reviewed:      Care Discussed with Consultants/Other Providers:

## 2022-07-07 NOTE — DISCHARGE NOTE PROVIDER - NSDCCPCAREPLAN_GEN_ALL_CORE_FT
PRINCIPAL DISCHARGE DIAGNOSIS  Diagnosis: PNA (pneumonia)  Assessment and Plan of Treatment: Continue Levaquin as prescribed  Aspiration precautions    Follow-up with your PCP ---call for appointment      SECONDARY DISCHARGE DIAGNOSES  Diagnosis: Unwitnessed fall  Assessment and Plan of Treatment: Fall precautions

## 2022-07-07 NOTE — PHYSICAL THERAPY INITIAL EVALUATION ADULT - ADDITIONAL COMMENTS
Pt admitted Pt from [facility] living [facility], required assistance for all ADLs and functional mobility PTA, pt unable to remember which device she used. As per chart review from previous admission, pt is legally blind.

## 2022-07-07 NOTE — ED PROVIDER NOTE - PROGRESS NOTE DETAILS
Spoke to [facility] unable to receive oxygen tonight. will admit to pmd service    Tod Bishop MD PGY-5 PEM Fellow

## 2022-07-07 NOTE — ED PROVIDER NOTE - NSFOLLOWUPINSTRUCTIONS_ED_ALL_ED_FT
See your Primary Doctor this week for follow up -- call to discuss.    Stay well hydrated, eat regular healthy meals, get plenty of rest, continued medications/antibiotics.    Use Nasal [place]ula Oxygen (2 LPM) until resolved pneumonia and cleared by your doctor.    See PNEUMONIA information and return instructions given to you.    Seek immediate medical care for new/worsening symptoms/concerns.

## 2022-07-07 NOTE — DISCHARGE NOTE NURSING/CASE MANAGEMENT/SOCIAL WORK - PATIENT PORTAL LINK FT
You can access the FollowMyHealth Patient Portal offered by [facility] by registering at the following website: [email or web address]. By joining [facility]’s FollowMyHealth portal, you will also be able to view your health information using other applications (apps) compatible with our system.

## 2022-07-07 NOTE — ED ADULT TRIAGE NOTE - CHIEF COMPLAINT QUOTE
From [facility] for hypoxia, facility "ran out of oxygen" as per EMS. Was seen here yesterday for fall, dx with pneumonia and discharged back to facility on oxygen.

## 2022-07-07 NOTE — DISCHARGE NOTE NURSING/CASE MANAGEMENT/SOCIAL WORK - NSDCPEFALRISK_GEN_ALL_CORE
For information on Fall & Injury Prevention, visit: [email or web address] OR  [email or web address] OR  https://www.cdc.gov/steadi/patient.html

## 2022-07-07 NOTE — PROGRESS NOTE ADULT - ASSESSMENT
Patient is a 89 year old female with PMH of ?CVA on ASA and Plavix, HTN, HLD who was BIB EMS from [place] for unwitnessed fall.    RLL pneumonia ?aspiration  S/p unwitnessed fall    - noted to be febrile in ER, no leukocytosis   - RVP/COVID negative    - CT C/A/P - b/l subcm nodular and tree-in-bud opacities, bronchiectasis, RLL consolidation/subsegmental atelectasis - possible infection and/or mucoid impaction, chronic appearing R sided fracture deformities    - pt denies respiratory complaints but not a reliable historian   - UA negative for pyuria (sample obtained via straight cath), culture negative    - started on ceftriaxone and azithromycin   - legionella urine ag negative -- d/c azithromycin   - continue on ceftriaxone 1g IV Q24h -- complete 5d course - end 7/10    -- if discharged, can switch to cefuroxime (weight ordered to determine CrCl)   - S&S evaluation   - monitor temps/WBC   - aspiration precautions    - rest of management per primary team      Julia Staples M.D.  [facility], Division of Infectious Diseases  [phone]  After 5pm on weekdays and all day on weekends - please call [phone]  
 89 y/o F with PMH of HTN, HLD, hypothyroid, CVA. Presents from [facility] for unwitnessed fall, bruising to L eye. CT head with no acute changes. CT chest with small nodular, TIB opacities. Areas of bronchiectasis, RLL atelectasis/mucus plugging vs PNA. Pulmonary called to consult for r/o PNA
 89 y/o F with PMH of HTN, HLD, hypothyroid, CVA. Presents from [facility] for unwitnessed fall, bruising to L eye. CT head with no acute changes. CT chest with small nodular, TIB opacities. Areas of bronchiectasis, RLL atelectasis/mucus plugging vs PNA.

## 2022-07-07 NOTE — PHYSICAL THERAPY INITIAL EVALUATION ADULT - STRENGTHENING, PT EVAL
GOAL: Patient will improve bilateral UE/LE strength to 5/5, for increased limb stability and to improve gait in 4 weeks.

## 2022-07-07 NOTE — H&P ADULT - NSHPLABSRESULTS_GEN_ALL_CORE
I have reviewed the labs, imaging and ekg. EKG with NSR HR 78, QTc 426 non-specific ST segment findings, CXR w/o new focal consolidations

## 2022-07-07 NOTE — H&P ADULT - NSHPADDITIONALINFOADULT_GEN_ALL_CORE
I was asked to see this patient by the hospitalist in charge. Dr. Arredondo to assume care for patient in AM and thereafter

## 2022-07-07 NOTE — H&P ADULT - PROBLEM SELECTOR PLAN 1
Mild hypoxia noted at facility with no oxygen available. Returned to ER now saturating well on 2LNC. Maybe due to PNA and chronic rib fractures  -Cont. supplemental 02, discharge planning with oxygen in mind  -SW consult  -Pt already on Xarelto  -Cont. PNA treatment as below  -Tylenol PRN pain

## 2022-07-07 NOTE — ED PROVIDER NOTE - ATTENDING CONTRIBUTION TO CARE
"Patient expresses concern over wound due to newly developed slough noted on assessment. SN encouraged patient to keep site clean with clean mild soap and water and cover with silicone foam border dressing. Wound dressing performed: cleansed with soap and water and border dressing applied. No iodosorb available in the home. Slough was easily removed while performing wound care. Patient presents no fever, redness or warmth over incision site. However there is still a \"hard lump\" under incision.      Plan for next visit: educate falls prevention, wound care, medication review, disease management education."
------------ATTENDING NOTE------------  pt sent to ED by EMS from [facility] for concerns of hypoxia, 86% on room air, pt was placed in facility today being treated for pneumonia, otherwise at her baseline mental/functional status, nml VS w/ 2L O2, tolerating PO, no complaints (pleasantly w/ dementia), dw SW/CM and facility if appropriate to dc w/ oxygen tx and continued tx plan/antibiotics -vs- admission -->  - Jn Lee MD   -------------------------------------------------

## 2022-07-07 NOTE — H&P ADULT - HISTORY OF PRESENT ILLNESS
89F w/ hx of CVA on asa and Xarelto, HTN, hypothyroid, HLD, mild memory impairment? p/w hypoxia and lack of oxygen. Pt recently admitted 2 days ago with fall and concern for syncope at assisted living ([facility]). Some concern for PNA as well. Family did not want anything done and wanted patient transferred back to [facility]. Discharged on PO antibiotics given possible PNA on imaging. Also noted to have some fractures related to fall. After being discharged to rehab today pt was noticed to by hypoxic. Did not having any oxygen so sent back to hospital for further evaluation.    In ER: Given Oxygen

## 2022-07-07 NOTE — H&P ADULT - PROBLEM SELECTOR PLAN 2
Possible PNA seen on recent admission. Planned for short course of PO antibiotics  -Cont. Levofloxacin daily  -See above regarding hypoxia

## 2022-07-08 ENCOUNTER — TRANSCRIPTION ENCOUNTER (OUTPATIENT)
Age: 87
End: 2022-07-08

## 2022-07-08 VITALS
OXYGEN SATURATION: 94 % | SYSTOLIC BLOOD PRESSURE: 152 MMHG | RESPIRATION RATE: 18 BRPM | TEMPERATURE: 98 F | DIASTOLIC BLOOD PRESSURE: 77 MMHG | HEART RATE: 66 BPM

## 2022-07-08 LAB
ALBUMIN SERPL ELPH-MCNC: 3 G/DL — LOW (ref 3.3–5)
ALP SERPL-CCNC: 75 U/L — SIGNIFICANT CHANGE UP (ref 40–120)
ALT FLD-CCNC: 11 U/L — SIGNIFICANT CHANGE UP (ref 10–45)
ANION GAP SERPL CALC-SCNC: 11 MMOL/L — SIGNIFICANT CHANGE UP (ref 5–17)
AST SERPL-CCNC: 9 U/L — LOW (ref 10–40)
BASOPHILS # BLD AUTO: 0.03 K/UL — SIGNIFICANT CHANGE UP (ref 0–0.2)
BASOPHILS NFR BLD AUTO: 0.4 % — SIGNIFICANT CHANGE UP (ref 0–2)
BILIRUB SERPL-MCNC: 0.4 MG/DL — SIGNIFICANT CHANGE UP (ref 0.2–1.2)
BUN SERPL-MCNC: 17 MG/DL — SIGNIFICANT CHANGE UP (ref 7–23)
CALCIUM SERPL-MCNC: 8.6 MG/DL — SIGNIFICANT CHANGE UP (ref 8.4–10.5)
CHLORIDE SERPL-SCNC: 100 MMOL/L — SIGNIFICANT CHANGE UP (ref 96–108)
CO2 SERPL-SCNC: 28 MMOL/L — SIGNIFICANT CHANGE UP (ref 22–31)
CREAT SERPL-MCNC: 0.62 MG/DL — SIGNIFICANT CHANGE UP (ref 0.5–1.3)
EGFR: 81 ML/MIN/1.73M2 — SIGNIFICANT CHANGE UP
EOSINOPHIL # BLD AUTO: 0.26 K/UL — SIGNIFICANT CHANGE UP (ref 0–0.5)
EOSINOPHIL NFR BLD AUTO: 3.1 % — SIGNIFICANT CHANGE UP (ref 0–6)
GLUCOSE SERPL-MCNC: 126 MG/DL — HIGH (ref 70–99)
HCT VFR BLD CALC: 32.5 % — LOW (ref 34.5–45)
HGB BLD-MCNC: 10.4 G/DL — LOW (ref 11.5–15.5)
IMM GRANULOCYTES NFR BLD AUTO: 0.4 % — SIGNIFICANT CHANGE UP (ref 0–1.5)
LYMPHOCYTES # BLD AUTO: 0.57 K/UL — LOW (ref 1–3.3)
LYMPHOCYTES # BLD AUTO: 6.8 % — LOW (ref 13–44)
MAGNESIUM SERPL-MCNC: 1.5 MG/DL — LOW (ref 1.6–2.6)
MCHC RBC-ENTMCNC: 30.5 PG — SIGNIFICANT CHANGE UP (ref 27–34)
MCHC RBC-ENTMCNC: 32 GM/DL — SIGNIFICANT CHANGE UP (ref 32–36)
MCV RBC AUTO: 95.3 FL — SIGNIFICANT CHANGE UP (ref 80–100)
MONOCYTES # BLD AUTO: 0.72 K/UL — SIGNIFICANT CHANGE UP (ref 0–0.9)
MONOCYTES NFR BLD AUTO: 8.5 % — SIGNIFICANT CHANGE UP (ref 2–14)
NEUTROPHILS # BLD AUTO: 6.82 K/UL — SIGNIFICANT CHANGE UP (ref 1.8–7.4)
NEUTROPHILS NFR BLD AUTO: 80.8 % — HIGH (ref 43–77)
NRBC # BLD: 0 /100 WBCS — SIGNIFICANT CHANGE UP (ref 0–0)
PLATELET # BLD AUTO: 217 K/UL — SIGNIFICANT CHANGE UP (ref 150–400)
POTASSIUM SERPL-MCNC: 3.9 MMOL/L — SIGNIFICANT CHANGE UP (ref 3.5–5.3)
POTASSIUM SERPL-SCNC: 3.9 MMOL/L — SIGNIFICANT CHANGE UP (ref 3.5–5.3)
PROT SERPL-MCNC: 5.6 G/DL — LOW (ref 6–8.3)
RBC # BLD: 3.41 M/UL — LOW (ref 3.8–5.2)
RBC # FLD: 14.1 % — SIGNIFICANT CHANGE UP (ref 10.3–14.5)
SARS-COV-2 RNA SPEC QL NAA+PROBE: SIGNIFICANT CHANGE UP
SODIUM SERPL-SCNC: 139 MMOL/L — SIGNIFICANT CHANGE UP (ref 135–145)
WBC # BLD: 8.43 K/UL — SIGNIFICANT CHANGE UP (ref 3.8–10.5)
WBC # FLD AUTO: 8.43 K/UL — SIGNIFICANT CHANGE UP (ref 3.8–10.5)

## 2022-07-08 PROCEDURE — 71045 X-RAY EXAM CHEST 1 VIEW: CPT

## 2022-07-08 PROCEDURE — 99285 EMERGENCY DEPT VISIT HI MDM: CPT | Mod: 25

## 2022-07-08 PROCEDURE — 93005 ELECTROCARDIOGRAM TRACING: CPT

## 2022-07-08 PROCEDURE — U0003: CPT

## 2022-07-08 PROCEDURE — 80053 COMPREHEN METABOLIC PANEL: CPT

## 2022-07-08 PROCEDURE — U0005: CPT

## 2022-07-08 PROCEDURE — 83735 ASSAY OF MAGNESIUM: CPT

## 2022-07-08 PROCEDURE — 85025 COMPLETE CBC W/AUTO DIFF WBC: CPT

## 2022-07-08 RX ORDER — ASPIRIN/CALCIUM CARB/MAGNESIUM 324 MG
81 TABLET ORAL DAILY
Refills: 0 | Status: DISCONTINUED | OUTPATIENT
Start: 2022-07-08 | End: 2022-07-08

## 2022-07-08 RX ORDER — CARVEDILOL PHOSPHATE 80 MG/1
25 CAPSULE, EXTENDED RELEASE ORAL EVERY 12 HOURS
Refills: 0 | Status: DISCONTINUED | OUTPATIENT
Start: 2022-07-08 | End: 2022-07-08

## 2022-07-08 RX ORDER — ATORVASTATIN CALCIUM 80 MG/1
40 TABLET, FILM COATED ORAL AT BEDTIME
Refills: 0 | Status: DISCONTINUED | OUTPATIENT
Start: 2022-07-08 | End: 2022-07-08

## 2022-07-08 RX ORDER — ACETAMINOPHEN 500 MG
650 TABLET ORAL EVERY 6 HOURS
Refills: 0 | Status: DISCONTINUED | OUTPATIENT
Start: 2022-07-08 | End: 2022-07-08

## 2022-07-08 RX ORDER — ONDANSETRON 8 MG/1
4 TABLET, FILM COATED ORAL EVERY 8 HOURS
Refills: 0 | Status: DISCONTINUED | OUTPATIENT
Start: 2022-07-08 | End: 2022-07-08

## 2022-07-08 RX ORDER — RIVAROXABAN 15 MG-20MG
10 KIT ORAL DAILY
Refills: 0 | Status: DISCONTINUED | OUTPATIENT
Start: 2022-07-08 | End: 2022-07-08

## 2022-07-08 RX ORDER — FOLIC ACID 0.8 MG
1 TABLET ORAL DAILY
Refills: 0 | Status: DISCONTINUED | OUTPATIENT
Start: 2022-07-08 | End: 2022-07-08

## 2022-07-08 RX ORDER — LEVOTHYROXINE SODIUM 125 MCG
50 TABLET ORAL DAILY
Refills: 0 | Status: DISCONTINUED | OUTPATIENT
Start: 2022-07-08 | End: 2022-07-08

## 2022-07-08 RX ORDER — LANOLIN ALCOHOL/MO/W.PET/CERES
3 CREAM (GRAM) TOPICAL AT BEDTIME
Refills: 0 | Status: DISCONTINUED | OUTPATIENT
Start: 2022-07-08 | End: 2022-07-08

## 2022-07-08 RX ORDER — CHOLECALCIFEROL (VITAMIN D3) 125 MCG
1000 CAPSULE ORAL DAILY
Refills: 0 | Status: DISCONTINUED | OUTPATIENT
Start: 2022-07-08 | End: 2022-07-08

## 2022-07-08 RX ORDER — ALPRAZOLAM 0.25 MG
0.25 TABLET ORAL DAILY
Refills: 0 | Status: DISCONTINUED | OUTPATIENT
Start: 2022-07-08 | End: 2022-07-08

## 2022-07-08 RX ADMIN — CARVEDILOL PHOSPHATE 25 MILLIGRAM(S): 80 CAPSULE, EXTENDED RELEASE ORAL at 18:30

## 2022-07-08 RX ADMIN — RIVAROXABAN 10 MILLIGRAM(S): KIT at 18:30

## 2022-07-08 RX ADMIN — CARVEDILOL PHOSPHATE 25 MILLIGRAM(S): 80 CAPSULE, EXTENDED RELEASE ORAL at 05:30

## 2022-07-08 RX ADMIN — Medication 1000 UNIT(S): at 18:30

## 2022-07-08 RX ADMIN — Medication 50 MICROGRAM(S): at 05:30

## 2022-07-08 RX ADMIN — Medication 1 MILLIGRAM(S): at 18:30

## 2022-07-08 RX ADMIN — Medication 81 MILLIGRAM(S): at 18:30

## 2022-07-08 NOTE — CHART NOTE - NSCHARTNOTEFT_GEN_A_CORE
Social Work assisted with discharge planning,  LMSW contacted [employer] to confirm the delivery of the oxygen concentrator.  [facility]  Update provided to RN.

## 2022-07-08 NOTE — DISCHARGE NOTE NURSING/CASE MANAGEMENT/SOCIAL WORK - NSDCPEFALRISK_GEN_ALL_CORE
For information on Fall & Injury Prevention, visit: [email or web address] OR  [email or web address] OR  https://www.cdc.gov/steadi/patient.html

## 2022-07-08 NOTE — CHART NOTE - NSCHARTNOTEFT_GEN_A_CORE
Medical Necessity for Oxygen   Patient presents with a diagnosis of Hypoxia . Acute exacerbation is currently resolved and patient will require 2 liters of continuous oxygen at home. Room air at rest oxygen saturation is 86%.   Contact # [place] [id number] Medical Necessity for Oxygen   Patient presents with a diagnosis of Hypoxia due to CT findings of bronchiectasis not Pneumonia or rib fractures. Acute exacerbation is currently resolved and patient will require 2 liters of continuous oxygen at home. Room air at rest oxygen saturation is 86%.   Contact # [place]indy [place], [place] [id number]

## 2022-07-08 NOTE — DISCHARGE NOTE PROVIDER - NSDCCPCAREPLAN_GEN_ALL_CORE_FT
PRINCIPAL DISCHARGE DIAGNOSIS  Diagnosis: Hypoxia  Assessment and Plan of Treatment: You were admitted for hypoxia in the setting of recent pneumonia and chronic rib fracture. Please complete antibitoics as directed. Oxygen was set up for you this is to be used continuously and titrated off under the supervision of medical providers once your saturation is improved.      SECONDARY DISCHARGE DIAGNOSES  Diagnosis: Pneumonia  Assessment and Plan of Treatment: Please take your medications as directed. Don’t skip doses. Follow up with your primary care physician within 3 days. Continue taking your antibiotics as directed until they are all gone—even if you start to feel better. This will prevent the pneumonia from  Coughing up mucus is normal. Don’t use medicines to suppress your cough unless your cough is dry, painful, or interferes with your sleep. Get plenty of rest until your fever, shortness of breath, and chest pain go away. Plan to get a flu shot every year. Ask your primary care doctor about pneumonia vaccines.  Seek immediate medical attention if you experience chest pain, trouble breathing, blue lips or fingernails, fever of 100.4°F  (38°C) or higher, yellow, green, bloody, or smelly sputum, more than normal mucus production, vomiting or diarrhea.    Diagnosis: Cerebrovascular accident (CVA)  Assessment and Plan of Treatment: Continue Xareolto and maintain fall precautions    Diagnosis: Hypertension, essential  Assessment and Plan of Treatment: Hypertension  Hypertension, commonly called high blood pressure, is when the force of blood pumping through your arteries is too strong. Hypertension forces your heart to work harder to pump blood. Your arteries may become narrow or stiff. Having untreated or uncontrolled hypertension for a long period of time can cause heart attack, stroke, kidney disease, and other problems. If started on a medication, take exactly as prescribed by your health care professional. Maintain a healthy lifestyle and follow up with your primary care physician.  SEEK IMMEDIATE MEDICAL CARE IF YOU HAVE ANY OF THE FOLLOWING SYMPTOMS: severe headache, confusion, chest pain, abdominal pain, vomiting, or shortness of breath.

## 2022-07-08 NOTE — DISCHARGE NOTE NURSING/CASE MANAGEMENT/SOCIAL WORK - PATIENT PORTAL LINK FT
You can access the FollowMyHealth Patient Portal offered by [facility] by registering at the following website: [email or web address]. By joining [facility]’s FollowMyHealth portal, you will also be able to view your health information using other applications (apps) compatible with our system.

## 2022-07-08 NOTE — DISCHARGE NOTE PROVIDER - HOSPITAL COURSE
89F w/ hx of CVA on asa and Xarelto, HTN, hypothyroid, HLD, mild memory impairment? p/w hypoxia and lack of oxygen, hypoxia noted at facility with no oxygen available. Returned to ER now saturating well on 2LNC, likely due to PNA and chronic rib fractures. Patient meeting criteria for O2 supplementation and set up through [facility]. Patient is stable for discharge back to [facility] w/ O2.

## 2022-07-08 NOTE — PROGRESS NOTE ADULT - PROBLEM/PLAN-6
Show Applicator Variable?: Yes Render Note In Bullet Format When Appropriate: No Post-Care Instructions: I reviewed with the patient in detail post-care instructions. Patient is to wear sunprotection, and avoid picking at any of the treated lesions. Pt may apply Vaseline to crusted or scabbing areas. Duration Of Freeze Thaw-Cycle (Seconds): 5 Detail Level: Simple Number Of Freeze-Thaw Cycles: 2 freeze-thaw cycles Consent: The patient's consent was obtained including but not limited to risks of crusting, scabbing, blistering, scarring, darker or lighter pigmentary change, recurrence, incomplete removal and infection. DISPLAY PLAN FREE TEXT

## 2022-07-08 NOTE — DISCHARGE NOTE PROVIDER - NSDCMRMEDTOKEN_GEN_ALL_CORE_FT
Adult Aspirin 81 mg oral tablet, chewable: 1 tab(s) orally once a day  ALPRAZolam 0.25 mg oral tablet: 1 tab(s) orally once a day, As Needed  amLODIPine 5 mg oral tablet: 1 tab(s) orally once a day  atorvastatin 40 mg oral tablet: 1 tab(s) orally once a day (at bedtime)  carvedilol 25 mg oral tablet: 1 tab(s) orally every 12 hours  cholecalciferol oral tablet: 1000 unit(s) orally once a day  folic acid 1 mg oral tablet: 1 tab(s) orally once a day  levoFLOXacin 250 mg oral tablet: 1 tab(s) orally every 24 hours x 3 days   levothyroxine 50 mcg (0.05 mg) oral tablet: 1 tab(s) orally once a day  rivaroxaban 10 mg oral tablet: 1 tab(s) orally once a day  Senna 8.6 mg oral tablet: 2 tab(s) orally once a day (at bedtime)

## 2022-07-08 NOTE — PROGRESS NOTE ADULT - SUBJECTIVE AND OBJECTIVE BOX
Patient is a 89y old  Female who presents with a chief complaint of No oxygen at rehab (07 Jul 2022 23:02)      SUBJECTIVE / OVERNIGHT EVENTS: will arrange transfer to facility once oxygen has been secured there    MEDICATIONS  (STANDING):  aspirin  chewable 81 milliGRAM(s) Oral daily  atorvastatin 40 milliGRAM(s) Oral at bedtime  carvedilol 25 milliGRAM(s) Oral every 12 hours  cholecalciferol 1000 Unit(s) Oral daily  folic acid 1 milliGRAM(s) Oral daily  levoFLOXacin  Tablet 250 milliGRAM(s) Oral every 24 hours  levothyroxine 50 MICROGram(s) Oral daily  rivaroxaban 10 milliGRAM(s) Oral daily    MEDICATIONS  (PRN):  acetaminophen     Tablet .. 650 milliGRAM(s) Oral every 6 hours PRN Temp greater or equal to 38C (100.4F), Mild Pain (1 - 3)  ALPRAZolam 0.25 milliGRAM(s) Oral daily PRN anxiety  melatonin 3 milliGRAM(s) Oral at bedtime PRN Insomnia  ondansetron Injectable 4 milliGRAM(s) IV Push every 8 hours PRN Nausea and/or Vomiting      Vital Signs Last 24 Hrs  T(F): 98.1 (07-08-22 @ 04:42), Max: 98.8 (07-07-22 @ 21:26)  HR: 76 (07-08-22 @ 05:01) (64 - 79)  BP: 115/70 (07-08-22 @ 04:42) (115/70 - 150/71)  RR: 18 (07-08-22 @ 04:42) (16 - 18)  SpO2: 97% (07-08-22 @ 04:42) (88% - 97%)  Telemetry:   CAPILLARY BLOOD GLUCOSE      POCT Blood Glucose.: 165 mg/dL (07 Jul 2022 16:57)  POCT Blood Glucose.: 162 mg/dL (07 Jul 2022 16:56)  POCT Blood Glucose.: 174 mg/dL (07 Jul 2022 11:42)    I&O's Summary      PHYSICAL EXAM:  GENERAL: NAD, well-developed  HEAD:  Atraumatic, Normocephalic  EYES: EOMI, PERRLA, conjunctiva and sclera clear  NECK: Supple, No JVD  CHEST/LUNG: Clear to auscultation bilaterally; No wheeze  HEART: Regular rate and rhythm; No murmurs, rubs, or gallops  ABDOMEN: Soft, Nontender, Nondistended; Bowel sounds present  EXTREMITIES:  2+ Peripheral Pulses, No clubbing, cyanosis, or edema  PSYCH: AAOx3  NEUROLOGY: non-focal  SKIN: No rashes or lesions    LABS:                        10.4   8.43  )-----------( 217      ( 08 Jul 2022 06:16 )             32.5     07-08    139  |  100  |  17  ----------------------------<  126<H>  3.9   |  28  |  0.62    Ca    8.6      08 Jul 2022 06:16  Mg     1.5     07-08    TPro  5.6<L>  /  Alb  3.0<L>  /  TBili  0.4  /  DBili  x   /  AST  9<L>  /  ALT  11  /  AlkPhos  75  07-08              RADIOLOGY & ADDITIONAL TESTS:    Imaging Personally Reviewed:    Consultant(s) Notes Reviewed:      Care Discussed with Consultants/Other Providers:

## 2022-07-11 LAB
CULTURE RESULTS: SIGNIFICANT CHANGE UP
CULTURE RESULTS: SIGNIFICANT CHANGE UP
SPECIMEN SOURCE: SIGNIFICANT CHANGE UP
SPECIMEN SOURCE: SIGNIFICANT CHANGE UP

## 2022-09-23 NOTE — PHYSICAL THERAPY INITIAL EVALUATION ADULT - LEVEL OF INDEPENDENCE, REHAB EVAL
Detail Level: Detailed Render Risk Assessment In Note?: no Comment: No new bullae since the first spots showed up on Sept 7th. minimum assist (75% patients effort)

## 2022-10-25 NOTE — ED ADULT TRIAGE NOTE - NSTRIAGECARE_GEN_A_ER
If provider orders tests at today's visit, patient would like to be contacted via uTest.  If to contact patient by phone, patient's preferred phone # is [phone] (Cell) and it is OK to leave message on voice mail or with family member.  If medications are ordered at today's visit, the pharmacy name/location patient would like them to be sent to is   [id number] - [facility] [address]  [address] [address]  Phone: [phone] Fax: [phone]     Face Mask

## 2022-12-08 NOTE — H&P ADULT - OPHTHALMOLOGIC
negative Post-Care Instructions: I reviewed with the patient in detail post-care instructions. Patient is to keep the biopsy site dry overnight, and then apply bacitracin twice daily until healed. Patient may apply hydrogen peroxide soaks to remove any crusting.

## 2023-07-17 NOTE — ED ADULT NURSE NOTE - CHIEF COMPLAINT QUOTE
unk From [place] for hypoxia, facility "ran out of oxygen" as per EMS. Was seen here yesterday for fall, dx with pneumonia and discharged back to facility on oxygen. Orbicularis Oris Muscle Flap Text: The defect edges were debeveled with a #15 scalpel blade.  Given that the defect affected the competency of the oral sphincter an orbicularis oris muscle flap was deemed most appropriate to restore this competency and normal muscle function.  Using a sterile surgical marker, an appropriate flap was drawn incorporating the defect. The area thus outlined was incised with a #15 scalpel blade.

## 2024-04-26 NOTE — PROGRESS NOTE ADULT - PROBLEM/PLAN-5
DISPLAY PLAN FREE TEXT

## 2024-06-13 NOTE — DISCHARGE NOTE ADULT - BECAUSE OF A PHYSICAL, MENTAL OR EMOTIONAL CONDITION, DO YOU HAVE DIFFICULTY DOING  ERRANDS ALONE LIKE VISITING A DOCTOR'S OFFICE OR SHOPPING (15 YEARS AND OLDER)
Duration Of Freeze Thaw-Cycle (Seconds): 0 Application Tool (Optional): Cry-AC Render Note In Bullet Format When Appropriate: No Post-Care Instructions: I reviewed with the patient in detail post-care instructions. Patient is to wear sunprotection, and avoid picking at any of the treated lesions. Pt may apply Vaseline to crusted or scabbing areas. Show Applicator Variable?: Yes Detail Level: Detailed Consent: The patient's consent was obtained including but not limited to risks of crusting, scabbing, blistering, scarring, darker or lighter pigmentary change, recurrence, incomplete removal and infection. Yes

## 2024-07-31 NOTE — ED ADULT NURSE NOTE - NS ED NURSE RECORD ANOTHER HT AND WT
Appt made for next Friday   
Maylin had called in from [facility], stating that the patient was released from the hospital, and needs to have a TCM appointment followed up with provider, just to discuss some medications.     Clinical team was unavailable at the time of assistance, please advise back out to Maylin at [phone] to get this appointment scheduled.   
Yes

## 2025-06-04 NOTE — PATIENT PROFILE ADULT. - EXTENSIONS OF SELF_ADULT
Please see referrals notes     [facility] [employer] stating that case has been APPROVED.  Auth #: [id number]  CPT code: 64812  No dates provided but Darcy states that approval is good for 3 months.   None

## 2025-06-04 NOTE — PHYSICAL THERAPY INITIAL EVALUATION ADULT - GAIT TRAINING, PT EVAL
Patient with known history of hairy cell leukemia and continues to follow Dr. Dow and Dr. Flores from Hematology/Oncology. Patient not currently on active treatment and currently undergoing treatment for neutropenic fever with broad-spectrum antibiotics as noted above.  Hematology consulted and following     5/30/25  Hematology discussed with other partners and it was decided not to proceed with treatment for leukemia. This was discussed with family.    patient will perform gait training  min assitx1 35 to 50 /RW within 2 weeks